# Patient Record
Sex: FEMALE | Race: WHITE | Employment: OTHER | URBAN - METROPOLITAN AREA
[De-identification: names, ages, dates, MRNs, and addresses within clinical notes are randomized per-mention and may not be internally consistent; named-entity substitution may affect disease eponyms.]

---

## 2017-01-06 ENCOUNTER — OFFICE VISIT (OUTPATIENT)
Dept: INTERNAL MEDICINE CLINIC | Age: 62
End: 2017-01-06

## 2017-01-06 VITALS
SYSTOLIC BLOOD PRESSURE: 135 MMHG | BODY MASS INDEX: 30.32 KG/M2 | HEIGHT: 65 IN | DIASTOLIC BLOOD PRESSURE: 80 MMHG | RESPIRATION RATE: 18 BRPM | WEIGHT: 182 LBS | HEART RATE: 99 BPM

## 2017-01-06 DIAGNOSIS — F41.9 ANXIETY AND DEPRESSION: ICD-10-CM

## 2017-01-06 DIAGNOSIS — J43.9 PULMONARY EMPHYSEMA, UNSPECIFIED EMPHYSEMA TYPE (HCC): ICD-10-CM

## 2017-01-06 DIAGNOSIS — R73.09 ELEVATED HEMOGLOBIN A1C: ICD-10-CM

## 2017-01-06 DIAGNOSIS — Z12.31 SCREENING MAMMOGRAM, ENCOUNTER FOR: ICD-10-CM

## 2017-01-06 DIAGNOSIS — I10 ESSENTIAL HYPERTENSION: Primary | ICD-10-CM

## 2017-01-06 DIAGNOSIS — I10 ESSENTIAL HYPERTENSION: ICD-10-CM

## 2017-01-06 DIAGNOSIS — F32.A ANXIETY AND DEPRESSION: ICD-10-CM

## 2017-01-06 DIAGNOSIS — I35.1 NONRHEUMATIC AORTIC VALVE INSUFFICIENCY: ICD-10-CM

## 2017-01-06 PROCEDURE — 99214 OFFICE O/P EST MOD 30 MIN: CPT | Performed by: INTERNAL MEDICINE

## 2017-01-06 RX ORDER — METOPROLOL SUCCINATE 25 MG/1
25 TABLET, EXTENDED RELEASE ORAL DAILY
Qty: 30 TABLET | Refills: 5 | Status: SHIPPED | OUTPATIENT
Start: 2017-01-06 | End: 2017-07-18 | Stop reason: SDUPTHER

## 2017-01-06 RX ORDER — ALBUTEROL SULFATE 90 UG/1
2 AEROSOL, METERED RESPIRATORY (INHALATION) EVERY 6 HOURS PRN
Qty: 1 INHALER | Refills: 2 | Status: SHIPPED | OUTPATIENT
Start: 2017-01-06 | End: 2017-09-06 | Stop reason: SDUPTHER

## 2017-01-06 RX ORDER — VARENICLINE TARTRATE 25 MG
KIT ORAL
Qty: 1 EACH | Refills: 0 | Status: SHIPPED | OUTPATIENT
Start: 2017-01-06 | End: 2017-07-18 | Stop reason: SDUPTHER

## 2017-01-06 RX ORDER — TIZANIDINE 2 MG/1
2 TABLET ORAL 2 TIMES DAILY PRN
Qty: 60 TABLET | Refills: 5 | Status: SHIPPED | OUTPATIENT
Start: 2017-01-06 | End: 2017-07-18 | Stop reason: SDUPTHER

## 2017-01-06 RX ORDER — DILTIAZEM HYDROCHLORIDE 120 MG/1
120 CAPSULE, COATED, EXTENDED RELEASE ORAL DAILY
Qty: 30 CAPSULE | Refills: 5 | Status: SHIPPED | OUTPATIENT
Start: 2017-01-06 | End: 2017-07-18 | Stop reason: SDUPTHER

## 2017-01-06 RX ORDER — LISINOPRIL AND HYDROCHLOROTHIAZIDE 20; 12.5 MG/1; MG/1
1 TABLET ORAL DAILY
Qty: 30 TABLET | Refills: 5 | Status: SHIPPED | OUTPATIENT
Start: 2017-01-06 | End: 2017-07-18 | Stop reason: SDUPTHER

## 2017-01-06 RX ORDER — DULOXETIN HYDROCHLORIDE 30 MG/1
30 CAPSULE, DELAYED RELEASE ORAL DAILY
Qty: 30 CAPSULE | Refills: 5 | Status: SHIPPED | OUTPATIENT
Start: 2017-01-06 | End: 2017-07-18 | Stop reason: SDUPTHER

## 2017-01-07 LAB
A/G RATIO: 1.7 (ref 1.1–2.2)
ALBUMIN SERPL-MCNC: 4 G/DL (ref 3.4–5)
ALP BLD-CCNC: 71 U/L (ref 40–129)
ALT SERPL-CCNC: 14 U/L (ref 10–40)
ANION GAP SERPL CALCULATED.3IONS-SCNC: 19 MMOL/L (ref 3–16)
AST SERPL-CCNC: 16 U/L (ref 15–37)
BASOPHILS ABSOLUTE: 0.2 K/UL (ref 0–0.2)
BASOPHILS RELATIVE PERCENT: 1 %
BILIRUB SERPL-MCNC: 0.3 MG/DL (ref 0–1)
BUN BLDV-MCNC: 14 MG/DL (ref 7–20)
CALCIUM SERPL-MCNC: 9.9 MG/DL (ref 8.3–10.6)
CHLORIDE BLD-SCNC: 96 MMOL/L (ref 99–110)
CO2: 23 MMOL/L (ref 21–32)
CREAT SERPL-MCNC: 1 MG/DL (ref 0.6–1.1)
EOSINOPHILS ABSOLUTE: 0 K/UL (ref 0–0.6)
EOSINOPHILS RELATIVE PERCENT: 0.2 %
ESTIMATED AVERAGE GLUCOSE: 128.4 MG/DL
GFR AFRICAN AMERICAN: >60
GFR NON-AFRICAN AMERICAN: 57
GLOBULIN: 2.4 G/DL
GLUCOSE BLD-MCNC: 113 MG/DL (ref 70–99)
HBA1C MFR BLD: 6.1 %
HCT VFR BLD CALC: 43.9 % (ref 36–48)
HEMOGLOBIN: 14.2 G/DL (ref 12–16)
LYMPHOCYTES ABSOLUTE: 0.8 K/UL (ref 1–5.1)
LYMPHOCYTES RELATIVE PERCENT: 5.2 %
MCH RBC QN AUTO: 30.5 PG (ref 26–34)
MCHC RBC AUTO-ENTMCNC: 32.4 G/DL (ref 31–36)
MCV RBC AUTO: 94.2 FL (ref 80–100)
MONOCYTES ABSOLUTE: 1 K/UL (ref 0–1.3)
MONOCYTES RELATIVE PERCENT: 6.3 %
NEUTROPHILS ABSOLUTE: 13.8 K/UL (ref 1.7–7.7)
NEUTROPHILS RELATIVE PERCENT: 87.3 %
PDW BLD-RTO: 18.8 % (ref 12.4–15.4)
PLATELET # BLD: 320 K/UL (ref 135–450)
PMV BLD AUTO: 8.8 FL (ref 5–10.5)
POTASSIUM SERPL-SCNC: 4.3 MMOL/L (ref 3.5–5.1)
RBC # BLD: 4.66 M/UL (ref 4–5.2)
SODIUM BLD-SCNC: 138 MMOL/L (ref 136–145)
TOTAL PROTEIN: 6.4 G/DL (ref 6.4–8.2)
WBC # BLD: 15.8 K/UL (ref 4–11)

## 2017-02-06 RX ORDER — VARENICLINE TARTRATE 1 MG/1
1 TABLET, FILM COATED ORAL 2 TIMES DAILY
Qty: 60 TABLET | Refills: 0 | Status: SHIPPED | OUTPATIENT
Start: 2017-02-06 | End: 2017-07-18

## 2017-02-22 DIAGNOSIS — J98.4 RESTRICTIVE LUNG DISEASE: Primary | ICD-10-CM

## 2017-02-22 RX ORDER — UMECLIDINIUM 62.5 UG/1
AEROSOL, POWDER ORAL
Qty: 30 EACH | Refills: 5 | Status: SHIPPED | OUTPATIENT
Start: 2017-02-22 | End: 2018-01-26 | Stop reason: SDUPTHER

## 2017-05-31 ENCOUNTER — OFFICE VISIT (OUTPATIENT)
Dept: ORTHOPEDIC SURGERY | Age: 62
End: 2017-05-31

## 2017-05-31 VITALS — WEIGHT: 182.1 LBS | HEIGHT: 65 IN | BODY MASS INDEX: 30.34 KG/M2

## 2017-05-31 DIAGNOSIS — M25.552 LEFT HIP PAIN: Primary | ICD-10-CM

## 2017-05-31 PROCEDURE — 99214 OFFICE O/P EST MOD 30 MIN: CPT | Performed by: ORTHOPAEDIC SURGERY

## 2017-05-31 PROCEDURE — G8427 DOCREV CUR MEDS BY ELIG CLIN: HCPCS | Performed by: ORTHOPAEDIC SURGERY

## 2017-05-31 PROCEDURE — G8417 CALC BMI ABV UP PARAM F/U: HCPCS | Performed by: ORTHOPAEDIC SURGERY

## 2017-05-31 PROCEDURE — 73502 X-RAY EXAM HIP UNI 2-3 VIEWS: CPT | Performed by: ORTHOPAEDIC SURGERY

## 2017-05-31 PROCEDURE — 1036F TOBACCO NON-USER: CPT | Performed by: ORTHOPAEDIC SURGERY

## 2017-05-31 PROCEDURE — 3014F SCREEN MAMMO DOC REV: CPT | Performed by: ORTHOPAEDIC SURGERY

## 2017-05-31 PROCEDURE — 3017F COLORECTAL CA SCREEN DOC REV: CPT | Performed by: ORTHOPAEDIC SURGERY

## 2017-05-31 RX ORDER — HYDROCODONE BITARTRATE AND ACETAMINOPHEN 10; 325 MG/1; MG/1
TABLET ORAL
COMMUNITY
Start: 2017-05-24 | End: 2018-01-26 | Stop reason: ALTCHOICE

## 2017-07-18 ENCOUNTER — OFFICE VISIT (OUTPATIENT)
Dept: INTERNAL MEDICINE CLINIC | Age: 62
End: 2017-07-18

## 2017-07-18 VITALS
WEIGHT: 184 LBS | DIASTOLIC BLOOD PRESSURE: 90 MMHG | HEIGHT: 65 IN | HEART RATE: 107 BPM | SYSTOLIC BLOOD PRESSURE: 140 MMHG | BODY MASS INDEX: 30.66 KG/M2 | RESPIRATION RATE: 18 BRPM

## 2017-07-18 DIAGNOSIS — R73.09 ELEVATED HEMOGLOBIN A1C: ICD-10-CM

## 2017-07-18 DIAGNOSIS — F41.9 ANXIETY AND DEPRESSION: ICD-10-CM

## 2017-07-18 DIAGNOSIS — I10 ESSENTIAL HYPERTENSION: Primary | ICD-10-CM

## 2017-07-18 DIAGNOSIS — J43.9 PULMONARY EMPHYSEMA, UNSPECIFIED EMPHYSEMA TYPE (HCC): ICD-10-CM

## 2017-07-18 DIAGNOSIS — F32.A ANXIETY AND DEPRESSION: ICD-10-CM

## 2017-07-18 PROCEDURE — G8417 CALC BMI ABV UP PARAM F/U: HCPCS | Performed by: INTERNAL MEDICINE

## 2017-07-18 PROCEDURE — 3017F COLORECTAL CA SCREEN DOC REV: CPT | Performed by: INTERNAL MEDICINE

## 2017-07-18 PROCEDURE — 3014F SCREEN MAMMO DOC REV: CPT | Performed by: INTERNAL MEDICINE

## 2017-07-18 PROCEDURE — 1036F TOBACCO NON-USER: CPT | Performed by: INTERNAL MEDICINE

## 2017-07-18 PROCEDURE — G8427 DOCREV CUR MEDS BY ELIG CLIN: HCPCS | Performed by: INTERNAL MEDICINE

## 2017-07-18 PROCEDURE — 99214 OFFICE O/P EST MOD 30 MIN: CPT | Performed by: INTERNAL MEDICINE

## 2017-07-18 PROCEDURE — 3023F SPIROM DOC REV: CPT | Performed by: INTERNAL MEDICINE

## 2017-07-18 PROCEDURE — G8926 SPIRO NO PERF OR DOC: HCPCS | Performed by: INTERNAL MEDICINE

## 2017-07-18 RX ORDER — METOPROLOL SUCCINATE 50 MG/1
50 TABLET, EXTENDED RELEASE ORAL DAILY
Qty: 30 TABLET | Refills: 5 | Status: SHIPPED | OUTPATIENT
Start: 2017-07-18 | End: 2018-01-03 | Stop reason: SDUPTHER

## 2017-07-18 RX ORDER — DILTIAZEM HYDROCHLORIDE 120 MG/1
120 CAPSULE, COATED, EXTENDED RELEASE ORAL DAILY
Qty: 30 CAPSULE | Refills: 5 | Status: SHIPPED | OUTPATIENT
Start: 2017-07-18 | End: 2018-01-03 | Stop reason: SDUPTHER

## 2017-07-18 RX ORDER — DULOXETIN HYDROCHLORIDE 30 MG/1
30 CAPSULE, DELAYED RELEASE ORAL DAILY
Qty: 30 CAPSULE | Refills: 5 | Status: SHIPPED | OUTPATIENT
Start: 2017-07-18 | End: 2018-01-03 | Stop reason: SDUPTHER

## 2017-07-18 RX ORDER — VARENICLINE TARTRATE 25 MG
KIT ORAL
Qty: 1 EACH | Refills: 0 | Status: SHIPPED | OUTPATIENT
Start: 2017-07-18 | End: 2017-08-15 | Stop reason: SDUPTHER

## 2017-07-18 RX ORDER — TIZANIDINE 2 MG/1
2 TABLET ORAL 2 TIMES DAILY PRN
Qty: 60 TABLET | Refills: 5 | Status: SHIPPED | OUTPATIENT
Start: 2017-07-18 | End: 2018-01-03 | Stop reason: SDUPTHER

## 2017-07-18 RX ORDER — LISINOPRIL AND HYDROCHLOROTHIAZIDE 20; 12.5 MG/1; MG/1
1 TABLET ORAL DAILY
Qty: 30 TABLET | Refills: 5 | Status: SHIPPED | OUTPATIENT
Start: 2017-07-18 | End: 2018-01-26 | Stop reason: SDUPTHER

## 2017-07-18 ASSESSMENT — PATIENT HEALTH QUESTIONNAIRE - PHQ9
SUM OF ALL RESPONSES TO PHQ QUESTIONS 1-9: 1
SUM OF ALL RESPONSES TO PHQ9 QUESTIONS 1 & 2: 1
2. FEELING DOWN, DEPRESSED OR HOPELESS: 1
1. LITTLE INTEREST OR PLEASURE IN DOING THINGS: 0

## 2017-08-11 ENCOUNTER — TELEPHONE (OUTPATIENT)
Dept: PULMONOLOGY | Age: 62
End: 2017-08-11

## 2017-08-21 RX ORDER — VARENICLINE TARTRATE
KIT
Qty: 53 EACH | Refills: 0 | Status: SHIPPED | OUTPATIENT
Start: 2017-08-21 | End: 2018-01-26 | Stop reason: SDUPTHER

## 2017-10-23 DIAGNOSIS — J98.4 RESTRICTIVE LUNG DISEASE: ICD-10-CM

## 2017-10-23 RX ORDER — UMECLIDINIUM 62.5 UG/1
AEROSOL, POWDER ORAL
Qty: 30 EACH | Refills: 5 | OUTPATIENT
Start: 2017-10-23

## 2017-11-06 DIAGNOSIS — J98.4 RESTRICTIVE LUNG DISEASE: ICD-10-CM

## 2017-11-06 RX ORDER — UMECLIDINIUM 62.5 UG/1
AEROSOL, POWDER ORAL
Qty: 30 EACH | Refills: 0 | OUTPATIENT
Start: 2017-11-06

## 2018-01-03 DIAGNOSIS — M47.817 LUMBOSACRAL SPONDYLOSIS WITHOUT MYELOPATHY: Primary | Chronic | ICD-10-CM

## 2018-01-03 RX ORDER — METOPROLOL SUCCINATE 50 MG/1
50 TABLET, EXTENDED RELEASE ORAL DAILY
Qty: 30 TABLET | Refills: 0 | Status: SHIPPED | OUTPATIENT
Start: 2018-01-03 | End: 2018-01-26 | Stop reason: SDUPTHER

## 2018-01-03 RX ORDER — DILTIAZEM HYDROCHLORIDE 120 MG/1
120 CAPSULE, COATED, EXTENDED RELEASE ORAL DAILY
Qty: 30 CAPSULE | Refills: 0 | Status: SHIPPED | OUTPATIENT
Start: 2018-01-03 | End: 2018-01-26 | Stop reason: SDUPTHER

## 2018-01-03 RX ORDER — DULOXETIN HYDROCHLORIDE 30 MG/1
30 CAPSULE, DELAYED RELEASE ORAL DAILY
Qty: 30 CAPSULE | Refills: 0 | Status: SHIPPED | OUTPATIENT
Start: 2018-01-03 | End: 2018-01-26 | Stop reason: SDUPTHER

## 2018-01-03 RX ORDER — TIZANIDINE 2 MG/1
TABLET ORAL
Qty: 60 TABLET | Refills: 0 | Status: SHIPPED | OUTPATIENT
Start: 2018-01-03 | End: 2018-01-26 | Stop reason: SDUPTHER

## 2018-01-26 ENCOUNTER — OFFICE VISIT (OUTPATIENT)
Dept: INTERNAL MEDICINE CLINIC | Age: 63
End: 2018-01-26

## 2018-01-26 VITALS
SYSTOLIC BLOOD PRESSURE: 150 MMHG | DIASTOLIC BLOOD PRESSURE: 75 MMHG | HEART RATE: 70 BPM | WEIGHT: 188 LBS | RESPIRATION RATE: 18 BRPM | HEIGHT: 65 IN | BODY MASS INDEX: 31.32 KG/M2

## 2018-01-26 DIAGNOSIS — J43.1 PANLOBULAR EMPHYSEMA (HCC): ICD-10-CM

## 2018-01-26 DIAGNOSIS — Z72.0 TOBACCO ABUSE: ICD-10-CM

## 2018-01-26 DIAGNOSIS — I10 ESSENTIAL HYPERTENSION: Primary | ICD-10-CM

## 2018-01-26 DIAGNOSIS — Z13.220 LIPID SCREENING: ICD-10-CM

## 2018-01-26 DIAGNOSIS — M47.817 LUMBOSACRAL SPONDYLOSIS WITHOUT MYELOPATHY: Chronic | ICD-10-CM

## 2018-01-26 DIAGNOSIS — M51.37 DEGENERATION OF LUMBAR OR LUMBOSACRAL INTERVERTEBRAL DISC: Chronic | ICD-10-CM

## 2018-01-26 DIAGNOSIS — J98.4 RESTRICTIVE LUNG DISEASE: ICD-10-CM

## 2018-01-26 DIAGNOSIS — Z12.31 ENCOUNTER FOR SCREENING MAMMOGRAM FOR BREAST CANCER: ICD-10-CM

## 2018-01-26 DIAGNOSIS — R73.09 ELEVATED HEMOGLOBIN A1C: ICD-10-CM

## 2018-01-26 PROCEDURE — 99214 OFFICE O/P EST MOD 30 MIN: CPT | Performed by: INTERNAL MEDICINE

## 2018-01-26 PROCEDURE — 1036F TOBACCO NON-USER: CPT | Performed by: INTERNAL MEDICINE

## 2018-01-26 PROCEDURE — 3023F SPIROM DOC REV: CPT | Performed by: INTERNAL MEDICINE

## 2018-01-26 PROCEDURE — 3014F SCREEN MAMMO DOC REV: CPT | Performed by: INTERNAL MEDICINE

## 2018-01-26 PROCEDURE — G8926 SPIRO NO PERF OR DOC: HCPCS | Performed by: INTERNAL MEDICINE

## 2018-01-26 PROCEDURE — G8427 DOCREV CUR MEDS BY ELIG CLIN: HCPCS | Performed by: INTERNAL MEDICINE

## 2018-01-26 PROCEDURE — 3017F COLORECTAL CA SCREEN DOC REV: CPT | Performed by: INTERNAL MEDICINE

## 2018-01-26 PROCEDURE — G8417 CALC BMI ABV UP PARAM F/U: HCPCS | Performed by: INTERNAL MEDICINE

## 2018-01-26 PROCEDURE — G8484 FLU IMMUNIZE NO ADMIN: HCPCS | Performed by: INTERNAL MEDICINE

## 2018-01-26 RX ORDER — VARENICLINE TARTRATE 25 MG
KIT ORAL
Qty: 53 EACH | Refills: 0 | Status: SHIPPED | OUTPATIENT
Start: 2018-01-26 | End: 2018-03-22 | Stop reason: SDUPTHER

## 2018-01-26 RX ORDER — DILTIAZEM HYDROCHLORIDE 120 MG/1
120 CAPSULE, COATED, EXTENDED RELEASE ORAL DAILY
Qty: 30 CAPSULE | Refills: 0 | Status: SHIPPED | OUTPATIENT
Start: 2018-01-26 | End: 2018-02-27 | Stop reason: SDUPTHER

## 2018-01-26 RX ORDER — LISINOPRIL AND HYDROCHLOROTHIAZIDE 20; 12.5 MG/1; MG/1
1 TABLET ORAL DAILY
Qty: 30 TABLET | Refills: 0 | Status: SHIPPED | OUTPATIENT
Start: 2018-01-26 | End: 2018-02-27 | Stop reason: SDUPTHER

## 2018-01-26 RX ORDER — VARENICLINE TARTRATE 1 MG/1
1 TABLET, FILM COATED ORAL 2 TIMES DAILY
Qty: 60 TABLET | Refills: 3 | Status: SHIPPED | OUTPATIENT
Start: 2018-01-26 | End: 2018-03-22 | Stop reason: SDUPTHER

## 2018-01-26 RX ORDER — TIZANIDINE 2 MG/1
TABLET ORAL
Qty: 60 TABLET | Refills: 0 | Status: SHIPPED | OUTPATIENT
Start: 2018-01-26 | End: 2018-02-27 | Stop reason: SDUPTHER

## 2018-01-26 RX ORDER — METOPROLOL SUCCINATE 50 MG/1
50 TABLET, EXTENDED RELEASE ORAL DAILY
Qty: 30 TABLET | Refills: 0 | Status: SHIPPED | OUTPATIENT
Start: 2018-01-26 | End: 2018-02-27 | Stop reason: SDUPTHER

## 2018-01-26 RX ORDER — DULOXETIN HYDROCHLORIDE 30 MG/1
30 CAPSULE, DELAYED RELEASE ORAL DAILY
Qty: 30 CAPSULE | Refills: 0 | Status: SHIPPED | OUTPATIENT
Start: 2018-01-26 | End: 2018-02-27 | Stop reason: SDUPTHER

## 2018-01-26 NOTE — PROGRESS NOTES
Subjective:      Patient ID: Praful Mckinnon is a 63 y.o. female. HPI     63 y.o. female with h.o   chronic back pain , copd, HTN , tobacco use here for regular f/w    Chronic back pain - s.p mulitiple surgeries. S.p VJ by F/w Dr Triston Raphael  on vicodin. , zanaflex Had epidurals with some relief- now lost them    Reports having more pain issues since last time   Using cane for ambulation . No recent falls    Able to do some activity     Depression better , compliant with meds     Copd with chronic Dyspnea stable. Uses inhalers as needed    Still smoking a pack a day    Current Outpatient Prescriptions   Medication Sig Dispense Refill    metoprolol succinate (TOPROL XL) 50 MG extended release tablet TAKE 1 TABLET BY MOUTH DAILY 30 tablet 0    diltiazem (CARDIZEM CD) 120 MG extended release capsule TAKE 1 CAPSULE BY MOUTH DAILY 30 capsule 0    DULoxetine (CYMBALTA) 30 MG extended release capsule TAKE 1 CAPSULE BY MOUTH DAILY 30 capsule 0    tiZANidine (ZANAFLEX) 2 MG tablet TAKE 1 TABLET BY MOUTH 2 TIMES DAILY AS NEEDED FOR MUSCLE PAIN 60 tablet 0    LYRICA 100 MG capsule Take 1 capsule by mouth 2 times daily for 30 days. 60 capsule 0    VENTOLIN  (90 Base) MCG/ACT inhaler Inhale 2 puffs into the lungs every 6 hours as needed for Wheezing 18 g 1    CHANTIX STARTING MONTH RICHARD 0.5 MG X 11 & 1 MG X 42 tablet TAKE AS DIRECTED PER PACKAGE INSTRUCTIONS 53 each 0    lisinopril-hydrochlorothiazide (PRINZIDE;ZESTORETIC) 20-12.5 MG per tablet Take 1 tablet by mouth daily 30 tablet 5    HYDROcodone-acetaminophen (NORCO)  MG per tablet .  INCRUSE ELLIPTA 62.5 MCG/INH AEPB USE ONE PUFF DAILY 30 each 5     No current facility-administered medications for this visit. Review of Systems  As above    Objective:   Physical Exam  There were no vitals filed for this visit. General: older appearing female,  Awake, alert and oriented.   Mucous Membranes:  Pink , anicteric  Neck: No JVD, no carotid bruit, no thyromegaly  Chest:  Clear to auscultation bilaterally, no added sounds  Cardiovascular:  RRR S1S2 heard, no murmurs or gallops  Abdomen:  Soft, undistended, non tender, no organomegaly, BS present  Extremities: trace edema Distal pulses well felt  Neurological : grossly normal mood ok today      Assessment:      1. Essential hypertension     2. Degeneration of lumbar or lumbosacral intervertebral disc     3. Tobacco abuse     4. Panlobular emphysema (HCC)             Plan:      Anxiety and severe  depression - has used prozac and klonopin in the past   Weaned off klonopin 2015    - now on cymbalta 30 mg daily -better controlled  - no suicidal thoughts  - would benefit from psych consult in neat future  - doing better since last year       HTn - high on meds, should be compliant on cardizem, lisinopril , metoprolol well controlled     Aortic regurgitation - no acute symptoms. F/w Dr. Selina Zuluaga    Tobacco abuse  - Need smoking cessation- need ct chest screening    COPD with pulmonary nodules- f/w Jm Bowman . Off advair and now on tudorza .  Albuterol prn    Elevated Hemoglobin A1c  At 6.1, low carb diet, -need to recheck    Chronic back pain - on vicodin by Dr. Chuy Cao- now off pain management  As she could not maintain pill count    Refer to new pain mx      Need colonoscopy /mammogram

## 2018-02-06 DIAGNOSIS — M47.817 LUMBOSACRAL SPONDYLOSIS WITHOUT MYELOPATHY: Chronic | ICD-10-CM

## 2018-02-24 DIAGNOSIS — J98.4 RESTRICTIVE LUNG DISEASE: ICD-10-CM

## 2018-02-26 ENCOUNTER — TELEPHONE (OUTPATIENT)
Dept: INTERNAL MEDICINE CLINIC | Age: 63
End: 2018-02-26

## 2018-02-26 DIAGNOSIS — M51.26 DISPLACEMENT OF LUMBAR INTERVERTEBRAL DISC WITHOUT MYELOPATHY: Chronic | ICD-10-CM

## 2018-02-26 DIAGNOSIS — M47.817 LUMBOSACRAL SPONDYLOSIS WITHOUT MYELOPATHY: Primary | Chronic | ICD-10-CM

## 2018-02-26 DIAGNOSIS — M48.061 SPINAL STENOSIS, LUMBAR REGION, WITHOUT NEUROGENIC CLAUDICATION: Chronic | ICD-10-CM

## 2018-02-26 DIAGNOSIS — M51.37 DEGENERATION OF LUMBAR OR LUMBOSACRAL INTERVERTEBRAL DISC: Chronic | ICD-10-CM

## 2018-02-26 RX ORDER — UMECLIDINIUM 62.5 UG/1
1 AEROSOL, POWDER ORAL DAILY
Qty: 30 EACH | Refills: 1 | Status: SHIPPED | OUTPATIENT
Start: 2018-02-26 | End: 2018-03-22 | Stop reason: ALTCHOICE

## 2018-02-26 NOTE — TELEPHONE ENCOUNTER
----- Message from Sonya Petit sent at 2/26/2018  2:05 PM EST -----  Contact: [phone]  She needs a referral sent to [facility] pain management-said they wouldn't schedule her with a drNoel Until they had this ,so shes not sure of which dr.she will be seeing-said she is going there for her back pain- # [phone] not have fax # last appt- 1-26-18-pt has medicare ins.--lr

## 2018-02-27 DIAGNOSIS — J98.4 RESTRICTIVE LUNG DISEASE: ICD-10-CM

## 2018-02-28 RX ORDER — LISINOPRIL AND HYDROCHLOROTHIAZIDE 20; 12.5 MG/1; MG/1
1 TABLET ORAL DAILY
Qty: 30 TABLET | Refills: 0 | Status: SHIPPED | OUTPATIENT
Start: 2018-02-28 | End: 2018-03-22 | Stop reason: SDUPTHER

## 2018-02-28 RX ORDER — TIZANIDINE 2 MG/1
TABLET ORAL
Qty: 60 TABLET | Refills: 0 | Status: SHIPPED | OUTPATIENT
Start: 2018-02-28 | End: 2018-03-22 | Stop reason: SDUPTHER

## 2018-02-28 RX ORDER — DULOXETIN HYDROCHLORIDE 30 MG/1
30 CAPSULE, DELAYED RELEASE ORAL DAILY
Qty: 30 CAPSULE | Refills: 0 | Status: SHIPPED | OUTPATIENT
Start: 2018-02-28 | End: 2018-03-22 | Stop reason: SDUPTHER

## 2018-02-28 RX ORDER — DILTIAZEM HYDROCHLORIDE 120 MG/1
120 CAPSULE, COATED, EXTENDED RELEASE ORAL DAILY
Qty: 30 CAPSULE | Refills: 0 | Status: SHIPPED | OUTPATIENT
Start: 2018-02-28 | End: 2018-03-22 | Stop reason: SDUPTHER

## 2018-02-28 RX ORDER — UMECLIDINIUM 62.5 UG/1
1 AEROSOL, POWDER ORAL DAILY
Qty: 30 EACH | Refills: 0 | Status: SHIPPED | OUTPATIENT
Start: 2018-02-28 | End: 2018-03-22 | Stop reason: SDUPTHER

## 2018-02-28 RX ORDER — METOPROLOL SUCCINATE 50 MG/1
50 TABLET, EXTENDED RELEASE ORAL DAILY
Qty: 30 TABLET | Refills: 0 | Status: SHIPPED | OUTPATIENT
Start: 2018-02-28 | End: 2018-03-22 | Stop reason: SDUPTHER

## 2018-03-22 ENCOUNTER — OFFICE VISIT (OUTPATIENT)
Dept: INTERNAL MEDICINE CLINIC | Age: 63
End: 2018-03-22

## 2018-03-22 VITALS
HEART RATE: 70 BPM | BODY MASS INDEX: 31.62 KG/M2 | WEIGHT: 190 LBS | SYSTOLIC BLOOD PRESSURE: 155 MMHG | DIASTOLIC BLOOD PRESSURE: 75 MMHG | RESPIRATION RATE: 18 BRPM

## 2018-03-22 DIAGNOSIS — J43.1 PANLOBULAR EMPHYSEMA (HCC): ICD-10-CM

## 2018-03-22 DIAGNOSIS — R73.09 ELEVATED HEMOGLOBIN A1C: ICD-10-CM

## 2018-03-22 DIAGNOSIS — I10 BENIGN ESSENTIAL HTN: Primary | ICD-10-CM

## 2018-03-22 DIAGNOSIS — I10 BENIGN ESSENTIAL HTN: ICD-10-CM

## 2018-03-22 DIAGNOSIS — M48.061 SPINAL STENOSIS, LUMBAR REGION, WITHOUT NEUROGENIC CLAUDICATION: Chronic | ICD-10-CM

## 2018-03-22 DIAGNOSIS — M47.817 LUMBOSACRAL SPONDYLOSIS WITHOUT MYELOPATHY: Chronic | ICD-10-CM

## 2018-03-22 PROCEDURE — G8417 CALC BMI ABV UP PARAM F/U: HCPCS | Performed by: INTERNAL MEDICINE

## 2018-03-22 PROCEDURE — 99213 OFFICE O/P EST LOW 20 MIN: CPT | Performed by: INTERNAL MEDICINE

## 2018-03-22 PROCEDURE — 3023F SPIROM DOC REV: CPT | Performed by: INTERNAL MEDICINE

## 2018-03-22 PROCEDURE — 3014F SCREEN MAMMO DOC REV: CPT | Performed by: INTERNAL MEDICINE

## 2018-03-22 PROCEDURE — G8427 DOCREV CUR MEDS BY ELIG CLIN: HCPCS | Performed by: INTERNAL MEDICINE

## 2018-03-22 PROCEDURE — G8484 FLU IMMUNIZE NO ADMIN: HCPCS | Performed by: INTERNAL MEDICINE

## 2018-03-22 PROCEDURE — 3017F COLORECTAL CA SCREEN DOC REV: CPT | Performed by: INTERNAL MEDICINE

## 2018-03-22 PROCEDURE — 4004F PT TOBACCO SCREEN RCVD TLK: CPT | Performed by: INTERNAL MEDICINE

## 2018-03-22 PROCEDURE — G8926 SPIRO NO PERF OR DOC: HCPCS | Performed by: INTERNAL MEDICINE

## 2018-03-22 RX ORDER — DILTIAZEM HYDROCHLORIDE 120 MG/1
120 CAPSULE, COATED, EXTENDED RELEASE ORAL DAILY
Qty: 30 CAPSULE | Refills: 5 | Status: SHIPPED | OUTPATIENT
Start: 2018-03-22 | End: 2018-07-23 | Stop reason: SDUPTHER

## 2018-03-22 RX ORDER — DULOXETIN HYDROCHLORIDE 30 MG/1
30 CAPSULE, DELAYED RELEASE ORAL DAILY
Qty: 30 CAPSULE | Refills: 0 | Status: SHIPPED | OUTPATIENT
Start: 2018-03-22 | End: 2018-04-18 | Stop reason: SDUPTHER

## 2018-03-22 RX ORDER — TIZANIDINE 2 MG/1
2 TABLET ORAL 2 TIMES DAILY PRN
Qty: 60 TABLET | Refills: 5 | Status: SHIPPED | OUTPATIENT
Start: 2018-03-22 | End: 2018-07-23 | Stop reason: SDUPTHER

## 2018-03-22 RX ORDER — LISINOPRIL AND HYDROCHLOROTHIAZIDE 20; 12.5 MG/1; MG/1
1 TABLET ORAL DAILY
Qty: 30 TABLET | Refills: 5 | Status: SHIPPED | OUTPATIENT
Start: 2018-03-22 | End: 2018-07-23

## 2018-03-22 RX ORDER — METOPROLOL SUCCINATE 50 MG/1
50 TABLET, EXTENDED RELEASE ORAL DAILY
Qty: 30 TABLET | Refills: 5 | Status: SHIPPED | OUTPATIENT
Start: 2018-03-22 | End: 2018-07-23 | Stop reason: SDUPTHER

## 2018-03-22 RX ORDER — VARENICLINE TARTRATE 1 MG/1
1 TABLET, FILM COATED ORAL 2 TIMES DAILY
Qty: 60 TABLET | Refills: 3 | Status: SHIPPED | OUTPATIENT
Start: 2018-03-22 | End: 2019-01-14 | Stop reason: SDUPTHER

## 2018-03-23 LAB
A/G RATIO: 1.9 (ref 1.1–2.2)
ALBUMIN SERPL-MCNC: 4.2 G/DL (ref 3.4–5)
ALP BLD-CCNC: 65 U/L (ref 40–129)
ALT SERPL-CCNC: 13 U/L (ref 10–40)
ANION GAP SERPL CALCULATED.3IONS-SCNC: 22 MMOL/L (ref 3–16)
AST SERPL-CCNC: 13 U/L (ref 15–37)
BASOPHILS ABSOLUTE: 0.1 K/UL (ref 0–0.2)
BASOPHILS RELATIVE PERCENT: 0.7 %
BILIRUB SERPL-MCNC: <0.2 MG/DL (ref 0–1)
BUN BLDV-MCNC: 15 MG/DL (ref 7–20)
CALCIUM SERPL-MCNC: 9.2 MG/DL (ref 8.3–10.6)
CHLORIDE BLD-SCNC: 97 MMOL/L (ref 99–110)
CO2: 23 MMOL/L (ref 21–32)
CREAT SERPL-MCNC: 0.8 MG/DL (ref 0.6–1.2)
EOSINOPHILS ABSOLUTE: 0.1 K/UL (ref 0–0.6)
EOSINOPHILS RELATIVE PERCENT: 1.2 %
ESTIMATED AVERAGE GLUCOSE: 139.9 MG/DL
GFR AFRICAN AMERICAN: >60
GFR NON-AFRICAN AMERICAN: >60
GLOBULIN: 2.2 G/DL
GLUCOSE BLD-MCNC: 123 MG/DL (ref 70–99)
HBA1C MFR BLD: 6.5 %
HCT VFR BLD CALC: 42.3 % (ref 36–48)
HEMOGLOBIN: 14.1 G/DL (ref 12–16)
LYMPHOCYTES ABSOLUTE: 1.2 K/UL (ref 1–5.1)
LYMPHOCYTES RELATIVE PERCENT: 12.5 %
MCH RBC QN AUTO: 32.7 PG (ref 26–34)
MCHC RBC AUTO-ENTMCNC: 33.4 G/DL (ref 31–36)
MCV RBC AUTO: 98 FL (ref 80–100)
MONOCYTES ABSOLUTE: 0.8 K/UL (ref 0–1.3)
MONOCYTES RELATIVE PERCENT: 8.5 %
NEUTROPHILS ABSOLUTE: 7.5 K/UL (ref 1.7–7.7)
NEUTROPHILS RELATIVE PERCENT: 77.1 %
PDW BLD-RTO: 17.7 % (ref 12.4–15.4)
PLATELET # BLD: 328 K/UL (ref 135–450)
PMV BLD AUTO: 8.8 FL (ref 5–10.5)
POTASSIUM SERPL-SCNC: 4.7 MMOL/L (ref 3.5–5.1)
RBC # BLD: 4.31 M/UL (ref 4–5.2)
SODIUM BLD-SCNC: 142 MMOL/L (ref 136–145)
TOTAL PROTEIN: 6.4 G/DL (ref 6.4–8.2)
WBC # BLD: 9.7 K/UL (ref 4–11)

## 2018-07-19 RX ORDER — DULOXETIN HYDROCHLORIDE 30 MG/1
30 CAPSULE, DELAYED RELEASE ORAL DAILY
Qty: 30 CAPSULE | Refills: 2 | Status: SHIPPED | OUTPATIENT
Start: 2018-07-19 | End: 2018-11-19 | Stop reason: SDUPTHER

## 2018-07-23 ENCOUNTER — OFFICE VISIT (OUTPATIENT)
Dept: INTERNAL MEDICINE CLINIC | Age: 63
End: 2018-07-23

## 2018-07-23 VITALS
DIASTOLIC BLOOD PRESSURE: 78 MMHG | SYSTOLIC BLOOD PRESSURE: 150 MMHG | HEIGHT: 65 IN | RESPIRATION RATE: 18 BRPM | HEART RATE: 70 BPM | BODY MASS INDEX: 33.66 KG/M2 | WEIGHT: 202 LBS

## 2018-07-23 DIAGNOSIS — Z23 NEED FOR PNEUMOCOCCAL VACCINATION: ICD-10-CM

## 2018-07-23 DIAGNOSIS — M48.061 SPINAL STENOSIS, LUMBAR REGION, WITHOUT NEUROGENIC CLAUDICATION: Chronic | ICD-10-CM

## 2018-07-23 DIAGNOSIS — F32.A ANXIETY AND DEPRESSION: ICD-10-CM

## 2018-07-23 DIAGNOSIS — I10 ESSENTIAL HYPERTENSION: ICD-10-CM

## 2018-07-23 DIAGNOSIS — J43.1 PANLOBULAR EMPHYSEMA (HCC): Primary | ICD-10-CM

## 2018-07-23 DIAGNOSIS — F41.9 ANXIETY AND DEPRESSION: ICD-10-CM

## 2018-07-23 DIAGNOSIS — R73.09 ELEVATED HEMOGLOBIN A1C: ICD-10-CM

## 2018-07-23 LAB
BASOPHILS ABSOLUTE: 0.1 K/UL (ref 0–0.2)
BASOPHILS RELATIVE PERCENT: 0.7 %
EOSINOPHILS ABSOLUTE: 0 K/UL (ref 0–0.6)
EOSINOPHILS RELATIVE PERCENT: 0.3 %
HCT VFR BLD CALC: 41.4 % (ref 36–48)
HEMOGLOBIN: 13.9 G/DL (ref 12–16)
LYMPHOCYTES ABSOLUTE: 0.8 K/UL (ref 1–5.1)
LYMPHOCYTES RELATIVE PERCENT: 8.9 %
MCH RBC QN AUTO: 33 PG (ref 26–34)
MCHC RBC AUTO-ENTMCNC: 33.5 G/DL (ref 31–36)
MCV RBC AUTO: 98.4 FL (ref 80–100)
MONOCYTES ABSOLUTE: 0.7 K/UL (ref 0–1.3)
MONOCYTES RELATIVE PERCENT: 7.1 %
NEUTROPHILS ABSOLUTE: 7.8 K/UL (ref 1.7–7.7)
NEUTROPHILS RELATIVE PERCENT: 83 %
PDW BLD-RTO: 16.1 % (ref 12.4–15.4)
PLATELET # BLD: 286 K/UL (ref 135–450)
PMV BLD AUTO: 8.5 FL (ref 5–10.5)
RBC # BLD: 4.21 M/UL (ref 4–5.2)
WBC # BLD: 9.5 K/UL (ref 4–11)

## 2018-07-23 PROCEDURE — 90732 PPSV23 VACC 2 YRS+ SUBQ/IM: CPT | Performed by: INTERNAL MEDICINE

## 2018-07-23 PROCEDURE — 3023F SPIROM DOC REV: CPT | Performed by: INTERNAL MEDICINE

## 2018-07-23 PROCEDURE — 3017F COLORECTAL CA SCREEN DOC REV: CPT | Performed by: INTERNAL MEDICINE

## 2018-07-23 PROCEDURE — G8427 DOCREV CUR MEDS BY ELIG CLIN: HCPCS | Performed by: INTERNAL MEDICINE

## 2018-07-23 PROCEDURE — G8417 CALC BMI ABV UP PARAM F/U: HCPCS | Performed by: INTERNAL MEDICINE

## 2018-07-23 PROCEDURE — 4004F PT TOBACCO SCREEN RCVD TLK: CPT | Performed by: INTERNAL MEDICINE

## 2018-07-23 PROCEDURE — G8926 SPIRO NO PERF OR DOC: HCPCS | Performed by: INTERNAL MEDICINE

## 2018-07-23 PROCEDURE — G0009 ADMIN PNEUMOCOCCAL VACCINE: HCPCS | Performed by: INTERNAL MEDICINE

## 2018-07-23 PROCEDURE — 99213 OFFICE O/P EST LOW 20 MIN: CPT | Performed by: INTERNAL MEDICINE

## 2018-07-23 RX ORDER — TIZANIDINE 2 MG/1
2 TABLET ORAL 2 TIMES DAILY PRN
Qty: 60 TABLET | Refills: 5 | Status: SHIPPED | OUTPATIENT
Start: 2018-07-23 | End: 2018-11-19 | Stop reason: SDUPTHER

## 2018-07-23 RX ORDER — VARENICLINE TARTRATE 1 MG/1
1 TABLET, FILM COATED ORAL 2 TIMES DAILY
Qty: 60 TABLET | Refills: 3 | Status: CANCELLED | OUTPATIENT
Start: 2018-07-23

## 2018-07-23 RX ORDER — METOPROLOL SUCCINATE 50 MG/1
50 TABLET, EXTENDED RELEASE ORAL DAILY
Qty: 30 TABLET | Refills: 5 | Status: SHIPPED | OUTPATIENT
Start: 2018-07-23 | End: 2018-11-19 | Stop reason: SDUPTHER

## 2018-07-23 RX ORDER — LISINOPRIL AND HYDROCHLOROTHIAZIDE 25; 20 MG/1; MG/1
1 TABLET ORAL DAILY
Qty: 30 TABLET | Refills: 5 | Status: SHIPPED | OUTPATIENT
Start: 2018-07-23 | End: 2018-11-19 | Stop reason: SDUPTHER

## 2018-07-23 RX ORDER — LISINOPRIL AND HYDROCHLOROTHIAZIDE 20; 12.5 MG/1; MG/1
1 TABLET ORAL DAILY
Qty: 30 TABLET | Refills: 5 | Status: CANCELLED | OUTPATIENT
Start: 2018-07-23

## 2018-07-23 RX ORDER — DILTIAZEM HYDROCHLORIDE 120 MG/1
120 CAPSULE, COATED, EXTENDED RELEASE ORAL DAILY
Qty: 30 CAPSULE | Refills: 5 | Status: SHIPPED | OUTPATIENT
Start: 2018-07-23 | End: 2018-11-19 | Stop reason: SDUPTHER

## 2018-07-23 NOTE — PROGRESS NOTES
Subjective:      Patient ID: Corinne Adame is a 66 y.o. female. HPI     66 y.o. female with h.o   chronic back pain , copd, HTN , tobacco use here for regular f/w    Chronic back pain - s.p mulitiple surgeries  S.p VJ by  Dr Maye Gutierrez- now changed pain mx to [place]    Was fired from Dr. Maye Gutierrez office  Off  vicodin. , zanaflex Had recent RFA to back with minimal relief-       Reports ongoing  pain issues due to being off meds   Using cane/walker  for ambulation . No recent falls       in [facility] ,expected to return this year    Depression better , compliant with meds - cymbalta    Copd with chronic Dyspnea stable. Uses inhalers as needed  Quit smoking 2 weeks ago with chantix    Current Outpatient Prescriptions   Medication Sig Dispense Refill    DULoxetine (CYMBALTA) 30 MG extended release capsule TAKE 1 CAPSULE BY MOUTH DAILY 30 capsule 2    LYRICA 100 MG capsule Take 1 capsule by mouth 2 times daily for 180 days. 60 capsule 5    tiZANidine (ZANAFLEX) 2 MG tablet Take 1 tablet by mouth 2 times daily as needed (muscle spasm) 60 tablet 5    metoprolol succinate (TOPROL XL) 50 MG extended release tablet Take 1 tablet by mouth daily 30 tablet 5    diltiazem (CARDIZEM CD) 120 MG extended release capsule Take 1 capsule by mouth daily 30 capsule 5    lisinopril-hydrochlorothiazide (PRINZIDE;ZESTORETIC) 20-12.5 MG per tablet Take 1 tablet by mouth daily 30 tablet 5    Umeclidinium Bromide (INCRUSE ELLIPTA) 62.5 MCG/INH AEPB Inhale 1 puff into the lungs daily 30 each 5    varenicline (CHANTIX CONTINUING MONTH RICHARD) 1 MG tablet Take 1 tablet by mouth 2 times daily 60 tablet 3    VENTOLIN  (90 Base) MCG/ACT inhaler Inhale 2 puffs into the lungs every 6 hours as needed for Wheezing 18 g 1     No current facility-administered medications for this visit.         Review of Systems  As above    Objective:   Physical Exam  Vitals:    07/23/18 1403   BP: (!) 150/78   Pulse: 70   Resp: 18         General: older

## 2018-07-24 LAB
A/G RATIO: 1.7 (ref 1.1–2.2)
ALBUMIN SERPL-MCNC: 4 G/DL (ref 3.4–5)
ALP BLD-CCNC: 66 U/L (ref 40–129)
ALT SERPL-CCNC: 16 U/L (ref 10–40)
ANION GAP SERPL CALCULATED.3IONS-SCNC: 16 MMOL/L (ref 3–16)
AST SERPL-CCNC: 15 U/L (ref 15–37)
BILIRUB SERPL-MCNC: <0.2 MG/DL (ref 0–1)
BUN BLDV-MCNC: 14 MG/DL (ref 7–20)
CALCIUM SERPL-MCNC: 9.4 MG/DL (ref 8.3–10.6)
CHLORIDE BLD-SCNC: 100 MMOL/L (ref 99–110)
CO2: 24 MMOL/L (ref 21–32)
CREAT SERPL-MCNC: 0.8 MG/DL (ref 0.6–1.2)
ESTIMATED AVERAGE GLUCOSE: 142.7 MG/DL
GFR AFRICAN AMERICAN: >60
GFR NON-AFRICAN AMERICAN: >60
GLOBULIN: 2.3 G/DL
GLUCOSE BLD-MCNC: 143 MG/DL (ref 70–99)
HBA1C MFR BLD: 6.6 %
POTASSIUM SERPL-SCNC: 4 MMOL/L (ref 3.5–5.1)
SODIUM BLD-SCNC: 140 MMOL/L (ref 136–145)
TOTAL PROTEIN: 6.3 G/DL (ref 6.4–8.2)

## 2018-10-10 DIAGNOSIS — M47.817 LUMBOSACRAL SPONDYLOSIS WITHOUT MYELOPATHY: Chronic | ICD-10-CM

## 2018-10-26 ENCOUNTER — OFFICE VISIT (OUTPATIENT)
Dept: INTERNAL MEDICINE CLINIC | Age: 63
End: 2018-10-26

## 2018-10-26 VITALS
HEART RATE: 80 BPM | BODY MASS INDEX: 32.99 KG/M2 | DIASTOLIC BLOOD PRESSURE: 79 MMHG | SYSTOLIC BLOOD PRESSURE: 135 MMHG | WEIGHT: 198 LBS | HEIGHT: 65 IN | RESPIRATION RATE: 18 BRPM

## 2018-10-26 DIAGNOSIS — I10 ESSENTIAL HYPERTENSION: ICD-10-CM

## 2018-10-26 DIAGNOSIS — M51.26 DISPLACEMENT OF LUMBAR INTERVERTEBRAL DISC WITHOUT MYELOPATHY: Chronic | ICD-10-CM

## 2018-10-26 DIAGNOSIS — J43.1 PANLOBULAR EMPHYSEMA (HCC): ICD-10-CM

## 2018-10-26 DIAGNOSIS — E11.9 WELL CONTROLLED TYPE 2 DIABETES MELLITUS (HCC): Primary | ICD-10-CM

## 2018-10-26 PROCEDURE — 4004F PT TOBACCO SCREEN RCVD TLK: CPT | Performed by: INTERNAL MEDICINE

## 2018-10-26 PROCEDURE — G8417 CALC BMI ABV UP PARAM F/U: HCPCS | Performed by: INTERNAL MEDICINE

## 2018-10-26 PROCEDURE — 99213 OFFICE O/P EST LOW 20 MIN: CPT | Performed by: INTERNAL MEDICINE

## 2018-10-26 PROCEDURE — 3044F HG A1C LEVEL LT 7.0%: CPT | Performed by: INTERNAL MEDICINE

## 2018-10-26 PROCEDURE — 3023F SPIROM DOC REV: CPT | Performed by: INTERNAL MEDICINE

## 2018-10-26 PROCEDURE — G8428 CUR MEDS NOT DOCUMENT: HCPCS | Performed by: INTERNAL MEDICINE

## 2018-10-26 PROCEDURE — G8484 FLU IMMUNIZE NO ADMIN: HCPCS | Performed by: INTERNAL MEDICINE

## 2018-10-26 PROCEDURE — 3017F COLORECTAL CA SCREEN DOC REV: CPT | Performed by: INTERNAL MEDICINE

## 2018-10-26 PROCEDURE — 2022F DILAT RTA XM EVC RTNOPTHY: CPT | Performed by: INTERNAL MEDICINE

## 2018-10-26 PROCEDURE — G8926 SPIRO NO PERF OR DOC: HCPCS | Performed by: INTERNAL MEDICINE

## 2018-10-26 RX ORDER — LANCETS 30 GAUGE
EACH MISCELLANEOUS
Qty: 100 EACH | Refills: 3 | Status: SHIPPED | OUTPATIENT
Start: 2018-10-26

## 2018-10-26 RX ORDER — ALBUTEROL SULFATE 90 UG/1
2 AEROSOL, METERED RESPIRATORY (INHALATION) EVERY 6 HOURS PRN
Qty: 3 INHALER | Refills: 1 | Status: SHIPPED | OUTPATIENT
Start: 2018-10-26 | End: 2019-02-26 | Stop reason: SDUPTHER

## 2018-10-26 RX ORDER — METFORMIN HYDROCHLORIDE 500 MG/1
500 TABLET, EXTENDED RELEASE ORAL
Qty: 30 TABLET | Refills: 3 | Status: SHIPPED | OUTPATIENT
Start: 2018-10-26 | End: 2018-11-19 | Stop reason: SDUPTHER

## 2018-10-26 RX ORDER — VARENICLINE TARTRATE 25 MG
KIT ORAL
Qty: 1 EACH | Refills: 0 | Status: SHIPPED | OUTPATIENT
Start: 2018-10-26 | End: 2019-12-23

## 2018-11-19 DIAGNOSIS — M47.817 LUMBOSACRAL SPONDYLOSIS WITHOUT MYELOPATHY: Chronic | ICD-10-CM

## 2018-11-19 RX ORDER — METFORMIN HYDROCHLORIDE 500 MG/1
500 TABLET, EXTENDED RELEASE ORAL
Qty: 30 TABLET | Refills: 2 | Status: SHIPPED | OUTPATIENT
Start: 2018-11-19 | End: 2019-02-26 | Stop reason: SDUPTHER

## 2018-11-19 RX ORDER — METOPROLOL SUCCINATE 50 MG/1
50 TABLET, EXTENDED RELEASE ORAL DAILY
Qty: 30 TABLET | Refills: 2 | Status: SHIPPED | OUTPATIENT
Start: 2018-11-19 | End: 2019-01-14 | Stop reason: SDUPTHER

## 2018-11-19 RX ORDER — LISINOPRIL AND HYDROCHLOROTHIAZIDE 25; 20 MG/1; MG/1
1 TABLET ORAL DAILY
Qty: 30 TABLET | Refills: 2 | Status: SHIPPED | OUTPATIENT
Start: 2018-11-19 | End: 2019-02-26 | Stop reason: SDUPTHER

## 2018-11-19 RX ORDER — DULOXETIN HYDROCHLORIDE 30 MG/1
30 CAPSULE, DELAYED RELEASE ORAL DAILY
Qty: 30 CAPSULE | Refills: 2 | Status: SHIPPED | OUTPATIENT
Start: 2018-11-19 | End: 2019-01-14 | Stop reason: SDUPTHER

## 2018-11-19 RX ORDER — TIZANIDINE 2 MG/1
2 TABLET ORAL 2 TIMES DAILY PRN
Qty: 60 TABLET | Refills: 2 | Status: SHIPPED | OUTPATIENT
Start: 2018-11-19 | End: 2019-01-14 | Stop reason: SDUPTHER

## 2018-11-19 RX ORDER — DILTIAZEM HYDROCHLORIDE 120 MG/1
120 CAPSULE, COATED, EXTENDED RELEASE ORAL DAILY
Qty: 30 CAPSULE | Refills: 2 | Status: SHIPPED | OUTPATIENT
Start: 2018-11-19 | End: 2019-01-14 | Stop reason: SDUPTHER

## 2018-12-28 DIAGNOSIS — M47.817 LUMBOSACRAL SPONDYLOSIS WITHOUT MYELOPATHY: Chronic | ICD-10-CM

## 2019-01-14 RX ORDER — VARENICLINE TARTRATE 1 MG/1
TABLET, FILM COATED ORAL
Qty: 56 TABLET | Refills: 0 | Status: SHIPPED | OUTPATIENT
Start: 2019-01-14 | End: 2019-02-26 | Stop reason: ALTCHOICE

## 2019-01-14 RX ORDER — DULOXETIN HYDROCHLORIDE 30 MG/1
CAPSULE, DELAYED RELEASE ORAL
Qty: 30 CAPSULE | Refills: 0 | Status: SHIPPED | OUTPATIENT
Start: 2019-01-14 | End: 2019-02-26 | Stop reason: SDUPTHER

## 2019-01-14 RX ORDER — UMECLIDINIUM 62.5 UG/1
AEROSOL, POWDER ORAL
Qty: 30 EACH | Refills: 0 | Status: SHIPPED | OUTPATIENT
Start: 2019-01-14 | End: 2019-02-26 | Stop reason: SDUPTHER

## 2019-01-14 RX ORDER — METOPROLOL SUCCINATE 50 MG/1
TABLET, EXTENDED RELEASE ORAL
Qty: 30 TABLET | Refills: 0 | Status: SHIPPED | OUTPATIENT
Start: 2019-01-14 | End: 2019-02-26 | Stop reason: SDUPTHER

## 2019-01-14 RX ORDER — TIZANIDINE 2 MG/1
TABLET ORAL
Qty: 60 TABLET | Refills: 0 | Status: SHIPPED | OUTPATIENT
Start: 2019-01-14 | End: 2020-01-21

## 2019-01-14 RX ORDER — DILTIAZEM HYDROCHLORIDE 120 MG/1
CAPSULE, COATED, EXTENDED RELEASE ORAL
Qty: 30 CAPSULE | Refills: 0 | Status: SHIPPED | OUTPATIENT
Start: 2019-01-14 | End: 2019-02-26 | Stop reason: SDUPTHER

## 2019-02-26 ENCOUNTER — OFFICE VISIT (OUTPATIENT)
Dept: INTERNAL MEDICINE CLINIC | Age: 64
End: 2019-02-26

## 2019-02-26 VITALS
HEART RATE: 70 BPM | HEIGHT: 65 IN | RESPIRATION RATE: 17 BRPM | SYSTOLIC BLOOD PRESSURE: 135 MMHG | WEIGHT: 188 LBS | BODY MASS INDEX: 31.32 KG/M2 | DIASTOLIC BLOOD PRESSURE: 75 MMHG

## 2019-02-26 DIAGNOSIS — E11.9 WELL CONTROLLED TYPE 2 DIABETES MELLITUS (HCC): ICD-10-CM

## 2019-02-26 DIAGNOSIS — M48.061 SPINAL STENOSIS, LUMBAR REGION, WITHOUT NEUROGENIC CLAUDICATION: Chronic | ICD-10-CM

## 2019-02-26 DIAGNOSIS — J43.1 PANLOBULAR EMPHYSEMA (HCC): ICD-10-CM

## 2019-02-26 DIAGNOSIS — M47.817 LUMBOSACRAL SPONDYLOSIS WITHOUT MYELOPATHY: Chronic | ICD-10-CM

## 2019-02-26 DIAGNOSIS — E11.9 WELL CONTROLLED TYPE 2 DIABETES MELLITUS (HCC): Primary | ICD-10-CM

## 2019-02-26 DIAGNOSIS — Z72.0 TOBACCO ABUSE: ICD-10-CM

## 2019-02-26 LAB
A/G RATIO: 1.8 (ref 1.1–2.2)
ALBUMIN SERPL-MCNC: 4.2 G/DL (ref 3.4–5)
ALP BLD-CCNC: 69 U/L (ref 40–129)
ALT SERPL-CCNC: 18 U/L (ref 10–40)
ANION GAP SERPL CALCULATED.3IONS-SCNC: 18 MMOL/L (ref 3–16)
AST SERPL-CCNC: 17 U/L (ref 15–37)
BILIRUB SERPL-MCNC: 0.4 MG/DL (ref 0–1)
BILIRUBIN, POC: NORMAL
BLOOD URINE, POC: NORMAL
BUN BLDV-MCNC: 18 MG/DL (ref 7–20)
CALCIUM SERPL-MCNC: 9.6 MG/DL (ref 8.3–10.6)
CHLORIDE BLD-SCNC: 97 MMOL/L (ref 99–110)
CLARITY, POC: NORMAL
CO2: 25 MMOL/L (ref 21–32)
COLOR, POC: NORMAL
CREAT SERPL-MCNC: 0.7 MG/DL (ref 0.6–1.2)
CREATININE URINE: 206.2 MG/DL (ref 28–259)
GFR AFRICAN AMERICAN: >60
GFR NON-AFRICAN AMERICAN: >60
GLOBULIN: 2.4 G/DL
GLUCOSE BLD-MCNC: 144 MG/DL (ref 70–99)
GLUCOSE URINE, POC: NORMAL
KETONES, POC: NORMAL
LEUKOCYTE EST, POC: NORMAL
MICROALBUMIN UR-MCNC: <1.2 MG/DL
MICROALBUMIN/CREAT UR-RTO: NORMAL MG/G (ref 0–30)
NITRITE, POC: NORMAL
PH, POC: NORMAL
POTASSIUM SERPL-SCNC: 4.4 MMOL/L (ref 3.5–5.1)
PROTEIN, POC: NORMAL
SODIUM BLD-SCNC: 140 MMOL/L (ref 136–145)
SPECIFIC GRAVITY, POC: NORMAL
TOTAL PROTEIN: 6.6 G/DL (ref 6.4–8.2)
UROBILINOGEN, POC: NORMAL

## 2019-02-26 PROCEDURE — 3017F COLORECTAL CA SCREEN DOC REV: CPT | Performed by: INTERNAL MEDICINE

## 2019-02-26 PROCEDURE — G8926 SPIRO NO PERF OR DOC: HCPCS | Performed by: INTERNAL MEDICINE

## 2019-02-26 PROCEDURE — 3023F SPIROM DOC REV: CPT | Performed by: INTERNAL MEDICINE

## 2019-02-26 PROCEDURE — 99213 OFFICE O/P EST LOW 20 MIN: CPT | Performed by: INTERNAL MEDICINE

## 2019-02-26 PROCEDURE — 4004F PT TOBACCO SCREEN RCVD TLK: CPT | Performed by: INTERNAL MEDICINE

## 2019-02-26 PROCEDURE — G8428 CUR MEDS NOT DOCUMENT: HCPCS | Performed by: INTERNAL MEDICINE

## 2019-02-26 PROCEDURE — 3044F HG A1C LEVEL LT 7.0%: CPT | Performed by: INTERNAL MEDICINE

## 2019-02-26 PROCEDURE — 2022F DILAT RTA XM EVC RTNOPTHY: CPT | Performed by: INTERNAL MEDICINE

## 2019-02-26 PROCEDURE — G8417 CALC BMI ABV UP PARAM F/U: HCPCS | Performed by: INTERNAL MEDICINE

## 2019-02-26 PROCEDURE — 81002 URINALYSIS NONAUTO W/O SCOPE: CPT | Performed by: INTERNAL MEDICINE

## 2019-02-26 PROCEDURE — G8484 FLU IMMUNIZE NO ADMIN: HCPCS | Performed by: INTERNAL MEDICINE

## 2019-02-26 RX ORDER — LISINOPRIL AND HYDROCHLOROTHIAZIDE 25; 20 MG/1; MG/1
1 TABLET ORAL DAILY
Qty: 90 TABLET | Refills: 1 | Status: SHIPPED | OUTPATIENT
Start: 2019-02-26 | End: 2019-07-08 | Stop reason: SDUPTHER

## 2019-02-26 RX ORDER — DULOXETIN HYDROCHLORIDE 30 MG/1
30 CAPSULE, DELAYED RELEASE ORAL DAILY
Qty: 90 CAPSULE | Refills: 1 | Status: SHIPPED | OUTPATIENT
Start: 2019-02-26 | End: 2019-07-08 | Stop reason: SDUPTHER

## 2019-02-26 RX ORDER — ALBUTEROL SULFATE 90 UG/1
2 AEROSOL, METERED RESPIRATORY (INHALATION) EVERY 6 HOURS PRN
Qty: 3 INHALER | Refills: 1 | Status: SHIPPED | OUTPATIENT
Start: 2019-02-26 | End: 2019-05-08 | Stop reason: SDUPTHER

## 2019-02-26 RX ORDER — METFORMIN HYDROCHLORIDE 500 MG/1
500 TABLET, EXTENDED RELEASE ORAL
Qty: 90 TABLET | Refills: 1 | Status: SHIPPED | OUTPATIENT
Start: 2019-02-26 | End: 2019-07-08 | Stop reason: SDUPTHER

## 2019-02-26 RX ORDER — METOPROLOL SUCCINATE 50 MG/1
50 TABLET, EXTENDED RELEASE ORAL DAILY
Qty: 90 TABLET | Refills: 1 | Status: SHIPPED | OUTPATIENT
Start: 2019-02-26 | End: 2019-07-08 | Stop reason: SDUPTHER

## 2019-02-26 RX ORDER — DILTIAZEM HYDROCHLORIDE 120 MG/1
120 CAPSULE, COATED, EXTENDED RELEASE ORAL DAILY
Qty: 90 CAPSULE | Refills: 1 | Status: SHIPPED | OUTPATIENT
Start: 2019-02-26 | End: 2019-06-27 | Stop reason: SDUPTHER

## 2019-02-27 LAB
ESTIMATED AVERAGE GLUCOSE: 134.1 MG/DL
HBA1C MFR BLD: 6.3 %

## 2019-03-27 DIAGNOSIS — M47.817 LUMBOSACRAL SPONDYLOSIS WITHOUT MYELOPATHY: Chronic | ICD-10-CM

## 2019-04-09 ENCOUNTER — TELEPHONE (OUTPATIENT)
Dept: INTERNAL MEDICINE CLINIC | Age: 64
End: 2019-04-09

## 2019-04-09 DIAGNOSIS — M54.9 CHRONIC BACK PAIN, UNSPECIFIED BACK LOCATION, UNSPECIFIED BACK PAIN LATERALITY: Primary | ICD-10-CM

## 2019-04-09 DIAGNOSIS — G89.29 CHRONIC BACK PAIN, UNSPECIFIED BACK LOCATION, UNSPECIFIED BACK PAIN LATERALITY: Primary | ICD-10-CM

## 2019-04-09 NOTE — TELEPHONE ENCOUNTER
----- Message from Atiya Sandoval sent at 4/9/2019  9:28 AM EDT -----  Contact: pt, [phone]  Pt is requesting a referral for pain management doctor, Dr. Nieves Zuluaga, [phone]. Last appt. 2-26-19. Next appt. 7-8-19.

## 2019-04-26 DIAGNOSIS — M47.817 LUMBOSACRAL SPONDYLOSIS WITHOUT MYELOPATHY: Chronic | ICD-10-CM

## 2019-05-08 RX ORDER — ALBUTEROL SULFATE 90 UG/1
2 AEROSOL, METERED RESPIRATORY (INHALATION) EVERY 6 HOURS PRN
Qty: 54 G | Refills: 2 | Status: SHIPPED | OUTPATIENT
Start: 2019-05-08 | End: 2019-07-08 | Stop reason: SDUPTHER

## 2019-05-14 ENCOUNTER — HOSPITAL ENCOUNTER (OUTPATIENT)
Dept: GENERAL RADIOLOGY | Age: 64
Discharge: HOME | End: 2019-05-14
Payer: MEDICARE

## 2019-05-14 ENCOUNTER — HOSPITAL ENCOUNTER (OUTPATIENT)
Age: 64
Discharge: HOME | End: 2019-05-14
Payer: MEDICARE

## 2019-05-14 DIAGNOSIS — M48.062 SPINAL STENOSIS OF LUMBAR REGION WITH NEUROGENIC CLAUDICATION: ICD-10-CM

## 2019-05-14 PROCEDURE — 72110 X-RAY EXAM L-2 SPINE 4/>VWS: CPT

## 2019-05-22 ENCOUNTER — HOSPITAL ENCOUNTER (OUTPATIENT)
Dept: MRI IMAGING | Age: 64
Discharge: HOME | End: 2019-05-22
Payer: MEDICARE

## 2019-05-22 ENCOUNTER — HOSPITAL ENCOUNTER (OUTPATIENT)
Age: 64
Discharge: HOME | End: 2019-05-22
Payer: MEDICARE

## 2019-05-22 DIAGNOSIS — M96.1 FAILED BACK SYNDROME: ICD-10-CM

## 2019-05-22 DIAGNOSIS — M48.062 SPINAL STENOSIS OF LUMBAR REGION WITH NEUROGENIC CLAUDICATION: ICD-10-CM

## 2019-05-22 LAB
BUN BLDV-MCNC: 15 MG/DL (ref 7–20)
CREAT SERPL-MCNC: 0.7 MG/DL (ref 0.6–1.2)
GFR AFRICAN AMERICAN: >60
GFR NON-AFRICAN AMERICAN: >60

## 2019-05-22 PROCEDURE — A9579 GAD-BASE MR CONTRAST NOS,1ML: HCPCS | Performed by: ANESTHESIOLOGY

## 2019-05-22 PROCEDURE — 84520 ASSAY OF UREA NITROGEN: CPT

## 2019-05-22 PROCEDURE — 82565 ASSAY OF CREATININE: CPT

## 2019-05-22 PROCEDURE — 6360000004 HC RX CONTRAST MEDICATION: Performed by: ANESTHESIOLOGY

## 2019-05-22 PROCEDURE — 72158 MRI LUMBAR SPINE W/O & W/DYE: CPT

## 2019-05-22 PROCEDURE — 36415 COLL VENOUS BLD VENIPUNCTURE: CPT

## 2019-05-22 RX ADMIN — GADOTERIDOL 15 ML: 279.3 INJECTION, SOLUTION INTRAVENOUS at 15:06

## 2019-05-28 DIAGNOSIS — M47.817 LUMBOSACRAL SPONDYLOSIS WITHOUT MYELOPATHY: Chronic | ICD-10-CM

## 2019-06-26 DIAGNOSIS — M47.817 LUMBOSACRAL SPONDYLOSIS WITHOUT MYELOPATHY: Chronic | ICD-10-CM

## 2019-06-27 RX ORDER — DILTIAZEM HYDROCHLORIDE 120 MG/1
CAPSULE, COATED, EXTENDED RELEASE ORAL
Qty: 90 CAPSULE | Refills: 0 | Status: SHIPPED | OUTPATIENT
Start: 2019-06-27 | End: 2019-07-08 | Stop reason: SDUPTHER

## 2019-07-08 ENCOUNTER — OFFICE VISIT (OUTPATIENT)
Dept: INTERNAL MEDICINE CLINIC | Age: 64
End: 2019-07-08

## 2019-07-08 VITALS
HEIGHT: 65 IN | RESPIRATION RATE: 18 BRPM | DIASTOLIC BLOOD PRESSURE: 75 MMHG | BODY MASS INDEX: 33.99 KG/M2 | WEIGHT: 204 LBS | HEART RATE: 70 BPM | SYSTOLIC BLOOD PRESSURE: 140 MMHG

## 2019-07-08 DIAGNOSIS — Z11.59 ENCOUNTER FOR HEPATITIS C SCREENING TEST FOR LOW RISK PATIENT: ICD-10-CM

## 2019-07-08 DIAGNOSIS — I10 ESSENTIAL HYPERTENSION: ICD-10-CM

## 2019-07-08 DIAGNOSIS — J98.4 RESTRICTIVE LUNG DISEASE: ICD-10-CM

## 2019-07-08 DIAGNOSIS — J43.1 PANLOBULAR EMPHYSEMA (HCC): ICD-10-CM

## 2019-07-08 DIAGNOSIS — E11.9 WELL CONTROLLED TYPE 2 DIABETES MELLITUS (HCC): ICD-10-CM

## 2019-07-08 DIAGNOSIS — M48.061 SPINAL STENOSIS, LUMBAR REGION, WITHOUT NEUROGENIC CLAUDICATION: Chronic | ICD-10-CM

## 2019-07-08 DIAGNOSIS — M47.817 LUMBOSACRAL SPONDYLOSIS WITHOUT MYELOPATHY: Primary | Chronic | ICD-10-CM

## 2019-07-08 DIAGNOSIS — Z72.0 TOBACCO ABUSE: ICD-10-CM

## 2019-07-08 PROBLEM — M25.552 LEFT HIP PAIN: Status: RESOLVED | Noted: 2017-05-31 | Resolved: 2019-07-08

## 2019-07-08 PROCEDURE — G8427 DOCREV CUR MEDS BY ELIG CLIN: HCPCS | Performed by: INTERNAL MEDICINE

## 2019-07-08 PROCEDURE — G8926 SPIRO NO PERF OR DOC: HCPCS | Performed by: INTERNAL MEDICINE

## 2019-07-08 PROCEDURE — 2022F DILAT RTA XM EVC RTNOPTHY: CPT | Performed by: INTERNAL MEDICINE

## 2019-07-08 PROCEDURE — 3023F SPIROM DOC REV: CPT | Performed by: INTERNAL MEDICINE

## 2019-07-08 PROCEDURE — 3044F HG A1C LEVEL LT 7.0%: CPT | Performed by: INTERNAL MEDICINE

## 2019-07-08 PROCEDURE — 99214 OFFICE O/P EST MOD 30 MIN: CPT | Performed by: INTERNAL MEDICINE

## 2019-07-08 PROCEDURE — G8417 CALC BMI ABV UP PARAM F/U: HCPCS | Performed by: INTERNAL MEDICINE

## 2019-07-08 PROCEDURE — 4004F PT TOBACCO SCREEN RCVD TLK: CPT | Performed by: INTERNAL MEDICINE

## 2019-07-08 PROCEDURE — 3017F COLORECTAL CA SCREEN DOC REV: CPT | Performed by: INTERNAL MEDICINE

## 2019-07-08 RX ORDER — DULOXETIN HYDROCHLORIDE 30 MG/1
30 CAPSULE, DELAYED RELEASE ORAL DAILY
Qty: 90 CAPSULE | Refills: 1 | Status: SHIPPED | OUTPATIENT
Start: 2019-07-08 | End: 2019-11-07 | Stop reason: SDUPTHER

## 2019-07-08 RX ORDER — METOPROLOL SUCCINATE 50 MG/1
50 TABLET, EXTENDED RELEASE ORAL DAILY
Qty: 90 TABLET | Refills: 1 | Status: SHIPPED | OUTPATIENT
Start: 2019-07-08 | End: 2020-01-13

## 2019-07-08 RX ORDER — LISINOPRIL AND HYDROCHLOROTHIAZIDE 25; 20 MG/1; MG/1
1 TABLET ORAL DAILY
Qty: 90 TABLET | Refills: 1 | Status: SHIPPED | OUTPATIENT
Start: 2019-07-08 | End: 2019-12-31

## 2019-07-08 RX ORDER — METFORMIN HYDROCHLORIDE 500 MG/1
500 TABLET, EXTENDED RELEASE ORAL
Qty: 90 TABLET | Refills: 1 | Status: SHIPPED | OUTPATIENT
Start: 2019-07-08 | End: 2020-01-13

## 2019-07-08 RX ORDER — DILTIAZEM HYDROCHLORIDE 120 MG/1
CAPSULE, COATED, EXTENDED RELEASE ORAL
Qty: 90 CAPSULE | Refills: 0 | Status: SHIPPED | OUTPATIENT
Start: 2019-07-08 | End: 2019-10-24 | Stop reason: SDUPTHER

## 2019-07-08 RX ORDER — ALBUTEROL SULFATE 90 UG/1
2 AEROSOL, METERED RESPIRATORY (INHALATION) EVERY 6 HOURS PRN
Qty: 54 G | Refills: 2 | Status: SHIPPED | OUTPATIENT
Start: 2019-07-08 | End: 2020-03-23

## 2019-07-08 ASSESSMENT — PATIENT HEALTH QUESTIONNAIRE - PHQ9
SUM OF ALL RESPONSES TO PHQ QUESTIONS 1-9: 1
SUM OF ALL RESPONSES TO PHQ QUESTIONS 1-9: 1
1. LITTLE INTEREST OR PLEASURE IN DOING THINGS: 0
2. FEELING DOWN, DEPRESSED OR HOPELESS: 1
SUM OF ALL RESPONSES TO PHQ9 QUESTIONS 1 & 2: 1

## 2019-07-08 NOTE — PROGRESS NOTES
Subjective:      Patient ID: Ama Perez is a 60 y.o. female. HPI     60 y.o. female with h.o   chronic back pain , copd, DM- 2,. HTN , tobacco use here for regular f/w    DM- 2- Taking metformin but not compliant with diet  Since last time, her  is back from [facility] after a long time  Mood improved and now eating more, gained about 20 lbs  Not checking sugars  activity remains low    More arthritis and back pain with weight gain      Reports ongoing aches and pains  Chronic back pain - s.p  surgerie x 4  S.p recent VJ by  Dr Kim Libman-   Not back on narcotics yet but considering pain pump soon  Remains on lyrica which is helping some       Using cane/walker  for ambulation . No recent falls  Reports chronic fatigue      Depression better , compliant with meds - cymbalta    Copd with chronic Dyspnea stable.  Uses inhalers as needed  Quit smoking  This term but intermittent smoking      Current Outpatient Medications   Medication Sig Dispense Refill    diltiazem (CARDIZEM CD) 120 MG extended release capsule TAKE ONE CAPSULE BY MOUTH DAILY 90 capsule 0    LYRICA 100 MG capsule TAKE ONE CAPSULE BY MOUTH 2 TIMES A DAY 60 capsule 0    albuterol sulfate  (90 Base) MCG/ACT inhaler Inhale 2 puffs into the lungs every 6 hours as needed for Wheezing 54 g 2    DULoxetine (CYMBALTA) 30 MG extended release capsule Take 1 capsule by mouth daily 90 capsule 1    Umeclidinium Bromide (INCRUSE ELLIPTA) 62.5 MCG/INH AEPB Inhale 1 puff into the lungs daily 3 each 2    metoprolol succinate (TOPROL XL) 50 MG extended release tablet Take 1 tablet by mouth daily 90 tablet 1    lisinopril-hydrochlorothiazide (PRINZIDE;ZESTORETIC) 20-25 MG per tablet Take 1 tablet by mouth daily 90 tablet 1    metFORMIN (GLUCOPHAGE XR) 500 MG extended release tablet Take 1 tablet by mouth daily (with breakfast) 90 tablet 1    tiZANidine (ZANAFLEX) 2 MG tablet TAKE ONE (1) TABLET BY MOUTH TWICE DAILY AS NEEDED FOR MUSCLE SPASMS 60

## 2019-07-09 LAB
A/G RATIO: 2 (ref 1.1–2.2)
ALBUMIN SERPL-MCNC: 4.1 G/DL (ref 3.4–5)
ALP BLD-CCNC: 86 U/L (ref 40–129)
ALT SERPL-CCNC: 12 U/L (ref 10–40)
ANION GAP SERPL CALCULATED.3IONS-SCNC: 20 MMOL/L (ref 3–16)
AST SERPL-CCNC: 13 U/L (ref 15–37)
BILIRUB SERPL-MCNC: <0.2 MG/DL (ref 0–1)
BUN BLDV-MCNC: 12 MG/DL (ref 7–20)
CALCIUM SERPL-MCNC: 9.6 MG/DL (ref 8.3–10.6)
CHLORIDE BLD-SCNC: 94 MMOL/L (ref 99–110)
CHOLESTEROL, FASTING: 175 MG/DL (ref 0–199)
CO2: 20 MMOL/L (ref 21–32)
CREAT SERPL-MCNC: 0.8 MG/DL (ref 0.6–1.2)
ESTIMATED AVERAGE GLUCOSE: 137 MG/DL
GFR AFRICAN AMERICAN: >60
GFR NON-AFRICAN AMERICAN: >60
GLOBULIN: 2.1 G/DL
GLUCOSE BLD-MCNC: 139 MG/DL (ref 70–99)
HBA1C MFR BLD: 6.4 %
HDLC SERPL-MCNC: 70 MG/DL (ref 40–60)
HEPATITIS C ANTIBODY INTERPRETATION: NORMAL
LDL CHOLESTEROL CALCULATED: 70 MG/DL
POTASSIUM SERPL-SCNC: 4.6 MMOL/L (ref 3.5–5.1)
SODIUM BLD-SCNC: 134 MMOL/L (ref 136–145)
TOTAL PROTEIN: 6.2 G/DL (ref 6.4–8.2)
TRIGLYCERIDE, FASTING: 173 MG/DL (ref 0–150)
VLDLC SERPL CALC-MCNC: 35 MG/DL

## 2019-07-24 DIAGNOSIS — M47.817 LUMBOSACRAL SPONDYLOSIS WITHOUT MYELOPATHY: Chronic | ICD-10-CM

## 2019-08-26 DIAGNOSIS — M47.817 LUMBOSACRAL SPONDYLOSIS WITHOUT MYELOPATHY: Chronic | ICD-10-CM

## 2019-10-24 RX ORDER — DILTIAZEM HYDROCHLORIDE 120 MG/1
CAPSULE, COATED, EXTENDED RELEASE ORAL
Qty: 90 CAPSULE | Refills: 0 | Status: SHIPPED | OUTPATIENT
Start: 2019-10-24 | End: 2020-01-20

## 2019-11-07 ENCOUNTER — OFFICE VISIT (OUTPATIENT)
Dept: INTERNAL MEDICINE CLINIC | Age: 64
End: 2019-11-07

## 2019-11-07 VITALS — HEIGHT: 65 IN | BODY MASS INDEX: 35.65 KG/M2 | WEIGHT: 214 LBS

## 2019-11-07 DIAGNOSIS — J43.1 PANLOBULAR EMPHYSEMA (HCC): ICD-10-CM

## 2019-11-07 DIAGNOSIS — I10 ESSENTIAL HYPERTENSION: ICD-10-CM

## 2019-11-07 DIAGNOSIS — Z00.00 MEDICARE ANNUAL WELLNESS VISIT, INITIAL: Primary | ICD-10-CM

## 2019-11-07 DIAGNOSIS — M51.37 DEGENERATION OF LUMBAR OR LUMBOSACRAL INTERVERTEBRAL DISC: Chronic | ICD-10-CM

## 2019-11-07 DIAGNOSIS — Z87.891 PERSONAL HISTORY OF TOBACCO USE: ICD-10-CM

## 2019-11-07 DIAGNOSIS — Z13.6 SCREENING FOR CARDIOVASCULAR CONDITION: ICD-10-CM

## 2019-11-07 DIAGNOSIS — Z23 NEED FOR INFLUENZA VACCINATION: ICD-10-CM

## 2019-11-07 DIAGNOSIS — Z00.00 ROUTINE GENERAL MEDICAL EXAMINATION AT A HEALTH CARE FACILITY: ICD-10-CM

## 2019-11-07 DIAGNOSIS — Z72.0 TOBACCO ABUSE: ICD-10-CM

## 2019-11-07 DIAGNOSIS — E11.9 WELL CONTROLLED TYPE 2 DIABETES MELLITUS (HCC): ICD-10-CM

## 2019-11-07 DIAGNOSIS — Z12.11 SCREENING FOR COLORECTAL CANCER: ICD-10-CM

## 2019-11-07 DIAGNOSIS — Z12.12 SCREENING FOR COLORECTAL CANCER: ICD-10-CM

## 2019-11-07 DIAGNOSIS — I20.8 STABLE ANGINA PECTORIS (HCC): ICD-10-CM

## 2019-11-07 DIAGNOSIS — Z12.31 ENCOUNTER FOR SCREENING MAMMOGRAM FOR BREAST CANCER: ICD-10-CM

## 2019-11-07 LAB
A/G RATIO: 1.7 (ref 1.1–2.2)
ALBUMIN SERPL-MCNC: 4.2 G/DL (ref 3.4–5)
ALP BLD-CCNC: 76 U/L (ref 40–129)
ALT SERPL-CCNC: 15 U/L (ref 10–40)
ANION GAP SERPL CALCULATED.3IONS-SCNC: 14 MMOL/L (ref 3–16)
AST SERPL-CCNC: 14 U/L (ref 15–37)
BASOPHILS ABSOLUTE: 0.1 K/UL (ref 0–0.2)
BASOPHILS RELATIVE PERCENT: 0.6 %
BILIRUB SERPL-MCNC: <0.2 MG/DL (ref 0–1)
BUN BLDV-MCNC: 16 MG/DL (ref 7–20)
CALCIUM SERPL-MCNC: 9.3 MG/DL (ref 8.3–10.6)
CHLORIDE BLD-SCNC: 89 MMOL/L (ref 99–110)
CHOLESTEROL, FASTING: 166 MG/DL (ref 0–199)
CO2: 25 MMOL/L (ref 21–32)
CREAT SERPL-MCNC: 0.8 MG/DL (ref 0.6–1.2)
EOSINOPHILS ABSOLUTE: 0.1 K/UL (ref 0–0.6)
EOSINOPHILS RELATIVE PERCENT: 1.3 %
GFR AFRICAN AMERICAN: >60
GFR NON-AFRICAN AMERICAN: >60
GLOBULIN: 2.5 G/DL
GLUCOSE BLD-MCNC: 109 MG/DL (ref 70–99)
HCT VFR BLD CALC: 41.5 % (ref 36–48)
HDLC SERPL-MCNC: 57 MG/DL (ref 40–60)
HEMOGLOBIN: 14 G/DL (ref 12–16)
LDL CHOLESTEROL CALCULATED: 69 MG/DL
LYMPHOCYTES ABSOLUTE: 0.8 K/UL (ref 1–5.1)
LYMPHOCYTES RELATIVE PERCENT: 8.1 %
MCH RBC QN AUTO: 31.9 PG (ref 26–34)
MCHC RBC AUTO-ENTMCNC: 33.6 G/DL (ref 31–36)
MCV RBC AUTO: 94.8 FL (ref 80–100)
MONOCYTES ABSOLUTE: 1 K/UL (ref 0–1.3)
MONOCYTES RELATIVE PERCENT: 9.7 %
NEUTROPHILS ABSOLUTE: 7.9 K/UL (ref 1.7–7.7)
NEUTROPHILS RELATIVE PERCENT: 80.3 %
PDW BLD-RTO: 16.8 % (ref 12.4–15.4)
PLATELET # BLD: 373 K/UL (ref 135–450)
PMV BLD AUTO: 8.2 FL (ref 5–10.5)
POTASSIUM SERPL-SCNC: 5.2 MMOL/L (ref 3.5–5.1)
RBC # BLD: 4.37 M/UL (ref 4–5.2)
SODIUM BLD-SCNC: 128 MMOL/L (ref 136–145)
TOTAL PROTEIN: 6.7 G/DL (ref 6.4–8.2)
TRIGLYCERIDE, FASTING: 202 MG/DL (ref 0–150)
TSH REFLEX: 1.26 UIU/ML (ref 0.27–4.2)
URIC ACID, SERUM: 6.7 MG/DL (ref 2.6–6)
VLDLC SERPL CALC-MCNC: 40 MG/DL
WBC # BLD: 9.9 K/UL (ref 4–11)

## 2019-11-07 PROCEDURE — G8482 FLU IMMUNIZE ORDER/ADMIN: HCPCS | Performed by: INTERNAL MEDICINE

## 2019-11-07 PROCEDURE — G0438 PPPS, INITIAL VISIT: HCPCS | Performed by: INTERNAL MEDICINE

## 2019-11-07 PROCEDURE — G0446 INTENS BEHAVE THER CARDIO DX: HCPCS | Performed by: INTERNAL MEDICINE

## 2019-11-07 PROCEDURE — 90682 RIV4 VACC RECOMBINANT DNA IM: CPT | Performed by: INTERNAL MEDICINE

## 2019-11-07 PROCEDURE — 3017F COLORECTAL CA SCREEN DOC REV: CPT | Performed by: INTERNAL MEDICINE

## 2019-11-07 PROCEDURE — G8599 NO ASA/ANTIPLAT THER USE RNG: HCPCS | Performed by: INTERNAL MEDICINE

## 2019-11-07 PROCEDURE — 3044F HG A1C LEVEL LT 7.0%: CPT | Performed by: INTERNAL MEDICINE

## 2019-11-07 PROCEDURE — G0008 ADMIN INFLUENZA VIRUS VAC: HCPCS | Performed by: INTERNAL MEDICINE

## 2019-11-07 RX ORDER — DULOXETIN HYDROCHLORIDE 30 MG/1
30 CAPSULE, DELAYED RELEASE ORAL DAILY
Qty: 90 CAPSULE | Refills: 1 | Status: SHIPPED | OUTPATIENT
Start: 2019-11-07 | End: 2020-03-23

## 2019-11-07 RX ORDER — VARENICLINE TARTRATE 25 MG
KIT ORAL
Qty: 1 BOX | Refills: 0 | Status: SHIPPED | OUTPATIENT
Start: 2019-11-07 | End: 2019-12-03 | Stop reason: SDUPTHER

## 2019-11-07 RX ORDER — TIZANIDINE 4 MG/1
4 TABLET ORAL NIGHTLY PRN
Qty: 30 TABLET | Refills: 2 | Status: SHIPPED | OUTPATIENT
Start: 2019-11-07 | End: 2020-01-21

## 2019-11-07 ASSESSMENT — PATIENT HEALTH QUESTIONNAIRE - PHQ9
SUM OF ALL RESPONSES TO PHQ QUESTIONS 1-9: 1
SUM OF ALL RESPONSES TO PHQ9 QUESTIONS 1 & 2: 1
1. LITTLE INTEREST OR PLEASURE IN DOING THINGS: 0
2. FEELING DOWN, DEPRESSED OR HOPELESS: 1
SUM OF ALL RESPONSES TO PHQ QUESTIONS 1-9: 1
SUM OF ALL RESPONSES TO PHQ QUESTIONS 1-9: 2
SUM OF ALL RESPONSES TO PHQ QUESTIONS 1-9: 2
SUM OF ALL RESPONSES TO PHQ QUESTIONS 1-9: 1
SUM OF ALL RESPONSES TO PHQ QUESTIONS 1-9: 1

## 2019-11-07 ASSESSMENT — LIFESTYLE VARIABLES
HOW MANY STANDARD DRINKS CONTAINING ALCOHOL DO YOU HAVE ON A TYPICAL DAY: 1
HOW OFTEN DO YOU HAVE SIX OR MORE DRINKS ON ONE OCCASION: 0
AUDIT-C TOTAL SCORE: 4
HOW OFTEN DO YOU HAVE A DRINK CONTAINING ALCOHOL: 3

## 2019-11-08 LAB
ESTIMATED AVERAGE GLUCOSE: 139.9 MG/DL
HBA1C MFR BLD: 6.5 %

## 2019-11-12 RX ORDER — UMECLIDINIUM 62.5 UG/1
AEROSOL, POWDER ORAL
Qty: 1 EACH | Refills: 0 | Status: SHIPPED | OUTPATIENT
Start: 2019-11-12 | End: 2019-12-10 | Stop reason: SDUPTHER

## 2019-11-21 DIAGNOSIS — M47.817 LUMBOSACRAL SPONDYLOSIS WITHOUT MYELOPATHY: Chronic | ICD-10-CM

## 2019-12-03 RX ORDER — VARENICLINE TARTRATE 25 MG
KIT ORAL
Qty: 53 TABLET | Refills: 0 | Status: SHIPPED | OUTPATIENT
Start: 2019-12-03 | End: 2019-12-23

## 2019-12-10 RX ORDER — UMECLIDINIUM 62.5 UG/1
AEROSOL, POWDER ORAL
Qty: 1 EACH | Refills: 0 | Status: SHIPPED | OUTPATIENT
Start: 2019-12-10 | End: 2020-01-07

## 2019-12-23 ENCOUNTER — OFFICE VISIT (OUTPATIENT)
Dept: INTERNAL MEDICINE CLINIC | Age: 64
End: 2019-12-23

## 2019-12-23 VITALS
HEIGHT: 65 IN | RESPIRATION RATE: 20 BRPM | HEART RATE: 86 BPM | SYSTOLIC BLOOD PRESSURE: 142 MMHG | OXYGEN SATURATION: 96 % | DIASTOLIC BLOOD PRESSURE: 76 MMHG | TEMPERATURE: 97.7 F | WEIGHT: 214 LBS | BODY MASS INDEX: 35.65 KG/M2

## 2019-12-23 DIAGNOSIS — Z01.818 PRE-OP EXAM: Primary | ICD-10-CM

## 2019-12-23 DIAGNOSIS — E11.9 WELL CONTROLLED TYPE 2 DIABETES MELLITUS (HCC): ICD-10-CM

## 2019-12-23 DIAGNOSIS — M51.37 DEGENERATION OF LUMBAR OR LUMBOSACRAL INTERVERTEBRAL DISC: Chronic | ICD-10-CM

## 2019-12-23 DIAGNOSIS — I10 ESSENTIAL HYPERTENSION: ICD-10-CM

## 2019-12-23 DIAGNOSIS — Z72.0 TOBACCO ABUSE: ICD-10-CM

## 2019-12-23 DIAGNOSIS — J43.1 PANLOBULAR EMPHYSEMA (HCC): ICD-10-CM

## 2019-12-23 PROCEDURE — 3044F HG A1C LEVEL LT 7.0%: CPT | Performed by: PHYSICIAN ASSISTANT

## 2019-12-23 PROCEDURE — 3017F COLORECTAL CA SCREEN DOC REV: CPT | Performed by: PHYSICIAN ASSISTANT

## 2019-12-23 PROCEDURE — G8926 SPIRO NO PERF OR DOC: HCPCS | Performed by: PHYSICIAN ASSISTANT

## 2019-12-23 PROCEDURE — 3023F SPIROM DOC REV: CPT | Performed by: PHYSICIAN ASSISTANT

## 2019-12-23 PROCEDURE — G8599 NO ASA/ANTIPLAT THER USE RNG: HCPCS | Performed by: PHYSICIAN ASSISTANT

## 2019-12-23 PROCEDURE — G8417 CALC BMI ABV UP PARAM F/U: HCPCS | Performed by: PHYSICIAN ASSISTANT

## 2019-12-23 PROCEDURE — G8428 CUR MEDS NOT DOCUMENT: HCPCS | Performed by: PHYSICIAN ASSISTANT

## 2019-12-23 PROCEDURE — G8482 FLU IMMUNIZE ORDER/ADMIN: HCPCS | Performed by: PHYSICIAN ASSISTANT

## 2019-12-23 PROCEDURE — 2022F DILAT RTA XM EVC RTNOPTHY: CPT | Performed by: PHYSICIAN ASSISTANT

## 2019-12-23 PROCEDURE — 99213 OFFICE O/P EST LOW 20 MIN: CPT | Performed by: PHYSICIAN ASSISTANT

## 2019-12-23 PROCEDURE — 4004F PT TOBACCO SCREEN RCVD TLK: CPT | Performed by: PHYSICIAN ASSISTANT

## 2019-12-23 RX ORDER — VARENICLINE TARTRATE 1 MG/1
1 TABLET, FILM COATED ORAL 2 TIMES DAILY
Qty: 56 TABLET | Refills: 0 | Status: SHIPPED | OUTPATIENT
Start: 2019-12-23 | End: 2020-09-09 | Stop reason: SDUPTHER

## 2019-12-23 RX ORDER — VARENICLINE TARTRATE 1 MG/1
TABLET, FILM COATED ORAL
Qty: 56 TABLET | Refills: 0 | Status: SHIPPED | OUTPATIENT
Start: 2019-12-23 | End: 2019-12-23 | Stop reason: SDUPTHER

## 2019-12-31 RX ORDER — LISINOPRIL AND HYDROCHLOROTHIAZIDE 25; 20 MG/1; MG/1
TABLET ORAL
Qty: 90 TABLET | Refills: 0 | Status: SHIPPED | OUTPATIENT
Start: 2019-12-31 | End: 2020-03-23

## 2019-12-31 RX ORDER — LISINOPRIL AND HYDROCHLOROTHIAZIDE 25; 20 MG/1; MG/1
TABLET ORAL
Qty: 90 TABLET | Refills: 0 | Status: SHIPPED | OUTPATIENT
Start: 2019-12-31 | End: 2019-12-31

## 2020-01-07 RX ORDER — UMECLIDINIUM 62.5 UG/1
AEROSOL, POWDER ORAL
Qty: 1 EACH | Refills: 2 | Status: SHIPPED | OUTPATIENT
Start: 2020-01-07 | End: 2020-03-23

## 2020-01-13 RX ORDER — METFORMIN HYDROCHLORIDE 500 MG/1
500 TABLET, EXTENDED RELEASE ORAL
Qty: 90 TABLET | Refills: 0 | Status: SHIPPED | OUTPATIENT
Start: 2020-01-13 | End: 2020-03-02

## 2020-01-13 RX ORDER — METOPROLOL SUCCINATE 50 MG/1
50 TABLET, EXTENDED RELEASE ORAL DAILY
Qty: 90 TABLET | Refills: 0 | Status: SHIPPED | OUTPATIENT
Start: 2020-01-13 | End: 2020-03-02

## 2020-01-20 RX ORDER — DILTIAZEM HYDROCHLORIDE 120 MG/1
CAPSULE, COATED, EXTENDED RELEASE ORAL
Qty: 90 CAPSULE | Refills: 0 | Status: SHIPPED | OUTPATIENT
Start: 2020-01-20 | End: 2020-03-02

## 2020-01-21 RX ORDER — TIZANIDINE 4 MG/1
4 TABLET ORAL NIGHTLY PRN
Qty: 30 TABLET | Refills: 2 | Status: SHIPPED | OUTPATIENT
Start: 2020-01-21 | End: 2020-03-23

## 2020-03-02 RX ORDER — DILTIAZEM HYDROCHLORIDE 120 MG/1
CAPSULE, COATED, EXTENDED RELEASE ORAL
Qty: 30 CAPSULE | Refills: 0 | Status: SHIPPED | OUTPATIENT
Start: 2020-03-02 | End: 2020-03-23

## 2020-03-02 RX ORDER — METOPROLOL SUCCINATE 50 MG/1
50 TABLET, EXTENDED RELEASE ORAL DAILY
Qty: 30 TABLET | Refills: 0 | Status: SHIPPED | OUTPATIENT
Start: 2020-03-02 | End: 2020-03-16

## 2020-03-02 RX ORDER — METFORMIN HYDROCHLORIDE 500 MG/1
TABLET, EXTENDED RELEASE ORAL
Qty: 30 TABLET | Refills: 0 | Status: SHIPPED | OUTPATIENT
Start: 2020-03-02 | End: 2020-03-16

## 2020-03-16 RX ORDER — METFORMIN HYDROCHLORIDE 500 MG/1
TABLET, EXTENDED RELEASE ORAL
Qty: 30 TABLET | Refills: 0 | Status: SHIPPED | OUTPATIENT
Start: 2020-03-16 | End: 2020-07-24

## 2020-03-16 RX ORDER — METOPROLOL SUCCINATE 50 MG/1
50 TABLET, EXTENDED RELEASE ORAL DAILY
Qty: 30 TABLET | Refills: 0 | Status: SHIPPED | OUTPATIENT
Start: 2020-03-16 | End: 2020-07-17

## 2020-03-23 RX ORDER — TIZANIDINE 4 MG/1
4 TABLET ORAL NIGHTLY PRN
Qty: 30 TABLET | Refills: 0 | Status: SHIPPED | OUTPATIENT
Start: 2020-03-23 | End: 2020-05-11

## 2020-03-23 RX ORDER — UMECLIDINIUM 62.5 UG/1
AEROSOL, POWDER ORAL
Qty: 3 EACH | Refills: 0 | Status: SHIPPED | OUTPATIENT
Start: 2020-03-23 | End: 2020-06-18

## 2020-03-23 RX ORDER — LISINOPRIL AND HYDROCHLOROTHIAZIDE 25; 20 MG/1; MG/1
TABLET ORAL
Qty: 90 TABLET | Refills: 0 | Status: CANCELLED | OUTPATIENT
Start: 2020-03-23

## 2020-03-23 RX ORDER — DULOXETIN HYDROCHLORIDE 30 MG/1
30 CAPSULE, DELAYED RELEASE ORAL DAILY
Qty: 90 CAPSULE | Refills: 0 | Status: SHIPPED | OUTPATIENT
Start: 2020-03-23 | End: 2020-08-12

## 2020-03-23 RX ORDER — LISINOPRIL AND HYDROCHLOROTHIAZIDE 25; 20 MG/1; MG/1
TABLET ORAL
Qty: 90 TABLET | Refills: 0 | Status: SHIPPED | OUTPATIENT
Start: 2020-03-23 | End: 2020-08-18

## 2020-03-23 RX ORDER — ALBUTEROL SULFATE 90 UG/1
2 AEROSOL, METERED RESPIRATORY (INHALATION) EVERY 6 HOURS PRN
Qty: 54 G | Refills: 0 | Status: SHIPPED | OUTPATIENT
Start: 2020-03-23 | End: 2020-09-21

## 2020-03-23 RX ORDER — DILTIAZEM HYDROCHLORIDE 120 MG/1
CAPSULE, COATED, EXTENDED RELEASE ORAL
Qty: 90 CAPSULE | Refills: 0 | Status: SHIPPED | OUTPATIENT
Start: 2020-03-23 | End: 2020-08-18

## 2020-05-11 RX ORDER — TIZANIDINE 4 MG/1
4 TABLET ORAL NIGHTLY PRN
Qty: 30 TABLET | Refills: 0 | Status: SHIPPED | OUTPATIENT
Start: 2020-05-11 | End: 2020-06-11

## 2020-06-12 RX ORDER — TIZANIDINE 4 MG/1
TABLET ORAL
Qty: 30 TABLET | Refills: 1 | Status: SHIPPED | OUTPATIENT
Start: 2020-06-12 | End: 2020-08-06 | Stop reason: SDUPTHER

## 2020-06-19 RX ORDER — UMECLIDINIUM 62.5 UG/1
AEROSOL, POWDER ORAL
Qty: 3 EACH | Refills: 0 | Status: SHIPPED | OUTPATIENT
Start: 2020-06-19 | End: 2020-07-15

## 2020-07-15 RX ORDER — UMECLIDINIUM 62.5 UG/1
AEROSOL, POWDER ORAL
Qty: 3 EACH | Refills: 0 | Status: SHIPPED | OUTPATIENT
Start: 2020-07-15 | End: 2020-08-11

## 2020-07-16 ENCOUNTER — HOSPITAL ENCOUNTER (OUTPATIENT)
Dept: MRI IMAGING | Age: 65
Discharge: HOME | End: 2020-07-16
Payer: MEDICARE

## 2020-07-16 ENCOUNTER — HOSPITAL ENCOUNTER (OUTPATIENT)
Age: 65
Discharge: HOME | End: 2020-07-16
Payer: MEDICARE

## 2020-07-16 LAB
BUN BLDV-MCNC: 7 MG/DL (ref 7–20)
CREAT SERPL-MCNC: 0.8 MG/DL (ref 0.6–1.2)
GFR AFRICAN AMERICAN: >60
GFR NON-AFRICAN AMERICAN: >60

## 2020-07-16 PROCEDURE — 84520 ASSAY OF UREA NITROGEN: CPT

## 2020-07-16 PROCEDURE — A9579 GAD-BASE MR CONTRAST NOS,1ML: HCPCS | Performed by: ANESTHESIOLOGY

## 2020-07-16 PROCEDURE — 72158 MRI LUMBAR SPINE W/O & W/DYE: CPT

## 2020-07-16 PROCEDURE — 6360000004 HC RX CONTRAST MEDICATION: Performed by: ANESTHESIOLOGY

## 2020-07-16 PROCEDURE — 36415 COLL VENOUS BLD VENIPUNCTURE: CPT

## 2020-07-16 PROCEDURE — 82565 ASSAY OF CREATININE: CPT

## 2020-07-16 RX ADMIN — GADOTERIDOL 18 ML: 279.3 INJECTION, SOLUTION INTRAVENOUS at 14:41

## 2020-07-17 RX ORDER — METOPROLOL SUCCINATE 50 MG/1
50 TABLET, EXTENDED RELEASE ORAL DAILY
Qty: 30 TABLET | Refills: 0 | Status: SHIPPED | OUTPATIENT
Start: 2020-07-17 | End: 2020-08-12

## 2020-07-24 RX ORDER — METFORMIN HYDROCHLORIDE 500 MG/1
TABLET, EXTENDED RELEASE ORAL
Qty: 30 TABLET | Refills: 0 | Status: SHIPPED | OUTPATIENT
Start: 2020-07-24 | End: 2020-08-18

## 2020-08-06 ENCOUNTER — OFFICE VISIT (OUTPATIENT)
Dept: INTERNAL MEDICINE CLINIC | Age: 65
End: 2020-08-06

## 2020-08-06 VITALS
RESPIRATION RATE: 24 BRPM | DIASTOLIC BLOOD PRESSURE: 80 MMHG | HEIGHT: 65 IN | WEIGHT: 219 LBS | BODY MASS INDEX: 36.49 KG/M2 | OXYGEN SATURATION: 95 % | HEART RATE: 105 BPM | SYSTOLIC BLOOD PRESSURE: 164 MMHG

## 2020-08-06 DIAGNOSIS — E11.9 WELL CONTROLLED TYPE 2 DIABETES MELLITUS (HCC): ICD-10-CM

## 2020-08-06 PROBLEM — E66.01 MORBIDLY OBESE (HCC): Status: ACTIVE | Noted: 2020-08-06

## 2020-08-06 PROCEDURE — G8926 SPIRO NO PERF OR DOC: HCPCS | Performed by: INTERNAL MEDICINE

## 2020-08-06 PROCEDURE — 2022F DILAT RTA XM EVC RTNOPTHY: CPT | Performed by: INTERNAL MEDICINE

## 2020-08-06 PROCEDURE — 4004F PT TOBACCO SCREEN RCVD TLK: CPT | Performed by: INTERNAL MEDICINE

## 2020-08-06 PROCEDURE — G8427 DOCREV CUR MEDS BY ELIG CLIN: HCPCS | Performed by: INTERNAL MEDICINE

## 2020-08-06 PROCEDURE — G8417 CALC BMI ABV UP PARAM F/U: HCPCS | Performed by: INTERNAL MEDICINE

## 2020-08-06 PROCEDURE — 3017F COLORECTAL CA SCREEN DOC REV: CPT | Performed by: INTERNAL MEDICINE

## 2020-08-06 PROCEDURE — 99214 OFFICE O/P EST MOD 30 MIN: CPT | Performed by: INTERNAL MEDICINE

## 2020-08-06 PROCEDURE — 3023F SPIROM DOC REV: CPT | Performed by: INTERNAL MEDICINE

## 2020-08-06 PROCEDURE — 3044F HG A1C LEVEL LT 7.0%: CPT | Performed by: INTERNAL MEDICINE

## 2020-08-06 RX ORDER — MELOXICAM 7.5 MG/1
7.5 TABLET ORAL 2 TIMES DAILY
COMMUNITY
Start: 2020-07-30 | End: 2020-12-11 | Stop reason: ALTCHOICE

## 2020-08-06 RX ORDER — DOXYCYCLINE 100 MG/1
100 TABLET ORAL 2 TIMES DAILY
Qty: 10 TABLET | Refills: 0 | Status: SHIPPED | OUTPATIENT
Start: 2020-08-06 | End: 2020-08-11

## 2020-08-06 RX ORDER — TIZANIDINE 2 MG/1
2 TABLET ORAL DAILY
COMMUNITY
Start: 2020-07-30 | End: 2020-10-22 | Stop reason: SDUPTHER

## 2020-08-06 RX ORDER — PREDNISONE 10 MG/1
TABLET ORAL
Qty: 30 TABLET | Refills: 0 | Status: SHIPPED | OUTPATIENT
Start: 2020-08-06 | End: 2020-12-11 | Stop reason: ALTCHOICE

## 2020-08-06 RX ORDER — FUROSEMIDE 20 MG/1
20 TABLET ORAL DAILY
Qty: 30 TABLET | Refills: 0 | Status: SHIPPED | OUTPATIENT
Start: 2020-08-06 | End: 2020-08-28

## 2020-08-06 NOTE — PROGRESS NOTES
MOUTH 2 TIMES A  capsule 0    albuterol sulfate  (90 Base) MCG/ACT inhaler Inhale 2 puffs into the lungs every 6 hours as needed for Wheezing 54 g 0    lisinopril-hydroCHLOROthiazide (PRINZIDE;ZESTORETIC) 20-25 MG per tablet TAKE ONE TABLET BY MOUTH DAILY 90 tablet 0    dilTIAZem (CARDIZEM CD) 120 MG extended release capsule TAKE ONE CAPSULE BY MOUTH DAILY 90 capsule 0    DULoxetine (CYMBALTA) 30 MG extended release capsule Take 1 capsule by mouth daily 90 capsule 0    varenicline (CHANTIX CONTINUING MONTH RICHARD) 1 MG tablet Take 1 tablet by mouth 2 times daily 56 tablet 0    blood glucose test strips (CONTOUR NEXT TEST) strip Test Daily. DX: E11.9 100 each 3    Lancets MISC Test once daily. DX: E11.9 100 each 3     No current facility-administered medications for this visit. Review of Systems  As above    Objective:   Physical Exam  Vitals:    08/06/20 1523   BP: (!) 164/80   Pulse: 105   Resp: 24   SpO2: 95%         General: older appearing female,  Awake, alert and oriented. Mucous Membranes:  Pink , anicteric  Neck: No JVD, no carotid bruit, no thyromegaly  Chest:  Clear to auscultation bilaterally, no added sounds  Cardiovascular:  RRR S1S2 heard, no murmurs or gallops  Abdomen:  Soft,obese  undistended, non tender, no organomegaly, BS present  Extremities: 1+  edema to both LE  Distal pulses well felt  Neurological : grossly normal mood       Wt Readings from Last 3 Encounters:   08/06/20 219 lb (99.3 kg)   12/23/19 214 lb (97.1 kg)   11/07/19 214 lb (97.1 kg)       Mri spine    No substantial interval change since 05/22/2019 allowing for differences    in slice selection and technique. 2. Prior L3 through L5 laminectomies and anterior osseous fusion from L2    through S1.    3. Severe L1-2 spinal canal stenosis. 4. Severe bilateral L1 neural foraminal narrowing. 5. Mild bilateral L3, right L4, and left L5 neural foraminal narrowing.             Assessment:       Diagnosis Orders   1. Well controlled type 2 diabetes mellitus ([address]     2. Panlobular emphysema ([address]     3. Morbidly obese ([address]     4. Spinal stenosis, lumbar region, without neurogenic claudication     5. Essential hypertension     6. COPD exacerbation (HCC)             Plan:         DM- 2, A1c  At 6.3- started on metformin 500mg daily, stopped with stomach issues  low carb diet, -need to recheck A1c and resume activity   Already on ACEI  Need lipids  Need eye exam    Anxiety and severe  depression - has used prozac and klonopin in the past   Weaned off klonopin 2015    - now on cymbalta 30 mg daily -better controlled  - no suicidal thoughts  - would benefit from psych consult   - doing better since last year though , since [facility]    [facility]    HTn - remains high today likely with 20 lb weight gain and dietary non compliance   compliant on cardizem, increased lisinoprilHCTz - continue  metoprolol     Aortic regurgitation - no acute symptoms. No CHF . F/w Dr. Stubbs Sera abuse  - quit smoking, 7/18  But resumed again intermittently    COPD with pulmonary nodules- f/w Jm Bowman . Off advair and now on Incruse Ellipta. Albuterol prn  Should stop smoking       Chronic back pain - fw Dr. Aidan Abad for pain pump soon  also on zanaflex and lyrica.     Recommend  shingles vaccine    Need colonoscopy /mammogram

## 2020-08-07 LAB
A/G RATIO: 1.2 (ref 1.1–2.2)
ALBUMIN SERPL-MCNC: 3.7 G/DL (ref 3.4–5)
ALP BLD-CCNC: 76 U/L (ref 40–129)
ALT SERPL-CCNC: 12 U/L (ref 10–40)
ANION GAP SERPL CALCULATED.3IONS-SCNC: 14 MMOL/L (ref 3–16)
AST SERPL-CCNC: 14 U/L (ref 15–37)
BILIRUB SERPL-MCNC: <0.2 MG/DL (ref 0–1)
BUN BLDV-MCNC: 9 MG/DL (ref 7–20)
CALCIUM SERPL-MCNC: 9.5 MG/DL (ref 8.3–10.6)
CHLORIDE BLD-SCNC: 92 MMOL/L (ref 99–110)
CO2: 25 MMOL/L (ref 21–32)
CREAT SERPL-MCNC: 0.7 MG/DL (ref 0.6–1.2)
ESTIMATED AVERAGE GLUCOSE: 148.5 MG/DL
GFR AFRICAN AMERICAN: >60
GFR NON-AFRICAN AMERICAN: >60
GLOBULIN: 3 G/DL
GLUCOSE BLD-MCNC: 127 MG/DL (ref 70–99)
HBA1C MFR BLD: 6.8 %
POTASSIUM SERPL-SCNC: 5 MMOL/L (ref 3.5–5.1)
SODIUM BLD-SCNC: 131 MMOL/L (ref 136–145)
TOTAL PROTEIN: 6.7 G/DL (ref 6.4–8.2)

## 2020-08-10 RX ORDER — VARENICLINE TARTRATE
KIT
Qty: 1 BOX | Refills: 0 | Status: SHIPPED | OUTPATIENT
Start: 2020-08-10 | End: 2020-10-06

## 2020-08-10 NOTE — TELEPHONE ENCOUNTER
----- Message from Dianne Quintero MD sent at 8/10/2020  3:16 PM EDT -----  Continue water pills daily  ----- Message -----  From: Ivette Russo  Sent: 8/10/2020   1:26 PM EDT  To: Dianne Quintero MD    Patient states that the leg swelling has gone down a little but not much. Please advise.

## 2020-08-11 RX ORDER — UMECLIDINIUM 62.5 UG/1
AEROSOL, POWDER ORAL
Qty: 1 EACH | Refills: 5 | Status: ON HOLD | OUTPATIENT
Start: 2020-08-11 | End: 2021-04-22

## 2020-08-12 RX ORDER — METOPROLOL SUCCINATE 50 MG/1
50 TABLET, EXTENDED RELEASE ORAL DAILY
Qty: 90 TABLET | Refills: 0 | Status: SHIPPED | OUTPATIENT
Start: 2020-08-12 | End: 2020-08-19

## 2020-08-12 RX ORDER — DULOXETIN HYDROCHLORIDE 30 MG/1
30 CAPSULE, DELAYED RELEASE ORAL DAILY
Qty: 90 CAPSULE | Refills: 0 | Status: SHIPPED | OUTPATIENT
Start: 2020-08-12 | End: 2020-11-05

## 2020-08-18 RX ORDER — LISINOPRIL AND HYDROCHLOROTHIAZIDE 25; 20 MG/1; MG/1
TABLET ORAL
Qty: 90 TABLET | Refills: 0 | Status: SHIPPED | OUTPATIENT
Start: 2020-08-18 | End: 2020-09-21

## 2020-08-18 RX ORDER — METFORMIN HYDROCHLORIDE 500 MG/1
TABLET, EXTENDED RELEASE ORAL
Qty: 90 TABLET | Refills: 0 | Status: SHIPPED | OUTPATIENT
Start: 2020-08-18 | End: 2020-11-11

## 2020-08-18 RX ORDER — DILTIAZEM HYDROCHLORIDE 120 MG/1
CAPSULE, COATED, EXTENDED RELEASE ORAL
Qty: 90 CAPSULE | Refills: 0 | Status: SHIPPED | OUTPATIENT
Start: 2020-08-18 | End: 2020-11-11

## 2020-08-20 RX ORDER — METOPROLOL SUCCINATE 50 MG/1
50 TABLET, EXTENDED RELEASE ORAL DAILY
Qty: 90 TABLET | Refills: 0 | Status: SHIPPED | OUTPATIENT
Start: 2020-08-20 | End: 2020-12-11 | Stop reason: SDUPTHER

## 2020-08-28 RX ORDER — FUROSEMIDE 20 MG/1
20 TABLET ORAL DAILY
Qty: 90 TABLET | Refills: 0 | Status: SHIPPED | OUTPATIENT
Start: 2020-08-28 | End: 2020-11-24

## 2020-09-09 RX ORDER — VARENICLINE TARTRATE 1 MG/1
1 TABLET, FILM COATED ORAL 2 TIMES DAILY
Qty: 56 TABLET | Refills: 0 | Status: SHIPPED | OUTPATIENT
Start: 2020-09-09 | End: 2020-10-06

## 2020-09-21 RX ORDER — LISINOPRIL AND HYDROCHLOROTHIAZIDE 25; 20 MG/1; MG/1
TABLET ORAL
Qty: 90 TABLET | Refills: 0 | Status: SHIPPED | OUTPATIENT
Start: 2020-09-21 | End: 2020-12-11 | Stop reason: SDUPTHER

## 2020-10-06 RX ORDER — VARENICLINE TARTRATE 1 MG/1
1 TABLET, FILM COATED ORAL 2 TIMES DAILY
Qty: 56 TABLET | Refills: 0 | Status: SHIPPED | OUTPATIENT
Start: 2020-10-06 | End: 2020-10-29

## 2020-10-22 RX ORDER — TIZANIDINE 2 MG/1
2 TABLET ORAL DAILY
Qty: 90 TABLET | Refills: 0 | Status: SHIPPED | OUTPATIENT
Start: 2020-10-22 | End: 2020-12-11 | Stop reason: SDUPTHER

## 2020-10-29 RX ORDER — VARENICLINE TARTRATE 1 MG/1
1 TABLET, FILM COATED ORAL 2 TIMES DAILY
Qty: 56 TABLET | Refills: 0 | Status: SHIPPED | OUTPATIENT
Start: 2020-10-29 | End: 2020-11-24

## 2020-11-05 RX ORDER — DULOXETIN HYDROCHLORIDE 30 MG/1
30 CAPSULE, DELAYED RELEASE ORAL DAILY
Qty: 90 CAPSULE | Refills: 0 | Status: SHIPPED | OUTPATIENT
Start: 2020-11-05 | End: 2020-12-11 | Stop reason: SDUPTHER

## 2020-11-11 ENCOUNTER — TELEPHONE (OUTPATIENT)
Dept: INTERNAL MEDICINE CLINIC | Age: 65
End: 2020-11-11

## 2020-11-11 RX ORDER — METFORMIN HYDROCHLORIDE 500 MG/1
TABLET, EXTENDED RELEASE ORAL
Qty: 90 TABLET | Refills: 0 | Status: SHIPPED | OUTPATIENT
Start: 2020-11-11 | End: 2020-11-11 | Stop reason: ALTCHOICE

## 2020-11-11 RX ORDER — DILTIAZEM HYDROCHLORIDE 120 MG/1
CAPSULE, COATED, EXTENDED RELEASE ORAL
Qty: 90 CAPSULE | Refills: 0 | Status: SHIPPED | OUTPATIENT
Start: 2020-11-11 | End: 2020-12-11 | Stop reason: SDUPTHER

## 2020-11-24 RX ORDER — VARENICLINE TARTRATE 1 MG/1
TABLET, FILM COATED ORAL
Qty: 180 TABLET | Refills: 0 | Status: SHIPPED | OUTPATIENT
Start: 2020-11-24 | End: 2021-07-13 | Stop reason: ALTCHOICE

## 2020-11-24 RX ORDER — FUROSEMIDE 20 MG/1
20 TABLET ORAL DAILY
Qty: 90 TABLET | Refills: 0 | Status: SHIPPED | OUTPATIENT
Start: 2020-11-24 | End: 2021-02-18

## 2020-12-11 ENCOUNTER — TELEPHONE (OUTPATIENT)
Dept: INTERNAL MEDICINE CLINIC | Age: 65
End: 2020-12-11

## 2020-12-11 ENCOUNTER — OFFICE VISIT (OUTPATIENT)
Dept: INTERNAL MEDICINE CLINIC | Age: 65
End: 2020-12-11

## 2020-12-11 VITALS
RESPIRATION RATE: 18 BRPM | SYSTOLIC BLOOD PRESSURE: 150 MMHG | DIASTOLIC BLOOD PRESSURE: 75 MMHG | BODY MASS INDEX: 35.65 KG/M2 | WEIGHT: 214 LBS | TEMPERATURE: 97.6 F | HEART RATE: 70 BPM | HEIGHT: 65 IN

## 2020-12-11 DIAGNOSIS — E66.01 MORBIDLY OBESE (HCC): ICD-10-CM

## 2020-12-11 DIAGNOSIS — E11.9 WELL CONTROLLED TYPE 2 DIABETES MELLITUS (HCC): ICD-10-CM

## 2020-12-11 DIAGNOSIS — I10 ESSENTIAL HYPERTENSION: ICD-10-CM

## 2020-12-11 DIAGNOSIS — F32.4 MAJOR DEPRESSIVE DISORDER WITH SINGLE EPISODE, IN PARTIAL REMISSION (HCC): ICD-10-CM

## 2020-12-11 LAB
BILIRUBIN, POC: NORMAL
BLOOD URINE, POC: NORMAL
CLARITY, POC: NORMAL
COLOR, POC: NORMAL
GLUCOSE URINE, POC: NORMAL
KETONES, POC: NORMAL
LEUKOCYTE EST, POC: NORMAL
NITRITE, POC: NORMAL
PH, POC: NORMAL
PROTEIN, POC: NORMAL
SPECIFIC GRAVITY, POC: NORMAL
UROBILINOGEN, POC: NORMAL

## 2020-12-11 PROCEDURE — G0008 ADMIN INFLUENZA VIRUS VAC: HCPCS | Performed by: INTERNAL MEDICINE

## 2020-12-11 PROCEDURE — 3017F COLORECTAL CA SCREEN DOC REV: CPT | Performed by: INTERNAL MEDICINE

## 2020-12-11 PROCEDURE — G8482 FLU IMMUNIZE ORDER/ADMIN: HCPCS | Performed by: INTERNAL MEDICINE

## 2020-12-11 PROCEDURE — 81002 URINALYSIS NONAUTO W/O SCOPE: CPT | Performed by: INTERNAL MEDICINE

## 2020-12-11 PROCEDURE — 90686 IIV4 VACC NO PRSV 0.5 ML IM: CPT | Performed by: INTERNAL MEDICINE

## 2020-12-11 PROCEDURE — G0439 PPPS, SUBSEQ VISIT: HCPCS | Performed by: INTERNAL MEDICINE

## 2020-12-11 PROCEDURE — 3044F HG A1C LEVEL LT 7.0%: CPT | Performed by: INTERNAL MEDICINE

## 2020-12-11 RX ORDER — DOXYCYCLINE 100 MG/1
100 TABLET ORAL 2 TIMES DAILY
Qty: 14 TABLET | Refills: 0 | Status: SHIPPED | OUTPATIENT
Start: 2020-12-11 | End: 2020-12-18

## 2020-12-11 RX ORDER — METOPROLOL SUCCINATE 50 MG/1
50 TABLET, EXTENDED RELEASE ORAL DAILY
Qty: 90 TABLET | Refills: 1 | Status: ON HOLD | OUTPATIENT
Start: 2020-12-11 | End: 2021-04-22 | Stop reason: HOSPADM

## 2020-12-11 RX ORDER — TORSEMIDE 20 MG/1
20 TABLET ORAL DAILY
Qty: 90 TABLET | Refills: 0 | Status: SHIPPED | OUTPATIENT
Start: 2020-12-11 | End: 2021-02-23 | Stop reason: SDUPTHER

## 2020-12-11 RX ORDER — IPRATROPIUM BROMIDE AND ALBUTEROL SULFATE 2.5; .5 MG/3ML; MG/3ML
1 SOLUTION RESPIRATORY (INHALATION) EVERY 4 HOURS
Qty: 360 ML | Refills: 0 | Status: SHIPPED | OUTPATIENT
Start: 2020-12-11

## 2020-12-11 RX ORDER — METHYLPREDNISOLONE 4 MG/1
TABLET ORAL
Qty: 1 KIT | Refills: 0 | Status: SHIPPED | OUTPATIENT
Start: 2020-12-11 | End: 2020-12-17

## 2020-12-11 RX ORDER — DULOXETIN HYDROCHLORIDE 30 MG/1
30 CAPSULE, DELAYED RELEASE ORAL DAILY
Qty: 90 CAPSULE | Refills: 0 | Status: SHIPPED | OUTPATIENT
Start: 2020-12-11 | End: 2021-04-14

## 2020-12-11 RX ORDER — TIZANIDINE 2 MG/1
2 TABLET ORAL DAILY
Qty: 90 TABLET | Refills: 1 | Status: SHIPPED | OUTPATIENT
Start: 2020-12-11 | End: 2021-03-18

## 2020-12-11 RX ORDER — LISINOPRIL AND HYDROCHLOROTHIAZIDE 25; 20 MG/1; MG/1
1 TABLET ORAL DAILY
Qty: 90 TABLET | Refills: 1 | Status: ON HOLD | OUTPATIENT
Start: 2020-12-11 | End: 2021-04-22 | Stop reason: HOSPADM

## 2020-12-11 RX ORDER — DILTIAZEM HYDROCHLORIDE 120 MG/1
120 CAPSULE, COATED, EXTENDED RELEASE ORAL DAILY
Qty: 90 CAPSULE | Refills: 1 | Status: ON HOLD | OUTPATIENT
Start: 2020-12-11 | End: 2021-04-22 | Stop reason: HOSPADM

## 2020-12-11 RX ORDER — BUPROPION HYDROCHLORIDE 100 MG/1
100 TABLET, EXTENDED RELEASE ORAL DAILY
Qty: 90 TABLET | Refills: 1 | Status: SHIPPED | OUTPATIENT
Start: 2020-12-11 | End: 2021-06-03

## 2020-12-11 ASSESSMENT — PATIENT HEALTH QUESTIONNAIRE - PHQ9
2. FEELING DOWN, DEPRESSED OR HOPELESS: 1
SUM OF ALL RESPONSES TO PHQ9 QUESTIONS 1 & 2: 0
1. LITTLE INTEREST OR PLEASURE IN DOING THINGS: 1
SUM OF ALL RESPONSES TO PHQ9 QUESTIONS 1 & 2: 2
SUM OF ALL RESPONSES TO PHQ QUESTIONS 1-9: 2
SUM OF ALL RESPONSES TO PHQ QUESTIONS 1-9: 0
SUM OF ALL RESPONSES TO PHQ QUESTIONS 1-9: 0
SUM OF ALL RESPONSES TO PHQ QUESTIONS 1-9: 2
2. FEELING DOWN, DEPRESSED OR HOPELESS: 0
SUM OF ALL RESPONSES TO PHQ QUESTIONS 1-9: 2
1. LITTLE INTEREST OR PLEASURE IN DOING THINGS: 0
SUM OF ALL RESPONSES TO PHQ QUESTIONS 1-9: 0

## 2020-12-11 ASSESSMENT — LIFESTYLE VARIABLES
HOW OFTEN DO YOU HAVE SIX OR MORE DRINKS ON ONE OCCASION: 0
HOW OFTEN DO YOU HAVE A DRINK CONTAINING ALCOHOL: 4
HOW MANY STANDARD DRINKS CONTAINING ALCOHOL DO YOU HAVE ON A TYPICAL DAY: 0
AUDIT-C TOTAL SCORE: 4

## 2020-12-11 NOTE — PATIENT INSTRUCTIONS
Learning About Medical Power of   What is a medical power of ? A medical power of , also called a durable power of  for health care, is one type of the legal forms called advance directives. It lets you name the person you want to make treatment decisions for you if you can't speak or decide for yourself. The person you choose is called your health care agent. This person is also called a health care proxy or health care surrogate. A medical power of  may be called something else in your state. How do you choose a health care agent? Choose your health care agent carefully. This person may or may not be a family member. Talk to the person before you make your final decision. Make sure he or she is comfortable with this responsibility. It's a good idea to choose someone who:  · Is at least 25years old. · Knows you well and understands what makes life meaningful for you. · Understands your Shinto and moral values. · Will do what you want, not what he or she wants. · Will be able to make difficult choices at a stressful time. · Will be able to refuse or stop treatment, if that is what you would want, even if you could die. · Will be firm and confident with health professionals if needed. · Will ask questions to get needed information. · Lives near you or agrees to travel to you if needed. Your family may help you make medical decisions while you can still be part of that process. But it's important to choose one person to be your health care agent in case you aren't able to make decisions for yourself. If you don't fill out the legal form and name a health care agent, the decisions your family can make may be limited. A health care agent may be called something else in your state. Who will make decisions for you if you don't have a health care agent? If you don't have a health care agent or a living will, you may not get the care you want. Decisions may be made by family members who disagree about your medical care. Or decisions may be made by a medical professional who doesn't know you well. In some cases, a  makes the decisions. When you name a health care agent, it is very clear who has the power to make health decisions for you. How do you name a health care agent? You name your health care agent on a legal form. This form is usually called a medical power of . Ask your hospital, state bar association, or office on aging where to find these forms. You must sign the form to make it legal. Some states require you to get the form notarized. This means that a person called a  watches you sign the form and then he or she signs the form. Some states also require that two or more witnesses sign the form. Be sure to tell your family members and doctors who your health care agent is. Where can you learn more? Go to https://[employer]."[employer]". org and sign in to your [employer] account. Enter [id number] in the [employer] box to learn more about \"Learning About [address] \"     If you do not have an account, please click on the \"Sign Up Now\" link. Current as of: December 9, 2019               Content Version: 12.6  © 4479-7043 [employer]. Care instructions adapted under license by [facility] ([facility]). If you have questions about a medical condition or this instruction, always ask your healthcare professional. [place] [address] any warranty or liability for your use of this information. Learning About Healthy Weight  What is a healthy weight? A healthy weight is the weight at which you feel good about yourself and have energy for work and play. It's also one that lowers your risk for health problems. What can you do to stay at a healthy weight?   It can be hard to stay at a healthy weight, especially when fast food, vending-machine snacks, and processed foods are so easy to find. And with your busy lifestyle, activity may be low on your list of things to do. But staying at a healthy weight may be easier than you think. Here are some dos and don'ts for staying at a healthy weight:  Do eat healthy foods  The kinds of foods you eat have a big impact on both your weight and your health. Reaching and staying at a healthy weight is not about going on a diet. It's about making healthier food choices every day and changing your diet for good. Healthy eating means eating a variety of foods so that you get all the nutrients you need. Your body needs protein, carbohydrate, and fats for energy. They keep your heart beating, your brain active, and your muscles working. On most days, try to eat from each food group. This means eating a variety of:  · Whole grains, such as whole wheat breads and pastas. · Fruits and vegetables. · Dairy products, such as low-fat milk, yogurt, and cheese. · Lean proteins, such as all types of fish, chicken without the skin, and beans. Don't have too much or too little of one thing. All foods, if eaten in moderation, can be part of healthy eating. Even sweets can be okay. If your favorite foods are high in fat, salt, sugar, or calories, limit how often you eat them. Eat smaller servings, or look for healthy substitutes. Do watch what you eat  Many people eat more than their bodies need. Part of staying at a healthy weight means learning how much food you really need from day to day and not eating more than that. Even with healthy foods, eating too much can make you gain weight. Having a well-balanced diet means that you eat enough, but not too much, and that your food gives you the nutrients you need to stay healthy. So listen to your body. Eat when you're hungry. Stop when you feel satisfied. It's a good idea to have healthy snacks ready for when you get hungry. Keep healthy snacks with you at work, in your car, and at home.  If you have a healthy snack easily available, you'll be less likely to pick a candy bar or bag of chips from a vending machine instead. Some healthy snacks you might want to keep on hand are fruit, low-fat yogurt, string cheese, low-fat microwave popcorn, raisins and other dried fruit, nuts, whole wheat crackers, pretzels, carrots, celery sticks, and broccoli. Do some physical activity   A big part of reaching and staying at a healthy weight is being active. When you're active, you burn calories. This makes it easier to reach and stay at a healthy weight. When you're active on a regular basis, your body burns more calories, even when you're at rest. Being active helps you lose fat and build lean muscle. Try to be active for at least 1 hour every day. This may sound like a lot, but it's okay to be active in smaller blocks of time that add up to 1 hour a day. Any activity that makes your heart beat faster and keeps it there for a while counts. A brisk walk, run, or swim will get your heart beating faster. So will climbing stairs, shooting baskets, or cycling. Even some household chores like vacuuming and mowing the lawn will get your heart rate up. Pick activities that you enjoy--ones that make your heart beat faster, your muscles stronger, and your muscles and joints more flexible. If you find more than one thing you like doing, do them all. You don't have to do the same thing every day. Don't diet  Diets don't work. Diets are temporary. Because you give up so much when you diet, you may be hungry and think about food all the time. And after you stop dieting, you also may overeat to make up for what you missed. Most people who diet end up gaining back the pounds they lost--and more. Remember that healthy bodies come in lots of shapes and sizes. Everyone can get healthier by eating better and being more active. Where can you learn more? Go to https://janett.health-partners. org and sign in to your [employer] account. meet your calcium requirement with diet alone, but a vitamin D supplement is usually necessary to meet this goal.  · When exposed to the sun, use a sunscreen that protects against both UVA and UVB radiation with an SPF of 30 or greater. Reapply every 2 to 3 hours or after sweating, drying off with a towel, or swimming. · Always wear a seat belt when traveling in a car. Always wear a helmet when riding a bicycle or motorcycle.

## 2020-12-11 NOTE — PROGRESS NOTES
Medicare Annual Wellness Visit  Name: Yosvany Strauss Date: 2020   MRN: [id number] Sex: Female   Age: 63 y.o. Ethnicity: Non-/Non    : 1955 Race: Misael Cox is here for Medicare AWV    Screenings for behavioral, psychosocial and functional/safety risks, and cognitive dysfunction are all negative except as indicated below. These results, as well as other patient data from the [address] form, are documented in Flowsheets linked to this Encounter. 63 y.o. female with h.o   chronic back pain , copd, DM- 2,. HTN , tobacco use here for annual exam        Since last time, reports progressive detioration of health with inactivity and continued smoking. Ongoing dyspnea with exertion, pedal edema with inactivity  continued  wheezing      DM- 2- Taking metformin but not compliant with diet  Since last time, her  is helping her out wi  Mood improved and now eating more, gained wt  Not checking sugars  activity remains low    More arthritis and back pain with weight gain      Reports ongoing aches and pains  Chronic back pain - s.p  surgeries x 4  S.p recent VJ by  Dr Hunter Ramos-   Not back on narcotics yet but considering pain pump soon  Remains on lyrica which is helping some       Using cane/walker  for ambulation . No recent falls  Reports chronic fatigue      Depression better , compliant with meds - cymbalta    Copd with chronic Dyspnea stable. Uses inhalers as needed  Quit smoking  This term but intermittent smoking        Allergies   Allergen Reactions    Ciprofloxacin Nausea And Vomiting       Prior to Visit Medications    Medication Sig Taking?  Authorizing Provider   furosemide (LASIX) 20 MG tablet Take 1 tablet by mouth daily  Radha Thomas MD   CHANTIX CONTINUING MONTH RICHARD 1 MG tablet TAKE ONE TABLET BY MOUTH 2 TIMES A DAY  Radha Thomas MD   LYRICA 100 MG capsule TAKE ONE CAPSULE BY MOUTH 2 TIMES A DAY  Radha Thomas MD dilTIAZem (CARDIZEM CD) 120 MG extended release capsule TAKE ONE CAPSULE BY MOUTH DAILY  Catalina Paris MD   metFORMIN (GLUCOPHAGE) 500 MG tablet Take 1 tablet by mouth daily (with breakfast)  Catalina Paris MD   DULoxetine (CYMBALTA) 30 MG extended release capsule Take 1 capsule by mouth daily  Catalina Paris MD   tiZANidine (ZANAFLEX) 2 MG tablet Take 1 tablet by mouth daily Take 2 mg by mouth daily  Catalina Paris MD   lisinopril-hydroCHLOROthiazide (PRINZIDE;ZESTORETIC) 20-25 MG per tablet TAKE ONE TABLET BY MOUTH DAILY  Catalina Paris MD   VENTOLIN  (90 Base) MCG/ACT inhaler Inhale 2 puffs into the lungs every 6 hours as needed for Wheezing  Catalina Paris MD   metoprolol succinate (TOPROL XL) 50 MG extended release tablet TAKE 1 TABLET BY MOUTH DAILY  Catalina Paris MD   INCRUSE ELLIPTA 62.5 MCG/INH AEPB INHALE 1 PUFF INTO THE LUNGS DAILY  Catalina Paris MD   meloxicam (MOBIC) 7.5 MG tablet Take 7.5 mg by mouth 2 times daily  Historical Provider, MD   predniSONE (DELTASONE) 10 MG tablet 40 mg x 3 days 30 mg x 3 days, 20 mg x 3 days, 10 mg x 3 days then stop  Catalina Paris MD   blood glucose test strips (CONTOUR NEXT TEST) strip Test Daily. DX: E11.9  Catalina Paris MD   Lancets MISC Test once daily.  DX: E11.9  Catalina Paris MD       Past Medical History:   Diagnosis Date    Abnormal CT scan, chest     COPD (chronic obstructive pulmonary disease) (HCC)     Decreased diffusion capacity of lung     Depression     Dyspnea     Hypertension     Lumbosacral spondylosis without myelopathy 5/9/2016    Pneumonia     Restrictive lung disease     Tobacco abuse     Well controlled type 2 diabetes mellitus ([facility] [address] 10/26/2018       Past Surgical History:   Procedure Laterality Date    BACK SURGERY      CARPAL TUNNEL RELEASE Left 12/12/14    HAND SURGERY Right 9/26/14    SKIN GRAFT Right        Family History   Problem Relation Age of Onset    Cancer Sister     Asthma Neg Hx     Emphysema Neg Hx     Diabetes Neg Hx     Hypertension Neg Hx     Heart Failure Neg Hx        CareTeam (Including outside providers/suppliers regularly involved in providing care):   Patient Care Team:  Alyx López MD as PCP - General (Internal Medicine)  Alyx López MD as PCP - [facility] Empaneled Provider  Gali Linda MD as Consulting Physician (Pulmonology)  Lucrecia Cardona MD as Consulting Physician (Pulmonology)  Serjio Young MD as Consulting Physician (Cardiology)  Joana Porras MD (Orthopedic Surgery)    Wt Readings from Last 3 Encounters:   12/11/20 214 lb (97.1 kg)   08/06/20 219 lb (99.3 kg)   12/23/19 214 lb (97.1 kg)     Vitals:    12/11/20 1445   Temp: 97.6 °F (36.4 °C)   Weight: 214 lb (97.1 kg)   Height: 5' 5\" (1.651 m)     Body mass index is 35.61 kg/m². Based upon direct observation of the patient, evaluation of cognition reveals recent and remote memory intact. General: older appearing female,  Awake, alert and oriented. Mucous Membranes:  Pink , anicteric  Neck: No JVD, no carotid bruit, no thyromegaly  Chest:  Clear to auscultation bilaterally, no added sounds  Cardiovascular:  RRR S1S2 heard, no murmurs or gallops  Abdomen:  Soft,obese  undistended, non tender, no organomegaly, BS present  Extremities: 1+  edema to both LE  Distal pulses well felt  Neurological : grossly normal mood       Wt Readings from Last 3 Encounters:   12/11/20 214 lb (97.1 kg)   08/06/20 219 lb (99.3 kg)   12/23/19 214 lb (97.1 kg)       Mri spinew    No substantial interval change since 05/22/2019 allowing for differences    in slice selection and technique. 2. Prior L3 through L5 laminectomies and anterior osseous fusion from L2    through S1.    3. Severe L1-2 spinal canal stenosis. 4. Severe bilateral L1 neural foraminal narrowing. 5. Mild bilateral L3, right L4, and left L5 neural foraminal narrowing. Need eye and dental exams as well once pandemic issues resolve         Patient's complete Health Risk Assessment and screening values have been reviewed and are found in Flowsheets. The following problems were reviewed today and where indicated follow up appointments were made and/or referrals ordered. Positive Risk Factor Screenings with Interventions:     Substance History:  Social History     Tobacco History     Smoking Status  Current Every Day Smoker Last attempt to quit  8/22/2014 Smoking Frequency  1 pack/day for 30 years (30 pk yrs) Smoking Tobacco Type  Cigarettes    Smokeless Tobacco Use  Current User Last attempt to quit  6/21/2016          Alcohol History     Alcohol Use Status  No          Drug Use     Drug Use Status  No          Sexual Activity     Sexually Active  Yes Partners  Male               Alcohol Screening: Audit-C Score: 4    A score of 8 or more is associated with harmful or hazardous drinking. A score of 13 or more in women, and 15 or more in men, is likely to indicate alcohol dependence. Substance Abuse Interventions:  · Tobacco abuse:  tobacco cessation tips and resources provided    General Health and ACP:  General  In general, how would you say your health is?: (!) Poor  In the past 7 days, have you experienced any of the following?  New or Increased Pain, New or Increased Fatigue, Loneliness, Social Isolation, Stress or Anger?: None of These  Do you get the social and emotional support that you need?: (!) No  Do you have a Living Will?: (!) No  Advance Directives     Power of  Living Will ACP-Advance Directive ACP-Power of     Not on File Not on File Filed [address] [facility] Risk Interventions:  · Pain issues: home exercises provided  · Stress: regular exercise recommended- 3-5 times per week, 30-45 minutes per session    Health Habits/Nutrition:  Health Habits/Nutrition  Do you exercise for at least 20 minutes 2-3 times per week?: (!) No  Have you lost any weight without trying in the past 3 months?: No  Do you eat fewer than 2 meals per day?: (!) Yes  Have you seen a dentist within the past year?: Yes  Body mass index: (!) 35.61  Health Habits/Nutrition Interventions:  · Inadequate physical activity:  patient is not ready to increase his/her physical activity level at this time    Hearing/Vision:  No exam data present  Hearing/Vision  Do you or your family notice any trouble with your hearing?: No  Do you have difficulty driving, watching TV, or doing any of your daily activities because of your eyesight?: No  Have you had an eye exam within the past year?: (!) No  Hearing/Vision Interventions:  · Vision concerns:  patient encouraged to make appointment with his/her eye specialist    Safety:  Safety  Do you have working smoke detectors?: Yes  Have all throw rugs been removed or fastened?: (!) No  Do you have non-slip mats or surfaces in all bathtubs/showers?: Yes  Do all of your stairways have a railing or banister?: (n/a)  Are your doorways, halls and stairs free of clutter?: Yes  Do you always fasten your seatbelt when you are in a car?: (!) No  Safety Interventions:  · Home safety tips provided    ADL:  ADLs  In the past 7 days, did you need help from others to perform any of the following everyday activities? Eating, dressing, grooming, bathing, toileting, or walking/balance?: (!) Walking/Balance, Grooming  In the past 7 days, did you need help from others to take care of any of the following?  Laundry, housekeeping, banking/finances, shopping, telephone use, food preparation, transportation, or taking medications?: (!) Laundry, Banking/Finances, Housekeeping, Shopping, Food Preparation  ADL Interventions:  · Patient declines any further evaluation/treatment for this issue    Personalized Preventive Plan   Current Health Maintenance Status  Immunization History   Administered Date(s) Administered    Influenza, Quadv, IM, PF (6 mo and older Fluzone, Flulaval, Fluarix, and 3 yrs and older Afluria) 12/11/2020    Influenza, [place], Recombinant, IM PF (Flublok 18 yrs and older) 11/07/2019    Pneumococcal Polysaccharide ([id number]) 01/01/2014, 07/23/2018    Td, unspecified formulation 08/15/2012        Health Maintenance   Topic Date Due    Diabetic retinal exam  02/14/1967    HIV screen  02/14/1972    DTaP/Tdap/Td vaccine (1 - Tdap) 02/14/1976    Cervical cancer screen  02/14/1978    Shingles Vaccine (1 of 2) 02/14/2007    Colon cancer screen colonoscopy  02/14/2007    Breast cancer screen  05/30/2015    Low dose CT lung screening  07/18/2015    Annual Wellness Visit (AWV)  05/29/2019    Diabetic microalbuminuria test  02/26/2020    Diabetic foot exam  07/08/2020    Lipid screen  11/07/2020    A1C test (Diabetic or Prediabetic)  08/06/2021    Potassium monitoring  08/06/2021    Creatinine monitoring  08/06/2021    Flu vaccine  Completed    Pneumococcal 0-64 years Vaccine  Completed    Hepatitis C screen  Completed    Hepatitis A vaccine  Aged Out    Hib vaccine  Aged Out    Meningococcal (ACWY) vaccine  Aged Out     Recommendations for [employer] Due: see orders and patient instructions/AVS.  . Recommended screening schedule for the next 5-10 years is provided to the patient in written form: see Patient Instructions/AVS.    Jackeline Piedra was seen today for medicare awv.     Diagnoses and all orders for this visit:    Need for influenza vaccination  -     INFLUENZA, QUADV, 0.5ML, 6 MO AND OLDER, IM, PF, PREFILL SYR OR SDV (FLUZONE QUADV, PF)

## 2020-12-12 LAB
A/G RATIO: 1.7 (ref 1.1–2.2)
ALBUMIN SERPL-MCNC: 3.9 G/DL (ref 3.4–5)
ALP BLD-CCNC: 91 U/L (ref 40–129)
ALT SERPL-CCNC: 12 U/L (ref 10–40)
ANION GAP SERPL CALCULATED.3IONS-SCNC: 12 MMOL/L (ref 3–16)
AST SERPL-CCNC: 13 U/L (ref 15–37)
BASOPHILS ABSOLUTE: 0.1 K/UL (ref 0–0.2)
BASOPHILS RELATIVE PERCENT: 0.7 %
BILIRUB SERPL-MCNC: <0.2 MG/DL (ref 0–1)
BUN BLDV-MCNC: 11 MG/DL (ref 7–20)
CALCIUM SERPL-MCNC: 9.2 MG/DL (ref 8.3–10.6)
CHLORIDE BLD-SCNC: 96 MMOL/L (ref 99–110)
CHOLESTEROL, FASTING: 181 MG/DL (ref 0–199)
CO2: 28 MMOL/L (ref 21–32)
CREAT SERPL-MCNC: 0.7 MG/DL (ref 0.6–1.2)
CREATININE URINE: 44.3 MG/DL (ref 28–259)
EOSINOPHILS ABSOLUTE: 0.1 K/UL (ref 0–0.6)
EOSINOPHILS RELATIVE PERCENT: 0.5 %
GFR AFRICAN AMERICAN: >60
GFR NON-AFRICAN AMERICAN: >60
GLOBULIN: 2.3 G/DL
GLUCOSE BLD-MCNC: 124 MG/DL (ref 70–99)
HCT VFR BLD CALC: 41.4 % (ref 36–48)
HDLC SERPL-MCNC: 56 MG/DL (ref 40–60)
HEMOGLOBIN: 13.8 G/DL (ref 12–16)
LDL CHOLESTEROL CALCULATED: 91 MG/DL
LYMPHOCYTES ABSOLUTE: 0.8 K/UL (ref 1–5.1)
LYMPHOCYTES RELATIVE PERCENT: 7.1 %
MCH RBC QN AUTO: 31.4 PG (ref 26–34)
MCHC RBC AUTO-ENTMCNC: 33.3 G/DL (ref 31–36)
MCV RBC AUTO: 94.4 FL (ref 80–100)
MICROALBUMIN UR-MCNC: 2.5 MG/DL
MICROALBUMIN/CREAT UR-RTO: 56.4 MG/G (ref 0–30)
MONOCYTES ABSOLUTE: 0.6 K/UL (ref 0–1.3)
MONOCYTES RELATIVE PERCENT: 5.9 %
NEUTROPHILS ABSOLUTE: 9.1 K/UL (ref 1.7–7.7)
NEUTROPHILS RELATIVE PERCENT: 85.8 %
PDW BLD-RTO: 16.5 % (ref 12.4–15.4)
PLATELET # BLD: 372 K/UL (ref 135–450)
PMV BLD AUTO: 8.6 FL (ref 5–10.5)
POTASSIUM SERPL-SCNC: 4 MMOL/L (ref 3.5–5.1)
RBC # BLD: 4.39 M/UL (ref 4–5.2)
SODIUM BLD-SCNC: 136 MMOL/L (ref 136–145)
TOTAL PROTEIN: 6.2 G/DL (ref 6.4–8.2)
TRIGLYCERIDE, FASTING: 168 MG/DL (ref 0–150)
TSH REFLEX: 0.88 UIU/ML (ref 0.27–4.2)
URIC ACID, SERUM: 7.4 MG/DL (ref 2.6–6)
VLDLC SERPL CALC-MCNC: 34 MG/DL
WBC # BLD: 10.6 K/UL (ref 4–11)

## 2020-12-14 ENCOUNTER — TELEPHONE (OUTPATIENT)
Dept: INTERNAL MEDICINE CLINIC | Age: 65
End: 2020-12-14

## 2020-12-14 DIAGNOSIS — E11.9 WELL CONTROLLED TYPE 2 DIABETES MELLITUS (HCC): ICD-10-CM

## 2020-12-14 LAB
ESTIMATED AVERAGE GLUCOSE: 137 MG/DL
HBA1C MFR BLD: 6.4 %

## 2020-12-14 NOTE — TELEPHONE ENCOUNTER
----- Message from David Briseno MD sent at 12/13/2020  7:31 PM EST -----  Lipids, renal liver, cbc all good    Need A1c    Thyroid good    Urine with slight protein spill, nothing to do  Uric acid high, risk for gout , cut down red meat

## 2020-12-23 ENCOUNTER — OFFICE VISIT (OUTPATIENT)
Dept: PRIMARY CARE CLINIC | Age: 65
End: 2020-12-23
Payer: MEDICARE

## 2020-12-23 ENCOUNTER — HOSPITAL ENCOUNTER (OUTPATIENT)
Dept: WOMENS IMAGING | Age: 65
Discharge: HOME | End: 2020-12-23
Payer: MEDICARE

## 2020-12-23 ENCOUNTER — HOSPITAL ENCOUNTER (OUTPATIENT)
Dept: CT IMAGING | Age: 65
Discharge: HOME | End: 2020-12-23
Payer: MEDICARE

## 2020-12-23 PROCEDURE — 77067 SCR MAMMO BI INCL CAD: CPT

## 2020-12-23 PROCEDURE — 77080 DXA BONE DENSITY AXIAL: CPT

## 2020-12-23 PROCEDURE — G0297 LDCT FOR LUNG CA SCREEN: HCPCS

## 2020-12-23 PROCEDURE — G8428 CUR MEDS NOT DOCUMENT: HCPCS | Performed by: NURSE PRACTITIONER

## 2020-12-23 PROCEDURE — 99211 OFF/OP EST MAY X REQ PHY/QHP: CPT | Performed by: NURSE PRACTITIONER

## 2020-12-23 PROCEDURE — G8417 CALC BMI ABV UP PARAM F/U: HCPCS | Performed by: NURSE PRACTITIONER

## 2020-12-23 NOTE — PATIENT INSTRUCTIONS
People: As much as possible, you should stay in a specific room and away from other people in your home. Also, you should use a separate bathroom, if available. Animals: You should restrict contact with pets and other animals while you are sick with COVID-19, just like you would around other people. Although there have not been reports of pets or other animals becoming sick with COVID-19, it is still recommended that people sick with COVID-19 limit contact with animals until more information is known about the virus. When possible, have another member of your household care for your animals while you are sick. If you are sick with COVID-19, avoid contact with your pet, including petting, snuggling, being kissed or licked, and sharing food. If you must care for your pet or be around animals while you are sick, wash your hands before and after you interact with pets and wear a facemask. Call ahead before visiting your doctor  If you have a medical appointment, call the healthcare provider and tell them that you have or may have COVID-19. This will help the healthcare providers office take steps to keep other people from getting infected or exposed. Wear a facemask  You should wear a facemask when you are around other people (e.g., sharing a room or vehicle) or pets and before you enter a healthcare providers office. If you are not able to wear a facemask (for example, because it causes trouble breathing), then people who live with you should not stay in the same room with you, or they should wear a facemask if they enter your room. Cover your coughs and sneezes  Cover your mouth and nose with a tissue when you cough or sneeze. Throw used tissues in a lined trash can. Immediately wash your hands with soap and water for at least 20 seconds or, if soap and water are not available, clean your hands with an alcohol-based hand  that contains at least 60% alcohol.   Clean your hands often Seek prompt medical attention if your illness is worsening (e.g., difficulty breathing). Before seeking care, call your healthcare provider and tell them that you have, or are being evaluated for, COVID-19. Put on a facemask before you enter the facility. These steps will help the healthcare providers office to keep other people in the office or waiting room from getting infected or exposed. Ask your healthcare provider to call the local or state health department. Persons who are placed under active monitoring or facilitated self-monitoring should follow instructions provided by their local health department or occupational health professionals, as appropriate. When working with your local health department check their available hours. If you have a medical emergency and need to call 911, notify the dispatch personnel that you have, or are being evaluated for COVID-19. If possible, put on a facemask before emergency medical services arrive. Discontinuing home isolation  Patients with confirmed COVID-19 should remain under home isolation precautions until the risk of secondary transmission to others is thought to be low. The decision to discontinue home isolation precautions should be made on a case-by-case basis, in consultation with healthcare providers and state and local health departments.

## 2020-12-24 LAB — SARS-COV-2: NOT DETECTED

## 2020-12-28 ENCOUNTER — HOSPITAL ENCOUNTER (OUTPATIENT)
Dept: PULMONOLOGY | Age: 65
Discharge: HOME | End: 2020-12-28
Payer: MEDICARE

## 2020-12-28 ENCOUNTER — TELEPHONE (OUTPATIENT)
Dept: INTERNAL MEDICINE CLINIC | Age: 65
End: 2020-12-28

## 2020-12-28 VITALS — OXYGEN SATURATION: 94 %

## 2020-12-28 PROCEDURE — 94060 EVALUATION OF WHEEZING: CPT

## 2020-12-28 PROCEDURE — 6370000000 HC RX 637 (ALT 250 FOR IP): Performed by: INTERNAL MEDICINE

## 2020-12-28 PROCEDURE — 94729 DIFFUSING CAPACITY: CPT

## 2020-12-28 PROCEDURE — 94640 AIRWAY INHALATION TREATMENT: CPT

## 2020-12-28 PROCEDURE — 94726 PLETHYSMOGRAPHY LUNG VOLUMES: CPT

## 2020-12-28 PROCEDURE — 94760 N-INVAS EAR/PLS OXIMETRY 1: CPT

## 2020-12-28 RX ORDER — ALBUTEROL SULFATE 90 UG/1
2 AEROSOL, METERED RESPIRATORY (INHALATION) ONCE
Status: COMPLETED | OUTPATIENT
Start: 2020-12-28 | End: 2020-12-28

## 2020-12-28 RX ORDER — DENOSUMAB 60 MG/ML
60 INJECTION SUBCUTANEOUS ONCE
Qty: 1 ML | Refills: 1 | Status: SHIPPED | OUTPATIENT
Start: 2020-12-28

## 2020-12-28 RX ADMIN — Medication 2 PUFF: at 14:00

## 2020-12-28 NOTE — TELEPHONE ENCOUNTER
----- Message from Enma York MD sent at 12/24/2020  8:52 PM EST -----  Osteopenia with decreased bone mineral density noted  Need prolia   Continue oscal 250 mg bid - need script  Arrange for prolia

## 2020-12-28 NOTE — TELEPHONE ENCOUNTER
----- Message from Karen Harvey MD sent at 12/27/2020  9:55 PM EST -----  Contact: [phone]  done  ----- Message -----  From: Homar Rao  Sent: 12/21/2020   9:25 AM EST  To: Karen Harvey MD    Please addend note.  ----- Message -----  From: Karen Harvey MD  Sent: 12/18/2020   2:22 PM EST  To: Homar Rao    Yes  ----- Message -----  From: Homar Rao  Sent: 12/18/2020   9:43 AM EST  To: Karen Harvey MD    Monica needing note to mention need for nebulizer. Are you able to addend last note?  ----- Message -----  From: Buffy Ny: 12/18/2020   9:33 AM EST  To: [facility]  called stating that Monica in [facility] still haven't received paperwork/letter/verbal ok stating the reason for her nebulizer. Pt cannot get it until this happens. Please advise.     [phone]    [facility] [phone]

## 2020-12-31 PROBLEM — M85.80 OSTEOPENIA: Status: ACTIVE | Noted: 2020-12-31

## 2021-01-01 NOTE — PROCEDURES
[address]                 [address] [address]                               PULMONARY FUNCTION    PATIENT NAME: Nena Brown                   :        1955  MED REC NO:   [id number]                          ROOM:  ACCOUNT NO:   [de-identified]                           ADMIT DATE: 2020  PROVIDER:     Geni Brady MD    DATE OF PROCEDURE:  2020    ORDERING PROVIDER:  Jey Barry MD    GIVEN DIAGNOSIS:  COPD. FINDINGS:  1. Spirometry: The FEV1 is 1.48 liters or 58% of predicted. The FVC  is 2.10 liters or 63% of predicted. The FEV1/FVC ratio is 71%. There  is no demonstrated response to inhaled bronchodilators. 2.  Plethysmography:  The total lung capacity is 4.00 liters or 75% of  predicted. 3.  Diffusion Capacity:  The diffusion capacity of carbon monoxide is  8.04 mL/min/mmHg which is 34% of predicted. 4.  Flow Volume Loops: The tracings show a normal pattern. IMPRESSION:  1. There is a mild obstructive lung defect without demonstrated  response to inhaled bronchodilators. 2.  There is a mild restrictive ventilatory defect. 3.  There is a severe reduction in diffusion capacity. 4.  Overall, the pulmonary function tests showing a combined restrictive  and obstructive defect pattern with impaired diffusion capacity, could  be seen in COPD with a concomitant restrictive defect or interstitial  lung disease. Clinical correlation is recommended.         Dave Kennedy MD    D: 2020 14:23:31       T: 2020 21:23:15     [id number]  Job#: [id number]     Doc#: [id number]    CC:

## 2021-01-05 LAB
CATARACTS: POSITIVE
DIABETIC RETINOPATHY: NEGATIVE
GLAUCOMA: NEGATIVE
INTRAOCULAR PRESSURE EYE: NORMAL
VISUAL ACUITY DISTANCE LEFT EYE: NORMAL
VISUAL ACUITY DISTANCE RIGHT EYE: NORMAL

## 2021-01-06 LAB
DLCO %PRED: 34 %
DLCO PRED: NORMAL
DLCO/VA %PRED: NORMAL
DLCO/VA PRED: NORMAL
DLCO/VA: NORMAL
DLCO: NORMAL
EXPIRATORY TIME-POST: NORMAL
EXPIRATORY TIME: NORMAL
FEF 25-75% %CHNG: NORMAL
FEF 25-75% %PRED-POST: NORMAL
FEF 25-75% %PRED-PRE: NORMAL
FEF 25-75% PRED: NORMAL
FEF 25-75%-POST: NORMAL
FEF 25-75%-PRE: NORMAL
FEV1 %PRED-POST: 58 %
FEV1 %PRED-PRE: 58 %
FEV1 PRED: NORMAL
FEV1-POST: NORMAL
FEV1-PRE: NORMAL
FEV1/FVC %PRED-POST: NORMAL
FEV1/FVC %PRED-PRE: NORMAL
FEV1/FVC PRED: NORMAL
FEV1/FVC-POST: 71 %
FEV1/FVC-PRE: 71 %
FVC %PRED-POST: NORMAL
FVC %PRED-PRE: NORMAL
FVC PRED: NORMAL
FVC-POST: NORMAL
FVC-PRE: NORMAL
GAW %PRED: NORMAL
GAW PRED: NORMAL
GAW: NORMAL
IC %PRED: NORMAL
IC PRED: NORMAL
IC: NORMAL
MEP: NORMAL
MIP: NORMAL
MVV %PRED-PRE: NORMAL
MVV PRED: NORMAL
MVV-PRE: NORMAL
PEF %PRED-POST: NORMAL
PEF %PRED-PRE: NORMAL
PEF PRED: NORMAL
PEF%CHNG: NORMAL
PEF-POST: NORMAL
PEF-PRE: NORMAL
RAW %PRED: NORMAL
RAW PRED: NORMAL
RAW: NORMAL
RV %PRED: NORMAL
RV PRED: NORMAL
RV: NORMAL
SVC %PRED: NORMAL
SVC PRED: NORMAL
SVC: NORMAL
TLC %PRED: 75 %
TLC PRED: NORMAL
TLC: NORMAL
VA %PRED: NORMAL
VA PRED: NORMAL
VA: NORMAL
VTG %PRED: NORMAL
VTG PRED: NORMAL
VTG: NORMAL

## 2021-01-06 ASSESSMENT — PULMONARY FUNCTION TESTS
FEV1_PERCENT_PREDICTED_PRE: 58
FEV1_PERCENT_PREDICTED_POST: 58
FEV1/FVC_PRE: 71
FEV1/FVC_POST: 71

## 2021-01-19 ENCOUNTER — HOSPITAL ENCOUNTER (OUTPATIENT)
Dept: NURSING | Age: 66
Setting detail: INFUSION SERIES
Discharge: HOME | End: 2021-01-19
Payer: MEDICARE

## 2021-01-19 VITALS
BODY MASS INDEX: 35.65 KG/M2 | HEART RATE: 74 BPM | RESPIRATION RATE: 20 BRPM | DIASTOLIC BLOOD PRESSURE: 70 MMHG | TEMPERATURE: 97.7 F | SYSTOLIC BLOOD PRESSURE: 151 MMHG | WEIGHT: 214 LBS | HEIGHT: 65 IN

## 2021-01-19 DIAGNOSIS — M85.80 OSTEOPENIA, UNSPECIFIED LOCATION: Primary | ICD-10-CM

## 2021-01-19 PROCEDURE — 96372 THER/PROPH/DIAG INJ SC/IM: CPT

## 2021-01-19 PROCEDURE — 6360000002 HC RX W HCPCS: Performed by: INTERNAL MEDICINE

## 2021-01-19 PROCEDURE — 99203 OFFICE O/P NEW LOW 30 MIN: CPT

## 2021-01-19 RX ADMIN — DENOSUMAB 60 MG: 60 INJECTION SUBCUTANEOUS at 11:49

## 2021-01-19 ASSESSMENT — PAIN DESCRIPTION - LOCATION: LOCATION: BACK

## 2021-01-19 ASSESSMENT — PAIN DESCRIPTION - PAIN TYPE: TYPE: CHRONIC PAIN

## 2021-01-19 ASSESSMENT — PAIN DESCRIPTION - ONSET: ONSET: ON-GOING

## 2021-01-19 ASSESSMENT — PAIN DESCRIPTION - DESCRIPTORS: DESCRIPTORS: ACHING;SHARP;SHOOTING

## 2021-01-19 ASSESSMENT — PAIN DESCRIPTION - FREQUENCY: FREQUENCY: CONTINUOUS

## 2021-01-19 ASSESSMENT — PAIN SCALES - GENERAL: PAINLEVEL_OUTOF10: 10

## 2021-01-19 ASSESSMENT — PAIN DESCRIPTION - ORIENTATION: ORIENTATION: OTHER (COMMENT)

## 2021-01-19 NOTE — PROGRESS NOTES
Pt without c/o's at present time and reports that she is feeling fine  resp easy and even  Lings CTA  Discharge instructions reviewed with pt and copy was given  Understanding verbalized then pt was  Discharged with  in stable condition

## 2021-01-19 NOTE — PROGRESS NOTES
Pt here for first dose of prolia   Prolia pamphlet given to pt and benefits as well as possible side effects reviewed with pt and her   Pt receptive to all information and denies any additional questions or concerns today  Prolia 60 mg given SC L arm  Injection site unremarkable  bandaid applied  Pt jose angel well   Pt agreeable to be monitored for 20 to 30 mins for any immediate resp issues   remains at side  Will monitor

## 2021-02-11 DIAGNOSIS — M47.817 LUMBOSACRAL SPONDYLOSIS WITHOUT MYELOPATHY: Chronic | ICD-10-CM

## 2021-02-11 RX ORDER — PREGABALIN 100 MG/1
CAPSULE ORAL
Qty: 180 CAPSULE | Refills: 0 | Status: SHIPPED | OUTPATIENT
Start: 2021-02-15 | End: 2021-05-10

## 2021-02-18 RX ORDER — FUROSEMIDE 20 MG/1
20 TABLET ORAL DAILY
Qty: 90 TABLET | Refills: 0 | Status: SHIPPED | OUTPATIENT
Start: 2021-02-18 | End: 2021-02-23 | Stop reason: ALTCHOICE

## 2021-02-23 ENCOUNTER — TELEPHONE (OUTPATIENT)
Dept: INTERNAL MEDICINE CLINIC | Age: 66
End: 2021-02-23

## 2021-02-23 RX ORDER — TORSEMIDE 20 MG/1
20 TABLET ORAL DAILY
Qty: 90 TABLET | Refills: 0 | Status: ON HOLD | OUTPATIENT
Start: 2021-02-23 | End: 2021-04-22 | Stop reason: HOSPADM

## 2021-02-23 NOTE — TELEPHONE ENCOUNTER
----- Message from John Bolanos MD sent at 2/23/2021  5:12 PM EST -----  Yes torsemide 20 mg   No more furosemide  ----- Message -----  From: Kenny Strong  Sent: 2/23/2021   3:54 PM EST  To: John Bolanos MD    Pharmacy called stating pt told them that the furosemide 20 mg is supposed to be torsemide 20 mg. Please advise.   [address]

## 2021-03-18 RX ORDER — TIZANIDINE 2 MG/1
TABLET ORAL
Qty: 30 TABLET | Refills: 0 | Status: SHIPPED | OUTPATIENT
Start: 2021-03-18 | End: 2021-04-14

## 2021-03-23 ENCOUNTER — OFFICE VISIT (OUTPATIENT)
Dept: ORTHOPEDIC SURGERY | Age: 66
End: 2021-03-23
Payer: MEDICARE

## 2021-03-23 DIAGNOSIS — M25.511 ACUTE PAIN OF RIGHT SHOULDER: ICD-10-CM

## 2021-03-23 DIAGNOSIS — S42.121A DISPLACED FRACTURE OF ACROMIAL PROCESS, RIGHT SHOULDER, INITIAL ENCOUNTER FOR CLOSED FRACTURE: Primary | ICD-10-CM

## 2021-03-23 DIAGNOSIS — M12.811 RIGHT ROTATOR CUFF TEAR ARTHROPATHY: ICD-10-CM

## 2021-03-23 DIAGNOSIS — M75.101 RIGHT ROTATOR CUFF TEAR ARTHROPATHY: ICD-10-CM

## 2021-03-23 PROCEDURE — G8482 FLU IMMUNIZE ORDER/ADMIN: HCPCS | Performed by: ORTHOPAEDIC SURGERY

## 2021-03-23 PROCEDURE — 99203 OFFICE O/P NEW LOW 30 MIN: CPT | Performed by: ORTHOPAEDIC SURGERY

## 2021-03-23 PROCEDURE — 3017F COLORECTAL CA SCREEN DOC REV: CPT | Performed by: ORTHOPAEDIC SURGERY

## 2021-03-23 PROCEDURE — G8428 CUR MEDS NOT DOCUMENT: HCPCS | Performed by: ORTHOPAEDIC SURGERY

## 2021-03-23 PROCEDURE — L3660 SO 8 AB RSTR CAN/WEB PRE OTS: HCPCS | Performed by: ORTHOPAEDIC SURGERY

## 2021-03-23 PROCEDURE — G8417 CALC BMI ABV UP PARAM F/U: HCPCS | Performed by: ORTHOPAEDIC SURGERY

## 2021-03-23 PROCEDURE — 4004F PT TOBACCO SCREEN RCVD TLK: CPT | Performed by: ORTHOPAEDIC SURGERY

## 2021-03-23 RX ORDER — HYDROCODONE BITARTRATE AND ACETAMINOPHEN 5; 325 MG/1; MG/1
1 TABLET ORAL EVERY 6 HOURS PRN
Qty: 28 TABLET | Refills: 0 | Status: SHIPPED | OUTPATIENT
Start: 2021-03-23 | End: 2021-03-30

## 2021-03-29 ENCOUNTER — HOSPITAL ENCOUNTER (OUTPATIENT)
Dept: CT IMAGING | Age: 66
Discharge: HOME | End: 2021-03-29
Payer: MEDICARE

## 2021-03-29 DIAGNOSIS — M25.511 ACUTE PAIN OF RIGHT SHOULDER: ICD-10-CM

## 2021-03-29 PROCEDURE — 73200 CT UPPER EXTREMITY W/O DYE: CPT

## 2021-04-05 ENCOUNTER — OFFICE VISIT (OUTPATIENT)
Dept: ORTHOPEDIC SURGERY | Age: 66
End: 2021-04-05
Payer: MEDICARE

## 2021-04-05 VITALS — BODY MASS INDEX: 35.65 KG/M2 | WEIGHT: 214 LBS | HEIGHT: 65 IN

## 2021-04-05 DIAGNOSIS — M12.811 ROTATOR CUFF ARTHROPATHY OF RIGHT SHOULDER: ICD-10-CM

## 2021-04-05 DIAGNOSIS — M75.101 RIGHT ROTATOR CUFF TEAR ARTHROPATHY: ICD-10-CM

## 2021-04-05 DIAGNOSIS — M12.811 RIGHT ROTATOR CUFF TEAR ARTHROPATHY: ICD-10-CM

## 2021-04-05 DIAGNOSIS — S42.121A DISPLACED FRACTURE OF ACROMIAL PROCESS, RIGHT SHOULDER, INITIAL ENCOUNTER FOR CLOSED FRACTURE: ICD-10-CM

## 2021-04-05 DIAGNOSIS — M25.511 ACUTE PAIN OF RIGHT SHOULDER: Primary | ICD-10-CM

## 2021-04-05 DIAGNOSIS — M19.011 PRIMARY OSTEOARTHRITIS OF RIGHT SHOULDER: ICD-10-CM

## 2021-04-05 PROCEDURE — 4004F PT TOBACCO SCREEN RCVD TLK: CPT | Performed by: ORTHOPAEDIC SURGERY

## 2021-04-05 PROCEDURE — 3017F COLORECTAL CA SCREEN DOC REV: CPT | Performed by: ORTHOPAEDIC SURGERY

## 2021-04-05 PROCEDURE — 99213 OFFICE O/P EST LOW 20 MIN: CPT | Performed by: ORTHOPAEDIC SURGERY

## 2021-04-05 PROCEDURE — G8428 CUR MEDS NOT DOCUMENT: HCPCS | Performed by: ORTHOPAEDIC SURGERY

## 2021-04-05 PROCEDURE — G8417 CALC BMI ABV UP PARAM F/U: HCPCS | Performed by: ORTHOPAEDIC SURGERY

## 2021-04-05 NOTE — PROGRESS NOTES
SHOULDER VISIT      HISTORY OF PRESENT ILLNESS    Kourtney Ballard is a 61 y.o. female who presents for evaluation of injury she sustained to her right shoulder. She uses a sling intermittently. She has had her CT scan is here for review. The nature and character of her pain basically is the same. ROS    Well-documented patient history form dated 3/23/2021  All other ROS negative except for above.     Past Surgical history    Past Surgical History:   Procedure Laterality Date    BACK SURGERY      CARPAL TUNNEL RELEASE Left 12/12/14    HAND SURGERY Right 9/26/14    SKIN GRAFT Right        PAST MEDICAL    Past Medical History:   Diagnosis Date    Abnormal CT scan, chest     COPD (chronic obstructive pulmonary disease) (HCC)     Decreased diffusion capacity of lung     Depression     Dyspnea     Hypertension     Lumbosacral spondylosis without myelopathy 5/9/2016    Osteopenia 12/31/2020    Pneumonia     Restrictive lung disease     Tobacco abuse     Well controlled type 2 diabetes mellitus ([address] 10/26/2018       Allergies    Allergies   Allergen Reactions    Ciprofloxacin Nausea And Vomiting       Meds    Current Outpatient Medications   Medication Sig Dispense Refill    tiZANidine (ZANAFLEX) 2 MG tablet TAKE ONE TABLET BY MOUTH EVERY DAY 30 tablet 0    torsemide (DEMADEX) 20 MG tablet Take 1 tablet by mouth daily 90 tablet 0    pregabalin (LYRICA) 100 MG capsule TAKE ONE CAPSULE BY MOUTH 2 TIMES A  capsule 0    calcium-vitamin D (OSCAL) 250-125 MG-UNIT per tablet Take 1 tablet by mouth 2 times daily 60 tablet 2    denosumab (PROLIA) 60 MG/ML SOSY SC injection Inject 1 mL into the skin once for 1 dose 1 mL 1    metoprolol succinate (TOPROL XL) 50 MG extended release tablet Take 1 tablet by mouth daily 90 tablet 1    lisinopril-hydroCHLOROthiazide (PRINZIDE;ZESTORETIC) 20-25 MG per tablet Take 1 tablet by mouth daily 90 tablet 1    DULoxetine (CYMBALTA) 30 MG extended release capsule Take 1 capsule by mouth daily 90 capsule 0    metFORMIN (GLUCOPHAGE) 500 MG tablet Take 1 tablet by mouth daily (with breakfast) 90 tablet 0    dilTIAZem (CARDIZEM CD) 120 MG extended release capsule Take 1 capsule by mouth daily 90 capsule 1    buPROPion (WELLBUTRIN SR) 100 MG extended release tablet Take 1 tablet by mouth daily 90 tablet 1    ipratropium-albuterol (DUONEB) 0.5-2.5 (3) MG/3ML SOLN nebulizer solution Inhale 3 mLs into the lungs every 4 hours 360 mL 0    CHANTIX CONTINUING MONTH RICHARD 1 MG tablet TAKE ONE TABLET BY MOUTH 2 TIMES A  tablet 0    VENTOLIN  (90 Base) MCG/ACT inhaler Inhale 2 puffs into the lungs every 6 hours as needed for Wheezing 54 g 0    INCRUSE ELLIPTA 62.5 MCG/INH AEPB INHALE 1 PUFF INTO THE LUNGS DAILY 1 each 5    blood glucose test strips (CONTOUR NEXT TEST) strip Test Daily. DX: E11.9 100 each 3    Lancets MISC Test once daily. DX: E11.9 100 each 3     No current facility-administered medications for this visit.         Social    Social History     Socioeconomic History    Marital status:      Spouse name: Not on file    Number of children: Not on file    Years of education: Not on file    Highest education level: Not on file   Occupational History    Not on file   Social Needs    Financial resource strain: Not on file    Food insecurity     Worry: Not on file     Inability: Not on file    Transportation needs     Medical: Not on file     Non-medical: Not on file   Tobacco Use    Smoking status: Current Every Day Smoker     Packs/day: 1.00     Years: 30.00     Pack years: 30.00     Types: Cigarettes     Last attempt to quit: 2014     Years since quittin.6    Smokeless tobacco: Current User     Last attempt to quit: 2016   Substance and Sexual Activity    Alcohol use: No    Drug use: No    Sexual activity: Yes     Partners: Male   Lifestyle    Physical activity     Days per week: Not on file     Minutes per session: Not on file    Stress: Not on file   Relationships    Social connections     Talks on phone: Not on file     Gets together: Not on file     Attends Jainism service: Not on file     Active member of club or organization: Not on file     Attends meetings of clubs or organizations: Not on file     Relationship status: Not on file    Intimate partner violence     Fear of current or ex partner: Not on file     Emotionally abused: Not on file     Physically abused: Not on file     Forced sexual activity: Not on file   Other Topics Concern    Not on file   Social History Narrative    Not on file       Family HISTORY    Family History   Problem Relation Age of Onset    Cancer Sister     Asthma Neg Hx     Emphysema Neg Hx     Diabetes Neg Hx     Hypertension Neg Hx     Heart Failure Neg Hx        PHYSICAL EXAM    Vital Signs:  Ht 5' 5\" (1.651 m)   Wt 214 lb (97.1 kg)   BMI 35.61 kg/m²   General Appearance:  Normal body habitus. Alert and oriented to person, place, and time. Affect:  Normal.   Skin:  Intact. Sensation:  Intact. Strength:  Intact. Reflexes:  Intact. Pulses:  Intact. Shoulder Exam  She has a full range of motion of the neck. She has negative Spurling's maneuver. Examination of the shoulder reveals: Very little range of motion secondary to arthritis ankylosis and pain posteriorly consistent with bone pain off the posterior acromion. Her left shoulder is almost the same as far as loss of motion. Her neurocirculatory lymphatic exam is symmetric to both upper extremities  She has no tenderness over the proximal biceps. She has a full active range of motion of the elbow, wrist and hand. Range of motion  (in degrees)   Right Left   ABDUCTION       EXT. ROTATION       INT.  ROTATION       FORWARD FLEX    STRENGTH         Provocative Test Positive Negative Not indicated   Spurling Sign [] [x] []   Cross Arm Adduction Test [] [] [x]   Apprehension Sign [] [] [x]   Neer Sign [] [] [x] Prescott Impingement Sign [] [] [x]   Yergason test [] [] [x]   OBucks test [] [] [x]     Other Provocative tests:     IMAGING STUDIES    I reviewed the CT scan of the right shoulder which reveals the acromion fracture and scapular fracture as well as osteoarthritis and rotator cuff arthropathy. .    IMPRESSION    Right rotator cuff arthropathy with acute acromion/scapula fracture    PLAN    1. Conservative care options including pain medicine were discussed. 2.  The indications for therapeutic injections were discussed. 3.  The indications for additional imaging studies were discussed. 4.  After considering the various options discussed, the patient elected to pursue a course that includes referring her to Dr. Cristofer Kohler for consultation to see if she may benefit from acute and/or staged intervention.

## 2021-04-06 ENCOUNTER — OFFICE VISIT (OUTPATIENT)
Dept: ORTHOPEDIC SURGERY | Age: 66
End: 2021-04-06
Payer: MEDICARE

## 2021-04-06 VITALS — HEIGHT: 65 IN | WEIGHT: 214 LBS | BODY MASS INDEX: 35.65 KG/M2

## 2021-04-06 DIAGNOSIS — M12.811 ROTATOR CUFF ARTHROPATHY OF RIGHT SHOULDER: ICD-10-CM

## 2021-04-06 DIAGNOSIS — S42.111A CLOSED DISPLACED FRACTURE OF BODY OF RIGHT SCAPULA, INITIAL ENCOUNTER: ICD-10-CM

## 2021-04-06 DIAGNOSIS — M19.011 PRIMARY OSTEOARTHRITIS OF RIGHT SHOULDER: Primary | ICD-10-CM

## 2021-04-06 PROCEDURE — 99214 OFFICE O/P EST MOD 30 MIN: CPT | Performed by: ORTHOPAEDIC SURGERY

## 2021-04-06 PROCEDURE — G8427 DOCREV CUR MEDS BY ELIG CLIN: HCPCS | Performed by: ORTHOPAEDIC SURGERY

## 2021-04-06 PROCEDURE — 4004F PT TOBACCO SCREEN RCVD TLK: CPT | Performed by: ORTHOPAEDIC SURGERY

## 2021-04-06 PROCEDURE — 3017F COLORECTAL CA SCREEN DOC REV: CPT | Performed by: ORTHOPAEDIC SURGERY

## 2021-04-06 PROCEDURE — G8417 CALC BMI ABV UP PARAM F/U: HCPCS | Performed by: ORTHOPAEDIC SURGERY

## 2021-04-06 RX ORDER — HYDROCODONE BITARTRATE AND ACETAMINOPHEN 10; 325 MG/1; MG/1
1 TABLET ORAL EVERY 6 HOURS PRN
Qty: 28 TABLET | Refills: 0 | Status: SHIPPED | OUTPATIENT
Start: 2021-04-06 | End: 2021-04-13

## 2021-04-06 NOTE — PROGRESS NOTES
Dr Souleymane Be      Date /Time 4/6/2021             9:19 AM EDT  Name Marco Bello             1955   Location  [address] [place] [address]  MRN [id number]                Chief Complaint   Patient presents with    Shoulder Pain     Right shoulder pain, referred by Dr. Freedom Pozo, Pain level 10        History of Present Illness    Marco Bello is a 61 y.o. female who presents with  right Shoulder pain. Sent in consultation by Jorge Cook MD,. She is Right-handed. Occupation: Retired  Occupational activities: light lifting. Athletic/exercise activity: no sports. Injury Mechanism:  none. Worker's Comp. & legal issues:   none. Previous Treatments: Ice, Heat, NSAIDs and pain medication    Patient presents to the office today for a new problem. Patient is here with a chief complaint of right shoulder pain. Patient was referred to us by Dr. Rhett Saint. Patient has had right shoulder pain for the past 3-4 weeks. She does not recall any specific injury or trauma. Pain is over the anterior and posterior aspects of the shoulder. Patient does have increased pain with movement. She was placed into a sling by Dr. Rhett Saint thought is not wearing her sling today. No past medical history contributing to the region. She is a former 40-pack-year history smoker. She quit smoking last December. Of note, she did start taking Prolia therapy as of last summer as her first infusion. She is due for second infusion this spring.     Past Medical History  Past Medical History:   Diagnosis Date    Abnormal CT scan, chest     COPD (chronic obstructive pulmonary disease) (HCC)     Decreased diffusion capacity of lung     Depression     Dyspnea     Hypertension     Lumbosacral spondylosis without myelopathy 5/9/2016    Osteopenia 12/31/2020    Pneumonia     Restrictive lung disease     Tobacco abuse     Well controlled type 2 diabetes mellitus ([facility] [address] 10/26/2018     Past Surgical History:   Procedure Laterality Date    BACK SURGERY      CARPAL TUNNEL RELEASE Left 14    HAND SURGERY Right 14    SKIN GRAFT Right      Social History     Tobacco Use    Smoking status: Former Smoker     Years: 30.00     Types: Cigarettes     Quit date: 2020     Years since quittin.2    Smokeless tobacco: Current User     Last attempt to quit: 2016   Substance Use Topics    Alcohol use: No      Current Outpatient Medications on File Prior to Visit   Medication Sig Dispense Refill    tiZANidine (ZANAFLEX) 2 MG tablet TAKE ONE TABLET BY MOUTH EVERY DAY 30 tablet 0    torsemide (DEMADEX) 20 MG tablet Take 1 tablet by mouth daily 90 tablet 0    pregabalin (LYRICA) 100 MG capsule TAKE ONE CAPSULE BY MOUTH 2 TIMES A  capsule 0    calcium-vitamin D (OSCAL) 250-125 MG-UNIT per tablet Take 1 tablet by mouth 2 times daily 60 tablet 2    denosumab (PROLIA) 60 MG/ML SOSY SC injection Inject 1 mL into the skin once for 1 dose 1 mL 1    metoprolol succinate (TOPROL XL) 50 MG extended release tablet Take 1 tablet by mouth daily 90 tablet 1    lisinopril-hydroCHLOROthiazide (PRINZIDE;ZESTORETIC) 20-25 MG per tablet Take 1 tablet by mouth daily 90 tablet 1    DULoxetine (CYMBALTA) 30 MG extended release capsule Take 1 capsule by mouth daily 90 capsule 0    metFORMIN (GLUCOPHAGE) 500 MG tablet Take 1 tablet by mouth daily (with breakfast) 90 tablet 0    dilTIAZem (CARDIZEM CD) 120 MG extended release capsule Take 1 capsule by mouth daily 90 capsule 1    buPROPion (WELLBUTRIN SR) 100 MG extended release tablet Take 1 tablet by mouth daily 90 tablet 1    ipratropium-albuterol (DUONEB) 0.5-2.5 (3) MG/3ML SOLN nebulizer solution Inhale 3 mLs into the lungs every 4 hours 360 mL 0    CHANTIX CONTINUING MONTH RICHARD 1 MG tablet TAKE ONE TABLET BY MOUTH 2 TIMES A  tablet 0    VENTOLIN  (90 Base) MCG/ACT inhaler Inhale 2 puffs into the lungs every 6 hours as needed for [x] Normal  []Decreased   [x] Normal []Decreased   Ulnar  [x] Normal  []Decreased   [x] Normal []Decreased   Scapula       Position  [x]Nml  []low  [] lateral  [x]Nml  []low  [] lateral   Dyskinesia  []+ []Abn. Shrug   []+ []Abn. Shrug                     Winging     [x]None   []Med  []Lat   []Worse w/FE  []Med  []Lat  []Worse w/FE   Scapulothoracic Compress.    []Impr Pain  []Impr Motion  []Impr Pain []Impr Motion    Range of Motion Active Passive Active Passive   Forward Elevation  deferred  170    Abduction  deferred  100    External Rotation @ side  deferred  60    External Rotation @ 90 abd  deferred  90    Internal Rotation @ 90 abd  deferred  40    Internal Rotation  deferred  T12    End range of motion  [] Pain  [] Pain  [] Pain  [] Pain   Strength RIGHT /5 LEFT /5   Abduction 3  5    External Rotation 3  5    Internal Rotation 3  5    Provocative Signs/Tests  [] All Neg   [x] +      [] -  [] All Neg   [x] +      [] -   Rotator Cuff Signs  [] All Neg  [] Not tested   [x] All Neg  [] Not tested    Neer  [x]  []Not tested   []  []Not tested    Paras Harper  [x]  []Not tested   []  []Not tested    Painful arc  [x]  []Not tested   []  []Not tested    Greater tuberosity tenderness  [x]  []Not tested   []  []Not tested    Drop arm  []  []Not tested  []  []Not tested   Superior Escape  []  []Not tested   []  []Not tested    ER Lag  []  []Not tested   []  []Not tested    Belly press  []  []Not tested   []  []Not tested    Lift-off  []  []Not tested   []  []Not tested    Bear hug  []  []Not tested   []  []Not tested    Biceps/Labral Signs  [] All Neg  [] Not tested   [] All Neg  [] Not tested    Palacios's  [x]  []Not tested   []  []Not tested    Speed's  [x]  []Not tested   []  []Not tested    Dynamic Load Shift/Shear  [x]  []Not tested   []  []Not tested    Clicking/Popping  []  []Not tested  []  []Not tested   Bicipital groove tenderness  []  []Not tested   []  []Not tested    Aaron  []  []Not tested   []  []Not tested AC Joint Signs  [x] All Neg  [] Not tested   [x] All Neg  [] Not tested    [facility] joint tenderness  []  []Not tested   []  []Not tested    Cross-arm adduction pain  []  []Not tested   []  []Not tested    Instability Signs  [] All Neg  [] Not tested  [] All Neg  [] Not tested   General laxity (thumb/elbow)  []  []Not tested   []  []Not tested    Hyperabduction  []  []Not tested   []  []Not tested    Sulcus []Side   []ER    []Side   []ER       Anterior apprehension  []  []Not tested   []  []Not tested    Relocation  []   []Not tested  []  []Not tested     Imaging  Right Shoulder: [facility]  Radiographs: X-rays that were taken previously were reviewed today. They do demonstrate a scapular fracture displaced in nature. CT result    1. Acute and comminuted fracture of the scapula involving the scapular body   and scapular spine.  Surrounding soft tissue and intramuscular edema. 2. Complete loss of the acromial humeral interval consistent with rotator   cuff insufficiency.  Severe associated atrophy of the rotator cuff   musculature. 3. Moderate glenohumeral effusion with numerous large articular bodies   throughout the joint. 4. Moderate to severe glenohumeral and moderate acromioclavicular   osteoarthritis. Assessment and Plan  Beryle Soman was seen today for shoulder pain. Diagnoses and all orders for this visit:    Primary osteoarthritis of right shoulder  -     [facility] PT - [facility]  -     HYDROcodone-acetaminophen (NORCO)  MG per tablet; Take 1 tablet by mouth every 6 hours as needed for Pain for up to 7 days. Intended supply: 30 days    Rotator cuff arthropathy of right shoulder    Closed displaced fracture of body of right scapula, initial encounter      Patient does have a complicated problem. First she has a displaced scapular fracture which is subacute. Fracture occurred approximately 3-4 weeks ago.   In addition she has advanced osteoarthritis and rotator cuff arthropathy. Only to seek secondary consultation from Dr. Carmina Mckeon in this case. She clearly has a now subacute scapular spine fracture that appears to be atraumatic in occurrence, concerning for pathologic or atypical.  She does have a history of Prolia use. Although there has never been a reported case that I can find correlating the two, there is a potential concern for this in my opinion. I will seek consultation from another experience surgeon, Dr. Carmina Mckeon. Clearly, she is not a candidate for reverse shoulder replacement anytime soon until we get good union of the scapular spine fracture. I discussed with Koffi Gale that her history, symptoms, signs and imaging are most consistent with rotator cuff arthropathy, right shoulder osteoarthritis, and right scapula fracture, concerning for pathologic or atypical.    We reviewed the natural history of these conditions and treatment options ranging from conservative measures (rest, icing, activity modification, physical therapy, pain meds, cortisone injection) to surgical options. In terms of treatment, I recommended continuing with rest, icing, avoidance of painful activities, NSAIDs or pain meds as tolerated, and physical therapy. If these are not effective, cortisone injection can be considered. We discussed surgical options as well, should conservative measures fail. Electronically signed by Mamie Flores MD on 2021 at 9:19 AM  This dictation was generated by voice recognition computer software. Although all attempts are made to edit the dictation for accuracy, there may be errors in the transcription that are not intended.

## 2021-04-06 NOTE — LETTER
Dr. Koffi Saul,    I saw Debby Frost today for a scapular spine fracture in the setting of severe glenohumeral arthritis. It appears that she had a very low traumatic injury and was just getting up from a chair. This concerns me for either an atypical fracture or pathologic fracture. She has taken Prolia single effusion last year. Although there are no case reports correlating the 2, I am concerned that there may be a correlation. I will seek the consultation of other surgeons I know and review further in the literature. I am wondering if we need to either seek consultation from an endocrinologist, for whether we need to stop her Prolia therapy until this fracture heals. The consultation can also be regarding Forteo infusions to promote bone healing.   This clearly comes with the monetary cost.    Thank you  Gerard

## 2021-04-14 ENCOUNTER — HOSPITAL ENCOUNTER (OUTPATIENT)
Dept: PHYSICAL THERAPY | Age: 66
Setting detail: THERAPIES SERIES
Discharge: HOME | End: 2021-04-14
Payer: MEDICARE

## 2021-04-14 DIAGNOSIS — M51.37 DEGENERATION OF LUMBAR OR LUMBOSACRAL INTERVERTEBRAL DISC: Chronic | ICD-10-CM

## 2021-04-14 PROCEDURE — 97110 THERAPEUTIC EXERCISES: CPT

## 2021-04-14 PROCEDURE — 97140 MANUAL THERAPY 1/> REGIONS: CPT

## 2021-04-14 PROCEDURE — 97161 PT EVAL LOW COMPLEX 20 MIN: CPT

## 2021-04-14 RX ORDER — TIZANIDINE 2 MG/1
TABLET ORAL
Qty: 30 TABLET | Refills: 0 | Status: SHIPPED | OUTPATIENT
Start: 2021-04-14 | End: 2021-05-03

## 2021-04-14 RX ORDER — DULOXETIN HYDROCHLORIDE 30 MG/1
30 CAPSULE, DELAYED RELEASE ORAL DAILY
Qty: 90 CAPSULE | Refills: 0 | Status: SHIPPED | OUTPATIENT
Start: 2021-04-14 | End: 2021-07-07

## 2021-04-14 NOTE — FLOWSHEET NOTE
[facility], [address] [place] [address] Ca[address], [address]  Phone: [phone], [phone]    Physical Therapy Treatment Note/ Progress Report:     Date:  2021    Patient Name:  Mayra Griggs    :  1955  MRN: [id number]  Restrictions/Precautions:    Medical/Treatment Diagnosis Information:  · Diagnosis: R shoulder OA / recent R scapular fx (atraumatic)  · Treatment Diagnosis: M25.511, K63.951  Insurance/Certification information:  PT Insurance Information: Medicare  Physician Information:  Referring Practitioner: Camacho Handy  Has the plan of care been signed (Y/N):        []  Yes  [x]  No     Date of Patient follow up with Physician: none scheduled    Is this a Progress Report:     []  Yes  [x]  No      If Yes:  Date Range for reporting period:  Initial Eval: 2021  Beginnin2021 --- Endin21    Progress report will be due (10 Rx or 30 days whichever is less):      Recertification will be due (POC Duration  / 90 days whichever is less): 21      Visit # Insurance Allowable Auth Required   In Person 1 Med Nec []  Yes     []  No    Tele Health 0  []  Yes     []  No    Total 1         Functional Scale: Quick Dash: 75% (Total Number Sum: 44)   Date assessed: 2021     Latex Allergy:  [x]NO      []YES  Preferred Language for Healthcare:   [x]English       []other:    Pain level:  5/10     SUBJECTIVE:  See eval    OBJECTIVE: See eval   Observation:    Test measurements:      RESTRICTIONS/PRECAUTIONS: recent scapular fx (2021), COPD, osteoporosis, HTN, diabetes    Exercises/Interventions:   Therapeutic Ex (30434)  Therapeutic Activity (89874)  NMR re-education (32597) Sets/Reps Notes/CUES   Pulley          scap retract 20x         Table slides flex 10x Cues to depress UT   Table slides abd 10x         Cane abd seated 15x    Cane ER seated 15x                                                                               Manual Intervention ([id number]) Seated PROM 10' Light, as tolerated due to h/o osteoporosis                            Medbridge access code: [email or web address]                    Patient Education         Therapeutic Exercise and NMR EXR  [x] (66075) Provided verbal/tactile cueing for activities related to strengthening, flexibility, endurance, ROM  for improvements in scapular, scapulothoracic and UE control with self care, reaching, carrying, lifting, house/yardwork, driving/computer work. [x] (43555) Provided verbal/tactile cueing for activities related to improving balance, coordination, kinesthetic sense, posture, motor skill, proprioception  to assist with  scapular, scapulothoracic and UE control with self care, reaching, carrying, lifting, house/yardwork, driving/computer work. Therapeutic Activities:    [x] (53063 or 96428) Provided verbal/tactile cueing for activities related to improving balance, coordination, kinesthetic sense, posture, motor skill, proprioception and motor activation to allow for proper function of scapular, scapulothoracic and UE control with self care, carrying, lifting, driving/computer work.      Home Exercise Program:    [x] (84220) Reviewed/Progressed HEP activities related to strengthening, flexibility, endurance, ROM of scapular, scapulothoracic and UE control with self care, reaching, carrying, lifting, house/yardwork, driving/computer work  [x] (23576) Reviewed/Progressed HEP activities related to improving balance, coordination, kinesthetic sense, posture, motor skill, proprioception of scapular, scapulothoracic and UE control with self care, reaching, carrying, lifting, house/yardwork, driving/computer work      Manual Treatments:  PROM / STM / Oscillations-Mobs:  G-I, II, III, IV (PA's, Inf., Post.)  [x] (56660) Provided manual therapy to mobilize soft tissue/joints of cervical/CT, scapular GHJ and UE for the purpose of modulating pain, promoting relaxation,  increasing ROM, reducing/eliminating soft tissue swelling/inflammation/restriction, improving soft tissue extensibility and allowing for proper ROM for normal function with self care, reaching, carrying, lifting, house/yardwork, driving/computer work    Modalities:      Charges:  Timed Code Treatment Minutes: 25   Total Treatment Minutes:  45   BWC:  TE TIME:  NMR TIME:  MANUAL TIME:  UNTIMED MINUTES:  Medicare Total:   n/a  n/a  n/a  n/a  $180      [x] EVAL (LOW) 63752 (typically 20 minutes face-to-face)  [] EVAL (MOD) 72104 (typically 30 minutes face-to-face)  [] EVAL (HIGH) 55475 (typically 45 minutes face-to-face)  [] RE-EVAL     [x] BZ(21554) x 1    [] IONTO  [] NMR (57130) x     [] VASO  [x] Manual (51256) x 1    [] Other:  [] TA x      [] Mech Traction (81132)  [] ES(attended) (31266)     [] ES (un) (65350):    ASSESSMENT:  See eval    GOALS:   Short Term Goals: To be achieved in: 2 weeks  1. Independent in HEP and progression per patient tolerance, in order to prevent re-injury. []? Progressing: []? Met: []? Not Met: []? Adjusted       2. Patient will have a decrease in pain to facilitate improvement in movement, function, and ADLs as indicated by Functional Deficits. []? Progressing: []? Met: []? Not Met: []? Adjusted          Long Term Goals: To be achieved in: 8 weeks  1. Disability index score of 20% or less for the QuickDash/Oswestry to assist with reaching prior level of function. []? Progressing: []? Met: []? Not Met: []? Adjusted      2. Patient will demonstrate increased AROM to equal the opposite side bilaterally to allow for proper joint functioning as indicated by patients Functional Deficits. []? Progressing: []? Met: []? Not Met: []? Adjusted       3. Patient will demonstrate an increase in strength to 5/5 to allow for proper functional mobility as indicated by patients Functional Deficits. []? Progressing: []? Met: []? Not Met: []? Adjusted       4.  Patient will return to all transfers, work activities, and functional activities without increased symptoms or restriction. []? Progressing: []? Met: []? Not Met: []? Adjusted       5. Patient will have 0/10 pain with ADL's.  []? Progressing: []? Met: []? Not Met: []? Adjusted       6. Patient stated goal: Patient wants to be able to dress independently, reach overhead, and put weight through walker without pain or restriction. []? Progressing: []? Met: []? Not Met: []? Adjusted                 (Cut Meryl Messing from eval)    Overall Progression Towards Functional goals/ Treatment Progress Update:  [] Patient is progressing as expected towards functional goals listed. [] Progression is slowed due to complexities/Impairments listed. [] Progression has been slowed due to co-morbidities.   [x] Plan just implemented, too soon to assess goals progression <30days   [] Goals require adjustment due to lack of progress  [] Patient is not progressing as expected and requires additional follow up with physician  [] Other    Prognosis for POC: [x] Good [] Fair  [] Poor    Patient requires continued skilled intervention: [x] Yes  [] No    Treatment/Activity Tolerance:  [x] Patient able to complete treatment  [] Patient limited by fatigue  [] Patient limited by pain    [] Patient limited by other medical complications  [] Other:     Return to Play: (if applicable)   []  Stage 1: Intro to Strength   []  Stage 2: Return to Run and Strength   []  Stage 3: Return to Jump and Strength   []  Stage 4: Dynamic Strength and Agility   []  Stage 5: Sport Specific Training     []  Ready to Return to Play, Meets All Above Stages   []  Not Ready for Return to Sports   Comments:                         PLAN: See eval  [] Continue per plan of care [] Alter current plan (see comments above)  [x] Plan of care initiated [] Hold pending MD visit [] Discharge    Electronically signed by:  Simonne Duane, PT    Note: If patient does not return for scheduled/ recommended follow up visits, this note will serve as a discharge from care along with most recent update on progress.

## 2021-04-14 NOTE — PLAN OF CARE
[address]ax [address] [facility] [address], [address], [address] [facility]  Phone: [phone], [phone]                                                    Physical Therapy Certification    Dear Referring Practitioner: Rik Jose,    We had the pleasure of evaluating the following patient for physical therapy services at [address] [place] [address]. A summary of our findings can be found in the initial assessment below. This includes our plan of care. If you have any questions or concerns regarding these findings, please do not hesitate to contact me at the office phone number checked above. Thank you for the referral.       Physician Signature:_______________________________Date:__________________  By signing above (or electronic signature), therapists plan is approved by physician      Patient: Gloria Duong   : 1955   MRN: [id number]  Referring Physician: Referring Practitioner: Rik Jose      Evaluation Date: 2021      Medical Diagnosis Information:  Diagnosis: R shoulder OA / recent R scapular fx (atraumatic)   Treatment Diagnosis: M25.511, M25.611                                         Insurance information: PT Insurance Information: Medicare    Precautions/ Contra-indications/Relevant Medical History: recent scapular fx (2021), COPD, osteoporosis, HTN, diabetes    C-SSRS Triggered by Intake questionnaire (Past 2 wk assessment):   [x] No, Questionnaire did not trigger screening.   [] Yes, Patient intake triggered further evaluation      [] C-SSRS Screening completed  [] PCP notified via Plan of Care  [] Emergency services notified     Latex Allergy:  [x]NO      []YES   Preferred Language for Healthcare:   [x]English       []other:     SUBJECTIVE: Patient stated complaint: Patient reports onset of R shoulder pain about 2 months ago. About one month ago she was standing up from a sitting position and she felt a \"crack. \" She discovered she had a fracture in her R scapula and she was given a sling that she wore for a few days. She followed up with another ortho MD, who diagnosed her with R shoulder OA, R RC arthropathy, and recent scapular fx. They discussed options and decided that she is a good candidate for therapy. They are keeping an eye on the scapular fracture since it appears to be atraumatic to determine if there is another cause.     Functional Disability Index: Quick Dash/Modified Oswestry: 75% (Total Number Sum: 44)    Pain Scale: 5/10  Easing factors: sleeping  Provocative factors: walking (uses rollator)     Type: [x]Constant   []Intermittent  []Radiating []Localized []other:     Numbness/Tingling: denies    Functional Limitations/Impairments: [x]Lifting/reaching [x]Grooming [x]Carrying    [x]ADL's []Driving [x]Sports/Recreations   []Other:    Occupation/School: retired, wants to be able to get dressed independently    Living Status/Prior Level of Function: Independent with ADLs and IADLs,     OBJECTIVE:     CERV ROM     Cervical Flexion     Cervical Extension     Cervical SB     Cervical rotation          ROM Left Right   Shoulder Flex 135 deg 90 deg (110 deg passively)   Shoulder Abd 135 deg 90 deg (100 deg passively)   Shoulder ER C7 Back of head   Shoulder IR L5 R hip   Elbow Flex     Elbow Ext     Wrist Flex     Wrist Ext     Strength  Left Right   Shoulder Flex     Shoulder Scap     Shoulder ER     Shoulder IR     Elbow Flex     Elbow Ext     Wrist Flex     Wrist Ext     Silviano        Reflexes/Sensation (myotomes/dermatomes):    [x]Normal    []Abnormal:      Joint mobility: not assessed   [x]Normal    []Hypo   []Hyper    Palpation: tenderness over bilateral scapular spines    Functional Mobility/Transfers: requires walker for all transfers (weightbearing through bilat UE)    Posture: forward head and rounded shoulders    Bandages/Dressings/Incisions: n/a    Gait (include devices/WB status): rollator walker and forward lumbar flexion    Orthopedic Special Tests: Drop arm (-)    [x] Patient history, allergies, meds reviewed. Medical chart reviewed. See intake form. Review Of Systems (ROS):  [x]Performed Review of systems (Integumentary, CardioPulmonary, Neurological) by intake and observation. Intake form has been scanned into medical record. Patient has been instructed to contact their primary care physician regarding ROS issues if not already being addressed at this time. Co-morbidities/Complexities (which will affect course of rehabilitation):   []None           Arthritic conditions   []Rheumatoid arthritis (M05.9)  [x]Osteoarthritis (M19.91)   Cardiovascular conditions   [x]Hypertension (I10)  []Hyperlipidemia (E78.5)  []Angina pectoris (I20)  []Atherosclerosis (I70)   Musculoskeletal conditions   []Disc pathology   []Congenital spine pathologies   []Prior surgical intervention  [x]Osteoporosis (M81.8)  []Osteopenia (M85.8)   Endocrine conditions   []Hypothyroid (E03.9)  []Hyperthyroid Gastrointestinal conditions   []Constipation (X03.57)   Metabolic conditions   []Morbid obesity (E66.01)  [x]Diabetes type 1(E10.65) or 2 (E11.65)   []Neuropathy (G60.9)     Pulmonary conditions   []Asthma (J45)  []Coughing   [x]COPD (J44.9)   Psychological Disorders  []Anxiety (F41.9)  []Depression (F32.9)   []Other:   []Other:          Barriers to/and or personal factors that will affect rehab potential:              []Age  []Sex              []Motivation/Lack of Motivation                        [x]Co-Morbidities              []Cognitive Function, education/learning barriers              []Environmental, home barriers              []profession/work barriers  []past PT/medical experience  []other:  Justification: unable to tolerate multiple positions    Falls Risk Assessment (30 days):   [x] Falls Risk assessed and no intervention required.   [] Falls Risk assessed and Patient requires intervention due to being higher risk   TUG consistent with Glenohumeral IR Deficit - <45 degrees   []Signs/symptoms consistent with facet dysfunction of cervical/thoracic spine    []Signs/symptoms consistent with pathology which may benefit from Dry needling     []other: Tolerance of evaluation/treatment:    []Excellent   [x]Good    []Fair   []Poor    Physical Therapy Evaluation Complexity Justification   [x] A history of present problem with:  [] no personal factors and/or comorbidities that impact the plan of care;  []1-2 personal factors and/or comorbidities that impact the plan of care  [x]3 personal factors and/or comorbidities that impact the plan of care  [x] An examination of body systems using standardized tests and measures addressing any of the following: body structures and functions (impairments), activity limitations, and/or participation restrictions;:  [] a total of 1-2 or more elements   [x] a total of 3 or more elements   [] a total of 4 or more elements   [x] A clinical presentation with:  [x] stable and/or uncomplicated characteristics   [] evolving clinical presentation with changing characteristics  [] unstable and unpredictable characteristics;   [x] Clinical decision making of [x] low, [] moderate, [] high complexity using standardized patient assessment instrument and/or measurable assessment of functional outcome. [x] EVAL (LOW) 79717 (typically 20 minutes face-to-face)  [] EVAL (MOD) 94678 (typically 30 minutes face-to-face)  [] EVAL (HIGH) 53300 (typically 45 minutes face-to-face)  [] RE-EVAL     PLAN:  Frequency/Duration:  2 days per week for 8 weeks:  INTERVENTIONS:  []  Therapeutic exercise including: strength training, ROM, for upper extremity and core   []  NMR activation and proprioception for UE and Core   []  Manual therapy as indicated for UE and spine to include: Dry Needling/IASTM, STM, PROM, Gr I-IV mobilizations, manipulation.    [] Modalities as needed that may include: thermal agents, E-stim, Biofeedback, US, iontophoresis as indicated  [] Patient education on joint protection, postural re-education, activity modification, progression of HEP. HEP instruction: Refer to [address] [place] [address] access code and exercises on the 1st visit treatment note    GOALS:    Short Term Goals: To be achieved in: 2 weeks  1. Independent in HEP and progression per patient tolerance, in order to prevent re-injury. [] Progressing: [] Met: [] Not Met: [] Adjusted   2. Patient will have a decrease in pain to facilitate improvement in movement, function, and ADLs as indicated by Functional Deficits. [] Progressing: [] Met: [] Not Met: [] Adjusted     Long Term Goals: To be achieved in: 8 weeks  1. Disability index score of 20% or less for the QuickDash/Oswestry to assist with reaching prior level of function. [] Progressing: [] Met: [] Not Met: [] Adjusted   2. Patient will demonstrate increased AROM to equal the opposite side bilaterally to allow for proper joint functioning as indicated by patients Functional Deficits. [] Progressing: [] Met: [] Not Met: [] Adjusted   3. Patient will demonstrate an increase in strength to 5/5 to allow for proper functional mobility as indicated by patients Functional Deficits. [] Progressing: [] Met: [] Not Met: [] Adjusted   4. Patient will return to all transfers, work activities, and functional activities without increased symptoms or restriction. [] Progressing: [] Met: [] Not Met: [] Adjusted   5. Patient will have 0/10 pain with ADL's.  [] Progressing: [] Met: [] Not Met: [] Adjusted   6. Patient stated goal: Patient wants to be able to dress independently, reach overhead, and put weight through walker without pain or restriction.   [] Progressing: [] Met: [] Not Met: [] Adjusted      Electronically signed by:  Vicki Arciniega, PT

## 2021-04-19 ENCOUNTER — HOSPITAL ENCOUNTER (INPATIENT)
Age: 66
LOS: 3 days | Discharge: HOME | DRG: 190 | End: 2021-04-22
Attending: INTERNAL MEDICINE | Admitting: INTERNAL MEDICINE
Payer: MEDICARE

## 2021-04-19 ENCOUNTER — OFFICE VISIT (OUTPATIENT)
Dept: INTERNAL MEDICINE CLINIC | Age: 66
End: 2021-04-19

## 2021-04-19 VITALS
WEIGHT: 208 LBS | DIASTOLIC BLOOD PRESSURE: 75 MMHG | TEMPERATURE: 97.8 F | SYSTOLIC BLOOD PRESSURE: 145 MMHG | RESPIRATION RATE: 28 BRPM | HEIGHT: 65 IN | BODY MASS INDEX: 34.66 KG/M2 | HEART RATE: 98 BPM | OXYGEN SATURATION: 89 %

## 2021-04-19 DIAGNOSIS — F32.4 MAJOR DEPRESSIVE DISORDER WITH SINGLE EPISODE, IN PARTIAL REMISSION (HCC): ICD-10-CM

## 2021-04-19 DIAGNOSIS — E11.9 WELL CONTROLLED TYPE 2 DIABETES MELLITUS (HCC): ICD-10-CM

## 2021-04-19 DIAGNOSIS — J44.1 COPD EXACERBATION (HCC): ICD-10-CM

## 2021-04-19 DIAGNOSIS — E66.01 MORBIDLY OBESE (HCC): ICD-10-CM

## 2021-04-19 LAB
BASOPHILS ABSOLUTE: 0.1 K/UL (ref 0–0.2)
BASOPHILS RELATIVE PERCENT: 0.8 %
EOSINOPHILS ABSOLUTE: 0.1 K/UL (ref 0–0.6)
EOSINOPHILS RELATIVE PERCENT: 0.5 %
GLUCOSE BLD-MCNC: 120 MG/DL (ref 70–99)
GLUCOSE BLD-MCNC: 125 MG/DL (ref 70–99)
HCT VFR BLD CALC: 39.2 % (ref 36–48)
HEMOGLOBIN: 12.9 G/DL (ref 12–16)
LYMPHOCYTES ABSOLUTE: 0.9 K/UL (ref 1–5.1)
LYMPHOCYTES RELATIVE PERCENT: 7.1 %
MCH RBC QN AUTO: 30.8 PG (ref 26–34)
MCHC RBC AUTO-ENTMCNC: 32.9 G/DL (ref 31–36)
MCV RBC AUTO: 93.6 FL (ref 80–100)
MONOCYTES ABSOLUTE: 0.7 K/UL (ref 0–1.3)
MONOCYTES RELATIVE PERCENT: 5.6 %
NEUTROPHILS ABSOLUTE: 10.3 K/UL (ref 1.7–7.7)
NEUTROPHILS RELATIVE PERCENT: 86 %
PDW BLD-RTO: 16.2 % (ref 12.4–15.4)
PERFORMED ON: ABNORMAL
PERFORMED ON: ABNORMAL
PLATELET # BLD: 375 K/UL (ref 135–450)
PMV BLD AUTO: 7.9 FL (ref 5–10.5)
RBC # BLD: 4.19 M/UL (ref 4–5.2)
SARS-COV-2, NAAT: NOT DETECTED
WBC # BLD: 12 K/UL (ref 4–11)

## 2021-04-19 PROCEDURE — 85025 COMPLETE CBC W/AUTO DIFF WBC: CPT

## 2021-04-19 PROCEDURE — 2500000003 HC RX 250 WO HCPCS: Performed by: INTERNAL MEDICINE

## 2021-04-19 PROCEDURE — 2580000003 HC RX 258: Performed by: INTERNAL MEDICINE

## 2021-04-19 PROCEDURE — 6360000002 HC RX W HCPCS: Performed by: INTERNAL MEDICINE

## 2021-04-19 PROCEDURE — 99223 1ST HOSP IP/OBS HIGH 75: CPT | Performed by: INTERNAL MEDICINE

## 2021-04-19 PROCEDURE — 94761 N-INVAS EAR/PLS OXIMETRY MLT: CPT

## 2021-04-19 PROCEDURE — 6370000000 HC RX 637 (ALT 250 FOR IP): Performed by: INTERNAL MEDICINE

## 2021-04-19 PROCEDURE — 36415 COLL VENOUS BLD VENIPUNCTURE: CPT

## 2021-04-19 PROCEDURE — 94640 AIRWAY INHALATION TREATMENT: CPT

## 2021-04-19 PROCEDURE — 83036 HEMOGLOBIN GLYCOSYLATED A1C: CPT

## 2021-04-19 PROCEDURE — 87635 SARS-COV-2 COVID-19 AMP PRB: CPT

## 2021-04-19 PROCEDURE — 1200000000 HC SEMI PRIVATE

## 2021-04-19 PROCEDURE — 2700000000 HC OXYGEN THERAPY PER DAY

## 2021-04-19 RX ORDER — ACETAMINOPHEN 650 MG/1
650 SUPPOSITORY RECTAL EVERY 6 HOURS PRN
Status: DISCONTINUED | OUTPATIENT
Start: 2021-04-19 | End: 2021-04-22 | Stop reason: HOSPADM

## 2021-04-19 RX ORDER — POLYETHYLENE GLYCOL 3350 17 G/17G
17 POWDER, FOR SOLUTION ORAL DAILY PRN
Status: DISCONTINUED | OUTPATIENT
Start: 2021-04-19 | End: 2021-04-22 | Stop reason: HOSPADM

## 2021-04-19 RX ORDER — SODIUM CHLORIDE 0.9 % (FLUSH) 0.9 %
5-40 SYRINGE (ML) INJECTION EVERY 12 HOURS SCHEDULED
Status: DISCONTINUED | OUTPATIENT
Start: 2021-04-19 | End: 2021-04-22 | Stop reason: HOSPADM

## 2021-04-19 RX ORDER — MELOXICAM 7.5 MG/1
TABLET ORAL
Status: ON HOLD | COMMUNITY
Start: 2021-04-06 | End: 2021-04-22 | Stop reason: HOSPADM

## 2021-04-19 RX ORDER — ONDANSETRON 2 MG/ML
4 INJECTION INTRAMUSCULAR; INTRAVENOUS EVERY 6 HOURS PRN
Status: DISCONTINUED | OUTPATIENT
Start: 2021-04-19 | End: 2021-04-22 | Stop reason: HOSPADM

## 2021-04-19 RX ORDER — DULOXETIN HYDROCHLORIDE 30 MG/1
30 CAPSULE, DELAYED RELEASE ORAL DAILY
Status: DISCONTINUED | OUTPATIENT
Start: 2021-04-19 | End: 2021-04-22 | Stop reason: HOSPADM

## 2021-04-19 RX ORDER — PREDNISONE 20 MG/1
40 TABLET ORAL DAILY
Status: DISCONTINUED | OUTPATIENT
Start: 2021-04-21 | End: 2021-04-22 | Stop reason: HOSPADM

## 2021-04-19 RX ORDER — NICOTINE POLACRILEX 4 MG
15 LOZENGE BUCCAL PRN
Status: DISCONTINUED | OUTPATIENT
Start: 2021-04-19 | End: 2021-04-22 | Stop reason: HOSPADM

## 2021-04-19 RX ORDER — DEXTROSE MONOHYDRATE 25 G/50ML
12.5 INJECTION, SOLUTION INTRAVENOUS PRN
Status: DISCONTINUED | OUTPATIENT
Start: 2021-04-19 | End: 2021-04-22 | Stop reason: HOSPADM

## 2021-04-19 RX ORDER — IPRATROPIUM BROMIDE AND ALBUTEROL SULFATE 2.5; .5 MG/3ML; MG/3ML
1 SOLUTION RESPIRATORY (INHALATION) EVERY 4 HOURS
Status: DISCONTINUED | OUTPATIENT
Start: 2021-04-19 | End: 2021-04-19

## 2021-04-19 RX ORDER — TIZANIDINE 4 MG/1
2 TABLET ORAL DAILY
Status: DISCONTINUED | OUTPATIENT
Start: 2021-04-19 | End: 2021-04-22 | Stop reason: HOSPADM

## 2021-04-19 RX ORDER — IPRATROPIUM BROMIDE AND ALBUTEROL SULFATE 2.5; .5 MG/3ML; MG/3ML
1 SOLUTION RESPIRATORY (INHALATION) EVERY 4 HOURS
Status: DISCONTINUED | OUTPATIENT
Start: 2021-04-19 | End: 2021-04-20

## 2021-04-19 RX ORDER — BUPROPION HYDROCHLORIDE 100 MG/1
100 TABLET, EXTENDED RELEASE ORAL DAILY
Status: DISCONTINUED | OUTPATIENT
Start: 2021-04-19 | End: 2021-04-22 | Stop reason: HOSPADM

## 2021-04-19 RX ORDER — METHYLPREDNISOLONE SODIUM SUCCINATE 40 MG/ML
40 INJECTION, POWDER, LYOPHILIZED, FOR SOLUTION INTRAMUSCULAR; INTRAVENOUS EVERY 12 HOURS
Status: COMPLETED | OUTPATIENT
Start: 2021-04-19 | End: 2021-04-21

## 2021-04-19 RX ORDER — PREGABALIN 100 MG/1
100 CAPSULE ORAL 2 TIMES DAILY
Status: DISCONTINUED | OUTPATIENT
Start: 2021-04-19 | End: 2021-04-22 | Stop reason: HOSPADM

## 2021-04-19 RX ORDER — SODIUM CHLORIDE 9 MG/ML
25 INJECTION, SOLUTION INTRAVENOUS PRN
Status: DISCONTINUED | OUTPATIENT
Start: 2021-04-19 | End: 2021-04-22 | Stop reason: HOSPADM

## 2021-04-19 RX ORDER — DILTIAZEM HYDROCHLORIDE 120 MG/1
120 CAPSULE, COATED, EXTENDED RELEASE ORAL DAILY
Status: DISCONTINUED | OUTPATIENT
Start: 2021-04-19 | End: 2021-04-20

## 2021-04-19 RX ORDER — LISINOPRIL AND HYDROCHLOROTHIAZIDE 12.5; 1 MG/1; MG/1
2 TABLET ORAL DAILY
Status: DISCONTINUED | OUTPATIENT
Start: 2021-04-19 | End: 2021-04-22

## 2021-04-19 RX ORDER — SODIUM CHLORIDE 9 MG/ML
INJECTION, SOLUTION INTRAVENOUS
Status: DISPENSED
Start: 2021-04-19 | End: 2021-04-20

## 2021-04-19 RX ORDER — DEXTROSE MONOHYDRATE 50 MG/ML
100 INJECTION, SOLUTION INTRAVENOUS PRN
Status: DISCONTINUED | OUTPATIENT
Start: 2021-04-19 | End: 2021-04-22 | Stop reason: HOSPADM

## 2021-04-19 RX ORDER — METOPROLOL SUCCINATE 50 MG/1
50 TABLET, EXTENDED RELEASE ORAL DAILY
Status: DISCONTINUED | OUTPATIENT
Start: 2021-04-19 | End: 2021-04-20

## 2021-04-19 RX ORDER — SODIUM CHLORIDE 0.9 % (FLUSH) 0.9 %
5-40 SYRINGE (ML) INJECTION PRN
Status: DISCONTINUED | OUTPATIENT
Start: 2021-04-19 | End: 2021-04-22 | Stop reason: HOSPADM

## 2021-04-19 RX ORDER — PROMETHAZINE HYDROCHLORIDE 25 MG/1
12.5 TABLET ORAL EVERY 6 HOURS PRN
Status: DISCONTINUED | OUTPATIENT
Start: 2021-04-19 | End: 2021-04-22 | Stop reason: HOSPADM

## 2021-04-19 RX ORDER — CALCIUM CARBONATE/VITAMIN D3 250-3.125
1 TABLET ORAL 2 TIMES DAILY
Status: DISCONTINUED | OUTPATIENT
Start: 2021-04-19 | End: 2021-04-22 | Stop reason: HOSPADM

## 2021-04-19 RX ORDER — ACETAMINOPHEN 325 MG/1
650 TABLET ORAL EVERY 6 HOURS PRN
Status: DISCONTINUED | OUTPATIENT
Start: 2021-04-19 | End: 2021-04-22 | Stop reason: HOSPADM

## 2021-04-19 RX ORDER — TORSEMIDE 20 MG/1
20 TABLET ORAL DAILY
Status: DISCONTINUED | OUTPATIENT
Start: 2021-04-19 | End: 2021-04-20

## 2021-04-19 RX ADMIN — IPRATROPIUM BROMIDE AND ALBUTEROL SULFATE 3 ML: .5; 3 SOLUTION RESPIRATORY (INHALATION) at 23:01

## 2021-04-19 RX ADMIN — IPRATROPIUM BROMIDE AND ALBUTEROL SULFATE 3 ML: .5; 3 SOLUTION RESPIRATORY (INHALATION) at 18:44

## 2021-04-19 RX ADMIN — METHYLPREDNISOLONE SODIUM SUCCINATE 40 MG: 40 INJECTION, POWDER, FOR SOLUTION INTRAMUSCULAR; INTRAVENOUS at 17:51

## 2021-04-19 RX ADMIN — TIZANIDINE 2 MG: 4 TABLET ORAL at 21:14

## 2021-04-19 RX ADMIN — IPRATROPIUM BROMIDE AND ALBUTEROL SULFATE 3 ML: .5; 3 SOLUTION RESPIRATORY (INHALATION) at 16:23

## 2021-04-19 RX ADMIN — LISINOPRIL AND HYDROCHLOROTHIAZIDE 2 TABLET: 12.5; 1 TABLET ORAL at 21:14

## 2021-04-19 RX ADMIN — ENOXAPARIN SODIUM 40 MG: 40 INJECTION SUBCUTANEOUS at 17:52

## 2021-04-19 RX ADMIN — Medication 10 ML: at 21:24

## 2021-04-19 RX ADMIN — METOPROLOL SUCCINATE 50 MG: 50 TABLET, EXTENDED RELEASE ORAL at 21:14

## 2021-04-19 RX ADMIN — SODIUM CHLORIDE 25 ML: 9 INJECTION, SOLUTION INTRAVENOUS at 17:53

## 2021-04-19 RX ADMIN — CALCIUM CARBONATE-CHOLECALCIFEROL TAB 250 MG-125 UNIT 250 MG: 250-125 TAB at 21:23

## 2021-04-19 RX ADMIN — DILTIAZEM HYDROCHLORIDE 120 MG: 120 CAPSULE, COATED, EXTENDED RELEASE ORAL at 21:14

## 2021-04-19 RX ADMIN — PREGABALIN 100 MG: 100 CAPSULE ORAL at 21:14

## 2021-04-19 RX ADMIN — DOXYCYCLINE 100 MG: 100 INJECTION, POWDER, LYOPHILIZED, FOR SOLUTION INTRAVENOUS at 17:51

## 2021-04-19 NOTE — ACP (ADVANCE CARE PLANNING)
Advance Care Planning   Healthcare Decision Maker:    Primary Decision Maker: Cipriano [facility] - [phone]    Click here to complete Healthcare Decision Makers including selection of the Healthcare Decision Maker Relationship (ie \"Primary\").

## 2021-04-19 NOTE — PROGRESS NOTES
RESPIRATORY THERAPY ASSESSMENT    Name:  Richar [facility] Record Number:  [id number]  Age: 61 y.o. Gender: female  : 1955  Today's Date:  2021  Room:  [address] [place] [address]    Assessment     Is the patient being admitted for a COPD or Asthma exacerbation? Yes   (If yes the patient will be seen every 4 hours for the first 24 hours and then reassessed)    Patient Admission Diagnosis      Allergies  Allergies   Allergen Reactions    Ciprofloxacin Nausea And Vomiting       Minimum Predicted Vital Capacity:     ALYCIA          Actual Vital Capacity:      ALYCIA              Pulmonary History:COPD and CHF/Pulmonary Edema  Home Oxygen Therapy:  room air  Home Respiratory Therapy:ENCRUZ AND ALBUTEROL   Current Respiratory Therapy:  DUONEB Q4H  Treatment Type: HHN  Medications: Albuterol/Ipratropium    Respiratory Severity Index(RSI)   Patients with orders for inhalation medications, oxygen, or any therapeutic treatment modality will be placed on Respiratory Protocol. They will be assessed with the first treatment and at least every 72 hours thereafter. The following severity scale will be used to determine frequency of treatment intervention.     Smoking History: Mild Exacerbation - 3     Social History  Social History     Tobacco Use    Smoking status: Former Smoker     Years: 30.00     Types: Cigarettes     Quit date: 2020     Years since quittin.3    Smokeless tobacco: Current User     Last attempt to quit: 2016   Substance Use Topics    Alcohol use: No    Drug use: No       Recent Surgical History: None = 0  Past Surgical History  Past Surgical History:   Procedure Laterality Date    BACK SURGERY      CARPAL TUNNEL RELEASE Left 14    HAND SURGERY Right 14    SKIN GRAFT Right        Level of Consciousness: Alert, Oriented, and Cooperative = 0    Level of Activity: Walking with assistance = 1    Respiratory Pattern: Dyspnea with exertion;Irregular pattern;or RR less than 6 = second inspiratory hold  4. Bronchodilators will be administered via Hand Held Nebulizer [facility]) to patients when ANY of the following criteria are met  a. Incognizant or uncooperative          b. Patients treated with HHN at Home        c. Unable to demonstrate proper use of MDI with spacer     d. RR > 30 bpm   5. Bronchodilators will be delivered via Metered Dose Inhaler (MDI), HHN, Aerogen to intubated patients on mechanical ventilation. 6. Inhalation medication orders will be delivered and/or substituted as outlined below. Aerosolized Medications Ordering and Administration Guidelines:    1. All Medications will be ordered by a physician, and their frequency and/or modality will be adjusted as defined by the patients Respiratory Severity Index (RSI) score. 2. If the patient does not have documented COPD, consider discontinuing anticholinergics when RSI is less than 9.  3. If the bronchospasm worsens (increased RSI), then the bronchodilator frequency can be increased to a maximum of every 4 hours. If greater than every 4 hours is required, the physician will be contacted. 4. If the bronchospasm improves, the frequency of the bronchodilator can be decreased, based on the patient's RSI, but not less than home treatment regimen frequency. 5. Bronchodilator(s) will be discontinued if patient has a RSI less than 9 and has received no scheduled or as needed treatment for 72  Hrs. Patients Ordered on a Mucolytic Agent:    1. Must always be administered with a bronchodilator. 2. Discontinue if patient experiences worsened bronchospasm, or secretions have lessened to the point that the patient is able to clear them with a cough. Anti-inflammatory and Combination Medications:    1.  If the patient lacks prior history of lung disease, is not using inhaled anti-inflammatory medication at home, and lacks wheezing by examination or by history for at least 24 hours, contact physician for possible discontinuation.

## 2021-04-19 NOTE — PROGRESS NOTES
Subjective:      Patient ID: Tori Pedroza is a 61 y.o. female. HPI     61 y.o. female with h.o   chronic back pain , copd, DM- 2,. HTN , tobacco use here for regular f/w    Since last time, reports progressive detioration of her breathing despite stopping smoking 3 months ago - inhalers help only a little   . Any activity makes her worse and gets dyspneic and  is very worried about her  No fevers, no chills. No cough ,audible stridor + and wheezing ++    Recent PFT with mild obstructive defect , severe reduction in diffusion capacity     notes she had her first prolia shot in December , however she had a fracture of her right scapula 3 weeks ago when trying to get up from sitting position. Seen Dr. Elsa Buenrostro and conservative mx advised. Planning for shoulder surgery once this issue resolves       DM- 2- Taking metformin but not compliant with diet  Last A1c at 6.4  Activity remains low     More arthritis and back pain with weight gain      Reports ongoing aches and pains  Chronic back pain - s.p  surgeries x 4  S.p  VJ by  Dr Tereza Cai-   Not back on narcotics yet but considering pain pump soon  Remains on lyrica which is helping some       Using cane/walker  for ambulation .  No recent falls  Reports chronic fatigue      Depression better , compliant with meds - cymbalta        Current Outpatient Medications   Medication Sig Dispense Refill    tiZANidine (ZANAFLEX) 2 MG tablet TAKE ONE TABLET BY MOUTH EVERY DAY 30 tablet 0    metFORMIN (GLUCOPHAGE) 500 MG tablet TAKE ONE TABLET BY MOUTH EVERY DAY 90 tablet 0    DULoxetine (CYMBALTA) 30 MG extended release capsule Take 1 capsule by mouth daily 90 capsule 0    torsemide (DEMADEX) 20 MG tablet Take 1 tablet by mouth daily 90 tablet 0    pregabalin (LYRICA) 100 MG capsule TAKE ONE CAPSULE BY MOUTH 2 TIMES A  capsule 0    calcium-vitamin D (OSCAL) 250-125 MG-UNIT per tablet Take 1 tablet by mouth 2 times daily 60 tablet 2    denosumab (PROLIA) 60 MG/ML SOSY SC injection Inject 1 mL into the skin once for 1 dose 1 mL 1    metoprolol succinate (TOPROL XL) 50 MG extended release tablet Take 1 tablet by mouth daily 90 tablet 1    lisinopril-hydroCHLOROthiazide (PRINZIDE;ZESTORETIC) 20-25 MG per tablet Take 1 tablet by mouth daily 90 tablet 1    dilTIAZem (CARDIZEM CD) 120 MG extended release capsule Take 1 capsule by mouth daily 90 capsule 1    buPROPion (WELLBUTRIN SR) 100 MG extended release tablet Take 1 tablet by mouth daily 90 tablet 1    ipratropium-albuterol (DUONEB) 0.5-2.5 (3) MG/3ML SOLN nebulizer solution Inhale 3 mLs into the lungs every 4 hours 360 mL 0    CHANTIX CONTINUING MONTH RICHARD 1 MG tablet TAKE ONE TABLET BY MOUTH 2 TIMES A  tablet 0    VENTOLIN  (90 Base) MCG/ACT inhaler Inhale 2 puffs into the lungs every 6 hours as needed for Wheezing 54 g 0    INCRUSE ELLIPTA 62.5 MCG/INH AEPB INHALE 1 PUFF INTO THE LUNGS DAILY 1 each 5    blood glucose test strips (CONTOUR NEXT TEST) strip Test Daily. DX: E11.9 100 each 3    Lancets MISC Test once daily. DX: E11.9 100 each 3     No current facility-administered medications for this visit. Review of Systems  As above    Objective:   Physical Exam  Vitals:    04/19/21 1416   Temp: 97.8 °F (36.6 °C)         General: older appearing female,  Awake, alert and oriented. Severe dyspnea with accessory muscle use and audible wheeze ++  Mucous Membranes:  Pink , anicteric  Neck: No JVD, no carotid bruit, no thyromegaly  Chest:  Upper airway stridor and diminished shayy air entry with scattered wheeze  Cardiovascular:  RRR S1S2 heard, no murmurs or gallops  Abdomen:  Soft,obese  undistended, non tender, no organomegaly, BS present  Extremities: 3+  edema to both LE  Distal pulses well felt  Neurological : grossly normal mood       Wt Readings from Last 3 Encounters:   04/19/21 208 lb (94.3 kg)   04/06/21 214 lb (97.1 kg)   04/05/21 214 lb (97.1 kg)       PFT    1. Spirometry: The FEV1 is 1.48 liters or 58% of predicted. The FVC  is 2.10 liters or 63% of predicted. The FEV1/FVC ratio is 71%. There  is no demonstrated response to inhaled bronchodilators. 2.  Plethysmography:  The total lung capacity is 4.00 liters or 75% of  predicted. 3.  Diffusion Capacity:  The diffusion capacity of carbon monoxide is  8.04 mL/min/mmHg which is 34% of predicted. 4.  Flow Volume Loops: The tracings show a normal pattern.     IMPRESSION:  1. There is a mild obstructive lung defect without demonstrated  response to inhaled bronchodilators. 2.  There is a mild restrictive ventilatory defect. 3.  There is a severe reduction in diffusion capacity. 4.  Overall, the pulmonary function tests showing a combined restrictive  and obstructive defect pattern with impaired diffusion capacity, could  be seen in COPD with a concomitant restrictive defect or interstitial  lung disease. Clinical correlation is recommended. Ct shoulder    Acute and comminuted fracture of the scapula involving the scapular body   and scapular spine.  Surrounding soft tissue and intramuscular edema. 2. Complete loss of the acromial humeral interval consistent with rotator   cuff insufficiency.  Severe associated atrophy of the rotator cuff   musculature. 3. Moderate glenohumeral effusion with numerous large articular bodies   throughout the joint. 4. Moderate to severe glenohumeral and moderate acromioclavicular   osteoarthritis. Assessment:       Diagnosis Orders   1. COPD exacerbation ([address]     2. Major depressive disorder with single episode, in partial remission ([address]     3. Morbidly obese ([address]     4. Well controlled type 2 diabetes mellitus ([address]             Plan:           COPD exacerbation    f/w Dr> Major . Off advair and now on Incruse Ellipta. Albuterol prn  Remains off smoking for few months    Remains symptomatic today with severe wheezing and dyspnea .  No hypoxia noted  Admit to hospital

## 2021-04-19 NOTE — PROGRESS NOTES
Patient admitted to room _212___ from Dr. Shahana García admit. Patient oriented to room, call light, bed rails, phone, lights and bathroom. Bed alarm deferred patient low fall risk and refuses alarm. Telemetry box in place, patient aware of placement and reason. Bed locked, in lowest position, side rails up 2/4, call light within reach. Recliner Assessment:     Patient is not able to demonstrate the ability to move from a reclining position to an upright position within the recliner due to no recliner in room. .         4 Eyes Skin Assessment:     The patient is being assess for   Admission    I agree that 2 RN's have performed a thorough Head to Toe Skin Assessment on the patient. ALL assessment sites listed below have been assessed. Areas assessed by both nurses:   [x]   Head, Face, and Ears   [x]   Shoulders, Back, and Chest, Abdomen  [x]   Arms, Elbows, and Hands   [x]   Coccyx, Sacrum, and Ischium  [x]   Legs, Feet, and Heels            **SHARE this note so that the co-signing nurse is able to place an eSignature**    Co-signer eSignature: Electronically signed by Curt Prasad RN on 4/19/21 at 5:32 PM EDT    Does the Patient have Skin Breakdown?   No           Peter Prevention initiated:  Yes   Wound Care Orders initiated:  NA      [facility] nurse consulted for Pressure Injury (Stage 3,4, Unstageable, DTI, NWPT, Complex wounds)and New or Established Ostomies:  NA      Primary Nurse eSignature: Electronically signed by Racheal Francis RN on 4/19/21 at 3:21 PM EDT

## 2021-04-19 NOTE — H&P
Admission [facility];  MRN: [id number] ;   Admit Date: 4/19/2021  2:53 PM   Current Date and Time: 4/19/2021 3:52 PM    PCP  --  Jack Prajapati MD         CC - direct admit from PCP for copd exacerbation       HPI:  Patient is a 61 y.o.  female  With hx of   chronic back pain , copd, DM- 2,. HTN , tobacco use comes to PCP office for regular f.w and notes to have severe dyspnea and hypoxia      Pt reports progressive detioration of her breathing despite stopping smoking 3 months ago - inhalers help only a little   . Any activity makes her worse and gets dyspneic and  is very worried about her  No fevers, no chills. No cough ,audible stridor + and wheezing ++     Recent PFT with mild obstructive defect , severe reduction in diffusion capacity      notes she had her first prolia shot in December , however she had a fracture of her right scapula 3 weeks ago when trying to get up from sitting position. Seen Dr. Susanna Alarcon and conservative mx advised.  Planning for shoulder surgery once this issue resolves        Allergies   Allergen Reactions    Ciprofloxacin Nausea And Vomiting       Past Medical History:   Diagnosis Date    Abnormal CT scan, chest     COPD (chronic obstructive pulmonary disease) (HCC)     Decreased diffusion capacity of lung     Depression     Dyspnea     Hypertension     Lumbosacral spondylosis without myelopathy 5/9/2016    Osteopenia 12/31/2020    Pneumonia     Restrictive lung disease     Tobacco abuse     Well controlled type 2 diabetes mellitus ([facility] [address] 10/26/2018      Past Surgical History:   Procedure Laterality Date    BACK SURGERY      CARPAL TUNNEL RELEASE Left 12/12/14    HAND SURGERY Right 9/26/14    SKIN GRAFT Right       Medications Prior to Admission: tiZANidine (ZANAFLEX) 2 MG tablet, TAKE ONE TABLET BY MOUTH EVERY DAY  metFORMIN (GLUCOPHAGE) 500 MG tablet, TAKE ONE TABLET BY MOUTH EVERY DAY  DULoxetine (CYMBALTA) 30 MG extended dyspnea, cough   Cardiovascular: Negative for chest pain   Gastrointestinal:neg for abd pain, nausea or vomiting or diarrhea  No melena or BRPR  Genitourinary: Negative for hematuria   Musculoskeletal: +vefor arthralgias   Skin: Negative for rash   Neurological: Negative for syncope   Hematological: Negative for adenopathy   Psychiatric/Behavorial: Negative for anxiety      Objective:     VS: Temp: 97.5 °F (36.4 °C)  Pulse: 86  BP: (!) 187/86  SpO2: 90 %        Physical Exam:    General: older appearing female,  Awake, alert and oriented. Severe dyspnea with accessory muscle use and audible wheeze ++  Mucous Membranes:  Pink , anicteric  Neck: No JVD, no carotid bruit, no thyromegaly  Chest:  Upper airway stridor and diminished shayy air entry with scattered wheeze  Cardiovascular:  RRR S1S2 heard, no murmurs or gallops  Abdomen:  Soft,obese  undistended, non tender, no organomegaly, BS present  Extremities: 3+  edema to both LE  Distal pulses well felt  Neurological : grossly normal mood             PFT     1.  Spirometry:  The FEV1 is 1.48 liters or 58% of predicted.  The FVC  is 2.10 liters or 63% of predicted.  The FEV1/FVC ratio is 71%.  There  is no demonstrated response to inhaled bronchodilators. 2.  Plethysmography:  The total lung capacity is 4.00 liters or 75% of  predicted. 3.  Diffusion Capacity:  The diffusion capacity of carbon monoxide is  8.04 mL/min/mmHg which is 34% of predicted. 4.  Flow Volume Loops:  The tracings show a normal pattern.     IMPRESSION:  1.  There is a mild obstructive lung defect without demonstrated  response to inhaled bronchodilators. 2. Lenetta Cave is a mild restrictive ventilatory defect. 3. Lenetta Cave is a severe reduction in diffusion capacity. 4.  Overall, the pulmonary function tests showing a combined restrictive  and obstructive defect pattern with impaired diffusion capacity, could  be seen in COPD with a concomitant restrictive defect or interstitial  lung disease.  Clinical correlation is recommended.     Ct shoulder     Acute and comminuted fracture of the scapula involving the scapular body   and scapular spine.  Surrounding soft tissue and intramuscular edema. 2. Complete loss of the acromial humeral interval consistent with rotator   cuff insufficiency.  Severe associated atrophy of the rotator cuff   musculature. 3. Moderate glenohumeral effusion with numerous large articular bodies   throughout the joint. 4. Moderate to severe glenohumeral and moderate acromioclavicular   osteoarthritis.         Assessment    1 COPD exacerbation (HCC)      2. Major depressive disorder with single episode, in partial remission ([facility]      3. Morbidly obese ([facility]      4. Well controlled type 2 diabetes mellitus ([facility]                       Plan:             COPD exacerbation   - admit to med - surg  - check cxr  - start on steroids, HHN and oxygen support  - mild hypoxia in office improved with rest - check ambulatory sats prior to dc and consider oxygen   f/w Jm Bowman . Off advair and now on Incruse Ellipta. Albuterol prn  Remains off smoking for few months  Recent PFT as above     DM- 2, A1c  At 6.4- on metformin 500mg daily,  Monitor sugars and start ssi while on steroids      Anxiety and severe  depression -  - now on cymbalta 30 mg daily -better controlled  - doing better since last year though , since her    returned from [facility]     HTN -  improved   compliant on cardizem, increased lisinoprilHCTz - continue  metoprolol   Consider changing cardizem  Due to severe pedal edema     Aortic regurgitation - no acute symptoms. No CHF but has dependant edema which is worsening with no activity   Edema remains despite recent change from lasix to  demadex 20 mg daily    check BNP and consider diuresis    F/w Dr. Kae Zamarripa        pulmonary nodules- stable on recent Ct         Chronic back pain - [reviously seen Dr. Patsy Krishnan  Not seeing him any more    on zanaflex and lyrica.       Diet: diabetic   GI/DVT PX: /lovenox  CODE STATUS: full    Gen Ruiz MD 4/19/2021 3:52 PM

## 2021-04-20 ENCOUNTER — APPOINTMENT (OUTPATIENT)
Dept: GENERAL RADIOLOGY | Age: 66
DRG: 190 | End: 2021-04-20
Attending: INTERNAL MEDICINE
Payer: MEDICARE

## 2021-04-20 LAB
A/G RATIO: 1.1 (ref 1.1–2.2)
ALBUMIN SERPL-MCNC: 3.4 G/DL (ref 3.4–5)
ALP BLD-CCNC: 88 U/L (ref 40–129)
ALT SERPL-CCNC: 12 U/L (ref 10–40)
ANION GAP SERPL CALCULATED.3IONS-SCNC: 12 MMOL/L (ref 3–16)
AST SERPL-CCNC: 11 U/L (ref 15–37)
BASOPHILS ABSOLUTE: 0 K/UL (ref 0–0.2)
BASOPHILS RELATIVE PERCENT: 0.3 %
BILIRUB SERPL-MCNC: <0.2 MG/DL (ref 0–1)
BUN BLDV-MCNC: 12 MG/DL (ref 7–20)
CALCIUM SERPL-MCNC: 9.4 MG/DL (ref 8.3–10.6)
CHLORIDE BLD-SCNC: 92 MMOL/L (ref 99–110)
CO2: 25 MMOL/L (ref 21–32)
CREAT SERPL-MCNC: 0.8 MG/DL (ref 0.6–1.2)
EOSINOPHILS ABSOLUTE: 0 K/UL (ref 0–0.6)
EOSINOPHILS RELATIVE PERCENT: 0 %
ESTIMATED AVERAGE GLUCOSE: 142.7 MG/DL
GFR AFRICAN AMERICAN: >60
GFR NON-AFRICAN AMERICAN: >60
GLOBULIN: 3.2 G/DL
GLUCOSE BLD-MCNC: 126 MG/DL (ref 70–99)
GLUCOSE BLD-MCNC: 138 MG/DL (ref 70–99)
GLUCOSE BLD-MCNC: 170 MG/DL (ref 70–99)
GLUCOSE BLD-MCNC: 178 MG/DL (ref 70–99)
GLUCOSE BLD-MCNC: 185 MG/DL (ref 70–99)
HBA1C MFR BLD: 6.6 %
HCT VFR BLD CALC: 37.9 % (ref 36–48)
HEMOGLOBIN: 12.6 G/DL (ref 12–16)
LYMPHOCYTES ABSOLUTE: 0.5 K/UL (ref 1–5.1)
LYMPHOCYTES RELATIVE PERCENT: 4.2 %
MCH RBC QN AUTO: 31.2 PG (ref 26–34)
MCHC RBC AUTO-ENTMCNC: 33.3 G/DL (ref 31–36)
MCV RBC AUTO: 93.7 FL (ref 80–100)
MONOCYTES ABSOLUTE: 0.2 K/UL (ref 0–1.3)
MONOCYTES RELATIVE PERCENT: 1.9 %
NEUTROPHILS ABSOLUTE: 10 K/UL (ref 1.7–7.7)
NEUTROPHILS RELATIVE PERCENT: 93.6 %
PDW BLD-RTO: 16.1 % (ref 12.4–15.4)
PERFORMED ON: ABNORMAL
PLATELET # BLD: 352 K/UL (ref 135–450)
PMV BLD AUTO: 8.1 FL (ref 5–10.5)
POTASSIUM SERPL-SCNC: 4 MMOL/L (ref 3.5–5.1)
PRO-BNP: 1082 PG/ML (ref 0–124)
RBC # BLD: 4.05 M/UL (ref 4–5.2)
SODIUM BLD-SCNC: 129 MMOL/L (ref 136–145)
TOTAL PROTEIN: 6.6 G/DL (ref 6.4–8.2)
TROPONIN: <0.01 NG/ML
WBC # BLD: 10.7 K/UL (ref 4–11)

## 2021-04-20 PROCEDURE — 1200000000 HC SEMI PRIVATE

## 2021-04-20 PROCEDURE — 6360000002 HC RX W HCPCS: Performed by: INTERNAL MEDICINE

## 2021-04-20 PROCEDURE — 94761 N-INVAS EAR/PLS OXIMETRY MLT: CPT

## 2021-04-20 PROCEDURE — 80053 COMPREHEN METABOLIC PANEL: CPT

## 2021-04-20 PROCEDURE — 84484 ASSAY OF TROPONIN QUANT: CPT

## 2021-04-20 PROCEDURE — 6370000000 HC RX 637 (ALT 250 FOR IP): Performed by: INTERNAL MEDICINE

## 2021-04-20 PROCEDURE — 36415 COLL VENOUS BLD VENIPUNCTURE: CPT

## 2021-04-20 PROCEDURE — 2580000003 HC RX 258: Performed by: INTERNAL MEDICINE

## 2021-04-20 PROCEDURE — 94640 AIRWAY INHALATION TREATMENT: CPT

## 2021-04-20 PROCEDURE — 85025 COMPLETE CBC W/AUTO DIFF WBC: CPT

## 2021-04-20 PROCEDURE — 2500000003 HC RX 250 WO HCPCS: Performed by: INTERNAL MEDICINE

## 2021-04-20 PROCEDURE — 2700000000 HC OXYGEN THERAPY PER DAY

## 2021-04-20 PROCEDURE — 99232 SBSQ HOSP IP/OBS MODERATE 35: CPT | Performed by: INTERNAL MEDICINE

## 2021-04-20 PROCEDURE — 83880 ASSAY OF NATRIURETIC PEPTIDE: CPT

## 2021-04-20 PROCEDURE — 99223 1ST HOSP IP/OBS HIGH 75: CPT | Performed by: INTERNAL MEDICINE

## 2021-04-20 PROCEDURE — 71046 X-RAY EXAM CHEST 2 VIEWS: CPT

## 2021-04-20 RX ORDER — IPRATROPIUM BROMIDE AND ALBUTEROL SULFATE 2.5; .5 MG/3ML; MG/3ML
1 SOLUTION RESPIRATORY (INHALATION)
Status: DISCONTINUED | OUTPATIENT
Start: 2021-04-20 | End: 2021-04-22 | Stop reason: HOSPADM

## 2021-04-20 RX ORDER — FUROSEMIDE 10 MG/ML
20 INJECTION INTRAMUSCULAR; INTRAVENOUS 2 TIMES DAILY
Status: DISCONTINUED | OUTPATIENT
Start: 2021-04-20 | End: 2021-04-21

## 2021-04-20 RX ORDER — METOPROLOL TARTRATE 50 MG/1
50 TABLET, FILM COATED ORAL 2 TIMES DAILY
Status: DISCONTINUED | OUTPATIENT
Start: 2021-04-20 | End: 2021-04-20

## 2021-04-20 RX ORDER — METOPROLOL TARTRATE 50 MG/1
50 TABLET, FILM COATED ORAL 2 TIMES DAILY
Status: DISCONTINUED | OUTPATIENT
Start: 2021-04-20 | End: 2021-04-22 | Stop reason: HOSPADM

## 2021-04-20 RX ORDER — ALBUTEROL SULFATE 2.5 MG/3ML
2.5 SOLUTION RESPIRATORY (INHALATION) EVERY 4 HOURS PRN
Status: DISCONTINUED | OUTPATIENT
Start: 2021-04-20 | End: 2021-04-22 | Stop reason: HOSPADM

## 2021-04-20 RX ADMIN — DULOXETINE HYDROCHLORIDE 30 MG: 30 CAPSULE, DELAYED RELEASE ORAL at 08:27

## 2021-04-20 RX ADMIN — FUROSEMIDE 20 MG: 10 INJECTION, SOLUTION INTRAMUSCULAR; INTRAVENOUS at 08:28

## 2021-04-20 RX ADMIN — METOPROLOL TARTRATE 50 MG: 50 TABLET, FILM COATED ORAL at 09:50

## 2021-04-20 RX ADMIN — PREGABALIN 100 MG: 100 CAPSULE ORAL at 21:11

## 2021-04-20 RX ADMIN — METHYLPREDNISOLONE SODIUM SUCCINATE 40 MG: 40 INJECTION, POWDER, FOR SOLUTION INTRAMUSCULAR; INTRAVENOUS at 17:11

## 2021-04-20 RX ADMIN — IPRATROPIUM BROMIDE AND ALBUTEROL SULFATE 3 ML: .5; 3 SOLUTION RESPIRATORY (INHALATION) at 18:59

## 2021-04-20 RX ADMIN — IPRATROPIUM BROMIDE AND ALBUTEROL SULFATE 3 ML: .5; 3 SOLUTION RESPIRATORY (INHALATION) at 22:50

## 2021-04-20 RX ADMIN — IPRATROPIUM BROMIDE AND ALBUTEROL SULFATE 3 ML: .5; 3 SOLUTION RESPIRATORY (INHALATION) at 03:18

## 2021-04-20 RX ADMIN — METFORMIN HYDROCHLORIDE 500 MG: 500 TABLET ORAL at 08:27

## 2021-04-20 RX ADMIN — IPRATROPIUM BROMIDE AND ALBUTEROL SULFATE 3 ML: .5; 3 SOLUTION RESPIRATORY (INHALATION) at 07:22

## 2021-04-20 RX ADMIN — DOXYCYCLINE 100 MG: 100 INJECTION, POWDER, LYOPHILIZED, FOR SOLUTION INTRAVENOUS at 17:11

## 2021-04-20 RX ADMIN — DOXYCYCLINE 100 MG: 100 INJECTION, POWDER, LYOPHILIZED, FOR SOLUTION INTRAVENOUS at 04:17

## 2021-04-20 RX ADMIN — TIZANIDINE 2 MG: 4 TABLET ORAL at 21:12

## 2021-04-20 RX ADMIN — CALCIUM CARBONATE-CHOLECALCIFEROL TAB 250 MG-125 UNIT 250 MG: 250-125 TAB at 21:12

## 2021-04-20 RX ADMIN — PREGABALIN 100 MG: 100 CAPSULE ORAL at 08:28

## 2021-04-20 RX ADMIN — CALCIUM CARBONATE-CHOLECALCIFEROL TAB 250 MG-125 UNIT 250 MG: 250-125 TAB at 08:28

## 2021-04-20 RX ADMIN — LISINOPRIL AND HYDROCHLOROTHIAZIDE 2 TABLET: 12.5; 1 TABLET ORAL at 21:12

## 2021-04-20 RX ADMIN — ENOXAPARIN SODIUM 40 MG: 40 INJECTION SUBCUTANEOUS at 08:26

## 2021-04-20 RX ADMIN — IPRATROPIUM BROMIDE AND ALBUTEROL SULFATE 3 ML: .5; 3 SOLUTION RESPIRATORY (INHALATION) at 11:23

## 2021-04-20 RX ADMIN — METHYLPREDNISOLONE SODIUM SUCCINATE 40 MG: 40 INJECTION, POWDER, FOR SOLUTION INTRAMUSCULAR; INTRAVENOUS at 04:17

## 2021-04-20 RX ADMIN — Medication 10 ML: at 08:26

## 2021-04-20 RX ADMIN — Medication 10 ML: at 21:15

## 2021-04-20 RX ADMIN — FUROSEMIDE 20 MG: 10 INJECTION, SOLUTION INTRAMUSCULAR; INTRAVENOUS at 17:10

## 2021-04-20 RX ADMIN — IPRATROPIUM BROMIDE AND ALBUTEROL SULFATE 3 ML: .5; 3 SOLUTION RESPIRATORY (INHALATION) at 15:37

## 2021-04-20 RX ADMIN — METOPROLOL TARTRATE 50 MG: 50 TABLET, FILM COATED ORAL at 21:12

## 2021-04-20 RX ADMIN — BUPROPION HYDROCHLORIDE 100 MG: 100 TABLET, FILM COATED, EXTENDED RELEASE ORAL at 08:27

## 2021-04-20 NOTE — PROGRESS NOTES
Xray called at this time regarding the 2 view chest xray that was not completed yesterday.  Transport to be called at this time

## 2021-04-20 NOTE — PROGRESS NOTES
Saw pt today for breathing treatment. Pt has an upper airway wheeze and lungs are clear, similar to tracheomalacia wheeze. Pt has occasional symptoms of coughing when eating, becomes short of breath during meals, and has tracheal wheeze.

## 2021-04-20 NOTE — FLOWSHEET NOTE
04/19/21 2230   Oxygen Therapy   SpO2 (!) 82 %   O2 Device None (Room air)   Pt walked to restroom and back to bed. 2 liters placed.  Pt recovered quickly

## 2021-04-20 NOTE — CONSULTS
methylPREDNISolone  40 mg Intravenous Q12H    Followed by   Kitty Going ON 4/21/2021] predniSONE  40 mg Oral Daily    insulin lispro  0-6 Units Subcutaneous TID     insulin lispro  0-3 Units Subcutaneous Nightly    doxycycline (VIBRAMYCIN) IV  100 mg Intravenous Q12H    buPROPion  100 mg Oral Daily    oyster shell calcium/vitamin D  1 tablet Oral BID    dilTIAZem  120 mg Oral Daily    DULoxetine  30 mg Oral Daily    lisinopril-hydroCHLOROthiazide  2 tablet Oral Daily    metFORMIN  500 mg Oral Daily    metoprolol succinate  50 mg Oral Daily    tiZANidine  2 mg Oral Daily    pregabalin  100 mg Oral BID     Continuous Infusions:   dextrose      sodium chloride 25 mL (04/19/21 8527)     PRN Meds:  albuterol, perflutren lipid microspheres, glucose, dextrose, glucagon (rDNA), dextrose, sodium chloride flush, sodium chloride, promethazine **OR** ondansetron, polyethylene glycol, acetaminophen **OR** acetaminophen    ALLERGIES:  Patient is allergic to ciprofloxacin. REVIEW OF SYSTEMS:  Constitutional: Negative for fever  HENT: Negative for sore throat  Eyes: Negative for redness   Respiratory: + for dyspnea, cough  Cardiovascular: Negative for chest pain  Gastrointestinal: Negative for vomiting, diarrhea   Genitourinary: Negative for hematuria   Musculoskeletal: Negative for arthralgias   Skin: Negative for rash  Neurological: Negative for syncope  Hematological: Negative for adenopathy  Psychiatric/Behavorial: Negative for anxiety    PHYSICAL EXAM:  Blood pressure (!) 177/81, pulse 78, temperature 97 °F (36.1 °C), temperature source Oral, resp. rate 18, height 5' 5\" (1.651 m), weight 212 lb 3.2 oz (96.3 kg), SpO2 97 %, not currently breastfeeding.' on 2l NC  Gen: No distress. Normocephalic, atraumatic. Eyes: PERRL. No sclera icterus. No conjunctival injection. ENT: No discharge. Pharynx clear. Neck: Trachea midline. No obvious mass. Resp: No accessory muscle use. No crackles.   Scattered bilateral wheezes. No rhonchi. No dullness on percussion. CV: Regular rate. Regular rhythm. No murmur or rub. No edema. GI: Non-tender. Non-distended. No hernia. Skin: Warm and dry. No nodule on exposed extremities. Lymph: No cervical LAD. No supraclavicular LAD. M/S: No cyanosis. No joint deformity. No clubbing. Neuro: Awake. Alert. Moves all four extremities. LABS:  CBC:   Recent Labs     04/19/21  1729 04/20/21  0600   WBC 12.0* 10.7   HGB 12.9 12.6   HCT 39.2 37.9   MCV 93.6 93.7    352     BMP:   Recent Labs     04/20/21  0600   *   K 4.0   CL 92*   CO2 25   BUN 12   CREATININE 0.8     LIVER PROFILE:   Recent Labs     04/20/21  0600   AST 11*   ALT 12   BILITOT <0.2   ALKPHOS 88     PT/INR: No results for input(s): PROTIME, INR in the last 72 hours. APTT: No results for input(s): APTT in the last 72 hours. UA:No results for input(s): NITRITE, COLORU, PHUR, LABCAST, WBCUA, RBCUA, MUCUS, TRICHOMONAS, YEAST, BACTERIA, CLARITYU, SPECGRAV, LEUKOCYTESUR, UROBILINOGEN, BILIRUBINUR, BLOODU, GLUCOSEU, AMORPHOUS in the last 72 hours. Invalid input(s): KETONESU  No results for input(s): PHART, GFR4KCI, PO2ART in the last 72 hours. Chest imaging was reviewed by me and showed:  CXR 4/20: hyperinflated, no airspace consolidative opacities, normal cardiomediastinal silhouette     ASSESSMENT:  · COPD with AE  · Acute respiratory failure with hypoxemia  · Chronic back pain  · Small pulm nodules <4 mm, f/b Dr. Min Leggett with yearly LDCT    PLAN:   IV steroids today, with plan to switch to oral prednisone 40 mg daily tomorrow, then taper   Inhaled bronchodilators   Antibiotics (doxycycline)  Supplemental oxygen to maintain SaO2 >92%; wean as tolerated  · Congratulated on quiting smoking. Thank you for the consult.

## 2021-04-20 NOTE — PLAN OF CARE
Problem: Falls - Risk of:  Goal: Will remain free from falls  Description: Will remain free from falls  Outcome: Ongoing     Problem: Discharge Planning:  Goal: Discharged to appropriate level of care  Description: Discharged to appropriate level of care  Outcome: Ongoing     Problem:  Activity Intolerance:  Goal: Ability to tolerate increased activity will improve  Description: Ability to tolerate increased activity will improve  Outcome: Ongoing     Problem: Airway Clearance - Ineffective:  Goal: Ability to maintain a clear airway will improve  Description: Ability to maintain a clear airway will improve  Outcome: Ongoing     Problem: Breathing Pattern - Ineffective:  Goal: Ability to achieve and maintain a regular respiratory rate will improve  Description: Ability to achieve and maintain a regular respiratory rate will improve  Outcome: Ongoing

## 2021-04-20 NOTE — PROGRESS NOTES
Admit: 2021    Name:  Sai Oseguera  Room:  [address] [place] [address]  MRN:    [id number]     Daily Progress Note for 2021     Admitted with COPD exacerbation    Interval History:     Feels about the same     Scheduled Meds:   ipratropium-albuterol  1 vial Inhalation Q4H WA    furosemide  20 mg Intravenous BID    sodium chloride flush  5-40 mL Intravenous 2 times per day    enoxaparin  40 mg Subcutaneous Daily    methylPREDNISolone  40 mg Intravenous Q12H    Followed by   Clive Hanson ON 2021] predniSONE  40 mg Oral Daily    insulin lispro  0-6 Units Subcutaneous TID WC    insulin lispro  0-3 Units Subcutaneous Nightly    doxycycline (VIBRAMYCIN) IV  100 mg Intravenous Q12H    buPROPion  100 mg Oral Daily    oyster shell calcium/vitamin D  1 tablet Oral BID    dilTIAZem  120 mg Oral Daily    DULoxetine  30 mg Oral Daily    lisinopril-hydroCHLOROthiazide  2 tablet Oral Daily    metFORMIN  500 mg Oral Daily    metoprolol succinate  50 mg Oral Daily    tiZANidine  2 mg Oral Daily    pregabalin  100 mg Oral BID       Continuous Infusions:   dextrose      sodium chloride 25 mL (21 1753)       PRN Meds:  albuterol, perflutren lipid microspheres, glucose, dextrose, glucagon (rDNA), dextrose, sodium chloride flush, sodium chloride, promethazine **OR** ondansetron, polyethylene glycol, acetaminophen **OR** acetaminophen                  Objective:     Temp  Av.3 °F (36.3 °C)  Min: 97 °F (36.1 °C)  Max: 97.8 °F (36.6 °C)  Pulse  Av  Min: 78  Max: 98  BP  Min: 145/75  Max: 187/86  SpO2  Av.8 %  Min: 82 %  Max: 98 %  Patient Vitals for the past 4 hrs:   BP Temp Temp src Pulse Resp SpO2   21 0743 -- -- -- -- -- 97 %   21 0712 (!) 177/81 97 °F (36.1 °C) Oral 78 18 93 %       No intake or output data in the 24 hours ending 21 0854    Physical Exam:  General:  Awake, alert and oriented.    Mucous Membranes:  Pink , anicteric  Neck: No JVD, no carotid bruit, no thyromegaly  Chest:  Bilateral. wheeze, no crackles, accessory muscle use +  Cardiovascular:  RRR S1S2 heard, no murmurs or gallops  Abdomen:  Soft, undistended, non tender, no organomegaly, BS present  Extremities: 1 + pedal edema Distal pulses well felt  Neurological : no focal deficits    Lab Data:  CBC:   Recent Labs     04/19/21  1729 04/20/21  0600   WBC 12.0* 10.7   RBC 4.19 4.05   HGB 12.9 12.6   HCT 39.2 37.9   MCV 93.6 93.7   RDW 16.2* 16.1*    352     BMP:   Recent Labs     04/20/21  0600   *   K 4.0   CL 92*   CO2 25   BUN 12   CREATININE 0.8     BNP: No results for input(s): BNP in the last 72 hours. PT/INR: No results for input(s): PROTIME, INR in the last 72 hours. APTT:No results for input(s): APTT in the last 72 hours. CARDIAC ENZYMES:   Recent Labs     04/20/21  0600   TROPONINI <0.01     FASTING LIPID PANEL:  Lab Results   Component Value Date    CHOL 153 04/10/2014    HDL 56 12/11/2020    TRIG 74 04/10/2014     LIVER PROFILE:   Recent Labs     04/20/21  0600   AST 11*   ALT 12   BILITOT <0.2   ALKPHOS 88         CXR 4/20   No pneumonia or edema        PFT     1.  Spirometry:  The FEV1 is 1.48 liters or 58% of predicted.  The FVC  is 2.10 liters or 63% of predicted.  The FEV1/FVC ratio is 71%.  There  is no demonstrated response to inhaled bronchodilators. 2.  Plethysmography:  The total lung capacity is 4.00 liters or 75% of  predicted. 3.  Diffusion Capacity:  The diffusion capacity of carbon monoxide is  8.04 mL/min/mmHg which is 34% of predicted. 4.  Flow Volume Loops:  The tracings show a normal pattern.     IMPRESSION:  1.  There is a mild obstructive lung defect without demonstrated  response to inhaled bronchodilators. 2. Leah Pilon is a mild restrictive ventilatory defect. 3. Leah Pilon is a severe reduction in diffusion capacity.   4.  Overall, the pulmonary function tests showing a combined restrictive  and obstructive defect pattern with impaired diffusion capacity, could  be seen in COPD with a concomitant restrictive defect or interstitial  lung disease.  Clinical correlation is recommended. Assessment & Plan:     Patient Active Problem List    Diagnosis Date Noted    Pulmonary nodules 12/31/2013     Priority: Medium    Tobacco abuse 12/31/2013     Priority: Low    Major depressive disorder with single episode, in partial remission ([facility] 04/19/2021    Primary osteoarthritis of right shoulder 04/05/2021    Displaced fracture of acromial process, right shoulder, initial encounter for closed fracture 03/23/2021    Acute pain of right shoulder 03/23/2021    Rotator cuff arthropathy of right shoulder 03/23/2021    Osteopenia 12/31/2020    Morbidly obese ([address] 08/06/2020    Well controlled type 2 diabetes mellitus ([facility] 10/26/2018    Lumbosacral spondylosis without myelopathy 05/09/2016    Displacement of lumbar intervertebral disc without myelopathy 05/09/2016    Degeneration of lumbar or lumbosacral intervertebral disc 05/09/2016    Spinal stenosis, lumbar region, without neurogenic claudication 05/09/2016    COPD (chronic obstructive pulmonary disease) ([facility] 07/21/2014    Aortic regurgitation 04/08/2014    HTN (hypertension) 04/08/2014    Abnormal chest CT 02/17/2014    Decreased diffusion capacity 02/17/2014    Restrictive lung disease 02/17/2014       COPD exacerbation   - admit to med - surg  CXR negative  - started on steroids, doxy, HHN and oxygen support  - mild hypoxia in office improved with rest - check ambulatory sats prior to dc and consider oxygen   f/w Dr> Major . Off advair and now on Incruse Ellipta.  Albuterol prn  Remains off smoking for few months  Recent PFT as above     DM- 2, A1c  At 6.4- on metformin 500mg daily,  Monitor sugars and start ssi while on steroids      Anxiety and severe  depression -  - now on cymbalta 30 mg daily -better controlled  - doing better since last year though , since her    [facility]     HTN -  improved   compliant on cardizem, increased lisinoprilHCTz - increase   metoprolol to 50 bid   D/c cardizem due topedal edema     Aortic regurgitation - no acute symptoms.  No CHF but has dependant edema which is worsening with no activity   Edema remains despite recent change from lasix to  demadex 20 mg daily   Elevated BNP  Start lasix   ECHO   F/w Dr. Sameer Chaney nodules- stable on recent Ct         Chronic back pain - [reviously seen Dr. Patsy Krishnan  Not seeing him any more    on zanaflex and lyrica.        Diet: diabetic   GI/DVT PX: /lovenox  CODE STATUS: full    [place]

## 2021-04-20 NOTE — FLOWSHEET NOTE
04/19/21 1942   Vital Signs   Temp 97 °F (36.1 °C)   Temp Source Oral   Pulse 97   Heart Rate Source Monitor   Resp 20   BP (!) 155/88   BP Location Right lower arm   Patient Position High fowlers   Oxygen Therapy   SpO2 92 %   O2 Device None (Room air)   Pt A/O x 4 assessment completed. Pt states she has a broken right scapula pt is going to physical therapy for this. Meds given per MAR. Pt denies any needs.  Call light within reach and bed alarm on

## 2021-04-20 NOTE — PROGRESS NOTES
Physician Progress Note      Fritz Vaughan  CSN #:                  [id number]  :                       1955  ADMIT DATE:       2021 2:53 PM  [address] [place] DATE:  RESPONDING  PROVIDER #:        Monique Carrizales MD          QUERY TEXT:    Patient admitted with COPD AE. Noted documentation of severe depression in   the h/p. Please document in progress notes and discharge summary if you are   treating/evaluating the following: The medical record reflects the following:  Risk Factors: advanced age, copd  Clinical Indicators: Anxiety and severe depression  Treatment: cymbalta 30 mg daily    Thank you for your assistance,  Birgit Kim RN,BSN,CCDS,CRCR  Options provided:  -- Major depression, recurrent:  severe without psychotic features  -- Major depression, single episode: severe without psychotic features  -- Other - I will add my own diagnosis  -- Disagree - Not applicable / Not valid  -- Disagree - Clinically unable to determine / Unknown  -- Refer to Clinical Documentation Reviewer    PROVIDER RESPONSE TEXT:    This patient has severe depression without psychotic features, single episode.     Query created by: Mohamud Leary on 2021 10:49 AM      Electronically signed by:  Monique Carrizales MD 2021 10:51 AM

## 2021-04-21 ENCOUNTER — HOSPITAL ENCOUNTER (OUTPATIENT)
Dept: PHYSICAL THERAPY | Age: 66
Setting detail: THERAPIES SERIES
End: 2021-04-21
Payer: MEDICARE

## 2021-04-21 ENCOUNTER — TELEPHONE (OUTPATIENT)
Dept: PULMONOLOGY | Age: 66
End: 2021-04-21

## 2021-04-21 LAB
ANION GAP SERPL CALCULATED.3IONS-SCNC: 10 MMOL/L (ref 3–16)
BASOPHILS ABSOLUTE: 0.1 K/UL (ref 0–0.2)
BASOPHILS RELATIVE PERCENT: 0.4 %
BUN BLDV-MCNC: 21 MG/DL (ref 7–20)
CALCIUM SERPL-MCNC: 9.5 MG/DL (ref 8.3–10.6)
CHLORIDE BLD-SCNC: 91 MMOL/L (ref 99–110)
CO2: 27 MMOL/L (ref 21–32)
CREAT SERPL-MCNC: 0.8 MG/DL (ref 0.6–1.2)
EOSINOPHILS ABSOLUTE: 0 K/UL (ref 0–0.6)
EOSINOPHILS RELATIVE PERCENT: 0 %
GFR AFRICAN AMERICAN: >60
GFR NON-AFRICAN AMERICAN: >60
GLUCOSE BLD-MCNC: 114 MG/DL (ref 70–99)
GLUCOSE BLD-MCNC: 139 MG/DL (ref 70–99)
GLUCOSE BLD-MCNC: 146 MG/DL (ref 70–99)
GLUCOSE BLD-MCNC: 154 MG/DL (ref 70–99)
GLUCOSE BLD-MCNC: 160 MG/DL (ref 70–99)
HCT VFR BLD CALC: 39.4 % (ref 36–48)
HEMOGLOBIN: 12.8 G/DL (ref 12–16)
LV EF: 58 %
LVEF MODALITY: NORMAL
LYMPHOCYTES ABSOLUTE: 0.5 K/UL (ref 1–5.1)
LYMPHOCYTES RELATIVE PERCENT: 3.5 %
MCH RBC QN AUTO: 30.5 PG (ref 26–34)
MCHC RBC AUTO-ENTMCNC: 32.5 G/DL (ref 31–36)
MCV RBC AUTO: 93.7 FL (ref 80–100)
MONOCYTES ABSOLUTE: 0.5 K/UL (ref 0–1.3)
MONOCYTES RELATIVE PERCENT: 4 %
NEUTROPHILS ABSOLUTE: 11.9 K/UL (ref 1.7–7.7)
NEUTROPHILS RELATIVE PERCENT: 92.1 %
PDW BLD-RTO: 16.2 % (ref 12.4–15.4)
PERFORMED ON: ABNORMAL
PLATELET # BLD: 397 K/UL (ref 135–450)
PMV BLD AUTO: 8.2 FL (ref 5–10.5)
POTASSIUM SERPL-SCNC: 4.3 MMOL/L (ref 3.5–5.1)
RBC # BLD: 4.21 M/UL (ref 4–5.2)
SODIUM BLD-SCNC: 128 MMOL/L (ref 136–145)
WBC # BLD: 12.9 K/UL (ref 4–11)

## 2021-04-21 PROCEDURE — 85025 COMPLETE CBC W/AUTO DIFF WBC: CPT

## 2021-04-21 PROCEDURE — 94761 N-INVAS EAR/PLS OXIMETRY MLT: CPT

## 2021-04-21 PROCEDURE — 2500000003 HC RX 250 WO HCPCS: Performed by: INTERNAL MEDICINE

## 2021-04-21 PROCEDURE — 36415 COLL VENOUS BLD VENIPUNCTURE: CPT

## 2021-04-21 PROCEDURE — 6370000000 HC RX 637 (ALT 250 FOR IP): Performed by: INTERNAL MEDICINE

## 2021-04-21 PROCEDURE — 2580000003 HC RX 258: Performed by: INTERNAL MEDICINE

## 2021-04-21 PROCEDURE — 1200000000 HC SEMI PRIVATE

## 2021-04-21 PROCEDURE — 93306 TTE W/DOPPLER COMPLETE: CPT

## 2021-04-21 PROCEDURE — 80048 BASIC METABOLIC PNL TOTAL CA: CPT

## 2021-04-21 PROCEDURE — 6360000002 HC RX W HCPCS: Performed by: INTERNAL MEDICINE

## 2021-04-21 PROCEDURE — 94640 AIRWAY INHALATION TREATMENT: CPT

## 2021-04-21 PROCEDURE — 99232 SBSQ HOSP IP/OBS MODERATE 35: CPT | Performed by: INTERNAL MEDICINE

## 2021-04-21 PROCEDURE — 2700000000 HC OXYGEN THERAPY PER DAY

## 2021-04-21 PROCEDURE — 2580000003 HC RX 258

## 2021-04-21 RX ORDER — SODIUM CHLORIDE 9 MG/ML
INJECTION, SOLUTION INTRAVENOUS
Status: COMPLETED
Start: 2021-04-21 | End: 2021-04-21

## 2021-04-21 RX ADMIN — ACETAMINOPHEN 650 MG: 325 TABLET ORAL at 03:41

## 2021-04-21 RX ADMIN — Medication 10 ML: at 22:27

## 2021-04-21 RX ADMIN — DOXYCYCLINE 100 MG: 100 INJECTION, POWDER, LYOPHILIZED, FOR SOLUTION INTRAVENOUS at 16:03

## 2021-04-21 RX ADMIN — DULOXETINE HYDROCHLORIDE 30 MG: 30 CAPSULE, DELAYED RELEASE ORAL at 08:59

## 2021-04-21 RX ADMIN — DOXYCYCLINE 100 MG: 100 INJECTION, POWDER, LYOPHILIZED, FOR SOLUTION INTRAVENOUS at 03:40

## 2021-04-21 RX ADMIN — IPRATROPIUM BROMIDE AND ALBUTEROL SULFATE 3 ML: .5; 3 SOLUTION RESPIRATORY (INHALATION) at 11:29

## 2021-04-21 RX ADMIN — BUPROPION HYDROCHLORIDE 100 MG: 100 TABLET, FILM COATED, EXTENDED RELEASE ORAL at 08:59

## 2021-04-21 RX ADMIN — IPRATROPIUM BROMIDE AND ALBUTEROL SULFATE 3 ML: .5; 3 SOLUTION RESPIRATORY (INHALATION) at 18:51

## 2021-04-21 RX ADMIN — LISINOPRIL AND HYDROCHLOROTHIAZIDE 2 TABLET: 12.5; 1 TABLET ORAL at 22:27

## 2021-04-21 RX ADMIN — PREGABALIN 100 MG: 100 CAPSULE ORAL at 22:26

## 2021-04-21 RX ADMIN — ACETAMINOPHEN 650 MG: 325 TABLET ORAL at 09:14

## 2021-04-21 RX ADMIN — IPRATROPIUM BROMIDE AND ALBUTEROL SULFATE 3 ML: .5; 3 SOLUTION RESPIRATORY (INHALATION) at 23:13

## 2021-04-21 RX ADMIN — METHYLPREDNISOLONE SODIUM SUCCINATE 40 MG: 40 INJECTION, POWDER, FOR SOLUTION INTRAMUSCULAR; INTRAVENOUS at 03:40

## 2021-04-21 RX ADMIN — METFORMIN HYDROCHLORIDE 500 MG: 500 TABLET ORAL at 08:59

## 2021-04-21 RX ADMIN — METOPROLOL TARTRATE 50 MG: 50 TABLET, FILM COATED ORAL at 08:59

## 2021-04-21 RX ADMIN — PREGABALIN 100 MG: 100 CAPSULE ORAL at 08:59

## 2021-04-21 RX ADMIN — CALCIUM CARBONATE-CHOLECALCIFEROL TAB 250 MG-125 UNIT 250 MG: 250-125 TAB at 09:00

## 2021-04-21 RX ADMIN — METOPROLOL TARTRATE 50 MG: 50 TABLET, FILM COATED ORAL at 22:26

## 2021-04-21 RX ADMIN — SODIUM CHLORIDE 250 ML: 9 INJECTION, SOLUTION INTRAVENOUS at 03:45

## 2021-04-21 RX ADMIN — CALCIUM CARBONATE-CHOLECALCIFEROL TAB 250 MG-125 UNIT 250 MG: 250-125 TAB at 22:27

## 2021-04-21 RX ADMIN — IPRATROPIUM BROMIDE AND ALBUTEROL SULFATE 3 ML: .5; 3 SOLUTION RESPIRATORY (INHALATION) at 07:20

## 2021-04-21 RX ADMIN — PREDNISONE 40 MG: 20 TABLET ORAL at 16:04

## 2021-04-21 RX ADMIN — IPRATROPIUM BROMIDE AND ALBUTEROL SULFATE 3 ML: .5; 3 SOLUTION RESPIRATORY (INHALATION) at 15:02

## 2021-04-21 RX ADMIN — Medication 10 ML: at 09:00

## 2021-04-21 RX ADMIN — TIZANIDINE 2 MG: 4 TABLET ORAL at 22:26

## 2021-04-21 RX ADMIN — ENOXAPARIN SODIUM 40 MG: 40 INJECTION SUBCUTANEOUS at 08:59

## 2021-04-21 ASSESSMENT — PAIN DESCRIPTION - PAIN TYPE
TYPE: ACUTE PAIN
TYPE: ACUTE PAIN

## 2021-04-21 ASSESSMENT — PAIN DESCRIPTION - LOCATION: LOCATION: HEAD

## 2021-04-21 ASSESSMENT — PAIN SCALES - GENERAL
PAINLEVEL_OUTOF10: 4
PAINLEVEL_OUTOF10: 7
PAINLEVEL_OUTOF10: 3

## 2021-04-21 ASSESSMENT — PAIN DESCRIPTION - FREQUENCY: FREQUENCY: CONTINUOUS

## 2021-04-21 NOTE — PLAN OF CARE
Problem: Falls - Risk of:  Goal: Will remain free from falls  Description: Will remain free from falls  4/21/2021 0029 by Caleb Tillman RN  Outcome: Ongoing  4/20/2021 1420 by Manley Siemens, RN  Outcome: Ongoing     Problem: OXYGENATION/RESPIRATORY FUNCTION  Goal: Patient will maintain patent airway  Outcome: Ongoing     Problem:  Activity Intolerance:  Goal: Ability to tolerate increased activity will improve  Description: Ability to tolerate increased activity will improve  Outcome: Ongoing     Problem: Breathing Pattern - Ineffective:  Goal: Ability to achieve and maintain a regular respiratory rate will improve  Description: Ability to achieve and maintain a regular respiratory rate will improve  Outcome: Ongoing

## 2021-04-21 NOTE — CARE COORDINATION
INTERDISCIPLINARY PLAN OF CARE CONFERENCE    Date/Time: 4/21/2021 2:02 PM  Completed by: Daysi Driscoll Case Management      Patient Name:  Marco Bello  YOB: 1955  Admitting Diagnosis: COPD (chronic obstructive pulmonary disease) ([facility] [J44.9]     Admit Date/Time:  4/19/2021  2:53 PM    Chart reviewed. Interdisciplinary team contacted or reviewed plan related to patient progress and discharge plans. Disciplines included Case Management, Nursing, and Dietitian. Current Status: IP 04/19/2021  PT/OT recommendation for discharge plan of care:     Expected D/C Disposition:  Home  Confirmed plan: chart reviewed  Discharge Plan Comments: Lives w/spouse. Plans to return home. CM following for home O2 needs. No other DC needs identified.      Home O2 in place on admit: No  Pt informed of need to bring portable home O2 tank on day of discharge for nursing to connect prior to leaving:  Not Indicated  Verbalized agreement/Understanding:  Not Indicated

## 2021-04-21 NOTE — PROGRESS NOTES
Admit: 2021    Name:  Cristiane Jackson  Room:  [facility]-  MRN:    [id number]     Daily Progress Note for 2021     Admitted with COPD exacerbation    Interval History:     Feels much better today    Scheduled Meds:   ipratropium-albuterol  1 vial Inhalation Q4H WA    furosemide  20 mg Intravenous BID    metoprolol tartrate  50 mg Oral BID    sodium chloride flush  5-40 mL Intravenous 2 times per day    enoxaparin  40 mg Subcutaneous Daily    predniSONE  40 mg Oral Daily    insulin lispro  0-6 Units Subcutaneous TID WC    insulin lispro  0-3 Units Subcutaneous Nightly    doxycycline (VIBRAMYCIN) IV  100 mg Intravenous Q12H    buPROPion  100 mg Oral Daily    oyster shell calcium/vitamin D  1 tablet Oral BID    DULoxetine  30 mg Oral Daily    lisinopril-hydroCHLOROthiazide  2 tablet Oral Daily    metFORMIN  500 mg Oral Daily    tiZANidine  2 mg Oral Daily    pregabalin  100 mg Oral BID       Continuous Infusions:   dextrose      sodium chloride 25 mL (21 1753)       PRN Meds:  albuterol, perflutren lipid microspheres, glucose, dextrose, glucagon (rDNA), dextrose, sodium chloride flush, sodium chloride, promethazine **OR** ondansetron, polyethylene glycol, acetaminophen **OR** acetaminophen                  Objective:     Temp  Av.5 °F (36.4 °C)  Min: 97 °F (36.1 °C)  Max: 98.8 °F (37.1 °C)  Pulse  Av.5  Min: 81  Max: 95  BP  Min: 138/80  Max: 167/79  SpO2  Av.9 %  Min: 91 %  Max: 97 %  Patient Vitals for the past 4 hrs:   BP Temp Temp src Pulse Resp SpO2   21 0734 (!) 167/79 97.1 °F (36.2 °C) Oral 82 18 91 %   21 0722 -- -- -- -- 18 91 %         Intake/Output Summary (Last 24 hours) at 2021 0948  Last data filed at 2021 0859  Gross per 24 hour   Intake 490 ml   Output 2 ml   Net 488 ml       Physical Exam:  General:  Awake, alert and oriented.    Mucous Membranes:  Pink , anicteric  Neck: No JVD, no carotid bruit, no thyromegaly  Chest:  no wheeze, no crackles, no accessory muscle use   Cardiovascular:  RRR S1S2 heard, no murmurs or gallops  Abdomen:  Soft, undistended, non tender, no organomegaly, BS present  Extremities: trace pedal edema Distal pulses well felt  Neurological : no focal deficits    Lab Data:  CBC:   Recent Labs     04/19/21  1729 04/20/21  0600 04/21/21  0537   WBC 12.0* 10.7 12.9*   RBC 4.19 4.05 4.21   HGB 12.9 12.6 12.8   HCT 39.2 37.9 39.4   MCV 93.6 93.7 93.7   RDW 16.2* 16.1* 16.2*    352 397     BMP:   Recent Labs     04/20/21  0600 04/21/21  0537   * 128*   K 4.0 4.3   CL 92* 91*   CO2 25 27   BUN 12 21*   CREATININE 0.8 0.8     BNP: No results for input(s): BNP in the last 72 hours. PT/INR: No results for input(s): PROTIME, INR in the last 72 hours. APTT:No results for input(s): APTT in the last 72 hours. CARDIAC ENZYMES:   Recent Labs     04/20/21  0600   TROPONINI <0.01     FASTING LIPID PANEL:  Lab Results   Component Value Date    CHOL 153 04/10/2014    HDL 56 12/11/2020    TRIG 74 04/10/2014     LIVER PROFILE:   Recent Labs     04/20/21  0600   AST 11*   ALT 12   BILITOT <0.2   ALKPHOS 88         CXR 4/20   No pneumonia or edema        PFT     1.  Spirometry:  The FEV1 is 1.48 liters or 58% of predicted.  The FVC  is 2.10 liters or 63% of predicted.  The FEV1/FVC ratio is 71%.  There  is no demonstrated response to inhaled bronchodilators. 2.  Plethysmography:  The total lung capacity is 4.00 liters or 75% of  predicted. 3.  Diffusion Capacity:  The diffusion capacity of carbon monoxide is  8.04 mL/min/mmHg which is 34% of predicted. 4.  Flow Volume Loops:  The tracings show a normal pattern.     IMPRESSION:  1.  There is a mild obstructive lung defect without demonstrated  response to inhaled bronchodilators. 2. Sahil Avoyelles is a mild restrictive ventilatory defect. 3. Sahil Avoyelles is a severe reduction in diffusion capacity.   4.  Overall, the pulmonary function tests showing a combined restrictive  and obstructive defect pattern with impaired diffusion capacity, could  be seen in COPD with a concomitant restrictive defect or interstitial  lung disease.  Clinical correlation is recommended. Assessment & Plan:     Patient Active Problem List    Diagnosis Date Noted    Pulmonary nodules 12/31/2013     Priority: Medium    Tobacco abuse 12/31/2013     Priority: Low    Major depressive disorder with single episode, in partial remission ([facility] 04/19/2021    Primary osteoarthritis of right shoulder 04/05/2021    Displaced fracture of acromial process, right shoulder, initial encounter for closed fracture 03/23/2021    Acute pain of right shoulder 03/23/2021    Rotator cuff arthropathy of right shoulder 03/23/2021    Osteopenia 12/31/2020    Morbidly obese ([address] 08/06/2020    Type 2 diabetes mellitus without complication, without long-term current use of insulin ([facility] 10/26/2018    Lumbosacral spondylosis without myelopathy 05/09/2016    Displacement of lumbar intervertebral disc without myelopathy 05/09/2016    Degeneration of lumbar or lumbosacral intervertebral disc 05/09/2016    Spinal stenosis, lumbar region, without neurogenic claudication 05/09/2016    COPD with acute exacerbation ([address] 07/21/2014    Aortic regurgitation 04/08/2014    HTN (hypertension) 04/08/2014    Abnormal chest CT 02/17/2014    Decreased diffusion capacity 02/17/2014    Restrictive lung disease 02/17/2014       COPD exacerbation   - admit to med - surg  CXR negative  - started on steroids, doxy, HHN and oxygen support  - mild hypoxia in office improved with rest - check ambulatory sats prior to dc and consider oxygen   f/w Dr> Major . Off advair and now on Incruse Ellipta.  Albuterol prn  Remains off smoking for few months  Recent PFT as above     DM- 2, A1c  At 6.4- on metformin 500mg daily,  Monitor sugars and start ssi while on steroids      Anxiety and severe  depression -  - now on cymbalta 30 mg daily -better controlled  - doing better since last year though , since her    returned from alf     HTN -  improved   compliant on cardizem, increased lisinoprilHCTz - increase   metoprolol to 50 bid   D/c cardizem due topedal edema    Hyponatremia  ? SIADH  Also recd IV lasix    has been on cymbalta for several years   1500 ml fluid restriction     Aortic regurgitation - no acute symptoms.  No CHF but has dependant edema which is worsening with no activity   Edema remains despite recent change from lasix to  demadex 20 mg daily   Elevated BNP  Start lasix    ECHO-not done yet   Hold lasix 4/21   F/w Dr. Kali Salazar nodules- stable on recent Ct       Chronic back pain - [reviously seen Dr. Delores Morris  Not seeing him any more    on zanaflex and lyrica.        Diet: diabetic   GI/DVT PX: /lovenox  CODE STATUS: full    Sultana Sheffield

## 2021-04-21 NOTE — PROGRESS NOTES
Bedside report given to Ashwini Sanchez RN  pt in stable condition no needs at this time.  Call light within reach

## 2021-04-21 NOTE — FLOWSHEET NOTE
04/20/21 2100   Vital Signs   Temp 98.8 °F (37.1 °C)   Temp Source Oral   Pulse 95   Heart Rate Source Monitor   Resp 18   /72   BP Location Right upper arm   Patient Position Semi fowlers   Level of Consciousness Alert (0)   MEWS Score 1   Oxygen Therapy   SpO2 93 %   O2 Device Nasal cannula   O2 Flow Rate (L/min) 2 L/min   Pt A/O x 4 assessment. Meds given per MAR. Pt remains on 2 liters only complaining of SOB with activity. Pt denies any needs at this time.  Call light within reach

## 2021-04-21 NOTE — PROGRESS NOTES
Pulmonary Progress Note    CC: shortness of breath     Subjective:   Patient feels better. Intake/Output Summary (Last 24 hours) at 4/21/2021 1116  Last data filed at 4/21/2021 0859  Gross per 24 hour   Intake 490 ml   Output 2 ml   Net 488 ml       Exam:   BP (!) 167/79   Pulse 82   Temp 97.1 °F (36.2 °C) (Oral)   Resp 18   Ht 5' 5\" (1.651 m)   Wt 212 lb 3.2 oz (96.3 kg)   SpO2 91%   BMI 35.31 kg/m²  on RA  Gen: No distress. Normocephalic, atraumatic. Comfortable. Eyes: PERRL. No sclera icterus. No conjunctival injection. ENT: No discharge. Pharynx clear. Neck: Trachea midline. No obvious mass. Resp: No accessory muscle use. No crackles. No wheezes. No rhonchi. No dullness on percussion. CV: Regular rate. Regular rhythm. No murmur or rub. No edema. GI: Non-tender. Non-distended. No hernia. Skin: Warm and dry. No nodule on exposed extremities. Lymph: No cervical LAD. No supraclavicular LAD. M/S: No cyanosis. No joint deformity. No clubbing. Neuro: Awake. Alert. Moves all four extremities.      Scheduled Meds:   ipratropium-albuterol  1 vial Inhalation Q4H WA    metoprolol tartrate  50 mg Oral BID    sodium chloride flush  5-40 mL Intravenous 2 times per day    enoxaparin  40 mg Subcutaneous Daily    predniSONE  40 mg Oral Daily    insulin lispro  0-6 Units Subcutaneous TID     insulin lispro  0-3 Units Subcutaneous Nightly    doxycycline (VIBRAMYCIN) IV  100 mg Intravenous Q12H    buPROPion  100 mg Oral Daily    oyster shell calcium/vitamin D  1 tablet Oral BID    DULoxetine  30 mg Oral Daily    lisinopril-hydroCHLOROthiazide  2 tablet Oral Daily    metFORMIN  500 mg Oral Daily    tiZANidine  2 mg Oral Daily    pregabalin  100 mg Oral BID     Continuous Infusions:   dextrose      sodium chloride 25 mL (04/19/21 9653)     PRN Meds:  albuterol, perflutren lipid microspheres, glucose, dextrose, glucagon (rDNA), dextrose, sodium chloride flush, sodium chloride, promethazine **OR** ondansetron, polyethylene glycol, acetaminophen **OR** acetaminophen    Labs:  CBC:   Recent Labs     04/19/21  1729 04/20/21  0600 04/21/21  0537   WBC 12.0* 10.7 12.9*   HGB 12.9 12.6 12.8   HCT 39.2 37.9 39.4   MCV 93.6 93.7 93.7    352 397     BMP:   Recent Labs     04/20/21  0600 04/21/21  0537   * 128*   K 4.0 4.3   CL 92* 91*   CO2 25 27   BUN 12 21*   CREATININE 0.8 0.8     LIVER PROFILE:   Recent Labs     04/20/21  0600   AST 11*   ALT 12   BILITOT <0.2   ALKPHOS 88     PT/INR: No results for input(s): PROTIME, INR in the last 72 hours. APTT: No results for input(s): APTT in the last 72 hours. UA:No results for input(s): NITRITE, COLORU, PHUR, LABCAST, WBCUA, RBCUA, MUCUS, TRICHOMONAS, YEAST, BACTERIA, CLARITYU, SPECGRAV, LEUKOCYTESUR, UROBILINOGEN, BILIRUBINUR, BLOODU, GLUCOSEU, AMORPHOUS in the last 72 hours. Invalid input(s): KETONESU  No results for input(s): PHART, SMS0GLW, PO2ART in the last 72 hours. Cultures:   4/19 Rapid sars Cov2- neg     Films:  CXR 4/20: hyperinflated, no airspace consolidative opacities, normal cardiomediastinal silhouette      ASSESSMENT:  · COPD with AE  · Acute respiratory failure with hypoxemia  · Chronic back pain  · Small pulm nodules <4 mm, f/b Dr. Emely Mullins with yearly LDCT  · hyponatremia     PLAN:  ·  oral prednisone 40 mg daily, then taper   · Inhaled bronchodilators   · Antibiotics  (D2 doxycycline)  · Supplemental oxygen to maintain SaO2 >92%; wean as tolerated  · Congratulated on quiting smoking. No

## 2021-04-21 NOTE — TELEPHONE ENCOUNTER
Patient  calling on pt behalf wanting to schedule a hospital fua. Patient to be d/c 4/22/21. Will watch for d/c.  Will need new referral from pcp     LOV: 7/19/16    Assessment:       · Dyspnea- Likely early emphysema and deconditioning  · Decreased diffusion capacity- No e/o ILD, or pulmonary vascular disease; suspect has a component of emphysema  · Tobacco abuse  · Moderate Aortic regurgitation     Plan:       · Encouraged continued smoking cessation  · Continue Incruse and albuterol prn  · Influenza vaccine recommended- pt declines  · Up-to-date on pneumovax  · F/u in 1 year with 6 MWT and PFT wo BD

## 2021-04-21 NOTE — PROGRESS NOTES
Per V/O Dr. Dupont Channel pt telemetry off for pt shower, will reapply after personal care completed. Call light in reach.

## 2021-04-22 ENCOUNTER — TELEPHONE (OUTPATIENT)
Dept: INTERNAL MEDICINE CLINIC | Age: 66
End: 2021-04-22

## 2021-04-22 VITALS
DIASTOLIC BLOOD PRESSURE: 66 MMHG | OXYGEN SATURATION: 91 % | SYSTOLIC BLOOD PRESSURE: 148 MMHG | WEIGHT: 212.2 LBS | RESPIRATION RATE: 16 BRPM | HEART RATE: 88 BPM | TEMPERATURE: 97.2 F | HEIGHT: 65 IN | BODY MASS INDEX: 35.35 KG/M2

## 2021-04-22 LAB
ANION GAP SERPL CALCULATED.3IONS-SCNC: 8 MMOL/L (ref 3–16)
BASOPHILS ABSOLUTE: 0.1 K/UL (ref 0–0.2)
BASOPHILS RELATIVE PERCENT: 0.5 %
BUN BLDV-MCNC: 18 MG/DL (ref 7–20)
CALCIUM SERPL-MCNC: 9.8 MG/DL (ref 8.3–10.6)
CHLORIDE BLD-SCNC: 93 MMOL/L (ref 99–110)
CO2: 29 MMOL/L (ref 21–32)
CREAT SERPL-MCNC: 0.8 MG/DL (ref 0.6–1.2)
EOSINOPHILS ABSOLUTE: 0 K/UL (ref 0–0.6)
EOSINOPHILS RELATIVE PERCENT: 0 %
GFR AFRICAN AMERICAN: >60
GFR NON-AFRICAN AMERICAN: >60
GLUCOSE BLD-MCNC: 100 MG/DL (ref 70–99)
GLUCOSE BLD-MCNC: 122 MG/DL (ref 70–99)
HCT VFR BLD CALC: 38.3 % (ref 36–48)
HEMOGLOBIN: 12.5 G/DL (ref 12–16)
LYMPHOCYTES ABSOLUTE: 1 K/UL (ref 1–5.1)
LYMPHOCYTES RELATIVE PERCENT: 9.1 %
MCH RBC QN AUTO: 30.4 PG (ref 26–34)
MCHC RBC AUTO-ENTMCNC: 32.6 G/DL (ref 31–36)
MCV RBC AUTO: 93.4 FL (ref 80–100)
MONOCYTES ABSOLUTE: 1.3 K/UL (ref 0–1.3)
MONOCYTES RELATIVE PERCENT: 11.3 %
NEUTROPHILS ABSOLUTE: 8.9 K/UL (ref 1.7–7.7)
NEUTROPHILS RELATIVE PERCENT: 79.1 %
PDW BLD-RTO: 15.8 % (ref 12.4–15.4)
PERFORMED ON: ABNORMAL
PLATELET # BLD: 364 K/UL (ref 135–450)
PMV BLD AUTO: 8.1 FL (ref 5–10.5)
POTASSIUM SERPL-SCNC: 4.3 MMOL/L (ref 3.5–5.1)
RBC # BLD: 4.1 M/UL (ref 4–5.2)
SODIUM BLD-SCNC: 130 MMOL/L (ref 136–145)
WBC # BLD: 11.2 K/UL (ref 4–11)

## 2021-04-22 PROCEDURE — 6370000000 HC RX 637 (ALT 250 FOR IP): Performed by: INTERNAL MEDICINE

## 2021-04-22 PROCEDURE — 2500000003 HC RX 250 WO HCPCS: Performed by: INTERNAL MEDICINE

## 2021-04-22 PROCEDURE — 94761 N-INVAS EAR/PLS OXIMETRY MLT: CPT

## 2021-04-22 PROCEDURE — 99239 HOSP IP/OBS DSCHRG MGMT >30: CPT | Performed by: INTERNAL MEDICINE

## 2021-04-22 PROCEDURE — 2580000003 HC RX 258: Performed by: INTERNAL MEDICINE

## 2021-04-22 PROCEDURE — 6360000002 HC RX W HCPCS: Performed by: INTERNAL MEDICINE

## 2021-04-22 PROCEDURE — 94640 AIRWAY INHALATION TREATMENT: CPT

## 2021-04-22 PROCEDURE — 85025 COMPLETE CBC W/AUTO DIFF WBC: CPT

## 2021-04-22 PROCEDURE — 36415 COLL VENOUS BLD VENIPUNCTURE: CPT

## 2021-04-22 PROCEDURE — 80048 BASIC METABOLIC PNL TOTAL CA: CPT

## 2021-04-22 RX ORDER — METOPROLOL TARTRATE 50 MG/1
50 TABLET, FILM COATED ORAL 2 TIMES DAILY
Qty: 60 TABLET | Refills: 1 | Status: SHIPPED | OUTPATIENT
Start: 2021-04-22 | End: 2021-06-09

## 2021-04-22 RX ORDER — BUDESONIDE AND FORMOTEROL FUMARATE DIHYDRATE 160; 4.5 UG/1; UG/1
2 AEROSOL RESPIRATORY (INHALATION) 2 TIMES DAILY
Qty: 1 INHALER | Refills: 1 | Status: SHIPPED | OUTPATIENT
Start: 2021-04-22 | End: 2021-06-09

## 2021-04-22 RX ORDER — DOXYCYCLINE HYCLATE 100 MG
100 TABLET ORAL 2 TIMES DAILY
Qty: 8 TABLET | Refills: 0 | Status: SHIPPED | OUTPATIENT
Start: 2021-04-22 | End: 2021-04-26

## 2021-04-22 RX ORDER — UMECLIDINIUM 62.5 UG/1
AEROSOL, POWDER ORAL
Qty: 1 EACH | Refills: 5 | Status: SHIPPED | OUTPATIENT
Start: 2021-04-22 | End: 2021-09-21

## 2021-04-22 RX ORDER — LISINOPRIL 40 MG/1
40 TABLET ORAL DAILY
Qty: 30 TABLET | Refills: 1 | Status: SHIPPED | OUTPATIENT
Start: 2021-04-22 | End: 2021-05-19

## 2021-04-22 RX ORDER — PREDNISONE 20 MG/1
TABLET ORAL
Qty: 15 TABLET | Refills: 0 | Status: SHIPPED | OUTPATIENT
Start: 2021-04-22 | End: 2021-05-05 | Stop reason: ALTCHOICE

## 2021-04-22 RX ORDER — LISINOPRIL 20 MG/1
40 TABLET ORAL DAILY
Status: DISCONTINUED | OUTPATIENT
Start: 2021-04-22 | End: 2021-04-22 | Stop reason: HOSPADM

## 2021-04-22 RX ADMIN — IPRATROPIUM BROMIDE AND ALBUTEROL SULFATE 3 ML: .5; 3 SOLUTION RESPIRATORY (INHALATION) at 07:28

## 2021-04-22 RX ADMIN — ENOXAPARIN SODIUM 40 MG: 40 INJECTION SUBCUTANEOUS at 08:43

## 2021-04-22 RX ADMIN — METOPROLOL TARTRATE 50 MG: 50 TABLET, FILM COATED ORAL at 08:43

## 2021-04-22 RX ADMIN — DOXYCYCLINE 100 MG: 100 INJECTION, POWDER, LYOPHILIZED, FOR SOLUTION INTRAVENOUS at 04:10

## 2021-04-22 RX ADMIN — PREDNISONE 40 MG: 20 TABLET ORAL at 08:43

## 2021-04-22 RX ADMIN — Medication 10 ML: at 08:44

## 2021-04-22 RX ADMIN — DULOXETINE HYDROCHLORIDE 30 MG: 30 CAPSULE, DELAYED RELEASE ORAL at 08:43

## 2021-04-22 RX ADMIN — CALCIUM CARBONATE-CHOLECALCIFEROL TAB 250 MG-125 UNIT 250 MG: 250-125 TAB at 08:43

## 2021-04-22 RX ADMIN — ACETAMINOPHEN 650 MG: 325 TABLET ORAL at 04:10

## 2021-04-22 RX ADMIN — METFORMIN HYDROCHLORIDE 500 MG: 500 TABLET ORAL at 08:43

## 2021-04-22 RX ADMIN — PREGABALIN 100 MG: 100 CAPSULE ORAL at 08:43

## 2021-04-22 RX ADMIN — LISINOPRIL 40 MG: 20 TABLET ORAL at 08:43

## 2021-04-22 RX ADMIN — BUPROPION HYDROCHLORIDE 100 MG: 100 TABLET, FILM COATED, EXTENDED RELEASE ORAL at 08:43

## 2021-04-22 ASSESSMENT — PAIN DESCRIPTION - LOCATION: LOCATION: HEAD

## 2021-04-22 ASSESSMENT — PAIN SCALES - GENERAL: PAINLEVEL_OUTOF10: 4

## 2021-04-22 NOTE — FLOWSHEET NOTE
04/21/21 2215   Vital Signs   Temp 97.9 °F (36.6 °C)   Temp Source Oral   Pulse 82   Heart Rate Source Monitor   Resp 18   BP (!) 179/83   BP Location Right upper arm   Patient Position Semi fowlers   Level of Consciousness Alert (0)   MEWS Score 1   Oxygen Therapy   SpO2 91 %   O2 Device None (Room air)   Pt A/O x 4 assessment completed. Meds given MAR. Pt denies any needs.  Call light within reach

## 2021-04-22 NOTE — CARE COORDINATION
DISCHARGE ORDER  Date/Time 2021 9:35 AM  Completed by: Kye Davenport Case Management    Patient Name: Nir Garvey      : 1955  Admitting Diagnosis: COPD (chronic obstructive pulmonary disease) [facility] [J44.9]      Admit order Date and Status: IP 2021  (verify MD's last order for status of admission)      Noted discharge order. If applicable PT/OT recommendation at Discharge: NA  DME recommendation by PT/OT: NA  Confirmed discharge plan with patient (Alert and Oriented)  Discharge Plan: Chart reviewed. Met with pt at bedside and explained the role of the CM. Plans to return home. Denies any new HHC or DME needs. No barriers to obtaining her prescriptions identified. +CM vito          Reviewed chart. Role of discharge planner explained and patient verbalized understanding. Discharge order is noted. Has Home O2 in place on admit:  No  Informed of need to bring portable home O2 tank on day of discharge for nursing to connect prior to leaving:   No  Verbalized agreement/Understanding:   No  Pt is being d/c'd to home today. Pt's O2 sats are 92% on RA. Discharge timeout done with Torey Frye. All discharge needs and concerns addressed.

## 2021-04-22 NOTE — PROGRESS NOTES
Written  report given to Anderson Garcia RN  pt in stable condition no needs at this time.  Call light within reach

## 2021-04-22 NOTE — TELEPHONE ENCOUNTER
----- Message from Jorge Cook MD sent at 4/22/2021  1:53 PM EDT -----  Contact: Tyra Pina- [phone]  Week of may 3rd  ----- Message -----  From: Joseph Bishop  Sent: 4/22/2021   1:36 PM EDT  To: Jorge Cook MD    Pt  called stating that she needs a hospital follow up in a week. [facility] not have any openings available. Where would you like for me to schedule her? Thank you.       Tyra Pina- [phone]

## 2021-04-22 NOTE — PLAN OF CARE
Problem: Falls - Risk of:  Goal: Will remain free from falls  Description: Will remain free from falls  Outcome: Ongoing     Problem: OXYGENATION/RESPIRATORY FUNCTION  Goal: Patient will maintain patent airway  Outcome: Ongoing     Problem: Discharge Planning:  Goal: Discharged to appropriate level of care  Description: Discharged to appropriate level of care  Outcome: Ongoing     Problem:  Activity Intolerance:  Goal: Ability to tolerate increased activity will improve  Description: Ability to tolerate increased activity will improve  Outcome: Ongoing

## 2021-04-22 NOTE — DISCHARGE SUMMARY
Name:  Cristiane Jackson  Room:  0212/0212-01  MRN:    [id number]    Discharge Summary      This discharge summary is in conjunction with a complete physical exam done on the day of discharge. Discharging Physician: Dr. Parnell Davis [place]: 4/19/2021  Discharge: 4/22/2021      HPI taken from admission H&P:    Patient is a 61 y.o.  female  With hx of   chronic back pain , copd, DM- 2,. HTN , tobacco use comes to PCP office for regular f.w and notes to have severe dyspnea and hypoxia      Pt reports progressive detioration of her breathing despite stopping smoking 3 months ago - inhalers help only a little   . Any activity makes her worse and gets dyspneic and  is very worried about her  No fevers, no chills. No cough ,audible stridor + and wheezing ++     Recent PFT with mild obstructive defect , severe reduction in diffusion capacity      notes she had her first prolia shot in December , however she had a fracture of her right scapula 3 weeks ago when trying to get up from sitting position. Seen Dr. Dru Wilkinson and conservative mx advised. Planning for shoulder surgery once this issue resolves     Diagnoses this Admission and Hospital Course   COPD exacerbation   - CXR negative  - started on steroids, doxy, HHN and oxygen support  - mild hypoxia in office improved with rest - check ambulatory sats prior to dc and consider oxygen  - f/w Jm Bowman . Off advair and now on Incruse Ellipta.  Albuterol prn  - Remains off smoking for few months  - Recent PFT as above  - D/c home on pred taper,doxy     DM2  - Hgb A1c  At 6.4  - on metformin 500mg daily,  - Monitor sugars and start ssi while on steroids      Anxiety and severe  depression -  - now on cymbalta 30 mg daily -better controlled  - doing better since last year though , since her    [facility]     HTN -  improved  - compliant on cardizem, increased lisinoprilHCTz - increase   metoprolol to 50 bid   - D/c cardizem due to pedal edema  - HCTZ d/c ed 2 to hyponatremia  - We may have to restart calcium blocker if Bp continues to be high     Hyponatremia  - ? SIADH  - Also recd IV lasix   - has been on cymbalta for several years - continue that for now  - Hold HCTZ. - 1800 ml fluid restriction  - Na 130 today     Aortic regurgitation   - no acute symptoms  - No CHF but has dependant edema which is worsening with no activity   - Edema remains despite recent change from lasix to  demadex 20 mg daily   - ECHO- as above  - Hold lasix 4/21  - F/w  [facility]     pulmonary nodules  - stable on recent Ct       Chronic back pain - [reviously seen Dr. Lovely Schwab  - Not seeing him any more   - on zanaflex and lyrica. Procedures (Please Review Full Report for Details)  None     Consults    Pulmonology     Physical Exam at Discharge:    BP (!) 148/66   Pulse 88   Temp 97.2 °F (36.2 °C) (Oral)   Resp 16   Ht 5' 5\" (1.651 m)   Wt 212 lb 3.2 oz (96.3 kg)   SpO2 91%   BMI 35.31 kg/m²     General:  Awake, alert and oriented.    Mucous Membranes:  Pink , anicteric  Neck: No JVD, no carotid bruit, no thyromegaly  Chest:  no wheeze, no crackles, no accessory muscle use   Cardiovascular:  RRR S1S2 heard, no murmurs or gallops  Abdomen:  Soft, undistended, non tender, no organomegaly, BS present  Extremities: trace pedal edema Distal pulses well felt  Neurological : no focal deficits    CBC:   Recent Labs     04/20/21  0600 04/21/21  0537 04/22/21  0547   WBC 10.7 12.9* 11.2*   HGB 12.6 12.8 12.5   HCT 37.9 39.4 38.3   MCV 93.7 93.7 93.4    397 364     BMP:   Recent Labs     04/20/21  0600 04/21/21  0537 04/22/21  0547   * 128* 130*   K 4.0 4.3 4.3   CL 92* 91* 93*   CO2 25 27 29   BUN 12 21* 18   CREATININE 0.8 0.8 0.8     LIVER PROFILE:   Recent Labs     04/20/21  0600   AST 11*   ALT 12   BILITOT <0.2   ALKPHOS 88     CULTURES  COVID-19: not detected     RADIOLOGY  XR CHEST (2 VW)   Final Result   No pneumonia or edema Discharge Medications     Medication List      START taking these medications    budesonide-formoterol 160-4.5 MCG/ACT Aero  Commonly known as: Symbicort  Inhale 2 puffs into the lungs 2 times daily     doxycycline hyclate 100 MG tablet  Commonly known as: VIBRA-TABS  Take 1 tablet by mouth 2 times daily for 4 days     lisinopril 40 MG tablet  Commonly known as: PRINIVIL;ZESTRIL  Take 1 tablet by mouth daily     metoprolol tartrate 50 MG tablet  Commonly known as: LOPRESSOR  Take 1 tablet by mouth 2 times daily     predniSONE 20 MG tablet  Commonly known as: DELTASONE  2 qd- 3 days,1 1/2 qd- 3 days,1 qd- 3 days,1/2 qd- 3 days        CONTINUE taking these medications    blood glucose test strips strip  Commonly known as: Contour Next Test  Test Daily. DX: E11.9     buPROPion 100 MG extended release tablet  Commonly known as: Wellbutrin SR  Take 1 tablet by mouth daily     calcium-vitamin D 250-125 MG-UNIT per tablet  Commonly known as: OSCAL  Take 1 tablet by mouth 2 times daily     Chantix Continuing Month Jeremy 1 MG tablet  Generic drug: varenicline  TAKE ONE TABLET BY MOUTH 2 TIMES A DAY     DULoxetine 30 MG extended release capsule  Commonly known as: CYMBALTA  Take 1 capsule by mouth daily     Incruse Ellipta 62.5 MCG/INH Aepb  Generic drug: Umeclidinium Bromide  INHALE 1 PUFF INTO THE LUNGS DAILY     ipratropium-albuterol 0.5-2.5 (3) MG/3ML Soln nebulizer solution  Commonly known as: DUONEB  Inhale 3 mLs into the lungs every 4 hours     Lancets Misc  Test once daily.  DX: E11.9     metFORMIN 500 MG tablet  Commonly known as: GLUCOPHAGE  TAKE ONE TABLET BY MOUTH EVERY DAY     pregabalin 100 MG capsule  Commonly known as: LYRICA  TAKE ONE CAPSULE BY MOUTH 2 TIMES A DAY     Prolia 60 MG/ML Sosy SC injection  Generic drug: denosumab  Inject 1 mL into the skin once for 1 dose     tiZANidine 2 MG tablet  Commonly known as: ZANAFLEX  TAKE ONE TABLET BY MOUTH EVERY DAY     Ventolin  (90 Base) MCG/ACT inhaler  Generic drug: albuterol sulfate HFA  Inhale 2 puffs into the lungs every 6 hours as needed for Wheezing        STOP taking these medications    dilTIAZem 120 MG extended release capsule  Commonly known as: CARDIZEM CD     lisinopril-hydroCHLOROthiazide 20-25 MG per tablet  Commonly known as: PRINZIDE;ZESTORETIC     meloxicam 7.5 MG tablet  Commonly known as: MOBIC     metoprolol succinate 50 MG extended release tablet  Commonly known as: TOPROL XL     torsemide 20 MG tablet  Commonly known as: DEMADEX           Where to Get Your Medications      These medications were sent to [address] [place] [address] [phone] - [phone]  [address], [address] [place] [address]    Phone: [phone]   · budesonide-formoterol 160-4.5 MCG/ACT Aero  · doxycycline hyclate 100 MG tablet  · Incruse Ellipta 62.5 MCG/INH Aepb  · lisinopril 40 MG tablet  · metoprolol tartrate 50 MG tablet  · predniSONE 20 MG tablet       Discharged in stable condition to home     Follow Up: Follow up with PCP, pulmonology OP     Total time spent on discharge is 35 minutes      KHLOE Flowers.

## 2021-04-22 NOTE — PROGRESS NOTES
Admit: 2021    Name:  Catia Sharma  Room:  [address] [place] [address]  MRN:    [id number]     Daily Progress Note for 2021     Admitted with COPD exacerbation    Interval History:     Feels much better today    Scheduled Meds:   ipratropium-albuterol  1 vial Inhalation Q4H WA    metoprolol tartrate  50 mg Oral BID    sodium chloride flush  5-40 mL Intravenous 2 times per day    enoxaparin  40 mg Subcutaneous Daily    predniSONE  40 mg Oral Daily    insulin lispro  0-6 Units Subcutaneous TID WC    insulin lispro  0-3 Units Subcutaneous Nightly    doxycycline (VIBRAMYCIN) IV  100 mg Intravenous Q12H    buPROPion  100 mg Oral Daily    oyster shell calcium/vitamin D  1 tablet Oral BID    DULoxetine  30 mg Oral Daily    lisinopril-hydroCHLOROthiazide  2 tablet Oral Daily    metFORMIN  500 mg Oral Daily    tiZANidine  2 mg Oral Daily    pregabalin  100 mg Oral BID       Continuous Infusions:   dextrose      sodium chloride 25 mL (21 1753)       PRN Meds:  albuterol, perflutren lipid microspheres, glucose, dextrose, glucagon (rDNA), dextrose, sodium chloride flush, sodium chloride, promethazine **OR** ondansetron, polyethylene glycol, acetaminophen **OR** acetaminophen                  Objective:     Temp  Av.3 °F (36.3 °C)  Min: 97 °F (36.1 °C)  Max: 97.9 °F (36.6 °C)  Pulse  Av.3  Min: 82  Max: 85  BP  Min: 135/78  Max: 179/83  SpO2  Av.5 %  Min: 90 %  Max: 95 %  Patient Vitals for the past 4 hrs:   Resp SpO2   21 0728 18 92 %         Intake/Output Summary (Last 24 hours) at 2021 0825  Last data filed at 2021 1809  Gross per 24 hour   Intake 390 ml   Output 3 ml   Net 387 ml       Physical Exam:  General:  Awake, alert and oriented.    Mucous Membranes:  Pink , anicteric  Neck: No JVD, no carotid bruit, no thyromegaly  Chest:  no wheeze, no crackles, no accessory muscle use   Cardiovascular:  RRR S1S2 heard, no murmurs or gallops  Abdomen:  Soft, undistended, non tender, no organomegaly, BS present  Extremities: trace pedal edema Distal pulses well felt  Neurological : no focal deficits    Lab Data:  CBC:   Recent Labs     04/20/21  0600 04/21/21  0537 04/22/21  0547   WBC 10.7 12.9* 11.2*   RBC 4.05 4.21 4.10   HGB 12.6 12.8 12.5   HCT 37.9 39.4 38.3   MCV 93.7 93.7 93.4   RDW 16.1* 16.2* 15.8*    397 364     BMP:   Recent Labs     04/20/21  0600 04/21/21  0537 04/22/21  0547   * 128* 130*   K 4.0 4.3 4.3   CL 92* 91* 93*   CO2 25 27 29   BUN 12 21* 18   CREATININE 0.8 0.8 0.8     BNP: No results for input(s): BNP in the last 72 hours. PT/INR: No results for input(s): PROTIME, INR in the last 72 hours. APTT:No results for input(s): APTT in the last 72 hours. CARDIAC ENZYMES:   Recent Labs     04/20/21  0600   TROPONINI <0.01     FASTING LIPID PANEL:  Lab Results   Component Value Date    CHOL 153 04/10/2014    HDL 56 12/11/2020    TRIG 74 04/10/2014     LIVER PROFILE:   Recent Labs     04/20/21  0600   AST 11*   ALT 12   BILITOT <0.2   ALKPHOS 88         CXR 4/20   No pneumonia or edema        PFT     1.  Spirometry:  The FEV1 is 1.48 liters or 58% of predicted.  The FVC  is 2.10 liters or 63% of predicted.  The FEV1/FVC ratio is 71%.  There  is no demonstrated response to inhaled bronchodilators. 2.  Plethysmography:  The total lung capacity is 4.00 liters or 75% of  predicted. 3.  Diffusion Capacity:  The diffusion capacity of carbon monoxide is  8.04 mL/min/mmHg which is 34% of predicted. 4.  Flow Volume Loops:  The tracings show a normal pattern.     IMPRESSION:  1.  There is a mild obstructive lung defect without demonstrated  response to inhaled bronchodilators. 2. Elena Mettle is a mild restrictive ventilatory defect. 3. Elena Mettle is a severe reduction in diffusion capacity.   4.  Overall, the pulmonary function tests showing a combined restrictive  and obstructive defect pattern with impaired diffusion capacity, could  be seen in COPD with a concomitant restrictive defect or interstitial  lung disease.  Clinical correlation is recommended. ECHO  Technically difficult examination. Left ventricular systolic function is normal with ejection fraction   estimated at 55-60 %. There is moderate concentric left ventricular hypertrophy. Grade I diastolic dysfunction with normal filling pressure. Mild mitral regurgitation. Mild-to-moderate aortic regurgitation is present. Mild tricuspid regurgitation. Systolic pulmonary artery pressure (SPAP) estimated at 40   mmHg (RA pressure 3 mmHg), consistent with mild pulmonary hypertension. Trivial pulmonic regurgitation present.   Assessment & Plan:     Patient Active Problem List    Diagnosis Date Noted    Pulmonary nodules 12/31/2013     Priority: Medium    Tobacco abuse 12/31/2013     Priority: Low    Major depressive disorder with single episode, in partial remission ([address] 04/19/2021    Primary osteoarthritis of right shoulder 04/05/2021    Displaced fracture of acromial process, right shoulder, initial encounter for closed fracture 03/23/2021    Acute pain of right shoulder 03/23/2021    Rotator cuff arthropathy of right shoulder 03/23/2021    Osteopenia 12/31/2020    Morbidly obese ([address] 08/06/2020    Type 2 diabetes mellitus without complication, without long-term current use of insulin ([address] 10/26/2018    Lumbosacral spondylosis without myelopathy 05/09/2016    Displacement of lumbar intervertebral disc without myelopathy 05/09/2016    Degeneration of lumbar or lumbosacral intervertebral disc 05/09/2016    Spinal stenosis, lumbar region, without neurogenic claudication 05/09/2016    COPD with acute exacerbation ([address] 07/21/2014    Aortic regurgitation 04/08/2014    HTN (hypertension) 04/08/2014    Abnormal chest CT 02/17/2014    Decreased diffusion capacity 02/17/2014    Restrictive lung disease 02/17/2014       COPD exacerbation   - admit to med - surg  CXR negative  - started on steroids, doxy, HHN and oxygen support  - mild hypoxia in office improved with rest - check ambulatory sats prior to dc and consider oxygen   f/w Jm Bowman . Off advair and now on Incruse Ellipta. Albuterol prn  Remains off smoking for few months  Recent PFT as above  D/c home on pred taper,doxy     DM- 2, A1c  At 6.4- on metformin 500mg daily,  Monitor sugars and start ssi while on steroids      Anxiety and severe  depression -  - now on cymbalta 30 mg daily -better controlled  - doing better since last year though , since her    returned from residential     HTN -  improved   compliant on cardizem, increased lisinoprilHCTz - increase   metoprolol to 50 bid   D/c cardizem due to pedal edema  HCTZ d/c ed 2 to hyponatremia  We may have to restart calcium blocker if Bp continues to be high    Hyponatremia  ? SIADH  Also recd IV lasix    has been on cymbalta for several years - continue that for now  Hold HCTZ. 1500 ml fluid restriction  Na 130 today   1800 ml FR at home    Aortic regurgitation - no acute symptoms.  No CHF but has dependant edema which is worsening with no activity   Edema remains despite recent change from lasix to  demadex 20 mg daily   Elevated BNP  Start lasix    ECHO- as above  Hold lasix 4/21   F/w Dr. Cherelle Fisher nodules- stable on recent Ct       Chronic back pain - [reviously seen Dr. Grace Samples  Not seeing him any more    on zanaflex and lyrica.        Diet: diabetic   GI/DVT PX: /lovenox  CODE STATUS: full    D/c home     F/u pcp 1 week   BMP in 1-2 weeks     Jovanny Fuel

## 2021-04-23 ENCOUNTER — CARE COORDINATION (OUTPATIENT)
Dept: CASE MANAGEMENT | Age: 66
End: 2021-04-23

## 2021-04-23 NOTE — CARE COORDINATION
[facility] Transitions Initial Follow Up Call    Call within 2 business days of discharge: Yes    Patient: Haven Krabbe Patient : 1955   MRN: [id number]  Reason for Admission: COPD   Discharge Date: 21 RARS: Readmission Risk Score: 10      Last Discharge [facility]       Complaint Diagnosis Description Type Department Provider    21   Admission (Discharged) 6356 Lilo Peoples MD           Attempted to reach patient via phone for initial post hospital transition call. VM left stating purpose of call along with my contact information requesting a return call on both home and mobile numbers.            Follow Up  Future Appointments   Date Time Provider Cyril Carney   2021  3:15 PM Linette Montesinos, PT [facility] PT [facility]   2021 11:00 AM MD Mc [facility] Int None   2021 12:00 PM [address] OP NURSING ROOM 1 [facility] OP RN Sarina Berman   8/10/2021  2:15 PM MD TERRI Armstrong RN

## 2021-04-26 ENCOUNTER — CARE COORDINATION (OUTPATIENT)
Dept: CASE MANAGEMENT | Age: 66
End: 2021-04-26

## 2021-04-28 ENCOUNTER — HOSPITAL ENCOUNTER (OUTPATIENT)
Dept: PHYSICAL THERAPY | Age: 66
Setting detail: THERAPIES SERIES
Discharge: HOME | End: 2021-04-28
Payer: MEDICARE

## 2021-04-28 PROCEDURE — 97140 MANUAL THERAPY 1/> REGIONS: CPT

## 2021-04-28 PROCEDURE — 97112 NEUROMUSCULAR REEDUCATION: CPT

## 2021-04-28 PROCEDURE — 97110 THERAPEUTIC EXERCISES: CPT

## 2021-04-28 NOTE — FLOWSHEET NOTE
[facility], [address] [place] [address] [address], [address]  Phone: [phone], [phone]    Physical Therapy Treatment Note/ Progress Report:     Date:  2021    Patient Name:  Judy Brito    :  1955  MRN: [id number]  Restrictions/Precautions:    Medical/Treatment Diagnosis Information:  · Diagnosis: R shoulder OA / recent R scapular fx (atraumatic)  · Treatment Diagnosis: M25.511, Z53.265  Insurance/Certification information:  PT Insurance Information: Medicare  Physician Information:  Referring Practitioner: Rola Coughlin  Has the plan of care been signed (Y/N):        []  Yes  [x]  No     Date of Patient follow up with Physician: none scheduled    Is this a Progress Report:     []  Yes  [x]  No      If Yes:  Date Range for reporting period:  Initial Eval: 2021  Beginnin2021 --- Endin21    Progress report will be due (10 Rx or 30 days whichever is less):      Recertification will be due (POC Duration  / 90 days whichever is less): 21      Visit # Insurance Allowable Auth Required   In Person 1 Med Nec []  Yes     []  No    Tele Health 0  []  Yes     []  No    Total 1         Functional Scale: Quick Dash: 75% (Total Number Sum: 44)   Date assessed: 2021     Latex Allergy:  [x]NO      []YES  Preferred Language for Healthcare:   [x]English       []other:    Pain level:  5/10     SUBJECTIVE:  Patient reports she hasn't really been doing her exercises, but she was in the hospital for a couple of days early last week. She felt better after the first visit though.     OBJECTIVE: See eval   Observation:    Test measurements:      RESTRICTIONS/PRECAUTIONS: recent scapular fx (2021), COPD, osteoporosis, HTN, diabetes    Exercises/Interventions:   Therapeutic Ex (46653)  Therapeutic Activity (46865)  NMR re-education (90764) Sets/Reps Notes/CUES   Pulley 5'         scap retract 20x         Table slides flex 10x10\" Cues to depress UT   Table slides abd 10x10\"    Table ER stretch 10x10\"         Cane abd seated 15x    Cane ER seated 15x         Bicep curls 3-way 1# 30x ea                                                                     Manual Intervention (65330)     Seated PROM 10' Light, as tolerated due to h/o osteoporosis                            [facility] [email or web address]                    Patient Education         Therapeutic Exercise and NMR EXR  [x] (90016) Provided verbal/tactile cueing for activities related to strengthening, flexibility, endurance, ROM  for improvements in scapular, scapulothoracic and UE control with self care, reaching, carrying, lifting, house/yardwork, driving/computer work. [x] (77159) Provided verbal/tactile cueing for activities related to improving balance, coordination, kinesthetic sense, posture, motor skill, proprioception  to assist with  scapular, scapulothoracic and UE control with self care, reaching, carrying, lifting, house/yardwork, driving/computer work. Therapeutic Activities:    [x] (43293 or 03941) Provided verbal/tactile cueing for activities related to improving balance, coordination, kinesthetic sense, posture, motor skill, proprioception and motor activation to allow for proper function of scapular, scapulothoracic and UE control with self care, carrying, lifting, driving/computer work.      Home Exercise Program:    [x] (97438) Reviewed/Progressed HEP activities related to strengthening, flexibility, endurance, ROM of scapular, scapulothoracic and UE control with self care, reaching, carrying, lifting, house/yardwork, driving/computer work  [x] (06292) Reviewed/Progressed HEP activities related to improving balance, coordination, kinesthetic sense, posture, motor skill, proprioception of scapular, scapulothoracic and UE control with self care, reaching, carrying, lifting, house/yardwork, driving/computer work      Manual Treatments:  PROM / STM / Oscillations-Mobs:  G-I, II, III, IV (PA's, Inf., Post.)  [x] (48542) Provided manual therapy to mobilize soft tissue/joints of cervical/CT, scapular GHJ and UE for the purpose of modulating pain, promoting relaxation,  increasing ROM, reducing/eliminating soft tissue swelling/inflammation/restriction, improving soft tissue extensibility and allowing for proper ROM for normal function with self care, reaching, carrying, lifting, house/yardwork, driving/computer work    Modalities:      Charges:  Timed Code Treatment Minutes: 45   Total Treatment Minutes:  45   BWC:  TE TIME:  NMR TIME:  MANUAL TIME:  UNTIMED MINUTES:  Medicare Total:   n/a  n/a  n/a  n/a  $280      [x] EVAL (LOW) 19938 (typically 20 minutes face-to-face)  [] EVAL (MOD) 91970 (typically 30 minutes face-to-face)  [] EVAL (HIGH) 95451 (typically 45 minutes face-to-face)  [] RE-EVAL     [x] MT(89883) x 1    [] IONTO  [x] NMR (26992) x 1    [] VASO  [x] Manual (31447) x 1    [] Other:  [] TA x      [] Mech Traction (00543)  [] ES(attended) (79776)     [] ES (un) (18124):    ASSESSMENT: Patient had decreased pain again post-manual therapy. Educated patient about importance of exercises. GOALS:   Short Term Goals: To be achieved in: 2 weeks  1. Independent in HEP and progression per patient tolerance, in order to prevent re-injury. []? Progressing: []? Met: []? Not Met: []? Adjusted       2. Patient will have a decrease in pain to facilitate improvement in movement, function, and ADLs as indicated by Functional Deficits. []? Progressing: []? Met: []? Not Met: []? Adjusted          Long Term Goals: To be achieved in: 8 weeks  1. Disability index score of 20% or less for the QuickDash/Oswestry to assist with reaching prior level of function. []? Progressing: []? Met: []? Not Met: []? Adjusted      2. Patient will demonstrate increased AROM to equal the opposite side bilaterally to allow for proper joint functioning as indicated by patients Functional Deficits. []? Progressing: []?  Met: []? Not Met: []? Adjusted       3. Patient will demonstrate an increase in strength to 5/5 to allow for proper functional mobility as indicated by patients Functional Deficits. []? Progressing: []? Met: []? Not Met: []? Adjusted       4. Patient will return to all transfers, work activities, and functional activities without increased symptoms or restriction. []? Progressing: []? Met: []? Not Met: []? Adjusted       5. Patient will have 0/10 pain with ADL's.  []? Progressing: []? Met: []? Not Met: []? Adjusted       6. Patient stated goal: Patient wants to be able to dress independently, reach overhead, and put weight through walker without pain or restriction. []? Progressing: []? Met: []? Not Met: []? Adjusted                 (Patrick Farias from eval)    Overall Progression Towards Functional goals/ Treatment Progress Update:  [] Patient is progressing as expected towards functional goals listed. [] Progression is slowed due to complexities/Impairments listed. [] Progression has been slowed due to co-morbidities.   [x] Plan just implemented, too soon to assess goals progression <30days   [] Goals require adjustment due to lack of progress  [] Patient is not progressing as expected and requires additional follow up with physician  [] Other    Prognosis for POC: [x] Good [] Fair  [] Poor    Patient requires continued skilled intervention: [x] Yes  [] No    Treatment/Activity Tolerance:  [x] Patient able to complete treatment  [] Patient limited by fatigue  [] Patient limited by pain    [] Patient limited by other medical complications  [] Other:     Return to Play: (if applicable)   []  Stage 1: Intro to Strength   []  Stage 2: Return to Run and Strength   []  Stage 3: Return to Jump and Strength   []  Stage 4: Dynamic Strength and Agility   []  Stage 5: Sport Specific Training     []  Ready to Return to Play, Meets All Above Stages   []  Not Ready for Return to Sports   Comments:                         PLAN: See eval  [x] Continue per plan of care [] Alter current plan (see comments above)  [] Plan of care initiated [] Hold pending MD visit [] Discharge    Electronically signed by:  Aisha Glaser PT    Note: If patient does not return for scheduled/ recommended follow up visits, this note will serve as a discharge from care along with most recent update on progress.

## 2021-05-03 RX ORDER — TIZANIDINE 2 MG/1
TABLET ORAL
Qty: 30 TABLET | Refills: 0 | Status: SHIPPED | OUTPATIENT
Start: 2021-05-03 | End: 2021-05-12

## 2021-05-05 ENCOUNTER — OFFICE VISIT (OUTPATIENT)
Dept: INTERNAL MEDICINE CLINIC | Age: 66
End: 2021-05-05

## 2021-05-05 ENCOUNTER — HOSPITAL ENCOUNTER (OUTPATIENT)
Dept: PHYSICAL THERAPY | Age: 66
Setting detail: THERAPIES SERIES
Discharge: HOME | End: 2021-05-05
Payer: MEDICARE

## 2021-05-05 VITALS
HEART RATE: 70 BPM | RESPIRATION RATE: 18 BRPM | WEIGHT: 216 LBS | DIASTOLIC BLOOD PRESSURE: 75 MMHG | BODY MASS INDEX: 35.99 KG/M2 | SYSTOLIC BLOOD PRESSURE: 142 MMHG | HEIGHT: 65 IN | TEMPERATURE: 97.8 F

## 2021-05-05 DIAGNOSIS — Z09 HOSPITAL DISCHARGE FOLLOW-UP: Primary | ICD-10-CM

## 2021-05-05 DIAGNOSIS — R60.0 PEDAL EDEMA: ICD-10-CM

## 2021-05-05 DIAGNOSIS — F32.4 MAJOR DEPRESSIVE DISORDER WITH SINGLE EPISODE, IN PARTIAL REMISSION (HCC): ICD-10-CM

## 2021-05-05 DIAGNOSIS — J44.1 COPD EXACERBATION (HCC): ICD-10-CM

## 2021-05-05 DIAGNOSIS — E11.9 WELL CONTROLLED TYPE 2 DIABETES MELLITUS (HCC): ICD-10-CM

## 2021-05-05 DIAGNOSIS — I10 ESSENTIAL HYPERTENSION: ICD-10-CM

## 2021-05-05 PROCEDURE — 99495 TRANSJ CARE MGMT MOD F2F 14D: CPT | Performed by: INTERNAL MEDICINE

## 2021-05-05 PROCEDURE — 97112 NEUROMUSCULAR REEDUCATION: CPT

## 2021-05-05 PROCEDURE — 1111F DSCHRG MED/CURRENT MED MERGE: CPT | Performed by: INTERNAL MEDICINE

## 2021-05-05 PROCEDURE — 97140 MANUAL THERAPY 1/> REGIONS: CPT

## 2021-05-05 PROCEDURE — 97110 THERAPEUTIC EXERCISES: CPT

## 2021-05-05 RX ORDER — TORSEMIDE 20 MG/1
20 TABLET ORAL DAILY
Qty: 90 TABLET | Refills: 0 | Status: SHIPPED | OUTPATIENT
Start: 2021-05-05 | End: 2021-08-02

## 2021-05-05 RX ORDER — MELOXICAM 7.5 MG/1
7.5 TABLET ORAL DAILY
Qty: 90 TABLET | Refills: 0 | Status: SHIPPED | OUTPATIENT
Start: 2021-05-05 | End: 2021-07-12

## 2021-05-05 NOTE — FLOWSHEET NOTE
[facility],  [place] [address] [place], [address]  Phone: [phone], [phone]    Physical Therapy Treatment Note/ Progress Report:     Date:  2021    Patient Name:  Gloria Duong    :  1955  MRN: [id number]  Restrictions/Precautions:    Medical/Treatment Diagnosis Information:  · Diagnosis: R shoulder OA / recent R scapular fx (atraumatic)  · Treatment Diagnosis: M25.511, F92.213  Insurance/Certification information:  PT Insurance Information: Medicare  Physician Information:  Referring Practitioner: Rik Jose  Has the plan of care been signed (Y/N):        []  Yes  [x]  No     Date of Patient follow up with Physician: none scheduled    Is this a Progress Report:     []  Yes  [x]  No      If Yes:  Date Range for reporting period:  Initial Eval: 2021  Beginnin2021 --- Endin21    Progress report will be due (10 Rx or 30 days whichever is less): 67     Recertification will be due (POC Duration  / 90 days whichever is less): 21      Visit # Insurance Allowable Auth Required   In Person 3 Med Nec []  Yes     []  No    Tele Health 0  []  Yes     []  No    Total 3         Functional Scale: Quick Dash: 75% (Total Number Sum: 44)   Date assessed: 2021     Latex Allergy:  [x]NO      []YES  Preferred Language for Healthcare:   [x]English       []other:    Pain level:  6-7/10     SUBJECTIVE:  Patient reports she has been working on her exercises, but she has still been having pain and popping in bilateral shoulders.  She has a little more today because she had to go to the doctor and walked more than normal.    OBJECTIVE: See eval   Observation:    Test measurements:      RESTRICTIONS/PRECAUTIONS: recent scapular fx (2021), COPD, osteoporosis, HTN, diabetes    Exercises/Interventions:   Therapeutic Ex (99438)  Therapeutic Activity (27564)  NMR re-education (52857) Sets/Reps Notes/CUES   Pulley 5'         scap retract / rows  20x / 20x Table slides flex 10x10\" Cues to depress UT   Table slides abd 10x10\"    Table ER stretch 10x10\"         Cane abd seated 15x    Cane ER seated 15x         Bicep curls 3-way 1# 30x ea         Cross body punches (thoracic rotation) 20x ea    Shoulder flex to 90 deg 20x ea Cues to depress UT                                                     Manual Intervention (04778)     Seated PROM 10' Light, as tolerated due to h/o osteoporosis   Light UT rolling with  5'                        [employer] access code: [email or web address]                    Patient Education         Therapeutic Exercise and NMR EXR  [x] (50731) Provided verbal/tactile cueing for activities related to strengthening, flexibility, endurance, ROM  for improvements in scapular, scapulothoracic and UE control with self care, reaching, carrying, lifting, house/yardwork, driving/computer work. [x] (53617) Provided verbal/tactile cueing for activities related to improving balance, coordination, kinesthetic sense, posture, motor skill, proprioception  to assist with  scapular, scapulothoracic and UE control with self care, reaching, carrying, lifting, house/yardwork, driving/computer work. Therapeutic Activities:    [x] (19876 or 93665) Provided verbal/tactile cueing for activities related to improving balance, coordination, kinesthetic sense, posture, motor skill, proprioception and motor activation to allow for proper function of scapular, scapulothoracic and UE control with self care, carrying, lifting, driving/computer work.      Home Exercise Program:    [x] (77043) Reviewed/Progressed HEP activities related to strengthening, flexibility, endurance, ROM of scapular, scapulothoracic and UE control with self care, reaching, carrying, lifting, house/yardwork, driving/computer work  [x] (20988) Reviewed/Progressed HEP activities related to improving balance, coordination, kinesthetic sense, posture, motor skill, proprioception of scapular, scapulothoracic and UE control with self care, reaching, carrying, lifting, house/yardwork, driving/computer work      Manual Treatments:  PROM / STM / Oscillations-Mobs:  G-I, II, III, IV (Godfrey, Inf., Post.)  [x] (55012) Provided manual therapy to mobilize soft tissue/joints of cervical/CT, scapular GHJ and UE for the purpose of modulating pain, promoting relaxation,  increasing ROM, reducing/eliminating soft tissue swelling/inflammation/restriction, improving soft tissue extensibility and allowing for proper ROM for normal function with self care, reaching, carrying, lifting, house/yardwork, driving/computer work    Modalities:      Charges:  Timed Code Treatment Minutes: 43   Total Treatment Minutes:  43   BWC:  TE TIME:  NMR TIME:  MANUAL TIME:  UNTIMED MINUTES:  Medicare Total:   n/a  n/a  n/a  n/a  $380      [] EVAL (LOW) 70402 (typically 20 minutes face-to-face)  [] EVAL (MOD) 04658 (typically 30 minutes face-to-face)  [] EVAL (HIGH) 28852 (typically 45 minutes face-to-face)  [] RE-EVAL     [x] QB(71474) x 1    [] IONTO  [x] NMR (83831) x 1    [] VASO  [x] Manual (03575) x 1    [] Other:  [] TA x      [] Mech Traction (01331)  [] ES(attended) (93127)     [] ES (un) (13693):    ASSESSMENT: Patient reported some increase in pain post PROM, but decreased pain post-soft tissue mobilization. Patient hasn't made much progress at this time, but just started working on her exercises consistently. GOALS:   Short Term Goals: To be achieved in: 2 weeks  1. Independent in HEP and progression per patient tolerance, in order to prevent re-injury. []? Progressing: []? Met: []? Not Met: []? Adjusted       2. Patient will have a decrease in pain to facilitate improvement in movement, function, and ADLs as indicated by Functional Deficits. []? Progressing: []? Met: []? Not Met: []? Adjusted          Long Term Goals: To be achieved in: 8 weeks  1.  Disability index score of 20% or less for the QuickDash/Oswestry to assist with reaching prior level of function. []? Progressing: []? Met: []? Not Met: []? Adjusted      2. Patient will demonstrate increased AROM to equal the opposite side bilaterally to allow for proper joint functioning as indicated by patients Functional Deficits. []? Progressing: []? Met: []? Not Met: []? Adjusted       3. Patient will demonstrate an increase in strength to 5/5 to allow for proper functional mobility as indicated by patients Functional Deficits. []? Progressing: []? Met: []? Not Met: []? Adjusted       4. Patient will return to all transfers, work activities, and functional activities without increased symptoms or restriction. []? Progressing: []? Met: []? Not Met: []? Adjusted       5. Patient will have 0/10 pain with ADL's.  []? Progressing: []? Met: []? Not Met: []? Adjusted       6. Patient stated goal: Patient wants to be able to dress independently, reach overhead, and put weight through walker without pain or restriction. []? Progressing: []? Met: []? Not Met: []? Adjusted                 (Cut Gale Axon from eval)    Overall Progression Towards Functional goals/ Treatment Progress Update:  [] Patient is progressing as expected towards functional goals listed. [] Progression is slowed due to complexities/Impairments listed. [] Progression has been slowed due to co-morbidities.   [x] Plan just implemented, too soon to assess goals progression <30days   [] Goals require adjustment due to lack of progress  [] Patient is not progressing as expected and requires additional follow up with physician  [] Other    Prognosis for POC: [x] Good [] Fair  [] Poor    Patient requires continued skilled intervention: [x] Yes  [] No    Treatment/Activity Tolerance:  [x] Patient able to complete treatment  [] Patient limited by fatigue  [] Patient limited by pain    [] Patient limited by other medical complications  [] Other:     Return to Play: (if applicable)   []  Stage 1: Intro to Strength   []  Stage 2: Return to Run and Strength   []  Stage 3: Return to Jump and Strength   []  Stage 4: Dynamic Strength and Agility   []  Stage 5: Sport Specific Training     []  Ready to Return to Play, Meets All Above Stages   []  Not Ready for Return to Sports   Comments:                         PLAN: See eval  [x] Continue per plan of care [] Alter current plan (see comments above)  [] Plan of care initiated [] Hold pending MD visit [] Discharge    Electronically signed by:  Caity Rodas PT    Note: If patient does not return for scheduled/ recommended follow up visits, this note will serve as a discharge from care along with most recent update on progress.

## 2021-05-05 NOTE — PROGRESS NOTES
Post-Discharge Transitional Care Management Services or Hospital Follow Up      Sim Call   YOB: 1955    Date of Office Visit:  5/5/2021  Date of Hospital Admission: 4/19/21  Date of Hospital Discharge: 4/22/21  Readmission Risk Score(high >=14%. Medium >=10%):Readmission Risk Score: 10      Care management risk score Rising risk (score 2-5) and Complex Care (Scores >=6): 3     Non face to face  following discharge, date last encounter closed (first attempt may have been earlier): 4/26/2021  3:14 PM 4/26/2021  3:14 PM    Call initiated 2 business days of discharge: Yes         Patient Active Problem List   Diagnosis    Pulmonary nodules    Tobacco abuse    Abnormal chest CT    Decreased diffusion capacity    Restrictive lung disease    Aortic regurgitation    HTN (hypertension)    COPD with acute exacerbation ([address]    Lumbosacral spondylosis without myelopathy    Displacement of lumbar intervertebral disc without myelopathy    Degeneration of lumbar or lumbosacral intervertebral disc    Spinal stenosis, lumbar region, without neurogenic claudication    Type 2 diabetes mellitus without complication, without long-term current use of insulin ([address]    Morbidly obese ([address]    Osteopenia    Displaced fracture of acromial process, right shoulder, initial encounter for closed fracture    Acute pain of right shoulder    Rotator cuff arthropathy of right shoulder    Primary osteoarthritis of right shoulder    Major depressive disorder with single episode, in partial remission ([address]       Allergies   Allergen Reactions    Ciprofloxacin Nausea And Vomiting       Medications listed as ordered at the time of discharge from hospitals, [address] Medication Instructions JOHN:    Printed on:05/05/21 8284   Medication Information                      blood glucose test strips (CONTOUR NEXT TEST) strip  Test Daily.   DX: E11.9             budesonide-formoterol (SYMBICORT) 160-4.5 MCG/ACT AERO  Inhale 2 puffs into the lungs 2 times daily             buPROPion (WELLBUTRIN SR) 100 MG extended release tablet  Take 1 tablet by mouth daily             calcium-vitamin D (OSCAL) 250-125 MG-UNIT per tablet  Take 1 tablet by mouth 2 times daily             CHANTIX CONTINUING MONTH RICHARD 1 MG tablet  TAKE ONE TABLET BY MOUTH 2 TIMES A DAY             denosumab (PROLIA) 60 MG/ML SOSY SC injection  Inject 1 mL into the skin once for 1 dose             DULoxetine (CYMBALTA) 30 MG extended release capsule  Take 1 capsule by mouth daily             INCRUSE ELLIPTA 62.5 MCG/INH AEPB  INHALE 1 PUFF INTO THE LUNGS DAILY             ipratropium-albuterol (DUONEB) 0.5-2.5 (3) MG/3ML SOLN nebulizer solution  Inhale 3 mLs into the lungs every 4 hours             Lancets MISC  Test once daily.  DX: E11.9             lisinopril (PRINIVIL;ZESTRIL) 40 MG tablet  Take 1 tablet by mouth daily             meloxicam (MOBIC) 7.5 MG tablet  Take 1 tablet by mouth daily             metFORMIN (GLUCOPHAGE) 500 MG tablet  TAKE ONE TABLET BY MOUTH EVERY DAY             metoprolol tartrate (LOPRESSOR) 50 MG tablet  Take 1 tablet by mouth 2 times daily             pregabalin (LYRICA) 100 MG capsule  TAKE ONE CAPSULE BY MOUTH 2 TIMES A DAY             tiZANidine (ZANAFLEX) 2 MG tablet  TAKE ONE TABLET BY MOUTH EVERY DAY             torsemide (DEMADEX) 20 MG tablet  Take 1 tablet by mouth daily             VENTOLIN  (90 Base) MCG/ACT inhaler  Inhale 2 puffs into the lungs every 6 hours as needed for Wheezing                   Medications marked \"taking\" at this time  Outpatient Medications Marked as Taking for the 5/5/21 encounter (Office Visit) with Nancy Lubin MD   Medication Sig Dispense Refill    torsemide (DEMADEX) 20 MG tablet Take 1 tablet by mouth daily 90 tablet 0    meloxicam (MOBIC) 7.5 MG tablet Take 1 tablet by mouth daily 90 tablet 0        Medications patient taking as of now reconciled against medications ordered at time of hospital discharge: Yes    Chief Complaint   Patient presents with    Follow-Up from Hospital       HPI     61 y.o. female with hx of   chronic back pain , copd, DM- 2,. HTN , tobacco use  Was admitted to hospital last week for copd exacerbation and treated with steroids, doxy . Hyponatremia treated with holding hctz and her cardizem was removed for worsening pedal edema. Pt now reports her breathing is the same, with exertional dyspnea and takes few minutes to get normal after minimal activity. Did not get qualified for oxygen. Activity remains poor. Worsening pedal edema off demadex     Remains sober from smoking x 4 months      Inpatient course: Discharge summary reviewed- see chart. Interval history/Current status: home with     Review of Systems     As above     Vitals:    05/05/21 1051   BP: (!) 142/75   Pulse: 70   Resp: 18   Temp: 97.8 °F (36.6 °C)   Weight: 216 lb (98 kg)   Height: 5' 5\" (1.651 m)     Body mass index is 35.94 kg/m². Wt Readings from Last 3 Encounters:   05/05/21 216 lb (98 kg)   04/19/21 212 lb 3.2 oz (96.3 kg)   04/19/21 208 lb (94.3 kg)     BP Readings from Last 3 Encounters:   05/05/21 (!) 142/75   04/22/21 (!) 148/66   04/19/21 (!) 145/75       Physical Exam      General: older appearing female,  Awake, alert and oriented. Mucous Membranes:  Pink , anicteric  Neck: No JVD, no carotid bruit, no thyromegaly  Chest:  diminished shayy air entry with scattered wheeze  Cardiovascular:  RRR S1S2 heard, no murmurs or gallops  Abdomen:  Soft,obese  undistended, non tender, no organomegaly, BS present  Extremities: 3+  edema to both LE  Distal pulses well felt  Neurological : grossly normal mood             PFT    1. Spirometry: The FEV1 is 1.48 liters or 58% of predicted. The FVC  is 2.10 liters or 63% of predicted. The FEV1/FVC ratio is 71%. There  is no demonstrated response to inhaled bronchodilators.   2.  Plethysmography:  The total lung capacity is 4.00 liters or 75% of  predicted. 3.  Diffusion Capacity:  The diffusion capacity of carbon monoxide is  8.04 mL/min/mmHg which is 34% of predicted. 4.  Flow Volume Loops: The tracings show a normal pattern.     IMPRESSION:  1. There is a mild obstructive lung defect without demonstrated  response to inhaled bronchodilators. 2.  There is a mild restrictive ventilatory defect. 3.  There is a severe reduction in diffusion capacity. 4.  Overall, the pulmonary function tests showing a combined restrictive  and obstructive defect pattern with impaired diffusion capacity, could  be seen in COPD with a concomitant restrictive defect or interstitial  lung disease. Clinical correlation is recommended. Ct shoulder    Acute and comminuted fracture of the scapula involving the scapular body   and scapular spine.  Surrounding soft tissue and intramuscular edema. 2. Complete loss of the acromial humeral interval consistent with rotator   cuff insufficiency.  Severe associated atrophy of the rotator cuff   musculature. 3. Moderate glenohumeral effusion with numerous large articular bodies   throughout the joint. 4. Moderate to severe glenohumeral and moderate acromioclavicular   osteoarthritis. Assessment:       Diagnosis Orders   1. COPD exacerbation ([address]  6 Minute Walk Test    DME Order for Pulse Ox as OP   2. Major depressive disorder with single episode, in partial remission ([address]     3. Well controlled type 2 diabetes mellitus ([address]     4. Essential hypertension     5. Hospital discharge follow-up     6. Pedal edema             Plan:           COPD exacerbation   - recent admission noted and improved some    f/w Dr> Major . Off advair and now on Incruse Ellipta.  Albuterol prn  Remains off smoking for few months  Obtain 6 min walk test  Consider overnight pulse oximetry    DM- 2, A1c  At 6.4- on metformin 500mg daily,  Already on ACEI  Need eye exam    Anxiety and severe  depression - has

## 2021-05-10 DIAGNOSIS — M47.817 LUMBOSACRAL SPONDYLOSIS WITHOUT MYELOPATHY: Chronic | ICD-10-CM

## 2021-05-11 RX ORDER — PREGABALIN 100 MG/1
CAPSULE ORAL
Qty: 180 CAPSULE | Refills: 0 | Status: SHIPPED | OUTPATIENT
Start: 2021-05-12 | End: 2021-08-04

## 2021-05-12 ENCOUNTER — HOSPITAL ENCOUNTER (OUTPATIENT)
Dept: PHYSICAL THERAPY | Age: 66
Setting detail: THERAPIES SERIES
Discharge: HOME | End: 2021-05-12
Payer: MEDICARE

## 2021-05-12 PROCEDURE — 97110 THERAPEUTIC EXERCISES: CPT | Performed by: PHYSICAL THERAPY ASSISTANT

## 2021-05-12 PROCEDURE — 97140 MANUAL THERAPY 1/> REGIONS: CPT | Performed by: PHYSICAL THERAPY ASSISTANT

## 2021-05-12 PROCEDURE — 97112 NEUROMUSCULAR REEDUCATION: CPT | Performed by: PHYSICAL THERAPY ASSISTANT

## 2021-05-12 RX ORDER — TIZANIDINE 2 MG/1
TABLET ORAL
Qty: 30 TABLET | Refills: 0 | Status: SHIPPED | OUTPATIENT
Start: 2021-05-12 | End: 2021-06-16

## 2021-05-12 NOTE — FLOWSHEET NOTE
[facility], [address] [place] [address] [place], [address]  Phone: [phone], [phone]    Physical Therapy Treatment Note/ Progress Report:     Date:  2021    Patient Name:  Gonzalo Griggs    :  1955  MRN: [id number]  Restrictions/Precautions:    Medical/Treatment Diagnosis Information:  · Diagnosis: R shoulder OA / recent R scapular fx (atraumatic)  · Treatment Diagnosis: M25.511, K80.749  Insurance/Certification information:  PT Insurance Information: Medicare  Physician Information:  Referring Practitioner: Willie Deleon  Has the plan of care been signed (Y/N):        []  Yes  [x]  No     Date of Patient follow up with Physician: none scheduled    Is this a Progress Report:     []  Yes  [x]  No      If Yes:  Date Range for reporting period:  Initial Eval: 2021  Beginnin2021 --- Endin21    Progress report will be due (10 Rx or 30 days whichever is less): 19     Recertification will be due (POC Duration  / 90 days whichever is less): 21      Visit # Insurance Allowable Auth Required   In Person [facility] []  Yes     []  No    [facility] 0  []  Yes     []  No    Total 4         Functional Scale: Quick Dash: 75% (Total Number Sum: 44)   Date assessed: 2021     Latex Allergy:  [x]NO      []YES  Preferred Language for Healthcare:   [x]English       []other:    Pain level:  6-7/10     SUBJECTIVE:  Still about the same - sore in the upper trap area.       OBJECTIVE: See eval   Observation:    Test measurements:      RESTRICTIONS/PRECAUTIONS: recent scapular fx (2021), COPD, osteoporosis, HTN, diabetes    Exercises/Interventions:   Therapeutic Ex (47913)  Therapeutic Activity (06408)  NMR re-education (61203) Sets/Reps Notes/CUES   Pulley 5'         scap retract / rows  Red 20x / red 20x    T=band ER /IR Active only x20 Unable to use band         Table slides flex 10x10\" Cues to depress UT   Table slides abd 10x10\"    Table ER stretch 10x10\" Met: []? Adjusted      2. Patient will demonstrate increased AROM to equal the opposite side bilaterally to allow for proper joint functioning as indicated by patients Functional Deficits. []? Progressing: []? Met: []? Not Met: []? Adjusted       3. Patient will demonstrate an increase in strength to 5/5 to allow for proper functional mobility as indicated by patients Functional Deficits. []? Progressing: []? Met: []? Not Met: []? Adjusted       4. Patient will return to all transfers, work activities, and functional activities without increased symptoms or restriction. []? Progressing: []? Met: []? Not Met: []? Adjusted       5. Patient will have 0/10 pain with ADL's.  []? Progressing: []? Met: []? Not Met: []? Adjusted       6. Patient stated goal: Patient wants to be able to dress independently, reach overhead, and put weight through walker without pain or restriction. []? Progressing: []? Met: []? Not Met: []? Adjusted                 (Cut Mary Alice Izquierdo from [facility])    Overall Progression Towards Functional goals/ Treatment Progress Update:  [] Patient is progressing as expected towards functional goals listed. [] Progression is slowed due to complexities/Impairments listed. [] Progression has been slowed due to co-morbidities.   [x] Plan just implemented, too soon to assess goals progression <30days   [] Goals require adjustment due to lack of progress  [] Patient is not progressing as expected and requires additional follow up with physician  [] Other    Prognosis for POC: [x] Good [] Fair  [] Poor    Patient requires continued skilled intervention: [x] Yes  [] No    Treatment/Activity Tolerance:  [x] Patient able to complete treatment  [] Patient limited by fatigue  [] Patient limited by pain    [] Patient limited by other medical complications  [] Other:     Return to Play: (if applicable)   []  Stage 1: Intro to Strength   []  Stage 2: Return to Run and Strength   []  Stage 3: Return to Jump and Strength   []  Stage 4: Dynamic Strength and Agility   []  Stage 5: Sport Specific Training     []  Ready to Return to Play, Meets All Above Stages   []  Not Ready for Return to Sports   Comments:                         PLAN: See eval  [x] Continue per plan of care [] Alter current plan (see comments above)  [] Plan of care initiated [] Hold pending MD visit [] Discharge    Electronically signed by:  Hui Rothman PTA    Note: If patient does not return for scheduled/ recommended follow up visits, this note will serve as a discharge from care along with most recent update on progress.

## 2021-05-19 ENCOUNTER — HOSPITAL ENCOUNTER (OUTPATIENT)
Dept: PHYSICAL THERAPY | Age: 66
Setting detail: THERAPIES SERIES
Discharge: HOME | End: 2021-05-19
Payer: MEDICARE

## 2021-05-19 PROCEDURE — 97140 MANUAL THERAPY 1/> REGIONS: CPT | Performed by: PHYSICAL THERAPY ASSISTANT

## 2021-05-19 PROCEDURE — 97110 THERAPEUTIC EXERCISES: CPT | Performed by: PHYSICAL THERAPY ASSISTANT

## 2021-05-19 PROCEDURE — 97112 NEUROMUSCULAR REEDUCATION: CPT | Performed by: PHYSICAL THERAPY ASSISTANT

## 2021-05-19 NOTE — FLOWSHEET NOTE
[facility], later [facility], [address] [place] [address] [place], [address]  Phone: [phone], [phone]    Physical Therapy Treatment Note/ Progress Report:     Date:  2021    Patient Name:  Marco Bello    :  1955  MRN: [id number]  Restrictions/Precautions:    Medical/Treatment Diagnosis Information:  · Diagnosis: R shoulder OA / recent R scapular fx (atraumatic)  · Treatment Diagnosis: M25.511, Y34.051  Insurance/Certification information:  PT Insurance Information: Medicare  Physician Information:  Referring Practitioner: Dru Wilkinson  Has the plan of care been signed (Y/N):        []  Yes  [x]  No     Date of Patient follow up with Physician: none scheduled    Is this a Progress Report:     []  Yes  [x]  No      If Yes:  Date Range for reporting period:  Initial Eval: 2021  Beginnin2021 --- Endin21    Progress report will be due (10 Rx or 30 days whichever is less):      Recertification will be due (POC Duration  / 90 days whichever is less): 21      Visit # Insurance Allowable Auth Required   In Person [facility] []  Yes     []  No    Tele Health 0  []  Yes     []  No    Total 5         Functional Scale: Quick Dash: 75% (Total Number Sum: 44)   Date assessed: 2021     Latex Allergy:  [x]NO      []YES  Preferred Language for Healthcare:   [x]English       []other:    Pain level:  6-7/10     SUBJECTIVE:  States she was very sore after last visit. Soreness was through the anterior shoulder. Was sore for a few days. We discussed contacting Dr. George Portillo office as they discussed a possible injection at her last visit with him. She will contact their office this week for follow-up.       OBJECTIVE: See eval   Observation:    Test measurements:      RESTRICTIONS/PRECAUTIONS: recent scapular fx (2021), COPD, osteoporosis, HTN, diabetes    Exercises/Interventions:   Therapeutic Ex (57920)  Therapeutic Activity (68297)  NMR re-education (68891) Sets/Reps Notes/CUES   Pulley 5'         scap retract / rows  Red 20x / red 20x    T=band ER /IR Active only x20 Unable to use band              Table slides flex 10x10\" Cues to depress UT   Table slides abd 10x10\"    Table ER stretch 10x10\"         Cane flexion seated  x10    Cane abd seated 15x    Cane ER seated 15x         Bicep curls 3-way 1# 30x ea         Cross body punches (thoracic rotation) 20x ea    Shoulder flex to 90 deg 20x ea Cues to depress UT                                                     Manual Intervention (32276)     Seated PROM 10' Light, as tolerated due to h/o osteoporosis   Light UT rolling with  5'         STM right upper trap and scapular  5'              Medbridge access code: [email or web address]                    Patient Education         Therapeutic Exercise and NMR EXR  [x] (83513) Provided verbal/tactile cueing for activities related to strengthening, flexibility, endurance, ROM  for improvements in scapular, scapulothoracic and UE control with self care, reaching, carrying, lifting, house/yardwork, driving/computer work. [x] (38782) Provided verbal/tactile cueing for activities related to improving balance, coordination, kinesthetic sense, posture, motor skill, proprioception  to assist with  scapular, scapulothoracic and UE control with self care, reaching, carrying, lifting, house/yardwork, driving/computer work. Therapeutic Activities:    [x] (92364 or 88146) Provided verbal/tactile cueing for activities related to improving balance, coordination, kinesthetic sense, posture, motor skill, proprioception and motor activation to allow for proper function of scapular, scapulothoracic and UE control with self care, carrying, lifting, driving/computer work.      Home Exercise Program:    [x] (85552) Reviewed/Progressed HEP activities related to strengthening, flexibility, endurance, ROM of scapular, scapulothoracic and UE control with self care, reaching, carrying, lifting, Functional Deficits. []? Progressing: []? Met: []? Not Met: []? Adjusted          Long Term Goals: To be achieved in: 8 weeks  1. Disability index score of 20% or less for the QuickDash/Oswestry to assist with reaching prior level of function. []? Progressing: []? Met: []? Not Met: []? Adjusted      2. Patient will demonstrate increased AROM to equal the opposite side bilaterally to allow for proper joint functioning as indicated by patients Functional Deficits. []? Progressing: []? Met: []? Not Met: []? Adjusted       3. Patient will demonstrate an increase in strength to 5/5 to allow for proper functional mobility as indicated by patients Functional Deficits. []? Progressing: []? Met: []? Not Met: []? Adjusted       4. Patient will return to all transfers, work activities, and functional activities without increased symptoms or restriction. []? Progressing: []? Met: []? Not Met: []? Adjusted       5. Patient will have 0/10 pain with ADL's.  []? Progressing: []? Met: []? Not Met: []? Adjusted       6. Patient stated goal: Patient wants to be able to dress independently, reach overhead, and put weight through walker without pain or restriction. []? Progressing: []? Met: []? Not Met: []? Adjusted                 (Cut Bibi Tony from eval)    Overall Progression Towards Functional goals/ Treatment Progress Update:  [] Patient is progressing as expected towards functional goals listed. [] Progression is slowed due to complexities/Impairments listed. [] Progression has been slowed due to co-morbidities.   [x] Plan just implemented, too soon to assess goals progression <30days   [] Goals require adjustment due to lack of progress  [] Patient is not progressing as expected and requires additional follow up with physician  [] Other    Prognosis for POC: [x] Good [] Fair  [] Poor    Patient requires continued skilled intervention: [x] Yes  [] No    Treatment/Activity Tolerance:  [x] Patient able to complete treatment  [] Patient limited by fatigue  [] Patient limited by pain    [] Patient limited by other medical complications  [] Other:     Return to Play: (if applicable)   []  Stage 1: Intro to Strength   []  Stage 2: Return to Run and Strength   []  Stage 3: Return to Jump and Strength   []  Stage 4: Dynamic Strength and Agility   []  Stage 5: Sport Specific Training     []  Ready to Return to Play, Meets All Above Stages   []  Not Ready for Return to Sports   Comments:                         PLAN: See eval  [x] Continue per plan of care [] Alter current plan (see comments above)  [] Plan of care initiated [] Hold pending MD visit [] Discharge    Electronically signed by:  Claudene Dress, PTA    Note: If patient does not return for scheduled/ recommended follow up visits, this note will serve as a discharge from care along with most recent update on progress.

## 2021-05-20 RX ORDER — LISINOPRIL 40 MG/1
40 TABLET ORAL DAILY
Qty: 30 TABLET | Refills: 2 | Status: SHIPPED | OUTPATIENT
Start: 2021-05-20 | End: 2021-08-26

## 2021-05-21 ENCOUNTER — TELEPHONE (OUTPATIENT)
Dept: ORTHOPEDIC SURGERY | Age: 66
End: 2021-05-21

## 2021-05-24 ENCOUNTER — HOSPITAL ENCOUNTER (OUTPATIENT)
Dept: PULMONOLOGY | Age: 66
Discharge: HOME | End: 2021-05-24
Payer: MEDICARE

## 2021-05-24 ENCOUNTER — TELEPHONE (OUTPATIENT)
Dept: ORTHOPEDIC SURGERY | Age: 66
End: 2021-05-24

## 2021-05-24 DIAGNOSIS — J44.1 COPD EXACERBATION (HCC): ICD-10-CM

## 2021-05-24 PROCEDURE — 94618 PULMONARY STRESS TESTING: CPT

## 2021-05-24 NOTE — TELEPHONE ENCOUNTER
General Question     Subject: QUESTIONS REGARDING APPOINTMENT  Patient and /or Facility Request: Brooke Conley  Contact Number: [phone]     CALLED TO FOLLOW UP ON PHONE ON Friday, MAY 21, 2021    PATIENT IS TO FOLLOW UP WITH DR. COATES.  STATED HE NEEDS A DOCTOR IN THE [facility] LOCATION.     PLEASE CALL BACK PATIENT AT THE ABOVE NUMBER

## 2021-05-24 NOTE — TELEPHONE ENCOUNTER
Left them a message. There is no one in [facility] or [facility] capable of handling her issues.   She needs to see Carmina Mckeon

## 2021-05-25 ENCOUNTER — HOSPITAL ENCOUNTER (OUTPATIENT)
Dept: PHYSICAL THERAPY | Age: 66
Setting detail: THERAPIES SERIES
Discharge: HOME | End: 2021-05-25
Payer: MEDICARE

## 2021-05-25 ENCOUNTER — TELEPHONE (OUTPATIENT)
Dept: PULMONOLOGY | Age: 66
End: 2021-05-25

## 2021-05-25 PROCEDURE — 97112 NEUROMUSCULAR REEDUCATION: CPT | Performed by: PHYSICAL THERAPY ASSISTANT

## 2021-05-25 PROCEDURE — 97110 THERAPEUTIC EXERCISES: CPT | Performed by: PHYSICAL THERAPY ASSISTANT

## 2021-05-25 PROCEDURE — 97140 MANUAL THERAPY 1/> REGIONS: CPT | Performed by: PHYSICAL THERAPY ASSISTANT

## 2021-05-25 NOTE — PROCEDURES
[address] [address]                               PULMONARY FUNCTION    PATIENT NAME: Yevgeniy Lugo                   :        1955  MED REC NO:   [id number]                          ROOM:  ACCOUNT NO:   [de-identified]                           ADMIT DATE: 2021  PROVIDER:     Rekha Coulter MD    DATE OF PROCEDURE:  2021    SIX MINUTE WALK TEST INTERPRETATION    The patient is a 53-year-old female who underwent a 6-minute walk test.   The patient's baseline oxygen saturation was 84% at room air at rest  with a heart rate of 91, respiratory rate of 20, dyspnea-modified Sea  scale of 3, fatigue-modified Sea scale of 3. The patient had to be put  on supplemental oxygen, which had to be increased to 2 liters per  minute. On the study, the patient's 6-minute walk parameters were 91%  of oxygen saturation at 2 liters by minute. Heart rate of 116 and  respiratory rate of 32, dyspnea-modified Sea scale of 7,  fatigue-modified Sea scale of 6. The patient walked 660 feet. The  patient's total expected 6-minute walk distance was 1381 feet. The  patient achieved 41% of the expected distance and also after the  exercise, the patient had significant increase in systolic blood  pressure along with that the patient had increased shortness of breath  and wheezing throughout. On the basis of this 6-minute walk test, the  patient has exertional hypoxemia on suboptimal exercise along with that,  the patient has elevation of heart rate, blood pressure and after the  exercise the patient also had extreme shortness of breath and wheezing. Please correlate clinically.         Berkley Dugan MD    D: 2021 9:07:09       T: 2021 9:12:02     [email or web address]  Job#: [id number]     Doc#: [id number]    CC:

## 2021-05-25 NOTE — TELEPHONE ENCOUNTER
Please see results of 6 min walk test. Dr. Babar Concepcion wanted you to know the pt. Should be on 2 L min. O2 per NC. Thanks.

## 2021-05-25 NOTE — FLOWSHEET NOTE
[place] [facility]'         scap retract / rows  Red 20x / red 20x    ER isometric 5\" x10    T - band ER /IR Active only x20 Unable to use band              Table slides flex 10x10\" Cues to depress UT   Table slides abd 10x10\"    Table ER stretch 10x10\"         Cane flexion seated  x10    Cane abd seated 15x    Cane ER seated x20         Bicep curls 3-way 1# 30x ea         Cross body punches (thoracic rotation) 20x ea    Shoulder flex to 90 deg 20x ea Cues to depress UT                                                     Manual Intervention (51433)     Seated PROM 10' Light, as tolerated due to h/o osteoporosis   Light UT rolling with  5'         STM right upper trap and scapular  5'              Medbridge access code: [email or web address]                    Patient Education         Therapeutic Exercise and NMR EXR  [x] (62028) Provided verbal/tactile cueing for activities related to strengthening, flexibility, endurance, ROM  for improvements in scapular, scapulothoracic and UE control with self care, reaching, carrying, lifting, house/yardwork, driving/computer work. [x] (12515) Provided verbal/tactile cueing for activities related to improving balance, coordination, kinesthetic sense, posture, motor skill, proprioception  to assist with  scapular, scapulothoracic and UE control with self care, reaching, carrying, lifting, house/yardwork, driving/computer work. Therapeutic Activities:    [x] (75395 or 95334) Provided verbal/tactile cueing for activities related to improving balance, coordination, kinesthetic sense, posture, motor skill, proprioception and motor activation to allow for proper function of scapular, scapulothoracic and UE control with self care, carrying, lifting, driving/computer work.      Home Exercise Program:    [x] (72853) Reviewed/Progressed HEP activities related to strengthening, flexibility, endurance, ROM of scapular, scapulothoracic and UE control with self care, reaching, carrying, lifting, house/yardwork, driving/computer work  [x] (49798) Reviewed/Progressed HEP activities related to improving balance, coordination, kinesthetic sense, posture, motor skill, proprioception of scapular, scapulothoracic and UE control with self care, reaching, carrying, lifting, house/yardwork, driving/computer work      Manual Treatments:  PROM / STM / Oscillations-Mobs:  G-I, II, III, IV (PA's, Inf., Post.)  [x] (30734) Provided manual therapy to mobilize soft tissue/joints of cervical/CT, scapular GHJ and UE for the purpose of modulating pain, promoting relaxation,  increasing ROM, reducing/eliminating soft tissue swelling/inflammation/restriction, improving soft tissue extensibility and allowing for proper ROM for normal function with self care, reaching, carrying, lifting, house/yardwork, driving/computer work    Modalities:      Charges:  Timed Code Treatment Minutes: 45'   Total Treatment Minutes:  38'   BWC:  TE TIME:  NMR TIME:  MANUAL TIME:  UNTIMED MINUTES:  Medicare Total:   n/a  n/a  n/a  n/a  $680 (visit 6)      [] EVAL (LOW) 42362 (typically 20 minutes face-to-face)  [] EVAL (MOD) 01353 (typically 30 minutes face-to-face)  [] EVAL (HIGH) 423 8935 (typically 45 minutes face-to-face)  [] RE-EVAL     [x] QX(98011) x 1    [] IONTO  [x] NMR (48471) x 1    [] VASO  [x] Manual ([id number]) x1     [] Other:  [] TA x      [] Mech Traction (56753)  [] ES(attended) (75737)     [] ES (un) (06704):    ASSESSMENT: Patient reported some increase in pain post PROM, but decreased pain post-soft tissue mobilization. Patient hasn't made much progress at this time, but just started working on her exercises consistently. GOALS:   Short Term Goals: To be achieved in: 2 weeks  1. Independent in HEP and progression per patient tolerance, in order to prevent re-injury. []? Progressing: [x]? Met: []? Not Met: []? Adjusted       2.  Patient will have a decrease in pain to facilitate improvement in movement, function, and ADLs as indicated by Functional Deficits. []? Progressing: [x]? Met: []? Not Met: []? Adjusted          Long Term Goals: To be achieved in: 8 weeks  1. Disability index score of 20% or less for the [employer]/Osw[employer] to assist with reaching prior level of function. []? Progressing: []? Met: [x]? Not Met: []? Adjusted      2. Patient will demonstrate increased AROM to equal the opposite side bilaterally to allow for proper joint functioning as indicated by patients Functional Deficits. []? Progressing: []? Met: [x]? Not Met: []? Adjusted       3. Patient will demonstrate an increase in strength to 5/5 to allow for proper functional mobility as indicated by patients Functional Deficits. []? Progressing: []? Met: [x]? Not Met: []? Adjusted       4. Patient will return to all transfers, work activities, and functional activities without increased symptoms or restriction. []? Progressing: []? Met: [x]? Not Met: []? Adjusted       5. Patient will have 0/10 pain with ADL's.  []? Progressing: []? Met: [x]? Not Met: []? Adjusted       6. Patient stated goal: Patient wants to be able to dress independently, reach overhead, and put weight through walker without pain or restriction. []? Progressing: []? Met: [x]? Not Met: []? Adjusted                    Overall Progression Towards Functional goals/ Treatment Progress Update:  [] Patient is progressing as expected towards functional goals listed. [] Progression is slowed due to complexities/Impairments listed. [] Progression has been slowed due to co-morbidities.   [x] Plan just implemented, too soon to assess goals progression <30days   [] Goals require adjustment due to lack of progress  [] Patient is not progressing as expected and requires additional follow up with physician  [] Other    Prognosis for POC: [x] Good [] Fair  [] Poor    Patient requires continued skilled intervention: [x] Yes  [] No    Treatment/Activity Tolerance:  [x] Patient able to complete treatment  [] Patient limited by fatigue  [] Patient limited by pain    [] Patient limited by other medical complications  [] Other:     Return to Play: (if applicable)   []  Stage 1: Intro to Strength   []  Stage 2: Return to Run and Strength   []  Stage 3: Return to Jump and Strength   []  Stage 4: Dynamic Strength and Agility   []  Stage 5: Sport Specific Training     []  Ready to Return to Play, Meets All Above Stages   []  Not Ready for Return to Sports   Comments:                         PLAN:   [] Continue per plan of care [] Alter current plan (see comments above)  [] Plan of care initiated [] Hold pending MD visit [x] Discharge and refer back to MD     Electronically signed by:  Sourav Sanders PTA, Dawit Camp PT,MPT,ATC     Note: If patient does not return for scheduled/ recommended follow up visits, this note will serve as a discharge from care along with most recent update on progress.

## 2021-06-03 RX ORDER — BUPROPION HYDROCHLORIDE 100 MG/1
100 TABLET, EXTENDED RELEASE ORAL DAILY
Qty: 90 TABLET | Refills: 0 | Status: SHIPPED | OUTPATIENT
Start: 2021-06-03 | End: 2021-10-14

## 2021-06-09 ENCOUNTER — OFFICE VISIT (OUTPATIENT)
Dept: ORTHOPEDIC SURGERY | Age: 66
End: 2021-06-09
Payer: MEDICARE

## 2021-06-09 VITALS — HEIGHT: 65 IN | BODY MASS INDEX: 35.99 KG/M2 | WEIGHT: 216 LBS

## 2021-06-09 DIAGNOSIS — M12.811 ROTATOR CUFF ARTHROPATHY OF RIGHT SHOULDER: ICD-10-CM

## 2021-06-09 DIAGNOSIS — S42.111A CLOSED DISPLACED FRACTURE OF BODY OF RIGHT SCAPULA, INITIAL ENCOUNTER: ICD-10-CM

## 2021-06-09 DIAGNOSIS — M25.511 RIGHT SHOULDER PAIN, UNSPECIFIED CHRONICITY: Primary | ICD-10-CM

## 2021-06-09 PROCEDURE — 99214 OFFICE O/P EST MOD 30 MIN: CPT | Performed by: ORTHOPAEDIC SURGERY

## 2021-06-09 PROCEDURE — 3017F COLORECTAL CA SCREEN DOC REV: CPT | Performed by: ORTHOPAEDIC SURGERY

## 2021-06-09 PROCEDURE — G8427 DOCREV CUR MEDS BY ELIG CLIN: HCPCS | Performed by: ORTHOPAEDIC SURGERY

## 2021-06-09 PROCEDURE — 1036F TOBACCO NON-USER: CPT | Performed by: ORTHOPAEDIC SURGERY

## 2021-06-09 PROCEDURE — G8417 CALC BMI ABV UP PARAM F/U: HCPCS | Performed by: ORTHOPAEDIC SURGERY

## 2021-06-09 RX ORDER — METOPROLOL TARTRATE 50 MG/1
50 TABLET, FILM COATED ORAL 2 TIMES DAILY
Qty: 60 TABLET | Refills: 0 | Status: SHIPPED | OUTPATIENT
Start: 2021-06-09 | End: 2021-07-07

## 2021-06-09 RX ORDER — BUDESONIDE AND FORMOTEROL FUMARATE DIHYDRATE 160; 4.5 UG/1; UG/1
AEROSOL RESPIRATORY (INHALATION)
Qty: 10.2 G | Refills: 0 | Status: SHIPPED | OUTPATIENT
Start: 2021-06-09 | End: 2021-07-07

## 2021-06-09 ASSESSMENT — ENCOUNTER SYMPTOMS
BACK PAIN: 1
SHORTNESS OF BREATH: 1

## 2021-06-09 NOTE — PROGRESS NOTES
Review of Systems   Respiratory: Positive for shortness of breath. Difficulty breathing - COPD     Cardiovascular: Positive for leg swelling. High blood pressure     Musculoskeletal: Positive for back pain. Fracture - scapula  Joint pain - shoulder   Neurological:        Chronic pain   All other systems reviewed and are negative.

## 2021-06-09 NOTE — PROGRESS NOTES
[address] [facility]  Office Visit  Shoulder Pain  Date:  2021    Name:  Marco Bello  Address:  [phone] [id number]    :  1955      Age:   61 y.o.    SSN:  [id number]      Medical Record Number:  <[id number]>    Chief Complaint:    Right shoulder pain    HPI:   Marco Bello is a 61 y.o. female who is right-hand-dominant here today for new patient evaluation for right shoulder. She has seen our partners previously and sent to us for further evaluation. The patient reports that about 3 months ago she was trying to get up from seated to standing when she felt pain in her right shoulder blade. X-ray and CT demonstrated a displaced scapular body fracture. It did not appear to go into the glenoid. The patient has been treated nonoperatively to allow it to heal.  The patient also has concurrent right shoulder rotator cuff arthropathy for which the treatment would be reverse total shoulder arthroplasty. The patient has a history of smoking but she quit in December. She also has a history of diabetes type 2 but she does not know what her A1c is.     Pain Assessment  Location of Pain: Shoulder  Location Modifiers: Right  Severity of Pain: 7  Quality of Pain: Sharp  Duration of Pain: Persistent  Frequency of Pain: Intermittent  Aggravating Factors:  (movement)  Limiting Behavior: Yes  Relieving Factors: Rest  Work-Related Injury: No  Are there other pain locations you wish to document?: No    Past History:  Past Medical History:   Diagnosis Date    Abnormal CT scan, chest     COPD (chronic obstructive pulmonary disease) (HCC)     Decreased diffusion capacity of lung     Depression     Dyspnea     Hypertension     Lumbosacral spondylosis without myelopathy 2016    Osteopenia 2020    Pneumonia     Restrictive lung disease     Tobacco abuse     Well controlled type 2 diabetes mellitus ([facility] [address] 10/26/2018       Past Surgical History:   Procedure Laterality Date    BACK SURGERY      CARPAL TUNNEL RELEASE Left 14    HAND SURGERY Right 14    SKIN GRAFT Right        Social History     Tobacco Use    Smoking status: Former Smoker     Years: 30.00     Types: Cigarettes     Quit date: 2020     Years since quittin.4    Smokeless tobacco: Former User     Quit date: 2016   Substance Use Topics    Alcohol use: No    Drug use: No        Family History:  family history includes Cancer in her sister.          Current Outpatient Medications:     SYMBICORT 160-4.5 MCG/ACT AERO, Inhale 2 puffs into the lungs 2 times daily, Disp: 10.2 g, Rfl: 0    metoprolol tartrate (LOPRESSOR) 50 MG tablet, Take 1 tablet by mouth 2 times daily, Disp: 60 tablet, Rfl: 0    buPROPion (WELLBUTRIN SR) 100 MG extended release tablet, Take 1 tablet by mouth daily, Disp: 90 tablet, Rfl: 0    lisinopril (PRINIVIL;ZESTRIL) 40 MG tablet, Take 1 tablet by mouth daily, Disp: 30 tablet, Rfl: 2    tiZANidine (ZANAFLEX) 2 MG tablet, TAKE ONE TABLET BY MOUTH EVERY DAY, Disp: 30 tablet, Rfl: 0    pregabalin (LYRICA) 100 MG capsule, TAKE ONE CAPSULE BY MOUTH 2 TIMES A DAY, Disp: 180 capsule, Rfl: 0    torsemide (DEMADEX) 20 MG tablet, Take 1 tablet by mouth daily, Disp: 90 tablet, Rfl: 0    meloxicam (MOBIC) 7.5 MG tablet, Take 1 tablet by mouth daily, Disp: 90 tablet, Rfl: 0    INCRUSE ELLIPTA 62.5 MCG/INH AEPB, INHALE 1 PUFF INTO THE LUNGS DAILY, Disp: 1 each, Rfl: 5    metFORMIN (GLUCOPHAGE) 500 MG tablet, TAKE ONE TABLET BY MOUTH EVERY DAY, Disp: 90 tablet, Rfl: 0    DULoxetine (CYMBALTA) 30 MG extended release capsule, Take 1 capsule by mouth daily, Disp: 90 capsule, Rfl: 0    calcium-vitamin D (OSCAL) 250-125 MG-UNIT per tablet, Take 1 tablet by mouth 2 times daily, Disp: 60 tablet, Rfl: 2    ipratropium-albuterol (DUONEB) 0.5-2.5 (3) MG/3ML SOLN nebulizer solution, Inhale 3 mLs into the lungs every 4 hours, Disp: 360 mL, Rfl: 0   strength, 5 out of 5 subscapularis strength    Stability: No anterior instability. No posterior instability. Other findings: The skin is warm dry and well perfused. 2+ radial pulse. Sensation is intact to light touch over the deltoid. Left comparison shoulder exam    Inspection:  Held in a normal posture. Normal contour at the acromioclavicular joint. No swelling, ecchymosis, or erythema about the shoulder. No atrophy appreciated. No scapular winging. Palpation:  No subacromial crepitus. No tenderness of the AC joint. No greater tuberosity tenderness. No tenderness in the bicipital groove. Range of Motion: Active ROM forward flexion 135 degrees, external rotation 40 degrees, internal rotation back pocket. Normal scapulothoracic rhythm. Strength:  Normal supraspinatus, infraspinatus, and subscapularis muscle strength. Stability: No anterior instability. No posterior instability. Special Tests: Impingement findings are negative. Labral findings are negative. Speed sign and Yergason signs are both negative. Crossover sign is negative. Belly press sign is negative. Lift off sign is negative. Other findings: The skin is warm dry and well perfused. 2+ radial pulse. Sensation is intact to light touch over the deltoid. Radiographic:  No new x-rays taken today. Prior x-ray and CT scan from March demonstrate displaced scapular body fracture with rotator cuff arthropathy    Assessment:  Fidel Arciniega is a 61 y.o. female with:  1. Right shoulder rotator cuff arthropathy in the setting of subacute scapular body fracture without extension into the joint, about 3 months out    Impression:  Encounter Diagnoses   Name Primary?     Right shoulder pain, unspecified chronicity Yes    Rotator cuff arthropathy of right shoulder     Closed displaced fracture of body of right scapula, initial encounter        Office Procedures:  Orders Placed This Encounter   Procedures    CT SHOULDER RIGHT WO CONTRAST     Standing Status:   Future     Standing Expiration Date:   6/9/2022     Order Specific Question:   Reason for exam:     Answer:   CT [facility] [place]       Plan: We had a candid discussion with Ann Marie Correa regarding the next treatment steps. At this point we believe the patient would be a good candidate for a right reverse total shoulder arthroplasty with several caveats. Fortunately the patient has quit smoking which is very helpful. She needs to get her blood sugars evaluated by her primary care physician and make sure her A1c is below 8 and well controlled. We also need to get a new CT scan to evaluate her scapular body fracture healing. If that is healed then she would be a good candidate for a reverse total shoulder arthroplasty once her blood sugars are under control. Follow-up with us in 1 to 2 weeks to go over the CT scan results. Total time spent for evaluation, education, and development of treatment plan: 35 minutes. Nicanor Galvin DO  [facility] Clinical Fellow    The encounter with Ann Marie Correa was supervised by Dr. Pauline Isaacs who personally examined the patient and reviewed the plan. This dictation was performed with a verbal recognition program (DRAGON) and it was checked for errors. It is possible that there are still dictated errors within this office note. If so, please bring any errors to my attention for an addendum. All efforts were made to ensure that this office note is accurate.   ______________  I was physically present and personally supervised the Orthopaedic Sports Medicine Fellow in the evaluation and development of a treatment plan for this patient. I personally interviewed the patient and performed a physical examination. In addition, I discussed the patient's condition and treatment options with them. I have also reviewed and agree with the past medical, family and social history unless otherwise noted. All of the patient's questions were answered. Serge Mckinney MD, PhD  6/9/2021

## 2021-06-15 ENCOUNTER — TELEPHONE (OUTPATIENT)
Dept: INTERNAL MEDICINE CLINIC | Age: 66
End: 2021-06-15

## 2021-06-15 NOTE — TELEPHONE ENCOUNTER
----- Message from Blue Alarcon MD sent at 6/15/2021  2:36 PM EDT -----  Contact: Bernice Lima [phone]  Yes and we sent request for oxygen  ----- Message -----  From: Andreia Llamas  Sent: 6/15/2021   2:32 PM EDT  To: Blue Alarcon MD    Please review. ----- Message -----  From: Rojelio Nicholas MA  Sent: 6/15/2021   2:10 PM EDT  To: Gloria Officer    Patient called to see if her walk test results are in. Patient said her test was done on 5/24/21 at [facility].  Thank you

## 2021-06-23 ENCOUNTER — OFFICE VISIT (OUTPATIENT)
Dept: ORTHOPEDIC SURGERY | Age: 66
End: 2021-06-23
Payer: MEDICARE

## 2021-06-23 VITALS — BODY MASS INDEX: 35.99 KG/M2 | WEIGHT: 216 LBS | HEIGHT: 65 IN

## 2021-06-23 DIAGNOSIS — S42.111A CLOSED DISPLACED FRACTURE OF BODY OF RIGHT SCAPULA, INITIAL ENCOUNTER: ICD-10-CM

## 2021-06-23 DIAGNOSIS — M12.811 ROTATOR CUFF ARTHROPATHY OF RIGHT SHOULDER: Primary | ICD-10-CM

## 2021-06-23 PROCEDURE — 3017F COLORECTAL CA SCREEN DOC REV: CPT | Performed by: ORTHOPAEDIC SURGERY

## 2021-06-23 PROCEDURE — 99214 OFFICE O/P EST MOD 30 MIN: CPT | Performed by: ORTHOPAEDIC SURGERY

## 2021-06-23 PROCEDURE — G8417 CALC BMI ABV UP PARAM F/U: HCPCS | Performed by: ORTHOPAEDIC SURGERY

## 2021-06-23 PROCEDURE — 1036F TOBACCO NON-USER: CPT | Performed by: ORTHOPAEDIC SURGERY

## 2021-06-23 PROCEDURE — G8427 DOCREV CUR MEDS BY ELIG CLIN: HCPCS | Performed by: ORTHOPAEDIC SURGERY

## 2021-06-23 NOTE — PROGRESS NOTES
Chief Complaint    Follow-up (right shoulder, CT results)      History of Present Illness:  Gloria Duong is a pleasant, type 2 diabetic, 61 y.o., female, here today for follow up of her right shoulder and CT results. This patient sustained a subacute scapular body fracture in the presence of rotator cuff arthropathy. We previously discussed she is a candidate for a reverse total shoulder replacement. She was able to obtain a CT for pre-operative planning. She tolerated the study well and presents today to review those results. Today she complains of pain with her everyday activities as well as loss of motion. Of importance she does require a walker for ambulation. She reports no new injuries or setbacks. Pain Assessment  Location of Pain: Shoulder  Location Modifiers: Right  Severity of Pain: 6  Quality of Pain: Aching  Duration of Pain: Persistent  Frequency of Pain: Intermittent  Aggravating Factors: Other (Comment) (movement)  Relieving Factors: Rest  Result of Injury: No  Work-Related Injury: No  Are there other pain locations you wish to document?: No      Medical History:  Patient's medications, allergies, past medical, surgical, social and family histories were reviewed and updated as appropriate. Review of Systems    Done: 6/9/21  Patient has had no medical changes since last evaluated        Review of Systems  A 14 point review of systems was completed by the patient on 6/9/21 and is available in the media section of the scanned medical record and was reviewed on 6/23/2021. The review is negative with the exception of those things mentioned in the HPI and Past Medical History    Vital Signs:  Vitals:       General/Appearance: Alert and oriented and in no apparent distress. Skin:  There are no skin lesions, cellulitis, or extreme edema. The patient has warm and well-perfused Bilateral upper extremities with brisk capillary refill.       Right Shoulder Exam:  Inspection: No gross deformities, no signs of infection. Palpation: Deferred    Active Range of Motion: Forward Elevation 120, External Rotation 35, Internal Rotation back pocket    Passive Range of Motion: Deferred    Strength: Deferred    Special Tests: No Aaron muscle deformity. Neurovascular: Sensation to light touch is intact, no motor deficits, palpable radial pulses 2+      Radiology:     No new XR obtained at this time. CT Right Shoulder: 3/23/21  CONCLUSION:   1. Chronic fracture acromion process and scapular body.  No acute osseous injury identified. 2. Moderate glenohumeral joint arthropathy with spurring of the inferomedial humeral head and    joint space loss. 3. Osseous bodies within the joint space may in part represent displaced spurs. Assessment :  Caleb Rose is a 61 y.o. female with: Right shoulder rotator cuff arthropathy in the setting of subacute scapular body fracture without extension into the joint. She is a candidate for concurrent ORIF scapular spine fracture and reverse shoulder arthroplasty. However, she has to stop smoking cigarettes absolutely in order to give the scapular spine fracture a chance to heal.      Impression:  Encounter Diagnoses   Name Primary?  Rotator cuff arthropathy of right shoulder Yes    Closed displaced fracture of body of right scapula, initial encounter        Office Procedures:  No orders of the defined types were placed in this encounter. Treatment Plan: We were able to review pertinent imaging today with the patient. We discussed diagnosis and treatment options in detail. She will require a reverse total shoulder replacement. Additionally she will require an ORIF of the spine of the scapula. We will schedule both of these operations at the same time. We discussed in detail risks, benefits and expectations postoperatively. We also discussed a recovery timeline following each operation.      We will require her to undergo nicotene blood testing prior to her

## 2021-06-28 ENCOUNTER — TELEPHONE (OUTPATIENT)
Dept: ORTHOPEDIC SURGERY | Age: 66
End: 2021-06-28

## 2021-06-28 ENCOUNTER — TELEPHONE (OUTPATIENT)
Dept: INTERNAL MEDICINE CLINIC | Age: 66
End: 2021-06-28

## 2021-06-28 NOTE — TELEPHONE ENCOUNTER
----- Message from Esther Michael MD sent at 6/28/2021  2:44 PM EDT -----  Contact: Yan Gifford [phone]  Make appt this week  ----- Message -----  From: Charles Dempsey MA  Sent: 6/28/2021   1:57 PM EDT  To: Esther Michael MD    Patient called stating she will be having surgery on 7/30/21 for a  shoulder repair and broken scapula. Patient needs a h&p prior to surgery. Please advise.  Thank you

## 2021-06-28 NOTE — TELEPHONE ENCOUNTER
Surgery and/or Procedure Scheduling     Contact Name: Leslie Martinez Request: YES  Patient Contact Number: [phone]

## 2021-06-29 ENCOUNTER — OFFICE VISIT (OUTPATIENT)
Dept: INTERNAL MEDICINE CLINIC | Age: 66
End: 2021-06-29

## 2021-06-29 ENCOUNTER — TELEPHONE (OUTPATIENT)
Dept: ORTHOPEDIC SURGERY | Age: 66
End: 2021-06-29

## 2021-06-29 VITALS
HEIGHT: 65 IN | SYSTOLIC BLOOD PRESSURE: 145 MMHG | HEART RATE: 70 BPM | DIASTOLIC BLOOD PRESSURE: 75 MMHG | RESPIRATION RATE: 18 BRPM | BODY MASS INDEX: 35.94 KG/M2

## 2021-06-29 DIAGNOSIS — M47.817 LUMBOSACRAL SPONDYLOSIS WITHOUT MYELOPATHY: ICD-10-CM

## 2021-06-29 DIAGNOSIS — Z01.818 PRE-OP EXAM: Primary | ICD-10-CM

## 2021-06-29 DIAGNOSIS — E11.9 WELL CONTROLLED TYPE 2 DIABETES MELLITUS (HCC): ICD-10-CM

## 2021-06-29 DIAGNOSIS — J43.1 PANLOBULAR EMPHYSEMA (HCC): ICD-10-CM

## 2021-06-29 DIAGNOSIS — F32.4 MAJOR DEPRESSIVE DISORDER WITH SINGLE EPISODE, IN PARTIAL REMISSION (HCC): ICD-10-CM

## 2021-06-29 DIAGNOSIS — I10 ESSENTIAL HYPERTENSION: ICD-10-CM

## 2021-06-29 DIAGNOSIS — E66.01 MORBIDLY OBESE (HCC): ICD-10-CM

## 2021-06-29 PROCEDURE — G8417 CALC BMI ABV UP PARAM F/U: HCPCS | Performed by: INTERNAL MEDICINE

## 2021-06-29 PROCEDURE — 93000 ELECTROCARDIOGRAM COMPLETE: CPT | Performed by: INTERNAL MEDICINE

## 2021-06-29 PROCEDURE — 99213 OFFICE O/P EST LOW 20 MIN: CPT | Performed by: INTERNAL MEDICINE

## 2021-06-29 PROCEDURE — 1036F TOBACCO NON-USER: CPT | Performed by: INTERNAL MEDICINE

## 2021-06-29 PROCEDURE — 3023F SPIROM DOC REV: CPT | Performed by: INTERNAL MEDICINE

## 2021-06-29 PROCEDURE — 2022F DILAT RTA XM EVC RTNOPTHY: CPT | Performed by: INTERNAL MEDICINE

## 2021-06-29 PROCEDURE — G8926 SPIRO NO PERF OR DOC: HCPCS | Performed by: INTERNAL MEDICINE

## 2021-06-29 PROCEDURE — 3044F HG A1C LEVEL LT 7.0%: CPT | Performed by: INTERNAL MEDICINE

## 2021-06-29 PROCEDURE — G8427 DOCREV CUR MEDS BY ELIG CLIN: HCPCS | Performed by: INTERNAL MEDICINE

## 2021-06-29 PROCEDURE — 3017F COLORECTAL CA SCREEN DOC REV: CPT | Performed by: INTERNAL MEDICINE

## 2021-06-29 NOTE — PROGRESS NOTES
Subjective:      Laverne Medellin is a 61 y.o. female who presents to the office today for a preoperative consultation at the request of surgeon  Dr. Ashleigh Encinas  who plans on performing right total shoulder arthoplasty    . Planned [facility] is [facility].             61 y.o. female with h.o   chronic back pain , copd, DM- 2,. HTN         COPD Since last time, reports her sob has been better , using inhalers as needed and off smoking for 6 months now   Had walk test done at [facility] and required oxygen. Still did not get any O2     DM- 2- Taking metformin but not compliant with diet  Since last time, her  is helping her out with house stuff   Mood improved and now eating more, gained wt  Last A1c at 6.6  activity remains low    More arthritis and back pain with weight gain      Reports ongoing aches and pains  Chronic back pain - s.p  surgeries x 4  S.p recent VJ by  Dr Rivas Ca-   Not back on narcotics yet but considering pain pump soon  Remains on lyrica which is helping some       Using cane/walker  for ambulation .  No recent falls  Reports chronic fatigue      Depression better , compliant with meds - cymbalta          Past Medical History:   Diagnosis Date    Abnormal CT scan, chest     COPD (chronic obstructive pulmonary disease) (HCC)     Decreased diffusion capacity of lung     Depression     Dyspnea     Hypertension     Lumbosacral spondylosis without myelopathy 5/9/2016    Osteopenia 12/31/2020    Pneumonia     Restrictive lung disease     Tobacco abuse     Well controlled type 2 diabetes mellitus ([address] 10/26/2018     Patient Active Problem List    Diagnosis Date Noted    Pulmonary nodules 12/31/2013    Tobacco abuse 12/31/2013    Major depressive disorder with single episode, in partial remission ([address] 04/19/2021    Primary osteoarthritis of right shoulder 04/05/2021    Displaced fracture of acromial process, right shoulder, initial encounter for closed fracture 03/23/2021  Acute pain of right shoulder 2021    Rotator cuff arthropathy of right shoulder 2021    Osteopenia 2020    Morbidly obese ([facility] 2020    Type 2 diabetes mellitus without complication, without long-term current use of insulin ([facility] 10/26/2018    Lumbosacral spondylosis without myelopathy 2016    Displacement of lumbar intervertebral disc without myelopathy 2016    Degeneration of lumbar or lumbosacral intervertebral disc 2016    Spinal stenosis, lumbar region, without neurogenic claudication 2016    COPD with acute exacerbation ([facility] 2014    Aortic regurgitation 2014    HTN (hypertension) 2014    Abnormal chest CT 2014    Decreased diffusion capacity 2014    Restrictive lung disease 2014     Past Surgical History:   Procedure Laterality Date    BACK SURGERY      CARPAL TUNNEL RELEASE Left 14    HAND SURGERY Right 14    SKIN GRAFT Right      Family History   Problem Relation Age of Onset    Cancer Sister     Asthma Neg Hx     Emphysema Neg Hx     Diabetes Neg Hx     Hypertension Neg Hx     Heart Failure Neg Hx      Social History     Socioeconomic History    Marital status:      Spouse name: None    Number of children: None    Years of education: None    Highest education level: None   Occupational History    None   Tobacco Use    Smoking status: Former Smoker     Years: 30.00     Types: Cigarettes     Quit date: 2020     Years since quittin.5    Smokeless tobacco: Former User     Quit date: 2016   Substance and Sexual Activity    Alcohol use: No    Drug use: No    Sexual activity: Yes     Partners: Male   Other Topics Concern    None   Social History Narrative    None     Social Determinants of Health     Financial Resource Strain:     Difficulty of Paying Living Expenses:    Food Insecurity:     Worried About Running Out of Food in the Last Year:     Ran Out of Food in the Last Year:    Transportation Needs:     Lack of Transportation (Medical):      Lack of Transportation (Non-Medical):    Physical Activity:     Days of Exercise per Week:     Minutes of Exercise per Session:    Stress:     Feeling of Stress :    Social Connections:     Frequency of Communication with Friends and Family:     Frequency of Social Gatherings with Friends and Family:     Attends Caoda[employer] Services:     Active Member of Clubs or Organizations:     Attends Club or Organization Meetings:     Marital Status:    Intimate Partner Violence:     Fear of Current or Ex-Partner:     Emotionally Abused:     Physically Abused:     Sexually Abused:      Current Outpatient Medications   Medication Sig Dispense Refill    tiZANidine (ZANAFLEX) 2 MG tablet TAKE ONE TABLET BY MOUTH EVERY DAY 30 tablet 2    SYMBICORT 160-4.5 MCG/ACT AERO Inhale 2 puffs into the lungs 2 times daily 10.2 g 0    metoprolol tartrate (LOPRESSOR) 50 MG tablet Take 1 tablet by mouth 2 times daily 60 tablet 0    buPROPion (WELLBUTRIN SR) 100 MG extended release tablet Take 1 tablet by mouth daily 90 tablet 0    lisinopril (PRINIVIL;ZESTRIL) 40 MG tablet Take 1 tablet by mouth daily 30 tablet 2    pregabalin (LYRICA) 100 MG capsule TAKE ONE CAPSULE BY MOUTH 2 TIMES A  capsule 0    torsemide (DEMADEX) 20 MG tablet Take 1 tablet by mouth daily 90 tablet 0    meloxicam (MOBIC) 7.5 MG tablet Take 1 tablet by mouth daily 90 tablet 0    INCRUSE ELLIPTA 62.5 MCG/INH AEPB INHALE 1 PUFF INTO THE LUNGS DAILY 1 each 5    metFORMIN (GLUCOPHAGE) 500 MG tablet TAKE ONE TABLET BY MOUTH EVERY DAY 90 tablet 0    DULoxetine (CYMBALTA) 30 MG extended release capsule Take 1 capsule by mouth daily 90 capsule 0    calcium-vitamin D (OSCAL) 250-125 MG-UNIT per tablet Take 1 tablet by mouth 2 times daily 60 tablet 2    denosumab (PROLIA) 60 MG/ML SOSY SC injection Inject 1 mL into the skin once for 1 dose 1 mL 1    normal sinus rhythm. ECHO 4/21     Summary   Technically difficult examination. Left ventricular systolic function is normal with ejection fraction   estimated at 55-60 %. There is moderate concentric left ventricular hypertrophy. Grade I diastolic dysfunction with normal filling pressure. Mild mitral regurgitation. Mild-to-moderate aortic regurgitation is present. Mild tricuspid regurgitation. Systolic pulmonary artery pressure (SPAP) estimated at 40   mmHg (RA pressure 3 mmHg), consistent with mild pulmonary hypertension. Trivial pulmonic regurgitation present. Walk test   43-year-old female who underwent a 6-minute walk test.   The patient's baseline oxygen saturation was 84% at room air at rest  with a heart rate of 91, respiratory rate of 20, dyspnea-modified Sea  scale of 3, fatigue-modified Sea scale of 3. The patient had to be put  on supplemental oxygen, which had to be increased to 2 liters per  minute. On the study, the patient's 6-minute walk parameters were 91%  of oxygen saturation at 2 liters by minute. PFT     1.  Spirometry:  The FEV1 is 1.48 liters or 58% of predicted.  The FVC  is 2.10 liters or 63% of predicted.  The FEV1/FVC ratio is 71%.  There  is no demonstrated response to inhaled bronchodilators. 2.  Plethysmography:  The total lung capacity is 4.00 liters or 75% of  predicted. 3.  Diffusion Capacity:  The diffusion capacity of carbon monoxide is  8.04 mL/min/mmHg which is 34% of predicted. 4.  Flow Volume Loops:  The tracings show a normal pattern.     IMPRESSION:  1.  There is a mild obstructive lung defect without demonstrated  response to inhaled bronchodilators. 2. Marinell Curd is a mild restrictive ventilatory defect. 3. Marinell Curd is a severe reduction in diffusion capacity.   4.  Overall, the pulmonary function tests showing a combined restrictive  and obstructive defect pattern with impaired diffusion capacity, could  be seen in COPD with a concomitant restrictive defect or interstitial  lung disease.  Clinical correlation is recommended.     Ct shoulder     Acute and comminuted fracture of the scapula involving the scapular body   and scapular spine.  Surrounding soft tissue and intramuscular edema. 2. Complete loss of the acromial humeral interval consistent with rotator   cuff insufficiency.  Severe associated atrophy of the rotator cuff   musculature. 3. Moderate glenohumeral effusion with numerous large articular bodies   throughout the joint. 4. Moderate to severe glenohumeral and moderate acromioclavicular   osteoarthritis. Lab Results   Component Value Date    LABA1C 6.6 04/19/2021     Lab Results   Component Value Date    .7 04/19/2021         Wt Readings from Last 3 Encounters:   06/23/21 216 lb (98 kg)   06/09/21 216 lb (98 kg)   05/05/21 216 lb (98 kg)          Assessment:      Diagnosis Orders   1. Pre-op exam  EKG 12 lead   2. Lumbosacral spondylosis without myelopathy     3. Major depressive disorder with single episode, in partial remission ([address]     4. Well controlled type 2 diabetes mellitus ([address]     5. Essential hypertension     6. Morbidly obese ([address]     7. Panlobular emphysema ([address]            Plan:     Patient medically stable  for above planned procedure. COPD    - recent admission noted and improved some    f/w Dr> Major . Off advair and now on Incruse Ellipta. Albuterol prn  Remains off smoking for 6 months  - new oxygen requirements as below.  Pt to get o2 today     - might need to monitor post op overnight for hx of severe copd        DM- 2, A1c  At 6.4- on metformin 500mg daily,  Already on ACEI  Need eye exam    Anxiety and severe  depression - has used prozac and klonopin in the past   Weaned off klonopin 2015    - now on cymbalta 30 mg daily -better controlled  - no suicidal thoughts  - doing better since last year since her    returned from [facility]    HTN - remains stable today   Recently off cardizem

## 2021-06-30 LAB
ESTIMATED AVERAGE GLUCOSE: 139.9 MG/DL
HBA1C MFR BLD: 6.5 %

## 2021-07-07 ENCOUNTER — TELEPHONE (OUTPATIENT)
Dept: ORTHOPEDIC SURGERY | Age: 66
End: 2021-07-07

## 2021-07-07 DIAGNOSIS — M51.37 DEGENERATION OF LUMBAR OR LUMBOSACRAL INTERVERTEBRAL DISC: Chronic | ICD-10-CM

## 2021-07-07 RX ORDER — DILTIAZEM HYDROCHLORIDE 120 MG/1
CAPSULE, COATED, EXTENDED RELEASE ORAL
Qty: 90 CAPSULE | Refills: 0 | Status: SHIPPED | OUTPATIENT
Start: 2021-07-07 | End: 2021-09-29

## 2021-07-07 RX ORDER — DULOXETIN HYDROCHLORIDE 30 MG/1
30 CAPSULE, DELAYED RELEASE ORAL DAILY
Qty: 90 CAPSULE | Refills: 0 | Status: SHIPPED | OUTPATIENT
Start: 2021-07-07 | End: 2021-09-29

## 2021-07-07 NOTE — TELEPHONE ENCOUNTER
Auth: [employer]  Date: 7/20/2021  Reference # None  Spoke with: None  Type of SX: Outpatient  Location: [facility]  CPT [id number]   SX area: Rt shoulder  Insurance: Medicare A&B

## 2021-07-12 RX ORDER — MELOXICAM 7.5 MG/1
7.5 TABLET ORAL DAILY
Qty: 90 TABLET | Refills: 0 | Status: SHIPPED | OUTPATIENT
Start: 2021-07-12 | End: 2021-10-06

## 2021-07-13 ENCOUNTER — TELEPHONE (OUTPATIENT)
Dept: PULMONOLOGY | Age: 66
End: 2021-07-13

## 2021-07-13 ENCOUNTER — VIRTUAL VISIT (OUTPATIENT)
Dept: PULMONOLOGY | Age: 66
End: 2021-07-13
Payer: MEDICARE

## 2021-07-13 DIAGNOSIS — J44.9 COPD, MILD (HCC): Primary | ICD-10-CM

## 2021-07-13 PROCEDURE — 99442 PR PHYS/QHP TELEPHONE EVALUATION 11-20 MIN: CPT | Performed by: INTERNAL MEDICINE

## 2021-07-13 NOTE — PROGRESS NOTES
Ronnie Winkler is a 61 y.o. female evaluated via telephone on 7/13/2021. Consent:  She and/or health care decision maker is aware that that she may receive a bill for this telephone service, depending on her insurance coverage, and has provided verbal consent to proceed: Yes    Documentation:  I communicated with the patient and/or health care decision maker about COPD and upcoming surgery. Details of this discussion including any medical advice provided:   Assessment:   · Pre-op for shoulder replacement surgery  · Chronic hypoxia  · Mild COPD  · Morbid obesity    Plan:   · Continue current inhaler regimen  Patient may proceed to the operating room from pulmonary perspective without further testing. Because of underlying disease, the patient is at increased risk of ammy-operative complications, including atelectasis, pneumonia and, when general anesthesia is required, failure to liberate from mechanical ventilation. However, this should not preclude the patient from having the recommended operation. It is my recommendation that the patient's inhaled bronchodilators be continued in the perioperative period and that short acting bronchodilators (albuterol neb) be used on a prn basis as well. All of this was discussed with the patient today in the office. I affirm this is a Patient Initiated Episode with a Patient who has not had a related appointment within my department in the past 7 days or scheduled within the next 24 hours. Patient identification was verified at the start of the visit: Yes    Total Time: minutes: 11-20 minutes    The visit was conducted pursuant to the emergency declaration under the [facility], [address] [place] [address] authority and the [employer] General Act. Patient identification was verified, and a caregiver was present when appropriate.  The patient was located in a state where the provider was credentialed to provide care.       Den Conroy MD

## 2021-07-13 NOTE — TELEPHONE ENCOUNTER
limited to the following:    Your Provider(s) may not able to provide medical treatment for your particular condition and you may be required to seek alternative healthcare or emergency care services.  The electronic systems or other security protocols or safeguards used in the Service could fail, causing a breach of privacy of your medical or other information.  Given regulatory requirements in certain jurisdictions, your Provider(s) diagnosis and/or treatment options, especially pertaining to certain prescriptions, may be limited. Acceptance   1. You understand that Services will be provided via Telehealth. This process involves the use of HIPAA compliant and secure, real-time audio-visual interfacing with a qualified and appropriately trained provider located at [facility]. 2. You understand that, under no circumstances, will this session be recorded. 3. You understand that the Provider(s) at [facility] and other clinical participants will be party to the information obtained during the Telehealth session in accordance with best medical practices. 4. You understand that the information obtained during the Telehealth session will be used to help determine the most appropriate treatment options. 5. You understand that You have the right to revoke this consent at any point in time. 6. You understand that Telehealth is voluntary, and that continued treatment is not dependent upon consent. 7. You understand that, in the event of non-consent to Telehealth services and/or technical difficulties, you will obtain services as typically provided in the absence of Telehealth technology. 8. You understand that this consent will be kept in Your medical record. 9. No potential benefits from the use of Telehealth or specific results can be guaranteed. Your condition may not be cured or improved and, in some cases, may get worse.    10. There are limitations in the terms described in the Terms of Service and this Telehealth Consent. The patient was read the following statement and has consented to the visit as of 7/13/21. The patient has been scheduled for their first telehealth visit on 7/13/21 with Dr. Brittney Cortes.

## 2021-07-13 NOTE — PROGRESS NOTES
1957 [facility] patients having surgery or anesthesia are required to be Covid tested OR to have been vaccinated at least 14 days prior to your procedure. It is very important to return our call to [phone] and notify the staff of your last vaccination date otherwise you will be required to complete Covid PCR test within the 5-6 days prior to surgery & quarantine. The results will need to be faxed to PreAdmission Testing at [phone]. PRIOR TO PROCEDURE DATE:        1. PLEASE FOLLOW ANY  GUIDELINES/ INSTRUCTIONS PRIOR TO YOUR PROCEDURE AS ADVISED BY YOUR SURGEON. 2. Arrange for someone to drive you home and be with you for the first 24 hours after discharge for your safety after your procedure for which you received sedation. Ensure it is someone we can share information with regarding your discharge. 3. You must contact your surgeon for instructions IF:   You are taking any blood thinners, aspirin, anti-inflammatory or vitamin E.   There is a change in your physical condition such as a cold, fever, rash, cuts, sores or any other infection, especially near your surgical site. 4. Do not drink alcohol the day before or day of your procedure. 5. A Pre-op History and Physical for surgery MUST be completed by your Physician or Urgent Care within 30 days of your procedure date. Please bring a copy with you on the day of your procedure and along with any other testing performed. THE DAY OF YOUR PROCEDURE:  1. Follow instructions for ARRIVAL TIME as DIRECTED BY YOUR SURGEON. 2. Enter the MAIN entrance from [employer] and follow the signs to the free [employer] or [employer] parking (offered free of charge 6am-5pm). 3. Enter the Main Entrance of the hospital (do not enter from the lower level of the parking garage). Upon entrance, check in with the  at the main desk on your left. If no one is available at the desk, proceed into the [facility] Waiting Room and go through the door directly into the [facility]. There is a Check-in desk ACROSS from Room 5 (marked with a sign hanging from the ceiling). The phone number for the surgery center is [phone]. 4. Please call [phone] option #2 option #2 if you have not been preregistered yet. On the day of your procedure bring your insurance card and photo ID. You will be registered at your bedside once brought back to your room. 5. DO NOT EAT ANYTHING eight hours prior to your arrival for surgery. May have 8 ounces of water 4 hours prior to your arrival for surgery. NOTE: ALL Gastric, Bariatric and Bowel surgery patients MUST follow their surgeon's instructions. 6. MEDICATIONS    Take the following medications with a SMALL sip of water: metoprolol   Bariatric patient's call surgeon if on diabetic medications as some need to be stopped 1 week preop   Use your usual dose of inhalers the morning of surgery. BRING your rescue inhaler with you to hospital.    Anesthesia does NOT want you to take insulin the morning of surgery. They will control your blood sugar while you are at the hospital. Please contact your ordering physician for instructions regarding your insulin the night before your procedure. If you have an insulin pump, please keep it set on basal rate. 7. Do not swallow water when brushing teeth. No gum, candy, mints or ice chips. Refrain from smoking or at least decrease the amount. 8. Dress in loose, comfortable clothing appropriate for redressing after your procedure. Do not wear jewelry (including body piercings), make-up (especially NO eye make-up), fingernail polish (NO toenail polish if foot/leg surgery), lotion, powders or metal hairclips. 9. Dentures, glasses, or contacts will need to be removed before your procedure.  Bring cases for your glasses, contacts, dentures, or hearing aids to protect them while you are in surgery. 10. If you use a CPAP, please bring it with you on the day of your procedure. 11. We recommend that valuable personal  belongings such as cash, cell phones, e-tablets or jewelry, be left at home during your stay. The hospital will not be responsible for valuables that are not secured in the hospital safe. However, if your insurance requires a co-pay, you may want to bring a method of payment, i.e. Check or credit card, if you wish to pay your co-pay the day of surgery. 12. If you are to stay overnight, you may bring a bag with personal items. Please have any large items you may need brought in by your family after your arrival to your hospital room. 15. If you have a Living Will or Durable Power of , please bring a copy on the day of your procedure. 15. With your permission, one family member may accompany you while you are being prepared for surgery. Once you are ready, additional family members may join you. HOW WE KEEP YOU SAFE and WORK TO PREVENT SURGICAL SITE INFECTIONS:  1. Health care workers should always check your ID bracelet to verify your name and birth date. You will be asked many times to state your name, date of birth, and allergies. 2. Health care workers should always clean their hands with soap or alcohol gel before providing care to you. It is okay to ask anyone if they cleaned their hands before they touch you. 3. You will be actively involved in verifying the type of procedure you are having and ensuring the correct surgical site. This will be confirmed multiple times prior to your procedure. Do NOT allison your surgery site UNLESS instructed to by your surgeon. 4. Do not shave or wax for 72 hours prior to procedure near your operative site. Shaving with a razor can irritate your skin and make it easier to develop an infection.  On the day of your procedure, any hair that needs to be removed near the surgical site will be clipped by a healthcare worker using a special clippers designed to avoid skin irritation. 5. When you are in the operating room, your surgical site will be cleansed with a special soap, and in most cases, you will be given an antibiotic before the surgery begins. What to expect AFTER YOUR PROCEDURE:  1. Immediately following your procedure, your will be taken to the PACU for the first phase of your recovery. Your nurse will help you recover from any potential side effects of anesthesia, such as extreme drowsiness, changes in your vital signs or breathing patterns. Nausea, headache, muscle aches, or sore throat may also occur after anesthesia. Your nurse will help you manage these potential side effects. 2. For comfort and safety, arrange to have someone at home with you for the first 24 hours after discharge. 3. You and your family will be given written instructions about your diet, activity, dressing care, medications, and return visits. 4. Once at home, should issues with nausea, pain, or bleeding occur, or should you notice any signs of infection, you should call your surgeon. 5. Always clean your hands before and after caring for your wound. Do not let your family touch your surgery site without cleaning their hands. 6. Narcotic pain medications can cause significant constipation. You may want to add a stool softener to your postoperative medication schedule or speak to your surgeon on how best to manage this SIDE EFFECT. SPECIAL INSTRUCTIONS   Thank you for allowing us to care for you. We strive to exceed your expectations in the delivery of care and service provided to you and your family. If you need to contact the [place] [address] staff for any reason, please call us at [phone]    Instructions reviewed with patient during preadmission testing phone interview.   Ericka Trujillo RN.7/13/2021 .8:37 AM      ADDITIONAL EDUCATIONAL INFORMATION REVIEWED PER PHONE WITH YOU AND/OR YOUR FAMILY:  Yes Hibiclens® Bathing Instructions

## 2021-07-14 ENCOUNTER — OFFICE VISIT (OUTPATIENT)
Dept: ORTHOPEDIC SURGERY | Age: 66
End: 2021-07-14
Payer: MEDICARE

## 2021-07-14 ENCOUNTER — HOSPITAL ENCOUNTER (OUTPATIENT)
Dept: PREADMISSION TESTING | Age: 66
Discharge: HOME | End: 2021-07-18
Payer: MEDICARE

## 2021-07-14 VITALS — BODY MASS INDEX: 35.65 KG/M2 | WEIGHT: 214 LBS | HEIGHT: 65 IN

## 2021-07-14 DIAGNOSIS — S42.111A CLOSED DISPLACED FRACTURE OF BODY OF RIGHT SCAPULA, INITIAL ENCOUNTER: ICD-10-CM

## 2021-07-14 DIAGNOSIS — M12.811 ROTATOR CUFF ARTHROPATHY OF RIGHT SHOULDER: Primary | ICD-10-CM

## 2021-07-14 LAB
ABO/RH: NORMAL
ANION GAP SERPL CALCULATED.3IONS-SCNC: 10 MMOL/L (ref 3–16)
ANTIBODY SCREEN: NORMAL
APTT: 26.9 SEC (ref 26.2–38.6)
BASOPHILS ABSOLUTE: 0.1 K/UL (ref 0–0.2)
BASOPHILS RELATIVE PERCENT: 0.9 %
BILIRUBIN URINE: NEGATIVE
BLOOD, URINE: NEGATIVE
BUN BLDV-MCNC: 12 MG/DL (ref 7–20)
CALCIUM SERPL-MCNC: 9 MG/DL (ref 8.3–10.6)
CHLORIDE BLD-SCNC: 97 MMOL/L (ref 99–110)
CLARITY: CLEAR
CO2: 30 MMOL/L (ref 21–32)
COLOR: YELLOW
CREAT SERPL-MCNC: 0.9 MG/DL (ref 0.6–1.2)
EOSINOPHILS ABSOLUTE: 0.1 K/UL (ref 0–0.6)
EOSINOPHILS RELATIVE PERCENT: 0.8 %
GFR AFRICAN AMERICAN: >60
GFR NON-AFRICAN AMERICAN: >60
GLUCOSE BLD-MCNC: 120 MG/DL (ref 70–99)
GLUCOSE URINE: NEGATIVE MG/DL
HCT VFR BLD CALC: 37.1 % (ref 36–48)
HEMOGLOBIN: 12.4 G/DL (ref 12–16)
INR BLD: 0.87 (ref 0.88–1.12)
KETONES, URINE: NEGATIVE MG/DL
LEUKOCYTE ESTERASE, URINE: NEGATIVE
LYMPHOCYTES ABSOLUTE: 0.7 K/UL (ref 1–5.1)
LYMPHOCYTES RELATIVE PERCENT: 6.8 %
MCH RBC QN AUTO: 31 PG (ref 26–34)
MCHC RBC AUTO-ENTMCNC: 33.4 G/DL (ref 31–36)
MCV RBC AUTO: 92.9 FL (ref 80–100)
MICROSCOPIC EXAMINATION: NORMAL
MONOCYTES ABSOLUTE: 0.8 K/UL (ref 0–1.3)
MONOCYTES RELATIVE PERCENT: 7.4 %
NEUTROPHILS ABSOLUTE: 8.9 K/UL (ref 1.7–7.7)
NEUTROPHILS RELATIVE PERCENT: 84.1 %
NITRITE, URINE: NEGATIVE
PDW BLD-RTO: 17.6 % (ref 12.4–15.4)
PH UA: 6.5 (ref 5–8)
PLATELET # BLD: 325 K/UL (ref 135–450)
PMV BLD AUTO: 8.6 FL (ref 5–10.5)
POTASSIUM SERPL-SCNC: 4.3 MMOL/L (ref 3.5–5.1)
PROTEIN UA: NEGATIVE MG/DL
PROTHROMBIN TIME: 9.8 SEC (ref 9.9–12.7)
RBC # BLD: 3.99 M/UL (ref 4–5.2)
SODIUM BLD-SCNC: 137 MMOL/L (ref 136–145)
SPECIFIC GRAVITY UA: <=1.005 (ref 1–1.03)
URINE TYPE: NORMAL
UROBILINOGEN, URINE: 0.2 E.U./DL
WBC # BLD: 10.6 K/UL (ref 4–11)

## 2021-07-14 PROCEDURE — 87641 MR-STAPH DNA AMP PROBE: CPT

## 2021-07-14 PROCEDURE — G8417 CALC BMI ABV UP PARAM F/U: HCPCS | Performed by: ORTHOPAEDIC SURGERY

## 2021-07-14 PROCEDURE — 86900 BLOOD TYPING SEROLOGIC ABO: CPT

## 2021-07-14 PROCEDURE — L3670 SO ACRO/CLAV CAN WEB PRE OTS: HCPCS | Performed by: ORTHOPAEDIC SURGERY

## 2021-07-14 PROCEDURE — 87086 URINE CULTURE/COLONY COUNT: CPT

## 2021-07-14 PROCEDURE — 1036F TOBACCO NON-USER: CPT | Performed by: ORTHOPAEDIC SURGERY

## 2021-07-14 PROCEDURE — 86901 BLOOD TYPING SEROLOGIC RH(D): CPT

## 2021-07-14 PROCEDURE — 99213 OFFICE O/P EST LOW 20 MIN: CPT | Performed by: ORTHOPAEDIC SURGERY

## 2021-07-14 PROCEDURE — 3017F COLORECTAL CA SCREEN DOC REV: CPT | Performed by: ORTHOPAEDIC SURGERY

## 2021-07-14 PROCEDURE — 85730 THROMBOPLASTIN TIME PARTIAL: CPT

## 2021-07-14 PROCEDURE — 85610 PROTHROMBIN TIME: CPT

## 2021-07-14 PROCEDURE — 86850 RBC ANTIBODY SCREEN: CPT

## 2021-07-14 PROCEDURE — 80048 BASIC METABOLIC PNL TOTAL CA: CPT

## 2021-07-14 PROCEDURE — 81003 URINALYSIS AUTO W/O SCOPE: CPT

## 2021-07-14 PROCEDURE — 85025 COMPLETE CBC W/AUTO DIFF WBC: CPT

## 2021-07-14 PROCEDURE — G8427 DOCREV CUR MEDS BY ELIG CLIN: HCPCS | Performed by: ORTHOPAEDIC SURGERY

## 2021-07-14 PROCEDURE — 83036 HEMOGLOBIN GLYCOSYLATED A1C: CPT

## 2021-07-14 NOTE — PROGRESS NOTES
pocket    Passive Range of Motion: Deferred    Strength: Deferred    Special Tests: No Aaron muscle deformity. Neurovascular: Sensation to light touch is intact, no motor deficits, palpable radial pulses 2+      Radiology:     No new XR obtained at this time. Assessment :  Ulysses samaniego 61 y. o. female with: Right shoulder rotator cuff arthropathy in the setting of subacute scapular body fracture without extension into the joint. Impression:  Encounter Diagnoses   Name Primary?  Rotator cuff arthropathy of right shoulder Yes    Closed displaced fracture of body of right scapula, initial encounter        Office Procedures:  Orders Placed This Encounter   Procedures    DJO ultrasling IV Shoulder Sling     Patient was prescribed a DJO Ultrasling IV Shoulder Brace. The right shoulder will require stabilization / immobilization from this orthosis. The orthosis will assist in protecting the affected area, provide functional support and facilitate healing. The device was ordered and fit on 7/14/21. The patient was educated and fit by a healthcare professional with expert knowledge and specialization in brace application while under the direct supervision of the treating physician. Verbal and written instructions for the use of and application of this item were provided. They were instructed to contact the office immediately should the brace result in increased pain, decreased sensation, increased swelling or worsening of the condition. Treatment Plan: We will proceed with surgery as planned on 7/20/21. Whether or not the spine of the scapula is fixed will be decided intra-operatively. We discussed in detail risks, benefits and expectations postoperatively. We also discussed a recovery timeline following this operation. We will go ahead and fit her with an ultrasling brace to be worn postoperatively. We will see Hollie Ruiz back for surgery weeks and/or as needed.  All questions were answered to patient's satisfaction and She was encouraged to call with any further questions or concerns. Redd Maharaj is in agreement with this plan. Risks, benefits and potential complications of reverse total shoulder arthroplasty surgery were discussed with the patient. Risks discussed include but are not limited to bleeding, infection, anesthetic risk, injury to nerves and blood vessels, deep vein thrombosis, residual stiffness and weakness, and the need for revision surgery. The patient also understands that anesthetic risks include cardiopulmonary issues, drug reactions and even death. The patient voices an understanding of the importance of physical therapy and home exercises after surgery. All questions were answered and written informed consent for surgery was obtained today. The patient was counseled at length about the risks of jonah Covid-19 during their perioperative period and any recovery window from their procedure. The patient was made aware that jonah Covid-19  may worsen their prognosis for recovering from their procedure  and lend to a higher morbidity and/or mortality risk. All material risks, benefits, and reasonable alternatives including postponing the procedure were discussed. The patient does wish to proceed with the procedure at this time. 7/14/2021  4:40 PM    Jacob Lee ATC  [facility]    During this examination, I, Greg Murillo, functioned as a scribe for Dr. Enrique Powell. The history taking and physical examination were performed by Dr. Maria Ines Olea. All counseling during the appointment was performed between the patient and Dr. Maria Ines Olea. 7/14/21  _____________  I, Dr. Enrique Powell, personally performed the services described in this documentation as described by Jacob Lee ATC in my presence, and it is both accurate and complete. Serge Olea MD, PhD  7/14/2021

## 2021-07-15 LAB
ESTIMATED AVERAGE GLUCOSE: 139.9 MG/DL
HBA1C MFR BLD: 6.5 %
MRSA SCREEN RT-PCR: NORMAL
URINE CULTURE, ROUTINE: NORMAL

## 2021-07-15 NOTE — PROGRESS NOTES
Pt had PAT visit 7-14, labs reviewed and abnormal results have been routed to surgeon by lab.  Eileen Cruz

## 2021-07-19 ENCOUNTER — ANESTHESIA EVENT (OUTPATIENT)
Dept: OPERATING ROOM | Age: 66
End: 2021-07-19
Payer: MEDICARE

## 2021-07-19 NOTE — PROGRESS NOTES
Spoke with Song pAaricio at pulmonary MD office Dr. Clemencia Vernon, [phone] states he cleared patient for surgery and will fax clearance letter tous. -db---

## 2021-07-19 NOTE — PROGRESS NOTES
[facility] / [facility] ([facility]) Cyril Brower, [address]    Acknowledgment of Informed Consent for Surgical or Medical Procedure and Sedation  I agree to allow doctor(s) Efra Shah and his/her associates or assistants, including residents and/or other qualified medical practitioner to perform the following medical treatment or procedure and to administer or direct the administration of sedation as necessary:  Procedure(s): RIGHT SHOULDER REVERSE TOTAL SHOULDER REPLACEMENT, OPEN REDUCTION INTERNAL FIXATION OF [address]   My doctor has explained the following regarding the proposed procedure:   the explanation of the procedure   the benefits of the procedure   the potential problems that might occur during recuperation   the risks and side effects of the procedure which could include but are not limited to severe blood loss, infection, stroke or death   the benefits, risks and side effect of alternative procedures including the consequences of declining this procedure or any alternative procedures   the likelihood of achieving satisfactory results. I acknowledge no guarantee or assurance has been made to me regarding the results. I understand that during the course of this treatment/procedure, unforeseen conditions can occur which require an additional or different procedure. I agree to allow my physician or assistants to perform such extension of the original procedure as they may find necessary. I understand that sedation will often result in temporary impairment of memory and fine motor skills and that sedation can occasionally progress to a state of deep sedation or general anesthesia. I understand the risks of anesthesia for surgery include, but are not limited to, sore throat, hoarseness, injury to face, mouth, or teeth; nausea; headache; injury to blood vessels or nerves; death, brain damage, or paralysis.     I understand that if I have a Limitation of Treatment order in effect during my hospitalization, the order may or may not be in effect during this procedure. I give my doctor permission to give me blood or blood products. I understand that there are risks with receiving blood such as hepatitis, AIDS, fever, or allergic reaction. I acknowledge that the risks, benefits, and alternatives of this treatment have been explained to me and that no express or implied warranty has been given by the hospital, any blood bank, or any person or entity as to the blood or blood components transfused. At the discretion of my doctor, I agree to allow observers, equipment/product representatives and allow photographing, and/or televising of the procedure, provided my name or identity is maintained confidentially. I agree the hospital may dispose of or use for scientific or educational purposes any tissue, fluid, or body parts which may be removed.     ________________________________Date________Time______ am/pm  ([place])  Patient or Signature of Closest Relative or Legal Guardian    ________________________________Date________Time______am/pm      Page 1 of  1  Witness

## 2021-07-20 ENCOUNTER — HOSPITAL ENCOUNTER (OUTPATIENT)
Age: 66
Setting detail: OUTPATIENT SURGERY
Discharge: HOME | End: 2021-07-20
Attending: ORTHOPAEDIC SURGERY | Admitting: ORTHOPAEDIC SURGERY
Payer: MEDICARE

## 2021-07-20 ENCOUNTER — ANESTHESIA (OUTPATIENT)
Dept: OPERATING ROOM | Age: 66
End: 2021-07-20
Payer: MEDICARE

## 2021-07-20 ENCOUNTER — APPOINTMENT (OUTPATIENT)
Dept: GENERAL RADIOLOGY | Age: 66
End: 2021-07-20
Attending: ORTHOPAEDIC SURGERY
Payer: MEDICARE

## 2021-07-20 VITALS
HEIGHT: 65 IN | RESPIRATION RATE: 20 BRPM | TEMPERATURE: 97.4 F | DIASTOLIC BLOOD PRESSURE: 69 MMHG | HEART RATE: 75 BPM | OXYGEN SATURATION: 92 % | SYSTOLIC BLOOD PRESSURE: 134 MMHG | BODY MASS INDEX: 35.65 KG/M2 | WEIGHT: 214 LBS

## 2021-07-20 VITALS — SYSTOLIC BLOOD PRESSURE: 129 MMHG | OXYGEN SATURATION: 91 % | DIASTOLIC BLOOD PRESSURE: 76 MMHG

## 2021-07-20 DIAGNOSIS — Z96.611 STATUS POST REPLACEMENT OF RIGHT SHOULDER JOINT: Primary | ICD-10-CM

## 2021-07-20 LAB
ABO/RH: NORMAL
ANTIBODY SCREEN: NORMAL
GLUCOSE BLD-MCNC: 131 MG/DL (ref 70–99)
GLUCOSE BLD-MCNC: 90 MG/DL (ref 70–99)
PERFORMED ON: ABNORMAL
PERFORMED ON: NORMAL

## 2021-07-20 PROCEDURE — 3600000004 HC SURGERY LEVEL 4 BASE: Performed by: ORTHOPAEDIC SURGERY

## 2021-07-20 PROCEDURE — 86850 RBC ANTIBODY SCREEN: CPT

## 2021-07-20 PROCEDURE — 86900 BLOOD TYPING SEROLOGIC ABO: CPT

## 2021-07-20 PROCEDURE — 2500000003 HC RX 250 WO HCPCS: Performed by: ANESTHESIOLOGY

## 2021-07-20 PROCEDURE — 64415 NJX AA&/STRD BRCH PLXS IMG: CPT | Performed by: ANESTHESIOLOGY

## 2021-07-20 PROCEDURE — 6360000002 HC RX W HCPCS: Performed by: NURSE PRACTITIONER

## 2021-07-20 PROCEDURE — 7100000001 HC PACU RECOVERY - ADDTL 15 MIN: Performed by: ORTHOPAEDIC SURGERY

## 2021-07-20 PROCEDURE — 97166 OT EVAL MOD COMPLEX 45 MIN: CPT

## 2021-07-20 PROCEDURE — 2700000000 HC OXYGEN THERAPY PER DAY

## 2021-07-20 PROCEDURE — 6360000002 HC RX W HCPCS: Performed by: NURSE ANESTHETIST, CERTIFIED REGISTERED

## 2021-07-20 PROCEDURE — 2500000003 HC RX 250 WO HCPCS: Performed by: NURSE ANESTHETIST, CERTIFIED REGISTERED

## 2021-07-20 PROCEDURE — 97530 THERAPEUTIC ACTIVITIES: CPT

## 2021-07-20 PROCEDURE — 3700000001 HC ADD 15 MINUTES (ANESTHESIA): Performed by: ORTHOPAEDIC SURGERY

## 2021-07-20 PROCEDURE — 73020 X-RAY EXAM OF SHOULDER: CPT

## 2021-07-20 PROCEDURE — 97535 SELF CARE MNGMENT TRAINING: CPT

## 2021-07-20 PROCEDURE — C1776 JOINT DEVICE (IMPLANTABLE): HCPCS | Performed by: ORTHOPAEDIC SURGERY

## 2021-07-20 PROCEDURE — 7100000010 HC PHASE II RECOVERY - FIRST 15 MIN: Performed by: ORTHOPAEDIC SURGERY

## 2021-07-20 PROCEDURE — 2709999900 HC NON-CHARGEABLE SUPPLY: Performed by: ORTHOPAEDIC SURGERY

## 2021-07-20 PROCEDURE — 6360000002 HC RX W HCPCS: Performed by: ORTHOPAEDIC SURGERY

## 2021-07-20 PROCEDURE — 7100000011 HC PHASE II RECOVERY - ADDTL 15 MIN: Performed by: ORTHOPAEDIC SURGERY

## 2021-07-20 PROCEDURE — 2580000003 HC RX 258: Performed by: ORTHOPAEDIC SURGERY

## 2021-07-20 PROCEDURE — 6370000000 HC RX 637 (ALT 250 FOR IP): Performed by: NURSE ANESTHETIST, CERTIFIED REGISTERED

## 2021-07-20 PROCEDURE — 7100000000 HC PACU RECOVERY - FIRST 15 MIN: Performed by: ORTHOPAEDIC SURGERY

## 2021-07-20 PROCEDURE — C9290 INJ, BUPIVACAINE LIPOSOME: HCPCS | Performed by: ANESTHESIOLOGY

## 2021-07-20 PROCEDURE — 6360000002 HC RX W HCPCS: Performed by: ANESTHESIOLOGY

## 2021-07-20 PROCEDURE — 97116 GAIT TRAINING THERAPY: CPT

## 2021-07-20 PROCEDURE — 97162 PT EVAL MOD COMPLEX 30 MIN: CPT

## 2021-07-20 PROCEDURE — 6370000000 HC RX 637 (ALT 250 FOR IP): Performed by: ANESTHESIOLOGY

## 2021-07-20 PROCEDURE — 3600000014 HC SURGERY LEVEL 4 ADDTL 15MIN: Performed by: ORTHOPAEDIC SURGERY

## 2021-07-20 PROCEDURE — 3700000000 HC ANESTHESIA ATTENDED CARE: Performed by: ORTHOPAEDIC SURGERY

## 2021-07-20 PROCEDURE — 86901 BLOOD TYPING SEROLOGIC RH(D): CPT

## 2021-07-20 PROCEDURE — 94761 N-INVAS EAR/PLS OXIMETRY MLT: CPT

## 2021-07-20 PROCEDURE — 94640 AIRWAY INHALATION TREATMENT: CPT

## 2021-07-20 PROCEDURE — 2580000003 HC RX 258: Performed by: ANESTHESIOLOGY

## 2021-07-20 DEVICE — SCREW, LOCKING BONE, RSP, 5X26
Type: IMPLANTABLE DEVICE | Site: SHOULDER | Status: FUNCTIONAL
Brand: DJO SURGICAL

## 2021-07-20 DEVICE — SCREW, LOCKING BONE, RSP, 5X22
Type: IMPLANTABLE DEVICE | Site: SHOULDER | Status: FUNCTIONAL
Brand: DJO SURGICAL

## 2021-07-20 DEVICE — ALTIVATE REVERSE, HUMERAL STEM, SMALL SHELL, SZ 12X48MM
Type: IMPLANTABLE DEVICE | Site: SHOULDER | Status: FUNCTIONAL
Brand: DJO SURGICAL

## 2021-07-20 DEVICE — GLENOID, HEAD W/RETAINING SCREW, RSP, 36MM, -4MM
Type: IMPLANTABLE DEVICE | Site: SHOULDER | Status: FUNCTIONAL
Brand: DJO SURGICAL

## 2021-07-20 DEVICE — SCREW, LOCKING BONE, RSP, 5X30
Type: IMPLANTABLE DEVICE | Site: SHOULDER | Status: FUNCTIONAL
Brand: DJO SURGICAL

## 2021-07-20 DEVICE — IMPL CAPPED SHOULDER S3 TOTAL ADV REVERSE DJO: Type: IMPLANTABLE DEVICE | Site: SHOULDER | Status: FUNCTIONAL

## 2021-07-20 DEVICE — RSP BASEPLATE, 30MM, W/P2 COATING
Type: IMPLANTABLE DEVICE | Site: SHOULDER | Status: FUNCTIONAL
Brand: DJO SURGICAL

## 2021-07-20 DEVICE — INSERT HUM 36MM NEUT SM SOCK E PLUS ALTIVATE REV: Type: IMPLANTABLE DEVICE | Site: SHOULDER | Status: FUNCTIONAL

## 2021-07-20 RX ORDER — 0.9 % SODIUM CHLORIDE 0.9 %
500 INTRAVENOUS SOLUTION INTRAVENOUS
Status: DISCONTINUED | OUTPATIENT
Start: 2021-07-20 | End: 2021-07-20 | Stop reason: HOSPADM

## 2021-07-20 RX ORDER — OXYCODONE HYDROCHLORIDE AND ACETAMINOPHEN 5; 325 MG/1; MG/1
1 TABLET ORAL EVERY 6 HOURS PRN
Qty: 36 TABLET | Refills: 0 | Status: SHIPPED | OUTPATIENT
Start: 2021-07-20 | End: 2021-07-29

## 2021-07-20 RX ORDER — HYDRALAZINE HYDROCHLORIDE 20 MG/ML
5 INJECTION INTRAMUSCULAR; INTRAVENOUS EVERY 10 MIN PRN
Status: DISCONTINUED | OUTPATIENT
Start: 2021-07-20 | End: 2021-07-20 | Stop reason: HOSPADM

## 2021-07-20 RX ORDER — ACETAMINOPHEN 325 MG/1
650 TABLET ORAL EVERY 6 HOURS
Status: CANCELLED | OUTPATIENT
Start: 2021-07-20

## 2021-07-20 RX ORDER — SODIUM CHLORIDE 0.9 % (FLUSH) 0.9 %
5-40 SYRINGE (ML) INJECTION PRN
Status: CANCELLED | OUTPATIENT
Start: 2021-07-20

## 2021-07-20 RX ORDER — VANCOMYCIN HYDROCHLORIDE 1 G/20ML
INJECTION, POWDER, LYOPHILIZED, FOR SOLUTION INTRAVENOUS PRN
Status: DISCONTINUED | OUTPATIENT
Start: 2021-07-20 | End: 2021-07-20 | Stop reason: ALTCHOICE

## 2021-07-20 RX ORDER — OXYCODONE HYDROCHLORIDE 5 MG/1
5 TABLET ORAL EVERY 4 HOURS PRN
Status: CANCELLED | OUTPATIENT
Start: 2021-07-20

## 2021-07-20 RX ORDER — ONDANSETRON 2 MG/ML
INJECTION INTRAMUSCULAR; INTRAVENOUS PRN
Status: DISCONTINUED | OUTPATIENT
Start: 2021-07-20 | End: 2021-07-20 | Stop reason: SDUPTHER

## 2021-07-20 RX ORDER — MIDAZOLAM HYDROCHLORIDE 1 MG/ML
INJECTION INTRAMUSCULAR; INTRAVENOUS PRN
Status: DISCONTINUED | OUTPATIENT
Start: 2021-07-20 | End: 2021-07-20 | Stop reason: SDUPTHER

## 2021-07-20 RX ORDER — LIDOCAINE HYDROCHLORIDE 10 MG/ML
1 INJECTION, SOLUTION EPIDURAL; INFILTRATION; INTRACAUDAL; PERINEURAL
Status: DISCONTINUED | OUTPATIENT
Start: 2021-07-20 | End: 2021-07-20 | Stop reason: HOSPADM

## 2021-07-20 RX ORDER — SUCCINYLCHOLINE/SOD CL,ISO/PF 200MG/10ML
SYRINGE (ML) INTRAVENOUS PRN
Status: DISCONTINUED | OUTPATIENT
Start: 2021-07-20 | End: 2021-07-20 | Stop reason: SDUPTHER

## 2021-07-20 RX ORDER — ALBUTEROL SULFATE 2.5 MG/3ML
2.5 SOLUTION RESPIRATORY (INHALATION) EVERY 6 HOURS PRN
Status: DISCONTINUED | OUTPATIENT
Start: 2021-07-20 | End: 2021-07-20 | Stop reason: HOSPADM

## 2021-07-20 RX ORDER — OXYCODONE HYDROCHLORIDE 5 MG/1
10 TABLET ORAL EVERY 4 HOURS PRN
Status: CANCELLED | OUTPATIENT
Start: 2021-07-20

## 2021-07-20 RX ORDER — MAGNESIUM HYDROXIDE 1200 MG/15ML
LIQUID ORAL CONTINUOUS PRN
Status: COMPLETED | OUTPATIENT
Start: 2021-07-20 | End: 2021-07-20

## 2021-07-20 RX ORDER — SENNA AND DOCUSATE SODIUM 50; 8.6 MG/1; MG/1
1 TABLET, FILM COATED ORAL 2 TIMES DAILY
Status: CANCELLED | OUTPATIENT
Start: 2021-07-20

## 2021-07-20 RX ORDER — SODIUM CHLORIDE 0.9 % (FLUSH) 0.9 %
5-40 SYRINGE (ML) INJECTION PRN
Status: DISCONTINUED | OUTPATIENT
Start: 2021-07-20 | End: 2021-07-20 | Stop reason: HOSPADM

## 2021-07-20 RX ORDER — ALBUTEROL SULFATE 90 UG/1
AEROSOL, METERED RESPIRATORY (INHALATION) PRN
Status: DISCONTINUED | OUTPATIENT
Start: 2021-07-20 | End: 2021-07-20 | Stop reason: SDUPTHER

## 2021-07-20 RX ORDER — ROCURONIUM BROMIDE 10 MG/ML
INJECTION, SOLUTION INTRAVENOUS PRN
Status: DISCONTINUED | OUTPATIENT
Start: 2021-07-20 | End: 2021-07-20 | Stop reason: SDUPTHER

## 2021-07-20 RX ORDER — ONDANSETRON 4 MG/1
4 TABLET, FILM COATED ORAL 3 TIMES DAILY PRN
Qty: 15 TABLET | Refills: 0 | Status: SHIPPED | OUTPATIENT
Start: 2021-07-20 | End: 2021-09-02 | Stop reason: ALTCHOICE

## 2021-07-20 RX ORDER — MORPHINE SULFATE 4 MG/ML
4 INJECTION, SOLUTION INTRAMUSCULAR; INTRAVENOUS
Status: CANCELLED | OUTPATIENT
Start: 2021-07-20

## 2021-07-20 RX ORDER — SODIUM CHLORIDE 9 MG/ML
25 INJECTION, SOLUTION INTRAVENOUS PRN
Status: DISCONTINUED | OUTPATIENT
Start: 2021-07-20 | End: 2021-07-20 | Stop reason: HOSPADM

## 2021-07-20 RX ORDER — DOCUSATE SODIUM 100 MG/1
100 CAPSULE, LIQUID FILLED ORAL 2 TIMES DAILY
Qty: 60 CAPSULE | Refills: 0 | Status: SHIPPED | OUTPATIENT
Start: 2021-07-20 | End: 2021-08-19

## 2021-07-20 RX ORDER — ASPIRIN 325 MG
325 TABLET, DELAYED RELEASE (ENTERIC COATED) ORAL DAILY
Qty: 30 TABLET | Refills: 0 | Status: SHIPPED | OUTPATIENT
Start: 2021-07-20 | End: 2022-06-07

## 2021-07-20 RX ORDER — ONDANSETRON 4 MG/1
4 TABLET, ORALLY DISINTEGRATING ORAL EVERY 8 HOURS PRN
Status: CANCELLED | OUTPATIENT
Start: 2021-07-20

## 2021-07-20 RX ORDER — SODIUM CHLORIDE 0.9 % (FLUSH) 0.9 %
5-40 SYRINGE (ML) INJECTION EVERY 12 HOURS SCHEDULED
Status: CANCELLED | OUTPATIENT
Start: 2021-07-20

## 2021-07-20 RX ORDER — EPHEDRINE SULFATE 50 MG/ML
INJECTION INTRAVENOUS PRN
Status: DISCONTINUED | OUTPATIENT
Start: 2021-07-20 | End: 2021-07-20 | Stop reason: SDUPTHER

## 2021-07-20 RX ORDER — PROPOFOL 10 MG/ML
INJECTION, EMULSION INTRAVENOUS PRN
Status: DISCONTINUED | OUTPATIENT
Start: 2021-07-20 | End: 2021-07-20 | Stop reason: SDUPTHER

## 2021-07-20 RX ORDER — BUPIVACAINE HYDROCHLORIDE 5 MG/ML
INJECTION, SOLUTION EPIDURAL; INTRACAUDAL
Status: COMPLETED | OUTPATIENT
Start: 2021-07-20 | End: 2021-07-20

## 2021-07-20 RX ORDER — SODIUM CHLORIDE 0.9 % (FLUSH) 0.9 %
5-40 SYRINGE (ML) INJECTION EVERY 12 HOURS SCHEDULED
Status: DISCONTINUED | OUTPATIENT
Start: 2021-07-20 | End: 2021-07-20 | Stop reason: HOSPADM

## 2021-07-20 RX ORDER — ONDANSETRON 2 MG/ML
4 INJECTION INTRAMUSCULAR; INTRAVENOUS
Status: DISCONTINUED | OUTPATIENT
Start: 2021-07-20 | End: 2021-07-20 | Stop reason: HOSPADM

## 2021-07-20 RX ORDER — LIDOCAINE HCL/PF 100 MG/5ML
SYRINGE (ML) INJECTION PRN
Status: DISCONTINUED | OUTPATIENT
Start: 2021-07-20 | End: 2021-07-20 | Stop reason: SDUPTHER

## 2021-07-20 RX ORDER — SODIUM CHLORIDE 9 MG/ML
25 INJECTION, SOLUTION INTRAVENOUS PRN
Status: CANCELLED | OUTPATIENT
Start: 2021-07-20

## 2021-07-20 RX ORDER — DIPHENHYDRAMINE HYDROCHLORIDE 50 MG/ML
12.5 INJECTION INTRAMUSCULAR; INTRAVENOUS
Status: DISCONTINUED | OUTPATIENT
Start: 2021-07-20 | End: 2021-07-20 | Stop reason: HOSPADM

## 2021-07-20 RX ORDER — ONDANSETRON 2 MG/ML
4 INJECTION INTRAMUSCULAR; INTRAVENOUS EVERY 6 HOURS PRN
Status: CANCELLED | OUTPATIENT
Start: 2021-07-20

## 2021-07-20 RX ORDER — SODIUM CHLORIDE, SODIUM LACTATE, POTASSIUM CHLORIDE, CALCIUM CHLORIDE 600; 310; 30; 20 MG/100ML; MG/100ML; MG/100ML; MG/100ML
INJECTION, SOLUTION INTRAVENOUS CONTINUOUS
Status: DISCONTINUED | OUTPATIENT
Start: 2021-07-20 | End: 2021-07-20 | Stop reason: HOSPADM

## 2021-07-20 RX ORDER — MIDAZOLAM HYDROCHLORIDE 1 MG/ML
INJECTION INTRAMUSCULAR; INTRAVENOUS
Status: COMPLETED
Start: 2021-07-20 | End: 2021-07-20

## 2021-07-20 RX ORDER — AMOXICILLIN AND CLAVULANATE POTASSIUM 875; 125 MG/1; MG/1
1 TABLET, FILM COATED ORAL 2 TIMES DAILY
Qty: 6 TABLET | Refills: 0 | Status: SHIPPED | OUTPATIENT
Start: 2021-07-20 | End: 2021-07-23

## 2021-07-20 RX ORDER — HYDROCODONE BITARTRATE AND ACETAMINOPHEN 5; 325 MG/1; MG/1
1 TABLET ORAL
Status: COMPLETED | OUTPATIENT
Start: 2021-07-20 | End: 2021-07-20

## 2021-07-20 RX ORDER — DEXAMETHASONE SODIUM PHOSPHATE 4 MG/ML
INJECTION, SOLUTION INTRA-ARTICULAR; INTRALESIONAL; INTRAMUSCULAR; INTRAVENOUS; SOFT TISSUE PRN
Status: DISCONTINUED | OUTPATIENT
Start: 2021-07-20 | End: 2021-07-20 | Stop reason: SDUPTHER

## 2021-07-20 RX ORDER — MORPHINE SULFATE 4 MG/ML
2 INJECTION, SOLUTION INTRAMUSCULAR; INTRAVENOUS
Status: CANCELLED | OUTPATIENT
Start: 2021-07-20

## 2021-07-20 RX ORDER — ALBUTEROL SULFATE 2.5 MG/3ML
2.5 SOLUTION RESPIRATORY (INHALATION) ONCE
Status: COMPLETED | OUTPATIENT
Start: 2021-07-20 | End: 2021-07-20

## 2021-07-20 RX ADMIN — MIDAZOLAM HYDROCHLORIDE 1 MG: 2 INJECTION, SOLUTION INTRAMUSCULAR; INTRAVENOUS at 10:20

## 2021-07-20 RX ADMIN — ONDANSETRON 4 MG: 2 INJECTION INTRAMUSCULAR; INTRAVENOUS at 13:12

## 2021-07-20 RX ADMIN — ALBUTEROL SULFATE 4 PUFF: 90 AEROSOL, METERED RESPIRATORY (INHALATION) at 11:14

## 2021-07-20 RX ADMIN — Medication 120 MG: at 10:59

## 2021-07-20 RX ADMIN — EPHEDRINE SULFATE 10 MG: 50 INJECTION INTRAVENOUS at 13:08

## 2021-07-20 RX ADMIN — SODIUM CHLORIDE, POTASSIUM CHLORIDE, SODIUM LACTATE AND CALCIUM CHLORIDE: 600; 310; 30; 20 INJECTION, SOLUTION INTRAVENOUS at 10:33

## 2021-07-20 RX ADMIN — ALBUTEROL SULFATE 2.5 MG: 2.5 SOLUTION RESPIRATORY (INHALATION) at 15:48

## 2021-07-20 RX ADMIN — BUPIVACAINE 20 ML: 13.3 INJECTION, SUSPENSION, LIPOSOMAL INFILTRATION at 10:35

## 2021-07-20 RX ADMIN — DEXAMETHASONE SODIUM PHOSPHATE 8 MG: 4 INJECTION, SOLUTION INTRAMUSCULAR; INTRAVENOUS at 11:29

## 2021-07-20 RX ADMIN — PROPOFOL 30 MG: 10 INJECTION, EMULSION INTRAVENOUS at 11:20

## 2021-07-20 RX ADMIN — HYDROCODONE BITARTRATE AND ACETAMINOPHEN 1 TABLET: 5; 325 TABLET ORAL at 16:29

## 2021-07-20 RX ADMIN — CEFTRIAXONE SODIUM 2000 MG: 2 INJECTION, POWDER, FOR SOLUTION INTRAMUSCULAR; INTRAVENOUS at 11:06

## 2021-07-20 RX ADMIN — EPHEDRINE SULFATE 10 MG: 50 INJECTION INTRAVENOUS at 12:05

## 2021-07-20 RX ADMIN — VANCOMYCIN HYDROCHLORIDE 1500 MG: 10 INJECTION, POWDER, LYOPHILIZED, FOR SOLUTION INTRAVENOUS at 10:33

## 2021-07-20 RX ADMIN — ROCURONIUM BROMIDE 20 MG: 10 INJECTION INTRAVENOUS at 12:39

## 2021-07-20 RX ADMIN — Medication 100 MG: at 10:59

## 2021-07-20 RX ADMIN — ALBUTEROL SULFATE 2.5 MG: 2.5 SOLUTION RESPIRATORY (INHALATION) at 09:27

## 2021-07-20 RX ADMIN — PROPOFOL 150 MG: 10 INJECTION, EMULSION INTRAVENOUS at 10:59

## 2021-07-20 RX ADMIN — EPHEDRINE SULFATE 10 MG: 50 INJECTION INTRAVENOUS at 12:01

## 2021-07-20 RX ADMIN — BUPIVACAINE HYDROCHLORIDE 20 ML: 5 INJECTION, SOLUTION EPIDURAL; INTRACAUDAL; PERINEURAL at 10:35

## 2021-07-20 RX ADMIN — ROCURONIUM BROMIDE 50 MG: 10 INJECTION INTRAVENOUS at 11:12

## 2021-07-20 ASSESSMENT — PULMONARY FUNCTION TESTS
PIF_VALUE: 1
PIF_VALUE: 22
PIF_VALUE: 23
PIF_VALUE: 22
PIF_VALUE: 21
PIF_VALUE: 23
PIF_VALUE: 5
PIF_VALUE: 21
PIF_VALUE: 22
PIF_VALUE: 21
PIF_VALUE: 22
PIF_VALUE: 23
PIF_VALUE: 22
PIF_VALUE: 23
PIF_VALUE: 22
PIF_VALUE: 21
PIF_VALUE: 23
PIF_VALUE: 22
PIF_VALUE: 23
PIF_VALUE: 22
PIF_VALUE: 6
PIF_VALUE: 22
PIF_VALUE: 21
PIF_VALUE: 22
PIF_VALUE: 21
PIF_VALUE: 26
PIF_VALUE: 23
PIF_VALUE: 21
PIF_VALUE: 6
PIF_VALUE: 21
PIF_VALUE: 21
PIF_VALUE: 22
PIF_VALUE: 22
PIF_VALUE: 23
PIF_VALUE: 21
PIF_VALUE: 22
PIF_VALUE: 21
PIF_VALUE: 22
PIF_VALUE: 21
PIF_VALUE: 23
PIF_VALUE: 21
PIF_VALUE: 22
PIF_VALUE: 23
PIF_VALUE: 23
PIF_VALUE: 22
PIF_VALUE: 23
PIF_VALUE: 23
PIF_VALUE: 22
PIF_VALUE: 21
PIF_VALUE: 22
PIF_VALUE: 20
PIF_VALUE: 23
PIF_VALUE: 1
PIF_VALUE: 21
PIF_VALUE: 21
PIF_VALUE: 22
PIF_VALUE: 23
PIF_VALUE: 22
PIF_VALUE: 22
PIF_VALUE: 21
PIF_VALUE: 22
PIF_VALUE: 20
PIF_VALUE: 22
PIF_VALUE: 4
PIF_VALUE: 21
PIF_VALUE: 21
PIF_VALUE: 28
PIF_VALUE: 21
PIF_VALUE: 21
PIF_VALUE: 22
PIF_VALUE: 20
PIF_VALUE: 9
PIF_VALUE: 21
PIF_VALUE: 19
PIF_VALUE: 26
PIF_VALUE: 21
PIF_VALUE: 23
PIF_VALUE: 23
PIF_VALUE: 20
PIF_VALUE: 23
PIF_VALUE: 4
PIF_VALUE: 22
PIF_VALUE: 19
PIF_VALUE: 23
PIF_VALUE: 22
PIF_VALUE: 21
PIF_VALUE: 22
PIF_VALUE: 23
PIF_VALUE: 21
PIF_VALUE: 23
PIF_VALUE: 3
PIF_VALUE: 22
PIF_VALUE: 22
PIF_VALUE: 21
PIF_VALUE: 21
PIF_VALUE: 5
PIF_VALUE: 21
PIF_VALUE: 22
PIF_VALUE: 9
PIF_VALUE: 4
PIF_VALUE: 23
PIF_VALUE: 21
PIF_VALUE: 22
PIF_VALUE: 21
PIF_VALUE: 22
PIF_VALUE: 21
PIF_VALUE: 5
PIF_VALUE: 21
PIF_VALUE: 23
PIF_VALUE: 21
PIF_VALUE: 25
PIF_VALUE: 24
PIF_VALUE: 21
PIF_VALUE: 21
PIF_VALUE: 22
PIF_VALUE: 24
PIF_VALUE: 21
PIF_VALUE: 25
PIF_VALUE: 24
PIF_VALUE: 22
PIF_VALUE: 23
PIF_VALUE: 22
PIF_VALUE: 20
PIF_VALUE: 22
PIF_VALUE: 27
PIF_VALUE: 22
PIF_VALUE: 20
PIF_VALUE: 9
PIF_VALUE: 21
PIF_VALUE: 21
PIF_VALUE: 20
PIF_VALUE: 2
PIF_VALUE: 5
PIF_VALUE: 22
PIF_VALUE: 21
PIF_VALUE: 23
PIF_VALUE: 23
PIF_VALUE: 22
PIF_VALUE: 20
PIF_VALUE: 21
PIF_VALUE: 23
PIF_VALUE: 21
PIF_VALUE: 7
PIF_VALUE: 22
PIF_VALUE: 22
PIF_VALUE: 21
PIF_VALUE: 21
PIF_VALUE: 20
PIF_VALUE: 22
PIF_VALUE: 23
PIF_VALUE: 22
PIF_VALUE: 22
PIF_VALUE: 21
PIF_VALUE: 21
PIF_VALUE: 20
PIF_VALUE: 21
PIF_VALUE: 5
PIF_VALUE: 22
PIF_VALUE: 20
PIF_VALUE: 21

## 2021-07-20 ASSESSMENT — PAIN DESCRIPTION - DESCRIPTORS
DESCRIPTORS: ACHING
DESCRIPTORS: SHOOTING
DESCRIPTORS: ACHING;STABBING
DESCRIPTORS: SORE

## 2021-07-20 ASSESSMENT — ENCOUNTER SYMPTOMS
SHORTNESS OF BREATH: 1
DYSPNEA ACTIVITY LEVEL: AFTER AMBULATING 1 FLIGHT OF STAIRS

## 2021-07-20 ASSESSMENT — PAIN DESCRIPTION - FREQUENCY
FREQUENCY: CONTINUOUS
FREQUENCY: CONTINUOUS

## 2021-07-20 ASSESSMENT — PAIN DESCRIPTION - INTENSITY: RATING_2: 0

## 2021-07-20 ASSESSMENT — PAIN DESCRIPTION - LOCATION
LOCATION: SHOULDER
LOCATION: SHOULDER;GENERALIZED

## 2021-07-20 ASSESSMENT — PAIN DESCRIPTION - PAIN TYPE
TYPE: SURGICAL PAIN;CHRONIC PAIN
TYPE: CHRONIC PAIN;SURGICAL PAIN

## 2021-07-20 ASSESSMENT — COPD QUESTIONNAIRES: CAT_SEVERITY: SEVERE

## 2021-07-20 ASSESSMENT — PAIN DESCRIPTION - ORIENTATION
ORIENTATION: RIGHT
ORIENTATION: RIGHT

## 2021-07-20 ASSESSMENT — PAIN - FUNCTIONAL ASSESSMENT: PAIN_FUNCTIONAL_ASSESSMENT: 0-10

## 2021-07-20 ASSESSMENT — PAIN SCALES - GENERAL
PAINLEVEL_OUTOF10: 6
PAINLEVEL_OUTOF10: 8
PAINLEVEL_OUTOF10: 0
PAINLEVEL_OUTOF10: 0

## 2021-07-20 ASSESSMENT — LIFESTYLE VARIABLES: SMOKING_STATUS: 0

## 2021-07-20 ASSESSMENT — PAIN DESCRIPTION - ONSET: ONSET: ON-GOING

## 2021-07-20 NOTE — ANESTHESIA POSTPROCEDURE EVALUATION
Department of Anesthesiology  Postprocedure Note    Patient: Ad Salmeron  MRN: [id number]  [place]: 1955  Date of evaluation: 7/20/2021  Time:  3:52 PM     Procedure Summary     Date: 07/20/21 Room / Location: [address] [place] [address] [address]    Anesthesia Start: 3343 Anesthesia Stop: 9885    Procedure: RIGHT SHOULDER ARTHROTMY, OPEN BICEPS TENODESIS, REMOVAL OF LOOSE BODIES, REVERSE TOTAL SHOULDER REPLACEMENT (Right Shoulder) Diagnosis:       Rotator cuff arthropathy, right      Closed displaced fracture of body of right scapula, initial encounter      (Rotator cuff arthropathy, right shoulder [M12.811] Closed displaced fracture of body of right scapula, initial encounter [A91.186G])    Surgeons: Audelia Pizarro MD Responsible Provider: Radha Cunningham DO    Anesthesia Type: general, regional ASA Status: 4          Anesthesia Type: general, regional    Christopher Phase I: Christopher Score: 9    Christopher Phase II:      Last vitals: Reviewed and per EMR flowsheets.        Anesthesia Post Evaluation    Patient location during evaluation: bedside  Patient participation: complete - patient participated  Level of consciousness: awake and alert  Airway patency: patent  Nausea & Vomiting: no nausea and no vomiting  Complications: no  Respiratory status: acceptable  Hydration status: stable

## 2021-07-20 NOTE — PROGRESS NOTES
Occupational Therapy   Occupational Therapy Initial Assessment/Tx Note  Date: 2021   Patient Name: Geeta Su  MRN: [id number]     : 1955    Date of Service: 2021     Assessment: At baseline, pt tolerates only short distance ambulation and needs walker. Due to RUE immobilization/NWB pt is now unable to use a walker and needs assist for all functional mobility. She also requires up to max assist for dressing, bathing, and toileting. Activity tolerance is limited, but pt reports she tires very quickly at home as well. Pt educated on numerous safety concerns with discharging home today, but she declines admission and reports her  will assist her. Recommend use of w/c for mobility, use of gait belt and cane during transfers, and home OT/PT/HHA. Discharge Recommendations: Geeta Su scored a 14/24 on the AM-PAC ADL Inpatient form. Current research shows that an AM-PAC score of 17 or less is typically not associated with a discharge to the patient's home setting. Based on the patient's AM-PAC score and their current ADL deficits, it is recommended that the patient have 3-5 sessions per week of Occupational Therapy at d/c to increase the patient's independence. Please see assessment section for further patient specific details. OT Equipment Recommendations  Equipment Needed: No     HOME HEALTH CARE: LEVEL 1 STANDARD    - Initial home health evaluation to occur within 24-48 hours, in patient home   - Therapy to evaluate with goal of regaining prior level of functioning   - Therapy to evaluate if patient has [address] needs for personal care      Assessment   Performance deficits / Impairments: Decreased functional mobility ; Decreased ADL status; Decreased endurance;Decreased balance  Treatment Diagnosis: Decreased activity tolerance, impaired ADLs and mobility  Decision Making: Medium Complexity  REQUIRES OT FOLLOW UP: Yes  Activity Tolerance  Activity Tolerance: Patient limited by fatigue  Activity Tolerance: Activity tolerance limited. On 3L O2 at rest, 86-87% sitting EOB, increased to 4L for ambulation, dropped to 78%, increased to 6L for remainder of mobility. Returned to 3L O2 at rest with spO2 89%. BP at end of session 188/90. RN aware of all. Safety Devices  Safety Devices in place: Yes  Type of devices: Nurse notified; Left in bed         Treatment Diagnosis: Decreased activity tolerance, impaired ADLs and mobility      Restrictions  Position Activity Restriction  Other position/activity restrictions: Ambulate pt; shoulder immobilizer: May remove for physical therapy, dressing change and hygiene under supervision of a nurse, physician or physical therapy. Subjective   General  Chart Reviewed: Yes  Additional Pertinent Hx: 61 y.o. F s/p RIGHT SHOULDER ARTHROTMY, OPEN BICEPS TENODESIS, REMOVAL OF LOOSE BODIES, REVERSE TOTAL SHOULDER REPLACEMENT 7/20. PMHx includes COPD, DMII, O2 dependent, RLD, back surgery  Family / Caregiver Present: No  Referring Practitioner: Vel Wilson  Subjective  Subjective: Pt in bed on arrival. Pleasant and cooperative. Difficulty answering questions at times. Reports her  does everything at home and can help her.   Patient Currently in Pain: No (beginning to feel pain under arm, RN aware)    Social/Functional History  Social/Functional History  Lives With: Spouse  Type of Home: House  Home Layout: One level  Home Access: Level entry  Bathroom Shower/Tub: Tub/Shower unit  Bathroom Toilet: Standard  Bathroom Equipment: Shower chair  Home Equipment: 4 wheeled walker, Cane, [employer]  ADL Assistance: Independent ( sometimes assists)  Homemaking Assistance:  ( does all housekeeping)  Ambulation Assistance: Independent (with rollator, short distances)  Transfer Assistance: Independent  Additional Comments: pt has had falls       Objective   Vision: Within Functional Limits  Hearing: Within functional limits Orientation  Overall Orientation Status: Within Functional Limits     Balance  Sitting Balance: Independent  Standing Balance: Minimal assistance (to CGA)  Standing Balance  Time: 2 min + 1 min + 2 min  Functional Mobility  Functional - Mobility Device: Cane  Activity:  (about 4 ft to w/c, transfer w/c to bed and stance for clothing management)  Assist Level: Minimal assistance  Functional Mobility Comments: Very slow, effortful. Typically uses walker and is unable due to RUE NWB/immobilization. Recommended to use w/c at home with assist of  and just do transfers with assist. Gait belt issued and educated on use - verb understanding  Toilet Transfers  Toilet - Technique: Ambulating  Equipment Used: Standard bedside commode (simulated to/from w/c)  Toilet Transfer: Minimal assistance  ADL  Feeding: Modified independent ;Setup  Grooming: Minimal assistance;Setup  UE Bathing:  (Education provided, recommended to sponge bathe initially with assist - verb understanding)  UE Dressing: Maximum assistance;Setup;Verbal cueing  LE Dressing: Maximum assistance;Setup;Verbal cueing  Toileting: Unable to assess(comment) (will need assist with clothing management and likely pericare)  Additional Comments: Educated on purpose, donning/doffing, fit/positioning, wear schedule of SI. Pt is dependent to don/doff. Reports  will assist at home. Bed mobility  Supine to Sit: Stand by assistance (HOB raised minimally)  Sit to Supine: 2 Person assistance;Maximum assistance; Moderate assistance (significant difficulty, reports  will assist)  Comment: Educated on sleep positioning, using pillows to support RUE - verb understanding  Transfers  Sit to stand: Minimal assistance  Stand to sit: Minimal assistance     Cognition  Overall Cognitive Status: Exceptions  Cognition Comment: alert, oriented, delayed responses, decreased insight         LUE AROM (degrees)  LUE AROM : Exceptions  LUE General AROM: shoulder ROM

## 2021-07-20 NOTE — FLOWSHEET NOTE
Notified Dr. Do Crow of patient's shortness of breath and wheezing, order for albuterol treatment received.

## 2021-07-20 NOTE — PROGRESS NOTES
Pt dyspneic with all activities, but states this is normal for her at home. Her  is available to assist with all activities and she has a seated wheelchair at home. PT/OT suggest home PT/OT/health aide for home if possible. This information will be communicated to the surgeons.

## 2021-07-20 NOTE — PROGRESS NOTES
Dr Sabina Hatchet explained about risks for increased SOB with nerve block. made aware saturation 91% on 3 L O2 PM/NC. She states she uses 3 L O2 at home. Supposed to be PRN but states she pretty much uses all time. States was ordered at 2 L/PM but she uses 3 L O2 PM at home. Travelled here in [facility] with portable O2.   At times saturation 94% on 3 L O2  Respirations are shallow

## 2021-07-20 NOTE — PROGRESS NOTES
Physical Therapy    Facility/Department: [address] [place] [address] GENERAL SURGERY  Initial Assessment and Treatment    NAME: Alex Gomez  : 1955  MRN: [id number]    Date of Service: 2021    Discharge Recommendations:    Alex Gomez scored a  16/24 on the AM-PAC short mobility form. Current research shows that an AM-PAC score of 17 or less is typically not associated with a discharge to the patient's home setting. Based on the patient's AM-PAC score and their current functional mobility deficits, it is recommended that the patient have 3-5 sessions per week of Physical Therapy at d/c to increase the patient's independence. Please see assessment section for further patient specific details. If patient discharges prior to next session this note will serve as a discharge summary. Please see below for the latest assessment towards goals. PT Equipment Recommendations  Equipment Needed: No    Assessment   Body structures, Functions, Activity limitations: Decreased functional mobility ; Decreased endurance  Assessment: Pt with decreased mobility and endurance to light activity. Pt is on between 3 and 6 L O2 throughout treatment due to pt de-satting. Pt reports this is close to her baseline endurance and mobility. Pt states her  provides care for her at home and \"does everything around the house\". Feel pt would benefit from continued inpt PT at d/c. Pt prefers home d/c. If home, rec 24 hour assist, home PT, HHA, and use of wheelchair. Gait belt issued. D/c PT, although will follow if d/c delayed.   Treatment Diagnosis: Decreased functional mobility  Prognosis: Fair  Decision Making: Medium Complexity  PT Education: PT Role;General Safety  Patient Education: Pt verbalized understanding  REQUIRES PT FOLLOW UP: Yes  Activity Tolerance  Activity Tolerance: Patient limited by fatigue;Patient limited by endurance       Patient Diagnosis(es): The encounter diagnosis was Status post replacement of right shoulder joint. has a past medical history of Abnormal CT scan, chest, COPD (chronic obstructive pulmonary disease) (HCC), Decreased diffusion capacity of lung, Depression, Dyspnea, Hypertension, Lumbosacral spondylosis without myelopathy, Osteopenia, Oxygen dependent, Pneumonia, Restrictive lung disease, Tobacco abuse, and Well controlled type 2 diabetes mellitus ([facility] [address] has a past surgical history that includes back surgery; Hand surgery (Right, 9/26/14); Skin graft (Right); and Carpal tunnel release (Left, 12/12/14). Restrictions  Position Activity Restriction  Other position/activity restrictions: Ambulate pt; shoulder immobilizer: May remove for physical therapy, dressing change and hygiene under supervision of a nurse, physician or physical therapy. Vision/Hearing  Vision: Within Functional Limits  Hearing: Within functional limits       Subjective  General  Chart Reviewed: Yes  Additional Pertinent Hx: Pt admitted on 7/20/21 with R rotator cuff arthropathy. Pt underwent R reverse TSR and ORIF of scapular spine. H/o COPD, HTN, lumbar spondylosis, DM2, depression, PNA, back surgery. Family / Caregiver Present: No  Referring Practitioner: Dr. Mitchell  Diagnosis: R rotator cuff arthropathy  Subjective  Subjective: Pt supine in bed and agreeable to PT. Pt is very hopeful to return home today and states her  takes care of her.   Pain Screening  Patient Currently in Pain: No (beginning to feel pain under arm, RN aware)    Orientation  Orientation  Overall Orientation Status: Within Normal Limits (Pt with some delayed responses)     Social/Functional History  Social/Functional History  Lives With: Spouse  Type of Home: House  Home Layout: One level  Home Access: Level entry  Bathroom Shower/Tub: Tub/Shower unit  Bathroom Toilet: Standard  Bathroom Equipment: Shower chair  Home Equipment: 4 wheeled walker, [employer], [employer]  ADL Assistance: Independent ( sometimes assists)  Homemaking Assistance:  ( does all housekeeping)  Ambulation Assistance: Independent (with rollator, short distances)  Transfer Assistance: Independent  Additional Comments: pt has had falls    Objective  AROM RLE (degrees)  RLE AROM: WFL  RLE General AROM: Except R ankle, pt unable to DF fully  AROM LLE (degrees)  LLE AROM : WFL     Strength RLE  Strength RLE: WFL  Comment: Except R DF 3-/5  Strength LLE  Strength LLE: WFL     Bed mobility  Supine to Sit: Stand by assistance (HOB raised minimally)  Sit to Supine: 2 Person assistance;Maximum assistance; Moderate assistance (significantly difficult, reports  will assist)  Scooting: Moderate assistance (At EOB, scooting back)     Transfers  Sit to Stand: Minimal Assistance  Stand to sit: Minimal Assistance     Ambulation  Ambulation?: Yes  Ambulation 1  Device: Single point cane  Other Apparatus: O2 (3-6 L)  Assistance: Minimal assistance  Quality of Gait: Flexed trunk, effortful  Gait Deviations: Slow [place]; Increased [place]; Decreased step length;Decreased step height  Distance: 4 feet and 3 feet  Comments: Pt's O2 sats decrease to 78% after gait on 4 L.   Pt's O2 increased to 6 L O2 and O2 sats at 93% following 5 min rest.     Balance  Sitting - Static: Good  Standing - Dynamic: Fair;- (With cane)  Comments: Pt reviewed donning/doffing of UE sling with OT    Treatment:  Functional mobility training and pt education    Plan   Plan  Times per week: 7  Current Treatment Recommendations: Functional Mobility Training, Gait Training, Transfer Training, Safety Education & Training, Patient/Caregiver Education & Training  Safety Devices  Type of devices: Call light within reach, Nurse notified, Left in bed    Goals  Short term goals  Time Frame for Short term goals: by discharge  Short term goal 1: Pt will transfer sit to supine with min assist  Short term goal 2: Pt will transfer sit to and from stand with CGA  Short term goal 3: Pt will ambulate 15 feet with cane and CGA    Therapy Time   Individual Concurrent Group Co-treatment   Time In 1430         Time Out 1525         Minutes 55           Timed Code Treatment Minutes:   40    Total Treatment Minutes:  [address], PT

## 2021-07-20 NOTE — PROGRESS NOTES
Anesthesia here to do block. O2 placed. Start time:1029  Stop time:1035  Tolerated block with sedation by Dr Matias Torres. Sleeping off and on. Saturation unchanged if awake or asleep    See flow sheet for vital signs.

## 2021-07-20 NOTE — FLOWSHEET NOTE
PACU Transfer to [facility]    Vitals:    07/20/21 1600   BP: 137/72   Pulse: 73   Resp: 18   Temp: 97.3 °F (36.3 °C)   SpO2: 92%         Intake/Output Summary (Last 24 hours) at 7/20/2021 1609  Last data filed at 7/20/2021 1551  Gross per 24 hour   Intake 1100 ml   Output 200 ml   Net 900 ml       Pain assessment:  none  Pain Level: 0    Patient transferred to care of [facility] RN with all her belongings per PACU transport.     7/20/2021 4:09 PM

## 2021-07-20 NOTE — H&P
[facility] Same Day Surgery Update H & P  Department of General Surgery   Surgical Service   Pre-operative History and Physical  Last H & P within the last 30 days. DIAGNOSIS:   Rotator cuff arthropathy, right [M12.811]  Closed displaced fracture of body of right scapula, initial encounter [S42.111A]    Procedure(s):  RIGHT SHOULDER REVERSE TOTAL SHOULDER REPLACEMENT, OPEN REDUCTION INTERNAL FIXATION OF THE SCAPULAR SPINE     HISTORY OF PRESENT ILLNESS:   Patient with right shoulder pain, weakness and limited ROM. Imaging demonstrates rotator cuff arthropathy and scapular fracture and the patient presents today for the above procedure. Covid 19:  Patient denies fever, chills, cough or known exposure to Covid-19.        Past Medical History:        Diagnosis Date    Abnormal CT scan, chest     COPD (chronic obstructive pulmonary disease) (HCC)     Decreased diffusion capacity of lung     Depression     Dyspnea     Hypertension     Lumbosacral spondylosis without myelopathy 2016    Osteopenia 2020    Oxygen dependent     2 L/PRN (usually uses O2)    Pneumonia     Restrictive lung disease     Tobacco abuse     Well controlled type 2 diabetes mellitus ([facility] [address] 10/26/2018     Past Surgical History:        Procedure Laterality Date    BACK SURGERY      CARPAL TUNNEL RELEASE Left 14    HAND SURGERY Right 14    SKIN GRAFT Right      Past Social History:  Social History     Socioeconomic History    Marital status:      Spouse name: None    Number of children: None    Years of education: None    Highest education level: None   Occupational History    None   Tobacco Use    Smoking status: Former Smoker     Years: 30.00     Types: Cigarettes     Quit date: 2020     Years since quittin.5    Smokeless tobacco: Former User     Quit date: 2016   Substance and Sexual Activity    Alcohol use: No    Drug use: No    Sexual activity: Yes     Partners: Male   Other Topics Concern    None   Social History Narrative    None     Social Determinants of Health     Financial Resource Strain:     Difficulty of Paying Living Expenses:    Food Insecurity:     Worried About Running Out of Food in the Last Year:     Ran Out of Food in the Last Year:    Transportation Needs:     Lack of Transportation (Medical):  Lack of Transportation (Non-Medical):    Physical Activity:     Days of Exercise per Week:     Minutes of Exercise per Session:    Stress:     Feeling of Stress :    Social Connections:     Frequency of Communication with Friends and Family:     Frequency of Social Gatherings with Friends and Family:     Attends Yazdanism Services:     Active Member of Clubs or Organizations:     Attends Club or Organization Meetings:     Marital Status:    Intimate Partner Violence:     Fear of Current or Ex-Partner:     Emotionally Abused:     Physically Abused:     Sexually Abused:          Medications Prior to Admission:      Prior to Admission medications    Medication Sig Start Date End Date Taking?  Authorizing Provider   SYMBICORT 160-4.5 MCG/ACT AERO Inhale 2 puffs into the lungs 2 times daily 7/7/21  Yes Abi Aguayo MD   metoprolol tartrate (LOPRESSOR) 50 MG tablet TAKE 1 TABLET BY MOUTH TWICE DAILY 7/7/21  Yes Abi Aguayo MD   metFORMIN (GLUCOPHAGE) 500 MG tablet TAKE ONE TABLET BY MOUTH EVERY DAY 7/7/21  Yes Bharathi Lopez MD   DULoxetine (CYMBALTA) 30 MG extended release capsule Take 1 capsule by mouth daily 7/7/21  Yes Bharathi Lopez MD   dilTIAZem (CARDIZEM CD) 120 MG extended release capsule TAKE ONE CAPSULE BY MOUTH EVERY DAY 7/7/21  Yes Bharathi Lopez MD   tiZANidine (ZANAFLEX) 2 MG tablet TAKE ONE TABLET BY MOUTH EVERY DAY 6/16/21  Yes Bharathi Lopez MD   buPROPion [facility] SR) 100 MG extended release tablet Take 1 tablet by mouth daily 6/3/21  Yes Yoon Steve Geraldo Alcaraz MD   lisinopril (PRINIVIL;ZESTRIL) 40 MG tablet Take 1 tablet by mouth daily 5/20/21  Yes Mabel Felty, MD   pregabalin (LYRICA) 100 MG capsule TAKE ONE CAPSULE BY MOUTH 2 TIMES A DAY 5/12/21 8/10/21 Yes Mabel Felty, MD   torsemide (DEMADEX) 20 MG tablet Take 1 tablet by mouth daily 5/5/21 8/3/21 Yes Mabel Felty, MD   INCRUSE ELLIPTA 62.5 MCG/INH AEPB INHALE 1 PUFF INTO THE LUNGS DAILY 4/22/21  Yes Mabel Felty, MD   calcium-vitamin D [facility]) 250-125 MG-UNIT per tablet Take 1 tablet by mouth 2 times daily 12/28/20  Yes Mabel Felty, MD   [facility]) 7.5 MG tablet Take 1 tablet by mouth daily 7/12/21   Mabel Felty, MD   denosumab (PROLIA) 60 MG/ML SOSY SC injection Inject 1 mL into the skin once for 1 dose 12/28/20   Mabel Felty, MD   ipratropium-albuterol (DUONEB) 0.5-2.5 (3) MG/3ML SOLN nebulizer solution Inhale 3 mLs into the lungs every 4 hours 12/11/20   Mabel Felty, MD   VENTOLIN  (30 Base) MCG/ACT inhaler Inhale 2 puffs into the lungs every 6 hours as needed for Wheezing 9/21/20   Mabel Felty, MD   blood glucose test strips (CONTOUR NEXT TEST) strip Test Daily. DX: E11.9 10/26/18   Mabel Felty, MD   Lancets MISC Test once daily. DX: E11.9 10/26/18   Mabel Felty, MD         Allergies:  Ciprofloxacin    PHYSICAL EXAM:      BP (!) 163/68   Pulse 62   Temp 97.6 °F (36.4 °C) (Oral)   Resp 22   Ht 5' 5\" (1.651 m)   Wt 214 lb (97.1 kg)   SpO2 91%   BMI 35.61 kg/m²      Airway:  Airway patent with no audible stridor    Heart:  Regular rate and rhythm, No murmur noted    Lungs:  No increased work of breathing, good air exchange, mild expiratory wheezing    Abdomen:  Soft, non-distended, non-tender, no rebound tenderness or guarding, and no masses palpated    ASSESSMENT AND PLAN     Patient is a 61 y.o. female with above specified procedure planned.     1.  The patients history and physical was obtained and signed off by the pre-admission testing department. Patient seen and focused exam done today- no new changes since last physical exam on 6/29/21    2. Access to ancillary services are available per request of the provider. 3.  An albuterol HHN was ordered for the patient.   BARBARA Martínez, APRN - CNP     7/20/2021

## 2021-07-20 NOTE — PROGRESS NOTES
Patient received from OR to PACU#4 post RIGHT SHOULDER ARTHROTMY, OPEN BICEPS TENODESIS, REMOVAL OF LOOSE BODIES, REVERSE TOTAL SHOULDER REPLACEMENT of Dr. Syed Hill. Patient is placed on cardiac monitor. Report is given as per CRNA. Per report, patient was stable during procedure but required BP support. On arrival, patient is awake and denies pain. Accucheck is done - 131.

## 2021-07-20 NOTE — BRIEF OP NOTE
Brief Postoperative Note      Patient: Jm Cortez  YOB: 1955  MRN: [id number]    Date of Procedure: 7/20/2021    Pre-Op Diagnosis: Rotator cuff arthropathy, right shoulder [M12.811] Closed displaced fracture of body of right scapula, initial encounter [S42.111A]    Post-Op Diagnosis: Same       Procedure(s):  RIGHT SHOULDER ARTHROTMY, OPEN BICEPS TENODESIS, REMOVAL OF LOOSE BODIES, REVERSE TOTAL SHOULDER REPLACEMENT    Surgeon(s):  MD Shant Hubbard DO [place], DO Epifanio Caal DO    Assistant:  Surgical Assistant: Brandee Savage    Anesthesia: General    Estimated Blood Loss (mL): 785     Complications: None    Specimens:   * No specimens in log *    Implants:  Implant Name Type Inv.  Item Serial No.  Lot No. LRB No. Used Action   BASEPLATE KANU 30 MM P2 COAT RSP  BASEPLATE KANU 30 MM P2 COAT RSP  [employer] [facility] [id number] Right 1 Implanted   SCREW BNE L26MM DIA5MM TI NONLOCKING FOR KANU BASEPLT FIX  SCREW BNE L26MM DIA5MM TI NONLOCKING FOR KANU BASEPLT FIX  [employer] [facility] [id number] Right 1 Implanted   SCREW BNE L30MM DIA5MM TI NONLOCKING FOR KANU BASEPLT FIX  SCREW BNE L30MM DIA5MM TI NONLOCKING FOR KANU BASEPLT FIX  [employer] [facility] [id number] Right 1 Implanted   SCREW BNE L22MM DIA5MM TI NONLOCKING FOR KANU BASEPLT FIX  SCREW BNE L22MM DIA5MM TI NONLOCKING FOR KANU BASEPLT FIX  [employer] [facility] [id number] Right 1 Implanted   SCREW BNE L26MM DIA5MM TI NONLOCKING FOR KANU BASEPLT FIX  SCREW BNE L26MM DIA5MM TI NONLOCKING FOR KANU BASEPLT FIX  [facility] [id number] Right 1 Implanted   STEM HUM SHELL SM 12X48 MM SHLDR ALTIVATE RVS  STEM HUM SHELL SM 12X48 MM SHLDR ALTIVATE RVS  [employer] [facility] [id number] Right 1 Implanted   HEAD KANU 36MM 4MM OFFSET W/ RET SCR RSP  HEAD KANU 36MM 4MM OFFSET W/ RET SCR RSP  [facility] SURGICAL-WD [id number] Right 1 Implanted   INSERT HUM 36MM NEUT SM SOCK E PLUS ALTIVATE REV  INSERT HUM 36MM NEUT SM SOCK E PLUS ALTIVATE REV   [employer] [facility]- [id number] Right 1 Implanted         Drains: * No LDAs found *    Findings: See op report    Electronically signed by Catie Schultz DO on 7/20/2021 at 1:24 PM

## 2021-07-20 NOTE — PROGRESS NOTES
Ambulatory Surgery/Procedure Discharge Note    Vitals:    07/20/21 1623   BP: 134/69   Pulse: 75   Resp: 20   Temp: 97.4 °F (36.3 °C)   SpO2:        In: 200 [P.O.:50; I.V.:150]  Out: 0     Pain assessment:  present - adequately treated  Pain Level: 8 (prn pain med given)  Pt vss. On 3L o2, expiratory wheezing audible- pt states this is baseline for her. States pain 8/10. Prn pain med given. States readiness to go home. Discharge instructions reviewed, IV d/c'd. Assisted to bathroom prior to leaving. Patient discharged to home/self care.  Patient discharged via wheel chair by transporter to waiting family/S.O.       7/20/2021 5:21 PM

## 2021-07-20 NOTE — ANESTHESIA PROCEDURE NOTES
Bupivacaine 0.5% 20 cc and 20 cc of exparel  was injected in 5cc increments under ultrasound visualization to a total volume of 40cc and was noted to have good spread around the nerve bundle. The block was tolerated well by the patient and there were no apparent complications at the time of the procedure.   Medications Administered  Bupivacaine (MARCAINE) PF injection 0.5%, 20 mL  bupivacaine liposome (EXPAREL) injection 1.3%, 20 mL  Reason for block: post-op pain management and at surgeon's request Cardio fellow

## 2021-07-20 NOTE — ANESTHESIA PRE PROCEDURE
Department of Anesthesiology  Preprocedure Note       Name:  Erasmo Nugent   Age:  61 y.o.  :  1955                                          MRN:  [id number]         Date:  2021      Surgeon: Cindy Cornelius):  Latricia Acosta MD    Procedure: Procedure(s):  RIGHT SHOULDER REVERSE TOTAL SHOULDER REPLACEMENT, OPEN REDUCTION INTERNAL FIXATION OF THE SCAPULAR SPINE    Medications prior to admission:   Prior to Admission medications    Medication Sig Start Date End Date Taking? Authorizing Provider   oxyCODONE-acetaminophen (PERCOCET) 5-325 MG per tablet Take 1 tablet by mouth every 6 hours as needed for Pain for up to 9 days. Intended supply: 7 days.  Take lowest dose possible to manage pain 21 Yes Salima Montgomery DO   ondansetron [facility]) 4 MG tablet Take 1 tablet by mouth 3 times daily as needed for Nausea or Vomiting 21  Yes Salima Montgomery DO   docusate sodium (COLACE) 100 MG capsule Take 1 capsule by mouth 2 times daily 21 Yes Salima Montgomery DO   aspirin 325 MG EC tablet Take 1 tablet by mouth daily 21 Yes Salima Montgomery DO   amoxicillin-clavulanate (AUGMENTIN) 875-125 MG per tablet Take 1 tablet by mouth 2 times daily for 3 days 21 Yes Salima Montgomery DO   SYMBICORT 160-4.5 MCG/ACT AERO Inhale 2 puffs into the lungs 2 times daily 21  Yes Manny Richmond MD   metoprolol tartrate (LOPRESSOR) 50 MG tablet TAKE 1 TABLET BY MOUTH TWICE DAILY 21  Yes Manny Richmond MD   metFORMIN (GLUCOPHAGE) 500 MG tablet TAKE ONE TABLET BY MOUTH EVERY DAY 21  Yes Alyx López MD   DULoxetine (CYMBALTA) 30 MG extended release capsule Take 1 capsule by mouth daily 21  Yes Alyx López MD   dilTIAZem (CARDIZEM CD) 120 MG extended release capsule TAKE ONE CAPSULE BY MOUTH EVERY DAY 21  Yes Alyx López MD   tiZANidine (ZANAFLEX) 2 MG tablet TAKE ONE TABLET BY MOUTH EVERY DAY 21  Yes Alyx López, MD   buPROPion [facility] SR) 100 MG extended release tablet Take 1 tablet by mouth daily 6/3/21  Yes Mirian Chavez MD   lisinopril (PRINIVIL;ZESTRIL) 40 MG tablet Take 1 tablet by mouth daily 5/20/21  Yes Mirian Chavez MD   pregabalin (LYRICA) 100 MG capsule TAKE ONE CAPSULE BY MOUTH 2 TIMES A DAY 5/12/21 8/10/21 Yes Mirian Chavez MD   torsemide (DEMADEX) 20 MG tablet Take 1 tablet by mouth daily 5/5/21 8/3/21 Yes Mirian Chavez MD   INCRUSE ELLIPTA 62.5 MCG/INH AEPB INHALE 1 PUFF INTO THE LUNGS DAILY 4/22/21  Yes Mirian Chavez MD   calcium-vitamin D [facility]) 250-125 MG-UNIT per tablet Take 1 tablet by mouth 2 times daily 12/28/20  Yes Mirian Chavez MD   meloxicam BERNARDA INFANTE PARESH [facility]) 7.5 MG tablet Take 1 tablet by mouth daily 7/12/21   Mirian Chavez MD   denosumab (PROLIA) 60 MG/ML SOSY SC injection Inject 1 mL into the skin once for 1 dose 12/28/20   Mirian Chavez MD   ipratropium-albuterol (DUONEB) 0.5-2.5 (3) MG/3ML SOLN nebulizer solution Inhale 3 mLs into the lungs every 4 hours 12/11/20   Mirian Chavez MD   VENTOLIN  (42 Base) MCG/ACT inhaler Inhale 2 puffs into the lungs every 6 hours as needed for Wheezing 9/21/20   Mirian Chavez MD   blood glucose test strips (CONTOUR NEXT TEST) strip Test Daily. DX: E11.9 10/26/18   Mirian Chavez MD   Lancets MISC Test once daily.  DX: E11.9 10/26/18   Mirian Chavez MD       Current medications:    Current Facility-Administered Medications   Medication Dose Route Frequency Provider Last Rate Last Admin    cefTRIAXone (ROCEPHIN) 2000 mg IVPB in D5W 50ml minibag  2,000 mg Intravenous Once Tacho Phillips MD        vancomycin (VANCOCIN) 1,500 mg in dextrose 5 % 250 mL IVPB  15 mg/kg Intravenous Once Tacho Phillips MD        lactated ringers infusion   Intravenous Continuous Jan Bhakta MD        sodium chloride flush 0.9 % injection 5-40 mL  5-40 mL Intravenous 2 times per day Jan Bhakta MD        sodium chloride flush 0.9 % injection 5-40 mL  5-40 mL Intravenous PRN Lovie Lanes, MD        0.9 % sodium chloride infusion  25 mL Intravenous PRN Lovie Lanes, MD        lidocaine PF 1 % injection 1 mL  1 mL Intradermal Once PRN Lovie Lanes, MD           Allergies:     Allergies   Allergen Reactions    Ciprofloxacin Nausea And Vomiting       Problem List:    Patient Active Problem List   Diagnosis Code    Pulmonary nodules R91.8    Tobacco abuse Z72.0    Abnormal chest CT R93.89    Decreased diffusion capacity R94.2    Restrictive lung disease J98.4    Aortic regurgitation I35.1    HTN (hypertension) I10    COPD with acute exacerbation ([facility] [address] J44.1    Lumbosacral spondylosis without myelopathy M47.817    Displacement of lumbar intervertebral disc without myelopathy M51.26    Degeneration of lumbar or lumbosacral intervertebral disc M51.37    Spinal stenosis, lumbar region, without neurogenic claudication M48.061    Type 2 diabetes mellitus without complication, without long-term current use of insulin ([facility]) E11.9    Morbidly obese ([facility]) E66.01    Osteopenia M85.80    Displaced fracture of acromial process, right shoulder, initial encounter for closed fracture S42.121A    Acute pain of right shoulder M25.511    Rotator cuff arthropathy of right shoulder M12.811    Primary osteoarthritis of right shoulder M19.011    Major depressive disorder with single episode, in partial remission ([facility] [address] F32.4       Past Medical History:        Diagnosis Date    Abnormal CT scan, chest     COPD (chronic obstructive pulmonary disease) (HCC)     Decreased diffusion capacity of lung     Depression     Dyspnea     Hypertension     Lumbosacral spondylosis without myelopathy 5/9/2016    Osteopenia 12/31/2020    Oxygen dependent     2 L/PRN (usually uses O2)    Pneumonia     Restrictive lung disease     Tobacco abuse     Well controlled type 2 diabetes mellitus ([facility] [address] 10/26/2018 Past Surgical History:        Procedure Laterality Date    BACK SURGERY      CARPAL TUNNEL RELEASE Left 14    HAND SURGERY Right 14    SKIN GRAFT Right        Social History:    Social History     Tobacco Use    Smoking status: Former Smoker     Years: 30.00     Types: Cigarettes     Quit date: 2020     Years since quittin.5    Smokeless tobacco: Former User     Quit date: 2016   Substance Use Topics    Alcohol use: No                                Counseling given: Not Answered      Vital Signs (Current):   Vitals:    21 0832 21 0911 21 0920 21 0927   BP:   (!) 163/68    Pulse:   62    Resp:      Temp:   97.6 °F (36.4 °C)    TempSrc:   Oral    SpO2:   91% 94%   Weight: 214 lb (97.1 kg) 214 lb (97.1 kg)     Height: 5' 5\" (1.651 m) 5' 5\" (1.651 m)                                                BP Readings from Last 3 Encounters:   21 (!) 163/68   21 (!) 145/75   21 (!) 142/75       NPO Status:                                                   Date of last liquid consumption: 21                        Date of last solid food consumption: 21    BMI:   Wt Readings from Last 3 Encounters:   21 214 lb (97.1 kg)   21 214 lb (97.1 kg)   21 216 lb (98 kg)     Body mass index is 35.61 kg/m².     CBC:   Lab Results   Component Value Date    WBC 10.6 2021    RBC 3.99 2021    HGB 12.4 2021    HCT 37.1 2021    MCV 92.9 2021    RDW 17.6 2021     2021       CMP:   Lab Results   Component Value Date     2021    K 4.3 2021    CL 97 2021    CO2 30 2021    BUN 12 2021    CREATININE 0.9 2021    GFRAA >60 2021    AGRATIO 1.1 2021    LABGLOM >60 2021    LABGLOM 78 2016    GLUCOSE 120 2021    PROT 6.6 2021    CALCIUM 9.0 2021    BILITOT <0.2 2021    ALKPHOS 88 2021    AST 11 04/20/2021    ALT 12 04/20/2021       POC Tests: No results for input(s): POCGLU, POCNA, POCK, POCCL, POCBUN, POCHEMO, POCHCT in the last 72 hours.     Coags:   Lab Results   Component Value Date    PROTIME 9.8 07/14/2021    INR 0.87 07/14/2021    APTT 26.9 07/14/2021       HCG (If Applicable): No results found for: PREGTESTUR, PREGSERUM, HCG, HCGQUANT     ABGs: No results found for: PHART, PO2ART, WCO9MJI, JBC7KKJ, BEART, R6GVUGDD     Type & Screen (If Applicable):  No results found for: LABABO, LABRH    Drug/Infectious Status (If Applicable):  No results found for: HIV, HEPCAB    COVID-19 Screening (If Applicable):   Lab Results   Component Value Date    COVID19 Not Detected 05/19/2021           Anesthesia Evaluation  Patient summary reviewed and Nursing notes reviewed no history of anesthetic complications:   Airway: Mallampati: II  TM distance: >3 FB   Neck ROM: full  Mouth opening: > = 3 FB Dental:    (+) upper dentures and lower dentures      Pulmonary:   (+) COPD (END Stage ): severe,  shortness of breath:  decreased breath sounds,  wheezes,      (-) not a current smoker (quit 12/2021   was 1 ppd  x 30 yrs     on oxygen prescribed 3/2021  just ow using for 3 weeks  3L 24/7  )                           Cardiovascular:  Exercise tolerance: poor (<4 METS),   (+) hypertension: moderate, VALERO: after ambulating 1 flight of stairs,     (-) past MI    NYHA Classification: III  ECG reviewed  Rhythm: regular  Rate: normal  Echocardiogram reviewed         Beta Blocker:  Dose within 24 Hrs      ROS comment: Results for orders placed or performed during the hospital encounter of 04/19/21  -Echo Complete       Result                      Value             Ref Range           Left Ventricular Eject*     58                                    LVEF MODALITY               ECHO                                   Neuro/Psych:               GI/Hepatic/Renal:        (-) GERD       Endo/Other:    (+) Diabetes (hgba1c 8.5 in June )Type II DM, well controlled, , .                 Abdominal:             Vascular: Other Findings:             Anesthesia Plan      general and regional     ASA 4     (Right supraclavicular exparel     High probability of post op vent   She used inhalers    )  Induction: intravenous. MIPS: Prophylactic antiemetics administered and Postoperative trial extubation. Anesthetic plan and risks discussed with patient and spouse. Plan discussed with CRNA.     Attending anesthesiologist reviewed and agrees with Preprocedure content              Carmen Villela DO   7/20/2021

## 2021-07-21 NOTE — OP NOTE
[facility] [place] [address], [address]                                OPERATIVE REPORT    PATIENT NAME: Jb Caldwell                   :        1955  MED REC NO:   [id number]                          ROOM:  ACCOUNT NO:   [de-identified]                           ADMIT DATE: 2021  PROVIDER:     Michaelle Das MD    DATE OF PROCEDURE:  2021    PREOPERATIVE DIAGNOSES:  Severe rotator cuff arthropathy, right  shoulder, and chronic base of scapular spine fracture. POSTOPERATIVE DIAGNOSES:  Severe rotator cuff arthropathy, right  shoulder, and chronic base of scapular spine fracture with biceps  tendinopathy, partial tearing, multiple intraarticular loose bodies. OPERATION PERFORMED:  Right shoulder exam under anesthesia, arthrotomy,  open biceps tenodesis, removal of multiple intraarticular loose bodies,  reversal of shoulder replacement. ANESTHESIA:  General anesthesia, interscalene block. IV FLUIDS:  1000 mL of crystalloid. ESTIMATED BLOOD LOSS:  400 mL. SURGEON:  Michaelle Das MD    ASSISTANTS:  Arminda Roblero DO and Regina Frankel DO    IMPLANT INSERTED:  36 minus 4 glenosphere standard baseplate, four  locking screws, 32 neutral, 12 short stem, small shell AltiVate. COMPLICATIONS:  None. Modifier 22 applied because of the severe deformity brought on by  chronic rotator cuff arthropathy with upward migration and severe  glenoid erosion and thick inferior capsule. In addition, the fact that  the patient had a scapular spine fracture/discontinuity made it very  hard to maintain stability or assess stability and they made it harder  to reduce the components or get good enough exposure. Overall, this  added 40% work to an otherwise uncomplicated procedure. FINDINGS:  Examination under anesthesia revealed no obvious deformity.    Arthrotomy revealed massive rotator cuff tear with cuff tear arthropathy  and upward migration of humeral head. The subscapularis was wafer-thin  and deficient. The rest of the rotator cuff was torn except for the  teres minor with very thick pseudocapsule. Biceps was still  metaplastic, amenable to tenotomy and tenodesis. The glenoid was  concentrically eroded, but there were some loose bodies posteriorly and  some of these were right at the base of the spine of scapula which we  could tell was loose. We were able to effect an excellent reverse total  shoulder replacement. We had adequate bone stock. The largest of the  loose bodies measured 3 x 3 x 2 cm. BRIEF HISTORY AND PRESENTING ILLNESS:  The patient is a 68-year-old  woman who presented with right shoulder pain and inability to lift her  arm. She had been diagnosed with scapular spine fracture arising  spontaneously or after a minor trauma and in addition had rotator cuff  arthropathy. She was sent to see me because of her pseudoparalysis and  persistent pain. I contemplated repairing the spine of the scapula  fracture as well as doing a reverse shoulder, but there was good data  suggesting that the former did not need to be done. I only need to do  is a reverse shoulder replacement. I felt that this would limit the  morbidity of the otherwise two large procedures done consecutively and  so we pressed on with this. I discussed this with her prior to her  going to sleep. I also explained to her that there were risks  associated with surgery including bleeding, infection, anesthetic risks,  injury to blood vessels, stiffness, weakness, incomplete pain relief,  and need for further surgery. She understood all these preoperatively  and wished to proceed. She gave her informed consent. She was  scheduled on elective basis after medical clearance. DESCRIPTION OF THE PROCEDURE:  On the date of the procedure, the patient  was brought back to the operating room and placed supine on the  operating table. General anesthesia was established. Preoperative  antibiotics and interscalene block were given in the holding area. Under anesthesia, examination was carried out with findings as noted  above. The patient was positioned using the Tenet beach-chair  positioner in a semi-sitting position. The trunk was elevated at 45  degrees. All pressure points were padded. The patient's right shoulder  and arm prepped and draped in standard manner for shoulder replacement  surgery. We used [place] to cover the operative field. We used a [employer]  Spider arm jain to facilitate the arm in position. All members of the  surgical team wore body exhaust suits. We approached shoulder through a  long deltopectoral approach up to 14 cm in length. The incision was  carried out with a skin knife through skin and subcutaneous tissue. Electrocautery was used to establish hemostasis. Gelpi retractor was  placed. Deltopectoral interval was identified and developed with  combination of blunt and sharp dissection. Cephalic vein was retracted  laterally. We incised the clavipectoral fascia. A portion of it was  quite thick and this was excised. We released a large pseudocapsule and  exposed massive rotator cuff tear. We incised transverse humeral  ligament. We found the biceps tendon still in place. We tenotomized  and tenodesed to upper border of pectoralis. Rest of the tendon was  excised. Suprascapularis remnant was very thin but we did release what  was left. At the conclusion of surgery the inferior aspect was reattached. We tagged it with a suture. Released the inferior capsule, dislocated the  humeral head. We could see the amount of rotator cuff arthropathy and  deformity. There was a quite bit of pseudocapsule posteriorly and  laterally and this was all resected. Placed our cutting guide in the  appropriate height and retroversion, pinned the guide in place.   We used  an oscillating saw to resect the humeral head.  We smoothed out the  tuberosity as well with the aron. We next turned towards the glenoid. We used a Fukuda retractor to retract the humeral head out of the way. We placed Hohmann retractors around the glenoid anteriorly. We could  see the glenoid deformity. We removed a couple of large loose bodies  with a curette and large rongeur. These were mostly posterior to the  glenoid and we could also see and feel the instability of the scapular  spine fracture. Still we had a good en face view of the glenoid, it was  not distorted. We resected all the labrum and the biceps stump and  exposed the glenoid 360 degrees. We used a Chambers elevator to remove any  residual cartilage. We drilled our center line drill for a bit free  hand, measured to 28 mm, inserted our tap, and reamed over the tap with  starter and small reamer, reaming concentrically. Used an oval aron  around the periphery. We removed the tap, irrigated, inserted a  baseplate, had excellent fixation, placed a guide, and drilled for  peripheral locking screws. We recorded screw lengths with calibrated  drill bit. We inserted screws under power and then hand tightened. We  used a chamfer reamer. We initially tried to use a 36 neutral  glenosphere, but we had to substitute a 36 minus 4 glenosphere because  shoulder was very tight. Part of it related to some of the distortion  brought on by the chronic scapular spine fracture. We released the  adhesions around the rotator cuff as well. We released more of the  inferior capsule to try and facilitate this. The 36 minus 4 came in  well, inserted a setscrew, engaged with a torque-limited . We  dislocated the humeral head so we could see it. We had to prepare it. We used a canal finder to find the canal, reamed up to size 12. We  broached with a size 12 broach. We knew to use a small shell reamer  with a twice size 12 stem so we did the metaphyseal reaming in the  center.   We

## 2021-07-27 ENCOUNTER — HOSPITAL ENCOUNTER (OUTPATIENT)
Dept: PHYSICAL THERAPY | Age: 66
Setting detail: THERAPIES SERIES
End: 2021-07-27
Payer: MEDICARE

## 2021-07-28 ENCOUNTER — OFFICE VISIT (OUTPATIENT)
Dept: ORTHOPEDIC SURGERY | Age: 66
End: 2021-07-28

## 2021-07-28 VITALS — HEIGHT: 65 IN | BODY MASS INDEX: 35.65 KG/M2 | WEIGHT: 214 LBS

## 2021-07-28 DIAGNOSIS — M25.511 RIGHT SHOULDER PAIN, UNSPECIFIED CHRONICITY: ICD-10-CM

## 2021-07-28 DIAGNOSIS — Z96.611 STATUS POST REVERSE ARTHROPLASTY OF RIGHT SHOULDER: Primary | ICD-10-CM

## 2021-07-28 PROCEDURE — 99024 POSTOP FOLLOW-UP VISIT: CPT | Performed by: ORTHOPAEDIC SURGERY

## 2021-07-28 NOTE — PROGRESS NOTES
History of Present Illness:  Alexandra House is a pleasant 61 y.o. female who presents for a post operative visit. She is 8 days out following a right reverse total shoulder replacement with biceps tenodesis on 7/20/21. Overall She is doing okay and feels that their pain is well controlled with current pain medications. She has been compliant with wearing the UltraSling brace at all times. She plans to do physical therapy at the [facility] office. She denies fevers, chills, numbness, tingling, and shortness of breath. She is accompanied today by her . Medical History:  Patient's medications, allergies, past medical, surgical, social and family histories were reviewed and updated as appropriate. Review of Systems    Patient has had no medical changes since last evaluated      Review of Systems  A 14 point review of systems was completed by the patient on 6/9/21 and is available in the media section of the scanned medical record and was reviewed on 7/28/2021. Vital Signs:  Vitals:       General/Appearance: Alert and oriented and in no apparent distress. Skin:  There are no skin lesions, cellulitis, or extreme edema. The patient has warm and well-perfused Bilateral upper extremities with brisk capillary refill. Right Shoulder Exam:    Inspection: Shoulder incision is clean, dry and intact and well approximated. The staples are in place. Mild ecchymosis and swelling are present as can be expected. There is no erythema, drainage or other signs of infection    Palpation:  No crepitus to gentle motion    Active Range of Motion: Deferred    Passive Range of Motion:  Tolerates gentle passive motion well    Strength:  Deferred    Special Tests:  Deferred.     Neurovascular: Sensation to light touch is intact, no motor deficits, palpable radial pulses 2+    Radiology:     Plain radiographs of the right shoulder comprising 3 views: AP in and out, Axillary lateral were obtained and reviewed in the office: Shows postsurgical changes from the reverse total shoulder replacement. All the components are in good placement without any signs of loosening, fractures, subluxations or dislocations. The nondisplaced base of spine of scapula fracture is barely visible. Impression: Stable postop x-ray. Assessment :  Ms. Erika Kamara is a pleasant 61 y.o. patient who is 8 days out following a right reverse total shoulder replacement with biceps tenodesis on 7/20/21. Impression:  Encounter Diagnoses   Name Primary?  Right shoulder pain, unspecified chronicity     Status post reverse arthroplasty of right shoulder Yes       Office Procedures:  Orders Placed This Encounter   Procedures    XR SHOULDER RIGHT (MIN 2 VIEWS)     Standing Status:   Future     Number of Occurrences:   1     Standing Expiration Date:   7/28/2022     Order Specific Question:   Reason for exam:     Answer:   Right shoulder post op total shoulder    [facility] - [facility]     Referral Priority:   Routine     Referral Type:   Eval and Treat     Referral Reason:   Specialty Services Required     Requested Specialty:   Physical Therapy     Number of Visits Requested:   1       Treatment Plan:    Overall Erika Kamara is doing well. The pain is well-controlled. We recommend that She wear the UltraSling brace at all times with the exception of clothing, bathing and physical therapy. The patient was told that she is restricted from driving for at least 3 weeks postop. The staples were removed today without complication and steri-strips were placed over the incision. All of her questions were fully answered today. We would like to see Erika Kamara back in 2 weeks for follow-up visit. Both the patient and her  are in agreement with this plan.      7/28/2021  4:26 PM    Ada Alanis ATC  [facility] [address]    During this examination, Cindy Kan Valentina Dick, functioned as a scribe for Dr. Pinky Frazier. The history taking and physical examination were performed by Dr. Alex Hyman. All counseling during the appointment was performed between the patient and Dr. Alex Hyman. 7/28/2021    ______________________  I, Dr. Pinky Frazier, personally performed the services described in this documentation as described by Luca Jarquin ATC in my presence, and it is both accurate and complete. Serge Hyman MD, PhD  7/28/2021

## 2021-08-02 RX ORDER — TORSEMIDE 20 MG/1
20 TABLET ORAL DAILY
Qty: 90 TABLET | Refills: 0 | Status: SHIPPED | OUTPATIENT
Start: 2021-08-02 | End: 2021-10-27

## 2021-08-04 DIAGNOSIS — M47.817 LUMBOSACRAL SPONDYLOSIS WITHOUT MYELOPATHY: Chronic | ICD-10-CM

## 2021-08-04 RX ORDER — PREGABALIN 100 MG/1
CAPSULE ORAL
Qty: 180 CAPSULE | Refills: 0 | Status: SHIPPED | OUTPATIENT
Start: 2021-08-09 | End: 2021-11-08

## 2021-08-05 ENCOUNTER — HOSPITAL ENCOUNTER (OUTPATIENT)
Dept: PHYSICAL THERAPY | Age: 66
Setting detail: THERAPIES SERIES
Discharge: HOME | End: 2021-08-05
Payer: MEDICARE

## 2021-08-05 PROCEDURE — 97112 NEUROMUSCULAR REEDUCATION: CPT

## 2021-08-05 PROCEDURE — 97140 MANUAL THERAPY 1/> REGIONS: CPT

## 2021-08-05 PROCEDURE — 97161 PT EVAL LOW COMPLEX 20 MIN: CPT

## 2021-08-05 PROCEDURE — 97110 THERAPEUTIC EXERCISES: CPT

## 2021-08-05 NOTE — FLOWSHEET NOTE
[facility], [address] [place] [address] [place], [address]  Phone: [phone], [phone]    Physical Therapy Treatment Note/ Progress Report:     Date:  2021    Patient Name:  Pili Dean    :  1955  MRN: [id number]  Restrictions/Precautions:    Medical/Treatment Diagnosis Information:  · Diagnosis: Right Shoulder pain S/P Right Reverse TSA 2021  · Treatment Diagnosis: G50.131; X12.610  Insurance/Certification information:  PT Insurance Information: Medicare  Physician Information:  Referring Practitioner: Raghu Grewal  Has the plan of care been signed (Y/N):        []  Yes  [x]  No     Date of Patient follow up with Physician: 2021    Is this a Progress Report:     []  Yes  [x]  No      If Yes:  Date Range for reporting period:  Initial Eval: 2021  Beginnin2021 --- Endin2021    Progress report will be due (10 Rx or 30 days whichever is less): 9779     Recertification will be due (POC Duration  / 90 days whichever is less): 11/3/2021      Visit # Insurance Allowable Auth Required   In Person 1 Med Nec []  Yes     []  No    Tele Health -  []  Yes     []  No    Total 1         Functional Scale: Quick Dash: 75% (Total Number Sum: 44/55)   Date assessed: 2021     Latex Allergy:  [x]NO      []YES  Preferred Language for Healthcare:   [x]English       []other:    Pain level:  210     SUBJECTIVE:  See eval    OBJECTIVE: See eval   Observation:    Test measurements:      RESTRICTIONS/PRECAUTIONS: Reverse TSA with Bicep Tenodesis        Exercises/Interventions:   Therapeutic Ex (28296)  Therapeutic Activity (27406)  NMR re-education (54764) Sets/Reps Notes/CUES   [place]          Wrist Flex/Ext 20 x     Wrist Pro/Sup 20 x     Rad/Uln Deviation 20 x          Ball Squeeze 30 x    Finger Taps 10 x ea         PROM Elbow Flex/Ext 20 x         Scap Squeeze 20 x    Shrug with Scap Squeeze 20 x         Upper Trap Stretch 3 x 30\" Manual Intervention (56873)     PROM 10' Seated per pt request                             [address] access code:  Access Code: [email or web address]  URL: [email or web address]  Date: 08/05/2021  Prepared by: Eve Kwong    Exercises  Seated Forearm Pronation and Supination AROM - 1 x daily - 7 x weekly - 3 sets - 10 reps  Seated Wrist Radial and Ulnar Deviation AROM - 1 x daily - 7 x weekly - 3 sets - 10 reps  Wrist Flexion Extension AROM - Palms Down - 1 x daily - 7 x weekly - 3 sets - 10 reps  Forearm Strengthening with Ball Squeeze - 1 x daily - 7 x weekly - 3 sets - 10 reps  Seated Scapular Retraction - 1 x daily - 7 x weekly - 3 sets - 10 reps  Standing Shoulder Shrugs - 1 x daily - 7 x weekly - 3 sets - 10 reps  Seated Neck Sidebending Stretch - 1 x daily - 7 x weekly - 1 sets - 3 reps - 30\" hold  Seated Finger MP Extension AROM with Blocking - 1 x daily - 7 x weekly - 1 sets - 10 reps                    Patient Education 10' Pt education with HEP and progression of PT along with compliance with HEP to aide with formal PT for optimal outcomes. Therapeutic Exercise and NMR EXR  [x] (94326) Provided verbal/tactile cueing for activities related to strengthening, flexibility, endurance, ROM  for improvements in scapular, scapulothoracic and UE control with self care, reaching, carrying, lifting, house/yardwork, driving/computer work. [x] (56825) Provided verbal/tactile cueing for activities related to improving balance, coordination, kinesthetic sense, posture, motor skill, proprioception  to assist with  scapular, scapulothoracic and UE control with self care, reaching, carrying, lifting, house/yardwork, driving/computer work.     Therapeutic Activities:    [x] (08473 or 62067) Provided verbal/tactile cueing for activities related to improving balance, coordination, kinesthetic sense, posture, motor skill, proprioception and motor activation to allow for proper function of scapular, scapulothoracic and UE control with self care, carrying, lifting, driving/computer work. Home Exercise Program:    [x] (37226) Reviewed/Progressed HEP activities related to strengthening, flexibility, endurance, ROM of scapular, scapulothoracic and UE control with self care, reaching, carrying, lifting, house/yardwork, driving/computer work  [x] (15968) Reviewed/Progressed HEP activities related to improving balance, coordination, kinesthetic sense, posture, motor skill, proprioception of scapular, scapulothoracic and UE control with self care, reaching, carrying, lifting, house/yardwork, driving/computer work      Manual Treatments:  PROM / STM / Oscillations-Mobs:  G-I, II, III, IV (PA's, Inf., Post.)  [x] (82585) Provided manual therapy to mobilize soft tissue/joints of cervical/CT, scapular GHJ and UE for the purpose of modulating pain, promoting relaxation,  increasing ROM, reducing/eliminating soft tissue swelling/inflammation/restriction, improving soft tissue extensibility and allowing for proper ROM for normal function with self care, reaching, carrying, lifting, house/yardwork, driving/computer work    Modalities:      Charges:  Timed Code Treatment Minutes: 40'   Total Treatment Minutes:  60'   BWC:  TE TIME:  NMR TIME:  MANUAL TIME:  UNTIMED MINUTES:  Medicare Total:   -  -  -  -  Visit 1   Total $130      [x] EVAL (LOW) 47190 (typically 20 minutes face-to-face)  [] EVAL (MOD) 95244 (typically 30 minutes face-to-face)  [] EVAL (HIGH) 40278 (typically 45 minutes face-to-face)  [] RE-EVAL     [x] (00448) x 1     [] IONTO  [x] NMR (08994) x 1    [] VASO  [x] Manual (48687) x 1     [] Other:  [] TA x      [] Mech Traction (41040)  [] ES(attended) (34175)     [] ES (un) (89072):    ASSESSMENT:  See eval    GOALS:   Short Term Goals: To be achieved in: 2 weeks  1. Independent in HEP and progression per patient tolerance, in order to prevent re-injury. []? Progressing: []? Met: []?  Not Met: []? Adjusted       2. Patient will have a decrease in pain to facilitate improvement in movement, function, and ADLs as indicated by Functional Deficits. []? Progressing: []? Met: []? Not Met: []? Adjusted          Long Term Goals: To be achieved in: 12 weeks  1. Disability index score of 20% or less for the QuickDash/Oswestry to assist with reaching prior level of function. []? Progressing: []? Met: []? Not Met: []? Adjusted      2. Patient will demonstrate increased AROM to equal the opposite side bilaterally to allow for proper joint functioning as indicated by patients Functional Deficits. []? Progressing: []? Met: []? Not Met: []? Adjusted       3. Patient will demonstrate an increase in strength to of R UE to 4/5 grossly to allow for proper functional mobility as indicated by patients Functional Deficits. []? Progressing: []? Met: []? Not Met: []? Adjusted       4. Patient will return to all transfers, work activities, and functional activities without increased symptoms or restriction. []? Progressing: []? Met: []? Not Met: []? Adjusted       5. Patient will have 0/10 pain with ADL's.  []? Progressing: []? Met: []? Not Met: []? Adjusted       6. Patient stated goal: To be able to perform self care  []? Progressing: []? Met: []? Not Met: []? Adjusted       7. Patient stated goal: To be able to walk with a walker without LOB  []? Progressing: []? Met: []? Not Met: []? Adjusted          Overall Progression Towards Functional goals/ Treatment Progress Update:  [] Patient is progressing as expected towards functional goals listed. [] Progression is slowed due to complexities/Impairments listed. [] Progression has been slowed due to co-morbidities.   [x] Plan just implemented, too soon to assess goals progression <30days   [] Goals require adjustment due to lack of progress  [] Patient is not progressing as expected and requires additional follow up with physician  [] Other    Prognosis for POC: [x] Good [] Fair  [] Poor    Patient requires continued skilled intervention: [x] Yes  [] No    Treatment/Activity Tolerance:  [x] Patient able to complete treatment  [] Patient limited by fatigue  [] Patient limited by pain    [] Patient limited by other medical complications  [] Other:     Return to Play: (if applicable)   []  Stage 1: Intro to Strength   []  Stage 2: Return to Run and Strength   []  Stage 3: Return to Jump and Strength   []  Stage 4: Dynamic Strength and Agility   []  Stage 5: Sport Specific Training     []  Ready to Return to Play, Meets All Above Stages   []  Not Ready for Return to Sports   Comments:                         PLAN: See eval  [] Continue per plan of care [] Alter current plan (see comments above)  [x] Plan of care initiated [] Hold pending MD visit [] Discharge    Electronically signed by:  Horacio Alvarez, PT , MPT,ATC  Note: If patient does not return for scheduled/ recommended follow up visits, this note will serve as a discharge from care along with most recent update on progress.

## 2021-08-05 NOTE — PLAN OF CARE
[address], [address] [address] Nicky Grissom  Phone: [phone], [phone]                                                    Physical Therapy Certification    Dear Referring Practitioner: Sofia Cox,    We had the pleasure of evaluating the following patient for physical therapy services at [address] [place] [address]. A summary of our findings can be found in the initial assessment below. This includes our plan of care. If you have any questions or concerns regarding these findings, please do not hesitate to contact me at the office phone number checked above. Thank you for the referral.       Physician Signature:_______________________________Date:__________________  By signing above (or electronic signature), therapists plan is approved by physician      Patient: Diane Forman   : 1955   MRN: [id number]  Referring Physician: Referring Practitioner: Sofia Cox      Evaluation Date: 2021      Medical Diagnosis Information:  Diagnosis: Right Shoulder pain S/P Right Reverse TSA 2021   Treatment Diagnosis: M25.511; H87.211                                         Insurance information: PT Insurance Information: Medicare    Precautions/ Contra-indications/Relevant Medical History: Reverse TSA with Bicep Tenodesis        C-SSRS Triggered by Intake questionnaire (Past 2 wk assessment):   [x] No, Questionnaire did not trigger screening.   [] Yes, Patient intake triggered further evaluation      [] C-SSRS Screening completed  [] PCP notified via Plan of Care  [] Emergency services notified     Latex Allergy:  [x]NO      []YES     Preferred Language for Healthcare:   [x]English       []other:     SUBJECTIVE: Patient stated complaint: Patient is a 60 y/o female who presents s/p Right Reverse TSA with Bicep Tenodesis on 2021. Patient reports having fallen last night but did not hit or fall onto the right UE. Functional Disability Index: Quick Dash: 75% (Total Number Sum: 44/55)    Pain Scale: 2/10  Easing factors: nothing  Provocative factors: wrong movement     Type: []Constant   [x]Intermittent  []Radiating []Localized []other:     Numbness/Tingling: Patient denies numbness and tingling    Functional Limitations/Impairments: [x]Lifting/reaching [x]Grooming [x]Carrying    [x]ADL's []Driving []Sports/Recreations   []Other:    Occupation/School: Disabled    Living Status/Prior Level of Function: Independent with ADLs and IADLs    OBJECTIVE:     CERV ROM     Cervical Flexion WFL    Cervical Extension WFL    Cervical SB WFL    Cervical rotation WFL         ROM Left Right   Shoulder Flex  AROM N.T. Shoulder Abd  \"   Shoulder ER  \"   Shoulder IR  \"   Elbow Flex     Elbow Ext     Wrist Flex     Wrist Ext     Strength  Left Right   Shoulder Flex  N.T. Shoulder Scap  \"   Shoulder ER  \"   Shoulder IR  \"   Elbow Flex     Elbow Ext     Wrist Flex     Wrist Ext     Silviano        Reflexes/Sensation (myotomes/dermatomes):    []Normal    []Abnormal:      Joint mobility:    []Normal    [x]Hypo   []Hyper    Palpation: noted tenderness throughout    Functional Mobility/Transfers: use of wheelchair for mobility due to lack of balance currently due to arm in sling and non use    Posture: right shoulder elevated and held in guarded position    Bandages/Dressings/Incisions: surgical incision     Gait (include devices/WB status): use of w/c primarily since surgery due to poor balance and unable to use walker     Orthopedic Special Tests: n/a Post-Op    [x] Patient history, allergies, meds reviewed. Medical chart reviewed. See intake form. Review Of Systems (ROS):  [x]Performed Review of systems (Integumentary, CardioPulmonary, Neurological) by intake and observation. Intake form has been scanned into medical record.  Patient has been instructed to contact their primary care physician regarding ROS issues if not already being control   []other:      Functional Activity Limitations (from functional questionnaire and intake)   [x]Reduced ability to tolerate prolonged functional positions   [x]Reduced ability or difficulty with changes of positions or transfers between positions   [x]Reduced ability to maintain good posture and demonstrate good body mechanics with sitting, bending, and  lifting   [x] Reduced ability or tolerance with driving and/or computer work   [x]Reduced ability to sleep   [x]Reduced ability to perform lifting, reaching, carrying tasks   [x]Reduced ability to tolerate impact through UE   [x]Reduced ability to reach behind back   [x]Reduced ability to  or hold objects   [x]Reduced ability to throw or toss an object   []other:    Participation Restrictions   [x]Reduced participation in self care activities   [x]Reduced participation in home management activities   [x]Reduced participation in work activities   [x]Reduced participation in social activities. [x]Reduced participation in sport/recreation activities. Classification:   [x]Signs/symptoms consistent with post-surgical status including decreased ROM, strength and function. []Signs/symptoms consistent with joint sprain/strain   []Signs/symptoms consistent with shoulder impingement   []Signs/symptoms consistent with shoulder/elbow/wrist tendinopathy   []Signs/symptoms consistent with Rotator cuff tear   []Signs/symptoms consistent with labral tear   []Signs/symptoms consistent with postural dysfunction    []Signs/symptoms consistent with Glenohumeral IR Deficit - <45 degrees   []Signs/symptoms consistent with facet dysfunction of cervical/thoracic spine    []Signs/symptoms consistent with pathology which may benefit from Dry needling     []other:      Tolerance of evaluation/treatment:    []Excellent   [x]Good    []Fair   []Poor    Physical Therapy Evaluation Complexity Justification   [x] A history of present problem with:  [] no personal factors and/or comorbidities that impact the plan of care;  []1-2 personal factors and/or comorbidities that impact the plan of care  [x]3 personal factors and/or comorbidities that impact the plan of care  [x] An examination of body systems using standardized tests and measures addressing any of the following: body structures and functions (impairments), activity limitations, and/or participation restrictions;:  [] a total of 1-2 or more elements   [] a total of 3 or more elements   [x] a total of 4 or more elements   [x] A clinical presentation with:  [x] stable and/or uncomplicated characteristics   [] evolving clinical presentation with changing characteristics  [] unstable and unpredictable characteristics;   [x] Clinical decision making of [x] low, [] moderate, [] high complexity using standardized patient assessment instrument and/or measurable assessment of functional outcome. [x] EVAL (LOW) 06952 (typically 20 minutes face-to-face)  [] EVAL (MOD) 47923 (typically 30 minutes face-to-face)  [] EVAL (HIGH) 33725 (typically 45 minutes face-to-face)  [] RE-EVAL     PLAN:  Frequency/Duration:  1-2 days per week for 12 weeks:  INTERVENTIONS:  [x]  Therapeutic exercise including: strength training, ROM, for upper extremity and core   [x]  NMR activation and proprioception for UE and Core   [x]  Manual therapy as indicated for UE and spine to include: Dry Needling/IASTM, STM, PROM, Gr I-IV mobilizations, manipulation. [x] Modalities as needed that may include: thermal agents, E-stim, Biofeedback, US, iontophoresis as indicated  [x] Patient education on joint protection, postural re-education, activity modification, progression of HEP. HEP instruction: Refer to [address] [place] [address] access code and exercises on the 1st visit treatment note    GOALS:    Short Term Goals: To be achieved in: 2 weeks  1. Independent in HEP and progression per patient tolerance, in order to prevent re-injury. []? Progressing: []? Met: []?  Not Met: []? Adjusted       2. Patient will have a decrease in pain to facilitate improvement in movement, function, and ADLs as indicated by Functional Deficits. []? Progressing: []? Met: []? Not Met: []? Adjusted          Long Term Goals: To be achieved in: 12 weeks  1. Disability index score of 20% or less for the QuickDash/Oswestry to assist with reaching prior level of function. []? Progressing: []? Met: []? Not Met: []? Adjusted      2. Patient will demonstrate increased AROM to equal the opposite side bilaterally to allow for proper joint functioning as indicated by patients Functional Deficits. []? Progressing: []? Met: []? Not Met: []? Adjusted       3. Patient will demonstrate an increase in strength to of R UE to 4/5 grossly to allow for proper functional mobility as indicated by patients Functional Deficits. []? Progressing: []? Met: []? Not Met: []? Adjusted       4. Patient will return to all transfers, work activities, and functional activities without increased symptoms or restriction. []? Progressing: []? Met: []? Not Met: []? Adjusted       5. Patient will have 0/10 pain with ADL's.  []? Progressing: []? Met: []? Not Met: []? Adjusted       6. Patient stated goal: To be able to perform self care  []? Progressing: []? Met: []? Not Met: []? Adjusted       7. Patient stated goal: To be able to walk with a walker without LOB  []? Progressing: []? Met: []? Not Met: []?  Adjusted            Electronically signed by:  Vandana Levy, PT, MPT,ATC

## 2021-08-10 ENCOUNTER — OFFICE VISIT (OUTPATIENT)
Dept: PULMONOLOGY | Age: 66
End: 2021-08-10
Payer: MEDICARE

## 2021-08-10 VITALS
DIASTOLIC BLOOD PRESSURE: 68 MMHG | HEART RATE: 64 BPM | WEIGHT: 214 LBS | TEMPERATURE: 97.9 F | SYSTOLIC BLOOD PRESSURE: 110 MMHG | BODY MASS INDEX: 35.65 KG/M2 | HEIGHT: 65 IN | RESPIRATION RATE: 22 BRPM | OXYGEN SATURATION: 90 %

## 2021-08-10 DIAGNOSIS — J44.9 COPD, MILD (HCC): Primary | ICD-10-CM

## 2021-08-10 DIAGNOSIS — J96.11 CHRONIC RESPIRATORY FAILURE WITH HYPOXIA (HCC): ICD-10-CM

## 2021-08-10 PROCEDURE — G8417 CALC BMI ABV UP PARAM F/U: HCPCS | Performed by: INTERNAL MEDICINE

## 2021-08-10 PROCEDURE — 1036F TOBACCO NON-USER: CPT | Performed by: INTERNAL MEDICINE

## 2021-08-10 PROCEDURE — 3023F SPIROM DOC REV: CPT | Performed by: INTERNAL MEDICINE

## 2021-08-10 PROCEDURE — 99214 OFFICE O/P EST MOD 30 MIN: CPT | Performed by: INTERNAL MEDICINE

## 2021-08-10 PROCEDURE — G8427 DOCREV CUR MEDS BY ELIG CLIN: HCPCS | Performed by: INTERNAL MEDICINE

## 2021-08-10 PROCEDURE — G8926 SPIRO NO PERF OR DOC: HCPCS | Performed by: INTERNAL MEDICINE

## 2021-08-10 PROCEDURE — 3017F COLORECTAL CA SCREEN DOC REV: CPT | Performed by: INTERNAL MEDICINE

## 2021-08-10 NOTE — PROGRESS NOTES
[facility] Pulmonary, Critical Care, and Sleep    Outpatient Follow Up Note  CC: COPD, SOB  Consulting provider: No ref. provider found    Interval History: 61 y.o. female reports wheezing on exertion and a daily dry cough. Quit smoking in 2020    Initial HPI: Ms Clinton Powell is in for F/U of dyspnea. Since last visit, Stopped taking her inhalers (spirva and advair). Had some problems with insomnia- better after stopping them. Using albuterol 3-4 times per day. Uses it after exertion and has a hard time catching her breath. Has not smoked x 4 months. No wheezing. No cough. No ED visits, no prednisone or abx since last visit. Had carpal tunnel surgery on right since last visit, having surgery on left next week. Riding exercise bike at home.  in nursing home since Feb x 5 years. She was admitted 1/1/14 with pneumonia. Activity limited by back pain and unbalance from foot drop.    Current Medications:    Current Outpatient Medications:     pregabalin (LYRICA) 100 MG capsule, TAKE ONE CAPSULE BY MOUTH 2 TIMES A DAY, Disp: 180 capsule, Rfl: 0    torsemide (DEMADEX) 20 MG tablet, Take 1 tablet by mouth daily, Disp: 90 tablet, Rfl: 0    ondansetron (ZOFRAN) 4 MG tablet, Take 1 tablet by mouth 3 times daily as needed for Nausea or Vomiting, Disp: 15 tablet, Rfl: 0    docusate sodium (COLACE) 100 MG capsule, Take 1 capsule by mouth 2 times daily, Disp: 60 capsule, Rfl: 0    aspirin 325 MG EC tablet, Take 1 tablet by mouth daily, Disp: 30 tablet, Rfl: 0    meloxicam (MOBIC) 7.5 MG tablet, Take 1 tablet by mouth daily, Disp: 90 tablet, Rfl: 0    SYMBICORT 160-4.5 MCG/ACT AERO, Inhale 2 puffs into the lungs 2 times daily, Disp: 10.2 g, Rfl: 1    metoprolol tartrate (LOPRESSOR) 50 MG tablet, TAKE 1 TABLET BY MOUTH TWICE DAILY, Disp: 60 tablet, Rfl: 1    metFORMIN (GLUCOPHAGE) 500 MG tablet, TAKE ONE TABLET BY MOUTH EVERY DAY, Disp: 90 tablet, Rfl: 0    DULoxetine (CYMBALTA) 30 MG extended release capsule, Take 1 capsule by mouth daily, Disp: 90 capsule, Rfl: 0    dilTIAZem (CARDIZEM CD) 120 MG extended release capsule, TAKE ONE CAPSULE BY MOUTH EVERY DAY, Disp: 90 capsule, Rfl: 0    tiZANidine (ZANAFLEX) 2 MG tablet, TAKE ONE TABLET BY MOUTH EVERY DAY, Disp: 30 tablet, Rfl: 2    buPROPion (WELLBUTRIN SR) 100 MG extended release tablet, Take 1 tablet by mouth daily, Disp: 90 tablet, Rfl: 0    lisinopril (PRINIVIL;ZESTRIL) 40 MG tablet, Take 1 tablet by mouth daily, Disp: 30 tablet, Rfl: 2    INCRUSE ELLIPTA 62.5 MCG/INH AEPB, INHALE 1 PUFF INTO THE LUNGS DAILY, Disp: 1 each, Rfl: 5    calcium-vitamin D (OSCAL) 250-125 MG-UNIT per tablet, Take 1 tablet by mouth 2 times daily, Disp: 60 tablet, Rfl: 2    denosumab (PROLIA) 60 MG/ML SOSY SC injection, Inject 1 mL into the skin once for 1 dose, Disp: 1 mL, Rfl: 1    ipratropium-albuterol (DUONEB) 0.5-2.5 (3) MG/3ML SOLN nebulizer solution, Inhale 3 mLs into the lungs every 4 hours, Disp: 360 mL, Rfl: 0    VENTOLIN  (90 Base) MCG/ACT inhaler, Inhale 2 puffs into the lungs every 6 hours as needed for Wheezing, Disp: 54 g, Rfl: 0    blood glucose test strips (CONTOUR NEXT TEST) strip, Test Daily. DX: E11.9, Disp: 100 each, Rfl: 3    Lancets MISC, Test once daily. DX: E11.9, Disp: 100 each, Rfl: 3      Objective:   PHYSICAL EXAM:    /68   Pulse 64   Temp 97.9 °F (36.6 °C)   Resp 22   Ht 5' 5\" (1.651 m)   Wt 214 lb (97.1 kg)   SpO2 90% Comment: with RA started 2L O2  BMI 35.61 kg/m²   Constitutional: In no acute distress. Appears stated age. Well developed and nourished  Eyes: PERRL. No sclera icterus. No conjunctival injection. ENT: Oropharynx clear. Neck: Trachea midline. No thyroid tenderness. Lymph: No cervical LAD. No supraclavicular LAD. Resp: No accessory muscle use. No crackles. No wheezes. No rhonchi. CV: Regular rate. Regular rhythm. No murmur or rub. + lower extremity edema. Skin: Warm and dry. No nodules on exposed extremities.  No rash on exposed extremities. Musc: No clubbing. No cyanosis. No synovitis or joint deformity in digits. Psych: Oriented x 3. Mood and affect normal. Intact judgment and insight. LABS:  Reviewed any pertinent new labs that are available.     PFT   14  FEV/FVC: 76%          FEV1/FVC: 74%           73  FEV1: 2.17L 79%      FEV1: 2.07L 76%          1.98, 74%  FVC: 2.85L 81%         FVC: 2.80L 80%          2.7, 78%  No response to bronchodilators                      No  T.03L 93%       T.27L 79 %            4.63L, 86%  RV: 2.22L 117%       RV: 1.45L 76%               97%  DLCO: 9.01 37%     DLCO: 10.31 43%          DLCO 10.62, 44%      Assessment:       · Mild COPD  · Chronic exertional hypoxic respiratory failure  · Moderate Aortic regurgitation    Plan:       · The pt has been on Incruse and Symbicort and albuterol prn  · Required 2 L with exertion per 6 MWT  · Lung cancer screening followed by PCP

## 2021-08-11 ENCOUNTER — OFFICE VISIT (OUTPATIENT)
Dept: ORTHOPEDIC SURGERY | Age: 66
End: 2021-08-11

## 2021-08-11 VITALS — HEIGHT: 65 IN | BODY MASS INDEX: 35.65 KG/M2 | WEIGHT: 214 LBS

## 2021-08-11 DIAGNOSIS — Z96.611 STATUS POST REVERSE ARTHROPLASTY OF SHOULDER, RIGHT: Primary | ICD-10-CM

## 2021-08-11 PROCEDURE — 99024 POSTOP FOLLOW-UP VISIT: CPT | Performed by: ORTHOPAEDIC SURGERY

## 2021-08-12 ENCOUNTER — HOSPITAL ENCOUNTER (OUTPATIENT)
Dept: PHYSICAL THERAPY | Age: 66
Setting detail: THERAPIES SERIES
Discharge: HOME | End: 2021-08-12
Payer: MEDICARE

## 2021-08-12 PROCEDURE — 97110 THERAPEUTIC EXERCISES: CPT | Performed by: PHYSICAL THERAPY ASSISTANT

## 2021-08-12 PROCEDURE — 97112 NEUROMUSCULAR REEDUCATION: CPT | Performed by: PHYSICAL THERAPY ASSISTANT

## 2021-08-12 PROCEDURE — 97530 THERAPEUTIC ACTIVITIES: CPT | Performed by: PHYSICAL THERAPY ASSISTANT

## 2021-08-12 PROCEDURE — 97140 MANUAL THERAPY 1/> REGIONS: CPT | Performed by: PHYSICAL THERAPY ASSISTANT

## 2021-08-12 NOTE — FLOWSHEET NOTE
Finger Taps 10 x ea Yellow digiflex        PROM Elbow Flex/Ext 20 x         Scap Squeeze 20 x    Shrug with Scap Squeeze 20 x         Upper Trap Stretch 3 x 30\"                   Leg exercises     Seated Add squeeze 5\" x20 HEP   Seated hip abduction 2x10 HEP   Seated marches x10 ea HEP   LAQ 2x10 HEP   Seated HR/TR x30 ea HEP   Standing with gait belt and CGA 3x45\" each @ cable column bar             Manual Intervention (12644)     PROM 15' Seated per pt request                             [employer] access code:  Access Code: [email or web address]  URL: [email or web address]  Date: 08/05/2021  Prepared by: Kendal Reid    Exercises  Seated Forearm Pronation and Supination AROM - 1 x daily - 7 x weekly - 3 sets - 10 reps  Seated Wrist Radial and Ulnar Deviation AROM - 1 x daily - 7 x weekly - 3 sets - 10 reps  Wrist Flexion Extension AROM - Palms Down - 1 x daily - 7 x weekly - 3 sets - 10 reps  Forearm Strengthening with Ball Squeeze - 1 x daily - 7 x weekly - 3 sets - 10 reps  Seated Scapular Retraction - 1 x daily - 7 x weekly - 3 sets - 10 reps  Standing Shoulder Shrugs - 1 x daily - 7 x weekly - 3 sets - 10 reps  Seated Neck Sidebending Stretch - 1 x daily - 7 x weekly - 1 sets - 3 reps - 30\" hold  Seated Finger MP Extension AROM with Blocking - 1 x daily - 7 x weekly - 1 sets - 10 reps                    Patient Education 10' Pt education with HEP and progression of PT along with compliance with HEP to aide with formal PT for optimal outcomes. Therapeutic Exercise and NMR EXR  [x] (28593) Provided verbal/tactile cueing for activities related to strengthening, flexibility, endurance, ROM  for improvements in scapular, scapulothoracic and UE control with self care, reaching, carrying, lifting, house/yardwork, driving/computer work.     [x] (56732) Provided verbal/tactile cueing for activities related to improving balance, coordination, kinesthetic sense, posture, motor skill, proprioception  to assist with IONTO  [x] NMR (49282) x 1    [] VASO  [x] Manual (98052) x 1     [] Other:  [x] TA x      [] Mech Traction (11238)  [] ES(attended) (01947)     [] ES (un) (58846):    ASSESSMENT:  See eval    GOALS:   Short Term Goals: To be achieved in: 2 weeks  1. Independent in HEP and progression per patient tolerance, in order to prevent re-injury. []? Progressing: []? Met: []? Not Met: []? Adjusted       2. Patient will have a decrease in pain to facilitate improvement in movement, function, and ADLs as indicated by Functional Deficits. []? Progressing: []? Met: []? Not Met: []? Adjusted          Long Term Goals: To be achieved in: 12 weeks  1. Disability index score of 20% or less for the QuickDash/Oswestry to assist with reaching prior level of function. []? Progressing: []? Met: []? Not Met: []? Adjusted      2. Patient will demonstrate increased AROM to equal the opposite side bilaterally to allow for proper joint functioning as indicated by patients Functional Deficits. []? Progressing: []? Met: []? Not Met: []? Adjusted       3. Patient will demonstrate an increase in strength to of R UE to 4/5 grossly to allow for proper functional mobility as indicated by patients Functional Deficits. []? Progressing: []? Met: []? Not Met: []? Adjusted       4. Patient will return to all transfers, work activities, and functional activities without increased symptoms or restriction. []? Progressing: []? Met: []? Not Met: []? Adjusted       5. Patient will have 0/10 pain with ADL's.  []? Progressing: []? Met: []? Not Met: []? Adjusted       6. Patient stated goal: To be able to perform self care  []? Progressing: []? Met: []? Not Met: []? Adjusted       7. Patient stated goal: To be able to walk with a walker without LOB  []? Progressing: []? Met: []? Not Met: []? Adjusted          Overall Progression Towards Functional goals/ Treatment Progress Update:  [] Patient is progressing as expected towards functional goals listed. [] Progression is slowed due to complexities/Impairments listed. [] Progression has been slowed due to co-morbidities. [x] Plan just implemented, too soon to assess goals progression <30days   [] Goals require adjustment due to lack of progress  [] Patient is not progressing as expected and requires additional follow up with physician  [] Other    Prognosis for POC: [x] Good [] Fair  [] Poor    Patient requires continued skilled intervention: [x] Yes  [] No    Treatment/Activity Tolerance:  [x] Patient able to complete treatment  [] Patient limited by fatigue  [] Patient limited by pain    [] Patient limited by other medical complications  [] Other:     Return to Play: (if applicable)   []  Stage 1: Intro to Strength   []  Stage 2: Return to Run and Strength   []  Stage 3: Return to Jump and Strength   []  Stage 4: Dynamic Strength and Agility   []  Stage 5: Sport Specific Training     []  Ready to Return to Play, Meets All Above Stages   []  Not Ready for Return to Sports   Comments:                         PLAN: See eval  [x] Continue per plan of care [] Alter current plan (see comments above)  [] Plan of care initiated [] Hold pending MD visit [] Discharge    Electronically signed by:  Justice Ewing PTA  Note: If patient does not return for scheduled/ recommended follow up visits, this note will serve as a discharge from care along with most recent update on progress.

## 2021-08-12 NOTE — PROGRESS NOTES
History of Present Illness:  Aspen Garcia returns for follow-up of her right shoulder. She is now 3 weeks out following right reverse shoulder arthroplasty. She has started physical therapy and she is doing this at the [facility] office. She reports that the pain is improving and she denies any reinjury. She rates her pain levels at 8/10 with movement. Medical History:  Patient's medications, allergies, past medical, surgical, social and family histories were reviewed and updated as appropriate. Pertinent items are noted in HPI  Review of systems reviewed from Patient History Form dated on 6/9/21 and available in the patient's chart under the Media tab. Vital Signs: There were no vitals filed for this visit. Constitutional: In no distress. She appears more comfortable today. Shoulder Examination:    Inspection: The wound is healing nicely. Palpation:  No crepitus with gentle range of motion. Active Range of Motion: deferred. Passive Range of Motion:  She tolerates gentle PROM with some guarding. Strength:  Deferred. Special Tests:  Distal NVI. Assessment :  Aspen Garcia is doing reasonably well. We will advance her PT slowly. Impression:  Encounter Diagnosis   Name Primary?  Status post reverse arthroplasty of shoulder, right Yes       Office Procedures:  Orders Placed This Encounter   Procedures    [facility] [place] Physical Therapy     Referral Priority:   Routine     Referral Type:   Eval and Treat     Referral Reason:   Specialty Services Required     Requested Specialty:   Physical Therapy     Number of Visits Requested:   1       Treatment Plan:  A new PT prescription was provided. She can slowly wean off the sling at home over the next 1-2 weeks. Follow-up in 3 weeks and we will obtain repeat plain radiographs at that time. All questions were answered. Serge Juan MD, PhD  8/11/2021

## 2021-08-18 ENCOUNTER — HOSPITAL ENCOUNTER (OUTPATIENT)
Dept: PHYSICAL THERAPY | Age: 66
Setting detail: THERAPIES SERIES
Discharge: HOME | End: 2021-08-18
Payer: MEDICARE

## 2021-08-18 DIAGNOSIS — Z96.611 STATUS POST REVERSE ARTHROPLASTY OF SHOULDER, RIGHT: Primary | ICD-10-CM

## 2021-08-18 PROCEDURE — 97140 MANUAL THERAPY 1/> REGIONS: CPT | Performed by: PHYSICAL THERAPY ASSISTANT

## 2021-08-18 PROCEDURE — 97530 THERAPEUTIC ACTIVITIES: CPT | Performed by: PHYSICAL THERAPY ASSISTANT

## 2021-08-18 PROCEDURE — 97112 NEUROMUSCULAR REEDUCATION: CPT | Performed by: PHYSICAL THERAPY ASSISTANT

## 2021-08-18 PROCEDURE — 97110 THERAPEUTIC EXERCISES: CPT | Performed by: PHYSICAL THERAPY ASSISTANT

## 2021-08-18 NOTE — FLOWSHEET NOTE
Status post reverse arthroplasty of shoulder, right        Precautions: Subscap, NWB Right Arm    Post Op Instructions:  [] Continuous passive motion (CPM)  [x] Active Elbow range of motion  [] Exercise in plane of scapula   []  Strengthening     [] Pulley and instruction    [x] Home exercise program (copy to patient)   [x] Sling when arm at risk  [] Sling or brace at all times   [x] AAROM: Forward elevation to 90            [x] AAROM: External rotation to 20    [] Isometric external rotator strengthening [] AAROM: internal rotation: up the back  [x] Isometric abductor strengthening  [] AAROM: Internal abduction     [] Isometric internal rotator strengthening [] AAROM: cross-body adduction             Stretching:     Strengthening:  [] Four quadrant (FE, ER, IR, CBA)  [] Rotator cuff (ER, IR, Abd)  [] Forward Elevation    [] External Rotators     [] External Rotation    [] Internal Rotators  [] Internal Rotation: up/back   [] Abductors     [] Internal Rotation: supine in abduction  [] Flexors  [] Cross-body abduction    [] Extensors  [x] Pendulum (FE, Abd/Add, cw/ccw)  [x] Scapular Stabilizers   [] Wall-walking (FE, Abd)    [x] Shoulder shrugs     [] Table slides      [x] Rhomboid pinch  [] Elbow (flex, ext, pron, sup)    [] Lat.  Pull downs     [] Medial epicondylitis program    [] Forward punch   [] Lateral epicondylitis program    [] Internal rotators     [] Progressive resistive exercises  [] Bench Press        [] Bench press plus  Activities:     [] Lateral pull-downs  [] Rowing     [] Progressive two-hand supine press  [] Stepper/Exercise bike   [] Biceps: curls/supination  [] Swimming  [] Water exercises    Modalities: PRN    Return to Sport:  [] Ultrasound     [] Plyometrics  [] Iontophoresis     [] Rhythmic stabilization  [] Moist heat     [] Core strengthening   [] Massage     [] Sports specific program:   [x] Cryotherapy      [] Electrical stimulation     [] Paraffin  [] Whirlpool  [] TENS    [x] Home pain, promoting relaxation,  increasing ROM, reducing/eliminating soft tissue swelling/inflammation/restriction, improving soft tissue extensibility and allowing for proper ROM for normal function with self care, reaching, carrying, lifting, house/yardwork, driving/computer work    Modalities:      Charges:  Timed Code Treatment Minutes: 54'   Total Treatment Minutes:  55'   BWC:  TE TIME:  NMR TIME:  MANUAL TIME:  UNTIMED MINUTES:  Medicare Total:   -  -  -  -  Visit 3   Total $390      [] EVAL (LOW) 19187 (typically 20 minutes face-to-face)  [] EVAL (MOD) 79821 (typically 30 minutes face-to-face)  [] EVAL (HIGH) 72170 (typically 45 minutes face-to-face)  [] RE-EVAL     [x] IB(05687) x 1     [] IONTO  [x] NMR (18994) x 1    [] VASO  [x] Manual (14625) x 1     [] Other:  [x] TA x      [] Mech Traction (22830)  [] ES(attended) (81933)     [] ES (un) (83095):    ASSESSMENT:  See eval    GOALS:   Short Term Goals: To be achieved in: 2 weeks  1. Independent in HEP and progression per patient tolerance, in order to prevent re-injury. [x]? Progressing: []? Met: []? Not Met: []? Adjusted       2. Patient will have a decrease in pain to facilitate improvement in movement, function, and ADLs as indicated by Functional Deficits. [x]? Progressing: []? Met: []? Not Met: []? Adjusted          Long Term Goals: To be achieved in: 12 weeks  1. Disability index score of 20% or less for the QuickDash/Oswestry to assist with reaching prior level of function. []? Progressing: []? Met: []? Not Met: []? Adjusted      2. Patient will demonstrate increased AROM to equal the opposite side bilaterally to allow for proper joint functioning as indicated by patients Functional Deficits. []? Progressing: []? Met: []? Not Met: []? Adjusted       3. Patient will demonstrate an increase in strength to of R UE to 4/5 grossly to allow for proper functional mobility as indicated by patients Functional Deficits. []? Progressing: []?  Met: []? Not Met: []? Adjusted       4. Patient will return to all transfers, work activities, and functional activities without increased symptoms or restriction. []? Progressing: []? Met: []? Not Met: []? Adjusted       5. Patient will have 0/10 pain with ADL's.  []? Progressing: []? Met: []? Not Met: []? Adjusted       6. Patient stated goal: To be able to perform self care  []? Progressing: []? Met: []? Not Met: []? Adjusted       7. Patient stated goal: To be able to walk with a walker without LOB  []? Progressing: []? Met: []? Not Met: []? Adjusted          Overall Progression Towards Functional goals/ Treatment Progress Update:  [] Patient is progressing as expected towards functional goals listed. [x] Progression is slowed due to complexities/Impairments listed. [] Progression has been slowed due to co-morbidities.   [] Plan just implemented, too soon to assess goals progression <30days   [] Goals require adjustment due to lack of progress  [] Patient is not progressing as expected and requires additional follow up with physician  [] Other    Prognosis for POC: [x] Good [] Fair  [] Poor    Patient requires continued skilled intervention: [x] Yes  [] No    Treatment/Activity Tolerance:  [x] Patient able to complete treatment  [] Patient limited by fatigue  [] Patient limited by pain    [] Patient limited by other medical complications  [] Other:     Return to Play: (if applicable)   []  Stage 1: Intro to Strength   []  Stage 2: Return to Run and Strength   []  Stage 3: Return to Jump and Strength   []  Stage 4: Dynamic Strength and Agility   []  Stage 5: Sport Specific Training     []  Ready to Return to Play, Meets All Above Stages   []  Not Ready for Return to Sports   Comments:                         PLAN: See eval  [x] Continue per plan of care [] Alter current plan (see comments above)  [] Plan of care initiated [] Hold pending MD visit [] Discharge    Electronically signed by:  Kim Huffman PTA   Note: If

## 2021-08-24 ENCOUNTER — HOSPITAL ENCOUNTER (OUTPATIENT)
Dept: PHYSICAL THERAPY | Age: 66
Setting detail: THERAPIES SERIES
Discharge: HOME | End: 2021-08-24
Payer: MEDICARE

## 2021-08-24 RX ORDER — CALCIUM CARBONATE-CHOLECALCIFEROL TAB 250 MG-125 UNIT 250-125 MG-UNIT
TAB ORAL
Qty: 100 TABLET | Refills: 0 | Status: SHIPPED | OUTPATIENT
Start: 2021-08-24 | End: 2021-10-11

## 2021-08-24 NOTE — FLOWSHEET NOTE
[facility])    Physical Therapy  Cancellation/No-show Note  Patient Name:  John Melo  :  1955   Date:  2021    Cancelled visits to date: 1  No-shows to date: 0    For today's appointment patient:  [x]  Cancelled  []  Rescheduled appointment  []  No-show     Reason given by patient:  []  Patient ill  []  Conflicting appointment  [x]  No transportation    []  Conflict with work  []  No reason given  []  Other:     Comments:      Phone call information:   []  Phone call made today to patient. []  Patient answered, conversation as follows:    []  Patient did not answer. []  Phone call not made today  [x]  Phone call not needed - pt contacted us to cancel and provided reason for cancellation.      Electronically signed by:  Radha George PTA

## 2021-08-25 ENCOUNTER — TELEPHONE (OUTPATIENT)
Dept: INTERNAL MEDICINE CLINIC | Age: 66
End: 2021-08-25

## 2021-08-26 ENCOUNTER — HOSPITAL ENCOUNTER (OUTPATIENT)
Dept: PHYSICAL THERAPY | Age: 66
Setting detail: THERAPIES SERIES
Discharge: HOME | End: 2021-08-26
Payer: MEDICARE

## 2021-08-26 PROCEDURE — 97110 THERAPEUTIC EXERCISES: CPT | Performed by: PHYSICAL THERAPY ASSISTANT

## 2021-08-26 PROCEDURE — 97140 MANUAL THERAPY 1/> REGIONS: CPT | Performed by: PHYSICAL THERAPY ASSISTANT

## 2021-08-26 PROCEDURE — 97530 THERAPEUTIC ACTIVITIES: CPT | Performed by: PHYSICAL THERAPY ASSISTANT

## 2021-08-26 PROCEDURE — 97112 NEUROMUSCULAR REEDUCATION: CPT | Performed by: PHYSICAL THERAPY ASSISTANT

## 2021-08-26 RX ORDER — METOPROLOL TARTRATE 50 MG/1
TABLET, FILM COATED ORAL
Qty: 60 TABLET | Refills: 0 | Status: SHIPPED | OUTPATIENT
Start: 2021-08-26 | End: 2021-09-29

## 2021-08-26 RX ORDER — LISINOPRIL 40 MG/1
40 TABLET ORAL DAILY
Qty: 30 TABLET | Refills: 0 | Status: SHIPPED | OUTPATIENT
Start: 2021-08-26 | End: 2021-09-29

## 2021-08-26 RX ORDER — BUDESONIDE AND FORMOTEROL FUMARATE DIHYDRATE 160; 4.5 UG/1; UG/1
AEROSOL RESPIRATORY (INHALATION)
Qty: 10.2 G | Refills: 0 | Status: SHIPPED | OUTPATIENT
Start: 2021-08-26 | End: 2021-09-29

## 2021-08-26 NOTE — FLOWSHEET NOTE
[facility], [address] [place] [address] [address], [id number]  Phone: [phone], [phone]    Physical Therapy Treatment Note/ Progress Report:     Date:  2021    Patient Name:  Rain Rios    :  1955  MRN: [id number]  Restrictions/Precautions:    Medical/Treatment Diagnosis Information:  · Diagnosis: Right Shoulder pain S/P Right Reverse TSA 2021  · Treatment Diagnosis: M55.157; K49.346  Insurance/Certification information:  PT Insurance Information: Medicare  Physician Information:  Referring Practitioner: Susanna Doan  Has the plan of care been signed (Y/N):        []  Yes  [x]  No     Date of Patient follow up with Physician: 2021    Is this a Progress Report:     []  Yes  [x]  No      If Yes:  Date Range for reporting period:  Initial Eval: 2021  Beginnin2021 --- Endin2021    Progress report will be due (10 Rx or 30 days whichever is less): 3475     Recertification will be due (POC Duration  / 90 days whichever is less): 11/3/2021      Visit # Insurance Allowable Auth Required   In Person [facility] []  Yes     []  No    [facility] -  []  Yes     []  No    Total 4         Functional Scale: Quick Dash: 75% (Total Number Sum: 44/55)   Date assessed: 2021     Latex Allergy:  [x]NO      []YES  Preferred Language for Healthcare:   [x]English       []other:    Pain level:  0/10 at rest     SUBJECTIVE: (4 weeks post-op on 2021) Patient reports that she has increased pain today for unknown reasons. OBJECTIVE: See eval   Observation:    Test measurements: ROM GOALS:    Flexion 90    ER 20  -          Flexion 100   ER 25  -        9/1    Flexion 110   ER 30  -        9/8    Flexion 120   ER 35  -        9/15  Flexion 130    ER 40    RESTRICTIONS/PRECAUTIONS: Reverse TSA with Bicep Tenodesis    2021  Shoulder Elbow Rehabilitation Referral    Patient Name: Yeyo Vazquez      YOB: 1955     Diagnosis:   1. Status post reverse arthroplasty of shoulder, right        Precautions: Subscap, NWB Right Arm    Post Op Instructions:  [] Continuous passive motion (CPM)  [x] Active Elbow range of motion  [] Exercise in plane of scapula   []  Strengthening     [] Pulley and instruction    [x] Home exercise program (copy to patient)   [x] Sling when arm at risk  [] Sling or brace at all times   [x] AAROM: Forward elevation to 90            [x] AAROM: External rotation to 20    [] Isometric external rotator strengthening [] AAROM: internal rotation: up the back  [x] Isometric abductor strengthening  [] AAROM: Internal abduction     [] Isometric internal rotator strengthening [] AAROM: cross-body adduction             Stretching:     Strengthening:  [] Four quadrant (FE, ER, IR, CBA)  [] Rotator cuff (ER, IR, Abd)  [] Forward Elevation    [] External Rotators     [] External Rotation    [] Internal Rotators  [] Internal Rotation: up/back   [] Abductors     [] Internal Rotation: supine in abduction  [] Flexors  [] Cross-body abduction    [] Extensors  [x] Pendulum (FE, Abd/Add, cw/ccw)  [x] Scapular Stabilizers   [] Wall-walking (FE, Abd)    [x] Shoulder shrugs     [] Table slides      [x] Rhomboid pinch  [] Elbow (flex, ext, pron, sup)    [] Lat.  Pull downs     [] Medial epicondylitis program    [] Forward punch   [] Lateral epicondylitis program    [] Internal rotators     [] Progressive resistive exercises  [] Bench Press        [] Bench press plus  Activities:     [] Lateral pull-downs  [] Rowing     [] Progressive two-hand supine press  [] Stepper/Exercise bike   [] Biceps: curls/supination  [] Swimming  [] Water exercises    Modalities: PRN    Return to Sport:  [] Ultrasound     [] Plyometrics  [] Iontophoresis     [] Rhythmic stabilization  [] Moist heat     [] Core strengthening   [] Massage     [] Sports specific program:   [x] Cryotherapy      [] Electrical stimulation     [] Paraffin  [] Whirlpool  [] TENS    [x] Home exercise program (copy to patient). Perform exercises for:   15     minutes    2-3      times/day  [x] Supervised physical therapy  Frequency: []  1x week  [x] 2x week  [] 3x week  [] Other:   Duration: [] 2 weeks   [] 4 weeks  [x] 6 weeks  [] Other:     Additional Instructions:   May progress Forward Elevation by 10 degrees weekly up to 140 and External Rotation 5 degrees weekly up to 40          Samer JACOB Garcia MD, PhD              Exercises/Interventions:   Therapeutic Ex (01765)  Therapeutic Activity (73178)  NMR re-education (39599) Sets/Reps Notes/CUES   Pulley          Wrist Flex/Ext 20 x  HEP   Wrist Pro/Sup 20 x  HEP   Rad/Uln Deviation 20 x  HEP             Gripping 20 x Red digiflex        PROM Elbow Flex/Ext 20 x         Scap Squeeze 30 x    Shrug with Scap Squeeze 30 x         Upper Trap Stretch 3 x 30\"                   Leg exercises     Seated Add squeeze 5\" x20 HEP   Seated hip abduction 2x10 HEP   Seated marches x10 ea HEP   LAQ 2x10 HEP   Seated HR/TR x30 ea HEP   Standing with gait belt and CGA 3x45\" each @ cable column bar             Manual Intervention (77555)     PROM 15' Supine on high/low table with head raised                             [employer] access code:  Access Code: [email or web address]  URL: [email or web address]  Date: 08/05/2021  Prepared by: Estil Prudent    Exercises  Seated Forearm Pronation and Supination AROM - 1 x daily - 7 x weekly - 3 sets - 10 reps  Seated Wrist Radial and Ulnar Deviation AROM - 1 x daily - 7 x weekly - 3 sets - 10 reps  Wrist Flexion Extension AROM - Palms Down - 1 x daily - 7 x weekly - 3 sets - 10 reps  Forearm Strengthening with Ball Squeeze - 1 x daily - 7 x weekly - 3 sets - 10 reps  Seated Scapular Retraction - 1 x daily - 7 x weekly - 3 sets - 10 reps  Standing Shoulder Shrugs - 1 x daily - 7 x weekly - 3 sets - 10 reps  Seated Neck Sidebending Stretch - 1 x daily - 7 x weekly - 1 sets - 3 reps - 30\" hold  Seated Finger MP Extension AROM with Blocking - 1 x daily - 7 x weekly - 1 sets - 10 reps                    Patient Education 10' Pt education with HEP and progression of PT along with compliance with HEP to aide with formal PT for optimal outcomes. Therapeutic Exercise and NMR EXR  [x] (15107) Provided verbal/tactile cueing for activities related to strengthening, flexibility, endurance, ROM  for improvements in scapular, scapulothoracic and UE control with self care, reaching, carrying, lifting, house/yardwork, driving/computer work. [x] (14900) Provided verbal/tactile cueing for activities related to improving balance, coordination, kinesthetic sense, posture, motor skill, proprioception  to assist with  scapular, scapulothoracic and UE control with self care, reaching, carrying, lifting, house/yardwork, driving/computer work. Therapeutic Activities:    [x] (97151 or 20950) Provided verbal/tactile cueing for activities related to improving balance, coordination, kinesthetic sense, posture, motor skill, proprioception and motor activation to allow for proper function of scapular, scapulothoracic and UE control with self care, carrying, lifting, driving/computer work.      Home Exercise Program:    [x] (51674) Reviewed/Progressed HEP activities related to strengthening, flexibility, endurance, ROM of scapular, scapulothoracic and UE control with self care, reaching, carrying, lifting, house/yardwork, driving/computer work  [x] (98669) Reviewed/Progressed HEP activities related to improving balance, coordination, kinesthetic sense, posture, motor skill, proprioception of scapular, scapulothoracic and UE control with self care, reaching, carrying, lifting, house/yardwork, driving/computer work      Manual Treatments:  PROM / STM / Oscillations-Mobs:  G-I, II, III, IV (PA's, Inf., Post.)  [x] (53300) Provided manual therapy to mobilize soft tissue/joints of cervical/CT, scapular GHJ and UE for the purpose of modulating pain, promoting relaxation,  increasing ROM, reducing/eliminating soft tissue swelling/inflammation/restriction, improving soft tissue extensibility and allowing for proper ROM for normal function with self care, reaching, carrying, lifting, house/yardwork, driving/computer work    Modalities:      Charges:  Timed Code Treatment Minutes: 54'   Total Treatment Minutes:  55'   BWC:  TE TIME:  NMR TIME:  MANUAL TIME:  UNTIMED MINUTES:  Medicare Total:   -  -  -  -  Visit 4   Total $520      [] EVAL (LOW) 68398 (typically 20 minutes face-to-face)  [] EVAL (MOD) 04015 (typically 30 minutes face-to-face)  [] EVAL (HIGH) 20470 (typically 45 minutes face-to-face)  [] RE-EVAL     [x] FK(45565) x 1     [] IONTO  [x] NMR (92045) x 1    [] VASO  [x] Manual (13500) x 1     [] Other:  [x] TA x      [] Mech Traction (88990)  [] ES(attended) (00227)     [] ES (un) (16214):    ASSESSMENT:  See eval    GOALS:   Short Term Goals: To be achieved in: 2 weeks  1. Independent in HEP and progression per patient tolerance, in order to prevent re-injury. [x]? Progressing: []? Met: []? Not Met: []? Adjusted       2. Patient will have a decrease in pain to facilitate improvement in movement, function, and ADLs as indicated by Functional Deficits. [x]? Progressing: []? Met: []? Not Met: []? Adjusted          Long Term Goals: To be achieved in: 12 weeks  1. Disability index score of 20% or less for the QuickDash/Oswestry to assist with reaching prior level of function. []? Progressing: []? Met: []? Not Met: []? Adjusted      2. Patient will demonstrate increased AROM to equal the opposite side bilaterally to allow for proper joint functioning as indicated by patients Functional Deficits. []? Progressing: []? Met: []? Not Met: []? Adjusted       3. Patient will demonstrate an increase in strength to of R UE to 4/5 grossly to allow for proper functional mobility as indicated by patients Functional Deficits. []? Progressing: []? Met: []?  Not Met: []? not return for scheduled/ recommended follow up visits, this note will serve as a discharge from care along with most recent update on progress.

## 2021-08-27 ENCOUNTER — TELEPHONE (OUTPATIENT)
Dept: INTERNAL MEDICINE CLINIC | Age: 66
End: 2021-08-27

## 2021-08-27 ENCOUNTER — CARE COORDINATION (OUTPATIENT)
Dept: CARE COORDINATION | Age: 66
End: 2021-08-27

## 2021-08-27 NOTE — CARE COORDINATION
.  Ambulatory Care Coordination Note  9/2/2021  CM Risk Score: 3  Charlson 10 Year Mortality Risk Score: 47%     ACC: Nadia Grace RN    Summary Note:  ACM received a referral for care coordination from PCP-Kandy. I have spoke with the patient's son , Morena Lee for follow up. He stated his mom is needing several support care items. He is looking for support for home health aids. I have discussed with him options and educated him that home health aids are not paid for by Medicare unless there are skilled needs. Educated on skilled needs verse retirement needs. He is open to a referral to AA for assessment of service support. He stated at one point his mom was eligible for Medicaid but was unsure as to why she no longer has it. She moves about her home in a w/c and has to transfer from bed to w/c or vice versa. She completes her care bathing with some help. She has been denying that she needs assistance at times. Her spouse is  Incarcerated for an unknown amount of time and he is usually a caregiver for this patient Granddaughter is currently staying with her and her son helping where he can. They are looking for resources for caregiver support. He also stated that he feels a lift chair or a power wheelchair would be beneficial for her as well. Discussed with him that lift chair are purchased outright and then you can submit for reimbursement and if Medicare agrees it is a necessity then they will reimburse only for the cost of the component to make it lift. I have advised him to reach out to a Medical supply store such as [employer] to get information on purchase or financing options. Her son also feels she would benefit from a motorized wheelchair. Discussed process of getting evaluated for this. She has a shoulder injury so the use of her current wheelchair is difficult.      Past Medical History:   Diagnosis Date    Abnormal CT scan, chest     COPD (chronic obstructive pulmonary disease) (HCC)     Decreased diffusion capacity of lung     Depression     Dyspnea     Hypertension     Lumbosacral spondylosis without myelopathy 5/9/2016    Osteopenia 12/31/2020    Oxygen dependent     2 L/PRN (usually uses O2)    Pneumonia     Restrictive lung disease     Tobacco abuse     Well controlled type 2 diabetes mellitus ([facility] [address] 10/26/2018     Lab Results   Component Value Date    LABA1C 6.5 07/14/2021    LABA1C 6.5 06/29/2021    LABA1C 6.6 04/19/2021     Lab Results   Component Value Date    .9 07/14/2021    .9 06/29/2021    .7 04/19/2021     Plan   Plan to have patient make appoint to evaluate for equipment needs. Fall safety and home safety  AA7 referral made-  Son to contact [facility] for options on a lift chair and purchase options    referral for service connection and education on support for caregivers. Prior to sending this referral, please add both AC Social Workers to the patient's care team. The referrals are identified through the care team assignment. Please complete the questions below, and/or provide a narrative description of the identified patient need below and include this smart phrase in your patient encounter.     -This patient is referred this date to [facility] for review, assessment, and consultation as needed regarding the following presenting concern(s). Please bold all that apply. No    [facility] assessment of patient's substance use disorder, if any, is indicated and requested. No   Patient has concerns about apparently deteriorating mental status. No   Patient desires assistance with locating an accessible behavioral health treatment provider. Yes   Patient has questions/concerns regarding application and/or eligibility for Medicaid. Yes     Patient has questions/concerns regarding application and/or eligibility for a Medicaid Waiver program ([place], Home Care Waiver, Assisted Living Waiver, etc.).     No   Patient has questions/concerns about the fire, crime, inclement weather or health crisis. : Independent  Ability to ambulate to restroom: Needs Assistance  Ability handle personal hygeine needs (bathing/dressing/grooming): Needs Assistance  Ability to manage Medications: Needs Assistance  Ability to prepare Food Preparation: Needs Assistance  Ability to maintain home (clean home, laundry): Dependent  Ability to drive and/or has transportation: Dependent  Ability to do shopping: Dependent  Ability to manage finances: Needs Assistance  Is patient able to live independently?: No     Current Housing: Private Residence        Per the Fall Risk Screening, did the patient have 2 or more falls or 1 fall with injury in the past year?: Yes  How often do you think you are about to fall and you do NOT fall? For example, you grab something to stabilize yourself or hold onto a wall/furniture?: Occasionally  Use of a Mobility Aid: Yes (Comment: wheelchair)  Difficulty walking/impaired gait: Yes  Issues with feet or shoes like numbness, edema, shoes not fitting: Yes (Comment: no feeling in her foot.  mobility poor )  Changes in vision, poor vision or poor lighting in environment: No  Dizziness: No  Other Fall Risk: Yes  What other fall risks does the patient have?: oxygen      Frequent urination at night?: No  Do you use rails/bars?: Yes  Do you have a non-slip tub mat?: Yes     Are you experiencing loss of meaning?: No  Are you experiencing loss of hope and peace?: No     Thinking about your patient's physical health needs, are there any symptoms or problems (risk indicators) you are unsure about that require further investigation?: Severe symptoms or problems that cause significant impact on daily life   Are the patients physical health problems impacting on their mental well-being?: Moderate to severe impact upon mental well-being and preventing enjoyment of usual activities   Are there any problems with your patients lifestyle behaviors (alcohol, drugs, diet, exercise) that are impacting on physical or mental well-being?: No identified areas of concern   Do you have any other concerns about your patients mental well-being? How would you rate their severity and impact on the patient?: Moderate to severe problems that interfere with function   How would you rate their home environment in terms of safety and stability (including domestic violence, insecure housing, neighbor harassment)?: Consistently safe, supportive, stable, no identified problems   How do daily activities impact on the patient's well-being? (include current or anticipated unemployment, work, caregiving, access to transportation or other): Some general dissatisfaction but no concern   How would you rate their social network (family, work, friends)?: Restricted participation with some degree of social isolation   How would you rate their financial resources (including ability to afford all required medical care)?: [address] insecure, some resource challenges   How wells does the patient now understand their health and well-being (symptoms, signs or risk factors) and what they need to do to manage their health?: Reasonable to good understanding but do not feel able to engage with advice at this time   How well do you think your patient can engage in healthcare discussions? (Barriers include language, deafness, aphasia, alcohol or drug problems, learning difficulties, concentration): Adequate communication, with or without minor barriers   Do other services need to be involved to help this patient?: Other care/services not in place and required   Are current services involved with this patient well-coordinated? (Include coordination with other services you are now recommendation): Required care/services missing and/or fragmented   Suggested Interventions and Community Resources                  Prior to Admission medications    Medication Sig Start Date End Date Taking?  Authorizing Provider   lisinopril (PRINIVIL;ZESTRIL) 40 MG tablet TAKE 1 TABLET BY MOUTH DAILY 8/26/21   Hany Osei MD   SYMBICORT 160-4.5 MCG/ACT AERO INHALE 2 PUFFS INTO THE LUNGS TWICE DAILY 8/26/21   Hany Oesi MD   metoprolol tartrate (LOPRESSOR) 50 MG tablet TAKE 1 TABLET BY MOUTH TWICE DAILY 8/26/21   Hany Osei MD   Calcium Carb-Cholecalciferol (CALCIUM-VITAMIN D3) 250-125 MG-UNIT TABS TAKE ONE TABLET BY MOUTH 2 TIMES A DAY 8/24/21   Dequan Krishnan MD   pregabalin (LYRICA) 100 MG capsule TAKE ONE CAPSULE BY MOUTH 2 TIMES A DAY 8/9/21 11/7/21  Dequan Krishnan MD   torsemide (DEMADEX) 20 MG tablet Take 1 tablet by mouth daily 8/2/21 10/31/21  Dequan Krishnan MD   ondansetron (ZOFRAN) 4 MG tablet Take 1 tablet by mouth 3 times daily as needed for Nausea or Vomiting 7/20/21   Lynn James DO   aspirin 325 MG EC tablet Take 1 tablet by mouth daily 7/20/21 8/19/21  Lynn James DO   meloxicam (MOBIC) 7.5 MG tablet Take 1 tablet by mouth daily 7/12/21   Dequan Krishnan MD   metFORMIN (GLUCOPHAGE) 500 MG tablet TAKE ONE TABLET BY MOUTH EVERY DAY 7/7/21   Dequan Krishnan MD   DULoxetine (CYMBALTA) 30 MG extended release capsule Take 1 capsule by mouth daily 7/7/21   Dequan Krishnan MD   dilTIAZem (CARDIZEM CD) 120 MG extended release capsule TAKE ONE CAPSULE BY MOUTH EVERY DAY 7/7/21   Dequan Krishnan MD   tiZANidine (ZANAFLEX) 2 MG tablet TAKE ONE TABLET BY MOUTH EVERY DAY 6/16/21   Dequan Krishnan MD   buPROPion [facility]) 100 MG extended release tablet Take 1 tablet by mouth daily 6/3/21   Dequan Krishnan MD   INCRUSE ELLIPTA 62.5 MCG/INH AEPB INHALE 1 PUFF INTO THE LUNGS DAILY 4/22/21   Dequan Krishnan MD   calcium-vitamin D ([address]) 250-125 MG-UNIT per tablet Take 1 tablet by mouth 2 times daily 12/28/20   Dequan Krishnan MD   denosumab (PROLIA) 60 MG/ML SOSY SC injection Inject 1 mL into the skin once for 1 dose 12/28/20   Kary Castillo Rachel Arauz MD   ipratropium-albuterol (DUONEB) 0.5-2.5 (3) MG/3ML SOLN nebulizer solution Inhale 3 mLs into the lungs every 4 hours 12/11/20   Gauri Sheikh MD   VENTOLIN  (21 Base) MCG/ACT inhaler Inhale 2 puffs into the lungs every 6 hours as needed for Wheezing 9/21/20   Gauri Sheikh MD   blood glucose test strips (CONTOUR NEXT TEST) strip Test Daily. DX: E11.9 10/26/18   Gauri Sheikh MD   Lancets MISC Test once daily.  DX: E11.9 10/26/18   Gauri Sheikh MD       Future Appointments   Date Time Provider Cyril Angelika   9/2/2021  4:40 PM [facility] None   9/8/2021  2:30 PM Cloprisca Campo, PTA [facility] PT [place] [facility]   9/13/2021  2:00 PM [address] PA-C [id number] [facility]   9/15/2021 12:30 PM [facility] OP NURSING ROOM 1 [facility] [place] [facility]   9/15/2021  2:30 PM Cloretta Campo, [facility] PT [facility]   9/22/2021  2:30 PM Cloretta Campo, PTA [facility] PT [facility]   9/30/2021  1:20 PM Gauri Sheikh MD [place] [facility] None   9/30/2021  2:30 PM Cloretta Campo, [facility] PT [place] [facility]   2/17/2022 10:20 AM Jailyn Gan MD [facility]   3/22/2022  1:00 PM [facility]SING ROOM 1 [facility]     ,   Diabetes Assessment            and   COPD Assessment              Symptoms:

## 2021-08-27 NOTE — TELEPHONE ENCOUNTER
----- Message from Angeline Feliz sent at 8/27/2021  4:15 PM EDT -----  Contact: Jon/brother [phone]    ----- Message -----  From: Britt Ching RN  Sent: 8/27/2021   4:04 PM EDT  To: Angeline Feliz    Enrolled in care coordination   ----- Message -----  From: Rorylit Tray  Sent: 8/25/2021   2:59 PM EDT  To: BARBARA [address] Dr. is wanting to set this patient up [facility] DX COPD and Physcial Debility.  ----- Message -----  From: Enma York MD  Sent: 8/24/2021   2:39 PM EDT  To: Lu Garsia    Call her  ----- Message -----  From: [facility] AT [facility]  Sent: 8/24/2021   1:53 PM EDT  To: Enma York MD    Patient's  is incarcerated and don't know for how long. Patient is unable to care for herself. Is she eligible to get home health care day and evening shifts? Please advise.      Thank you

## 2021-08-27 NOTE — TELEPHONE ENCOUNTER
----- Message from Scott Moreno RN sent at 8/27/2021  4:04 PM EDT -----  Contact: Jon/brother [phone]  Enrolled in care coordination   ----- Message -----  From: Kendragaston Debora  Sent: 8/25/2021   2:59 PM EDT  To: BARBARA Tor[address] Dr. is wanting to set this patient up for Home Aids not [facility]. DX COPD and Physcial Debility.  ----- Message -----  From: Robert Roman MD  Sent: 8/24/2021   2:39 PM EDT  To: Fly Webster    Call her  ----- Message -----  From: Jabari Reeves  Sent: 8/24/2021   1:53 PM EDT  To: Robert Roman MD    Patient's  is incarcerated and don't know for how long. Patient is unable to care for herself. Is she eligible to get home health care day and evening shifts? Please advise.      Thank you

## 2021-08-30 ENCOUNTER — CARE COORDINATION (OUTPATIENT)
Dept: CARE COORDINATION | Age: 66
End: 2021-08-30

## 2021-08-30 NOTE — CARE COORDINATION
LSW contacted pt son Valentina Dempsey, per [facility], at [phone]. Valentina Dempsey voiced that he will be the main contact for pt as her , Lynda Alvarez, is not available and the time frame for his availability is unknown. LSW reviewed that this will be noted on chart. LSW reviewed COA ref and confirmed that [facility] had sent ref as of last week. LSW answered questions in regards to waiver programs and level of assistance. Valentina Dempsey noted only need at this time was for a lift chair and an motorized chair. LSW expressed understanding and indicated that these DME's due require scripts in order to obtain some coverage. LSW inquired about Medicaid eligibility. Valentina Dempsey reviewed they are actively looking into this. LSW reviewed easiest and quickest way to apply being online and setting up portal. LSW reviewed that with this portal, you can track your status, information requests and your eligibility will be established quicker. Valentina Roselyn expressed understanding    Valentina Dempsey expressed no other needs at this time. LSW to monitor

## 2021-08-31 ENCOUNTER — HOSPITAL ENCOUNTER (OUTPATIENT)
Dept: PHYSICAL THERAPY | Age: 66
Setting detail: THERAPIES SERIES
Discharge: HOME | End: 2021-08-31
Payer: MEDICARE

## 2021-08-31 ENCOUNTER — CARE COORDINATION (OUTPATIENT)
Dept: CARE COORDINATION | Age: 66
End: 2021-08-31

## 2021-08-31 ENCOUNTER — TELEPHONE (OUTPATIENT)
Dept: INTERNAL MEDICINE CLINIC | Age: 66
End: 2021-08-31

## 2021-08-31 PROCEDURE — 97110 THERAPEUTIC EXERCISES: CPT | Performed by: PHYSICAL THERAPY ASSISTANT

## 2021-08-31 PROCEDURE — 97530 THERAPEUTIC ACTIVITIES: CPT | Performed by: PHYSICAL THERAPY ASSISTANT

## 2021-08-31 PROCEDURE — 97140 MANUAL THERAPY 1/> REGIONS: CPT | Performed by: PHYSICAL THERAPY ASSISTANT

## 2021-08-31 PROCEDURE — 97112 NEUROMUSCULAR REEDUCATION: CPT | Performed by: PHYSICAL THERAPY ASSISTANT

## 2021-08-31 NOTE — FLOWSHEET NOTE
[facility], [address] [place] [address] Caren Flores 46, [address]  Phone: [phone], [phone]    Physical Therapy Treatment Note/ Progress Report:     Date:  2021    Patient Name:  Jm Cortez    :  1955  MRN: [id number]  Restrictions/Precautions:    Medical/Treatment Diagnosis Information:  · Diagnosis: Right Shoulder pain S/P Right Reverse TSA 2021  · Treatment Diagnosis: Z57.440; R05.978  Insurance/Certification information:  PT Insurance Information: Medicare  Physician Information:  Referring Practitioner: Freddie Duval  Has the plan of care been signed (Y/N):        []  Yes  [x]  No     Date of Patient follow up with Physician: 2021    Is this a Progress Report:     []  Yes  [x]  No      If Yes:  Date Range for reporting period:  Initial Eval: 2021  Beginnin2021 --- Endin2021    Progress report will be due (10 Rx or 30 days whichever is less): 5241     Recertification will be due (POC Duration  / 90 days whichever is less): 11/3/2021      Visit # Insurance Allowable Auth Required   In Person [address] [place] [address] []  Yes     []  No    Tele Health -  []  Yes     []  No    Total 11 6 used previously        Functional Scale: Quick Dash: 75% (Total Number Sum: 44/55)   Date assessed: 2021     Latex Allergy:  [x]NO      []YES  Preferred Language for Healthcare:   [x]English       []other:    Pain level:  0/10 at rest     SUBJECTIVE: (6 weeks post-op on 2021) Doing OK - feels she has been doing better in regards to her legs and is doing more. States she tries to not use her arm but tends to bear weight through the arm when she is transferring from her wheelchair.       OBJECTIVE: See eval   Observation:    Test measurements: ROM GOALS:    Flexion 90    ER 20  -        8/26  Flexion 100   ER 25  -        9/1    Flexion 110   ER 30  -        9/8    Flexion 120   ER 35  -        9/15  Flexion 130    ER 40    RESTRICTIONS/PRECAUTIONS: Reverse TSA with Bicep Tenodesis    8/18/2021  Shoulder Elbow Rehabilitation Referral    Patient Name: Beena Bethea      YOB: 1955/2021     Diagnosis:   1. Status post reverse arthroplasty of shoulder, right        Precautions: Subscap, NWB Right Arm    Post Op Instructions:  [] Continuous passive motion (CPM)  [x] Active Elbow range of motion  [] Exercise in plane of scapula   []  Strengthening     [] Pulley and instruction    [x] Home exercise program (copy to patient)   [x] Sling when arm at risk  [] Sling or brace at all times   [x] AAROM: Forward elevation to 90            [x] AAROM: External rotation to 20    [] Isometric external rotator strengthening [] AAROM: internal rotation: up the back  [x] Isometric abductor strengthening  [] AAROM: Internal abduction     [] Isometric internal rotator strengthening [] AAROM: cross-body adduction             Stretching:     Strengthening:  [] Four quadrant (FE, ER, IR, CBA)  [] Rotator cuff (ER, IR, Abd)  [] Forward Elevation    [] External Rotators     [] External Rotation    [] Internal Rotators  [] Internal Rotation: up/back   [] Abductors     [] Internal Rotation: supine in abduction  [] Flexors  [] Cross-body abduction    [] Extensors  [x] Pendulum (FE, Abd/Add, cw/ccw)  [x] Scapular Stabilizers   [] Wall-walking (FE, Abd)    [x] Shoulder shrugs     [] Table slides      [x] Rhomboid pinch  [] Elbow (flex, ext, pron, sup)    [] Lat.  Pull downs     [] Medial epicondylitis program    [] Forward punch   [] Lateral epicondylitis program    [] Internal rotators     [] Progressive resistive exercises  [] Bench Press        [] Bench press plus  Activities:     [] Lateral pull-downs  [] Rowing     [] Progressive two-hand supine press  [] Stepper/Exercise bike   [] Biceps: curls/supination  [] Swimming  [] Water exercises    Modalities: PRN    Return to Sport:  [] Ultrasound     [] Plyometrics  [] Iontophoresis     [] Rhythmic stabilization  [] Moist heat     [] Core strengthening   [] Massage     [] Sports specific program:   [x] Cryotherapy      [] Electrical stimulation     [] Paraffin  [] Whirlpool  [] TENS    [x] Home exercise program (copy to patient). Perform exercises for:   15     minutes    2-3      times/day  [x] Supervised physical therapy  Frequency: []  1x week  [x] 2x week  [] 3x week  [] Other:   Duration: [] 2 weeks   [] 4 weeks  [x] 6 weeks  [] Other:     Additional Instructions:   May progress Forward Elevation by 10 degrees weekly up to 140 and External Rotation 5 degrees weekly up to 40          Samer SNoel Olea MD, PhD              Exercises/Interventions:   Therapeutic Ex (20370)  Therapeutic Activity (22821)  NMR re-education (66674) Sets/Reps Notes/CUES   Pulley          Wrist Flex/Ext 20 x  HEP   Wrist Pro/Sup 20 x  HEP   Rad/Uln Deviation 20 x  HEP             Gripping 20 x Red digiflex        PROM Elbow Flex/Ext 20 x         Scap Squeeze 30 x    Shrug with Scap Squeeze 30 x         Upper Trap Stretch 3 x 30\"    Supine cane press  X20     Table slides  3 ways 10x10\" ea         Leg exercises     Seated Add squeeze HEP   Seated hip abduction HEP   Seated marches HEP   LAQ HEP   Seated HR/TR HEP   Standing with gait belt and CGA 3x45\" each @ cable column bar   Standing HR at low bar x20    Standing marches at low bar with CGA  X10 R, L    Standing Mini Squat  x10    Manual Intervention (06897)     PROM 15' Supine on high/low table with head raised                             Med[employer] access code:  Access Code: [email or web address]  URL: [email or web address]  Date: 08/05/2021  Prepared by: Deneen Camargo    Exercises  Seated Forearm Pronation and Supination AROM - 1 x daily - 7 x weekly - 3 sets - 10 reps  Seated Wrist Radial and Ulnar Deviation AROM - 1 x daily - 7 x weekly - 3 sets - 10 reps  Wrist Flexion Extension AROM - Palms Down - 1 x daily - 7 x weekly - 3 sets - 10 reps  Forearm Strengthening with Ball Squeeze - 1 x daily - 7 x weekly - 3 sets - 10 reps  Seated Scapular Retraction - 1 x daily - 7 x weekly - 3 sets - 10 reps  Standing Shoulder Shrugs - 1 x daily - 7 x weekly - 3 sets - 10 reps  Seated Neck Sidebending Stretch - 1 x daily - 7 x weekly - 1 sets - 3 reps - 30\" hold  Seated Finger MP Extension AROM with Blocking - 1 x daily - 7 x weekly - 1 sets - 10 reps                    Patient Education 10' Pt education with HEP and progression of PT along with compliance with HEP to aide with formal PT for optimal outcomes. Therapeutic Exercise and NMR EXR  [x] (61823) Provided verbal/tactile cueing for activities related to strengthening, flexibility, endurance, ROM  for improvements in scapular, scapulothoracic and UE control with self care, reaching, carrying, lifting, house/yardwork, driving/computer work. [x] (07811) Provided verbal/tactile cueing for activities related to improving balance, coordination, kinesthetic sense, posture, motor skill, proprioception  to assist with  scapular, scapulothoracic and UE control with self care, reaching, carrying, lifting, house/yardwork, driving/computer work. Therapeutic Activities:    [x] (05790 or 24005) Provided verbal/tactile cueing for activities related to improving balance, coordination, kinesthetic sense, posture, motor skill, proprioception and motor activation to allow for proper function of scapular, scapulothoracic and UE control with self care, carrying, lifting, driving/computer work.      Home Exercise Program:    [x] (93671) Reviewed/Progressed HEP activities related to strengthening, flexibility, endurance, ROM of scapular, scapulothoracic and UE control with self care, reaching, carrying, lifting, house/yardwork, driving/computer work  [x] (00263) Reviewed/Progressed HEP activities related to improving balance, coordination, kinesthetic sense, posture, motor skill, proprioception of scapular, scapulothoracic and UE control with self care, reaching, carrying, lifting, house/yardwork, driving/computer work      Manual Treatments:  PROM / STM / Oscillations-Mobs:  G-I, II, III, IV (PA's, Inf., Post.)  [x] (93255) Provided manual therapy to mobilize soft tissue/joints of cervical/CT, scapular GHJ and UE for the purpose of modulating pain, promoting relaxation,  increasing ROM, reducing/eliminating soft tissue swelling/inflammation/restriction, improving soft tissue extensibility and allowing for proper ROM for normal function with self care, reaching, carrying, lifting, house/yardwork, driving/computer work    Modalities:      Charges:  Timed Code Treatment Minutes: 54'   Total Treatment Minutes:  54'   [facility]:  TE TIME:  NMR TIME:  MANUAL TIME:  UNTIMED MINUTES:  Medicare Total:   -  -  -  -  Visit 5 (this round) (Total 11visits)   Total $1270       [] EVAL (LOW) 78767 (typically 20 minutes face-to-face)  [] EVAL (MOD) 80669 (typically 30 minutes face-to-face)  [] EVAL (HIGH) 07219 (typically 45 minutes face-to-face)  [] RE-EVAL     [x] VA(08010) x 1     [] IONTO  [x] NMR (90899) x 1    [] VASO  [x] Manual (44490) x 1     [] Other:  [x] TA x   1   [] Mech Traction (69974)  [] ES(attended) (23384)     [] ES (un) (11275):    ASSESSMENT:  See eval    GOALS:   Short Term Goals: To be achieved in: 2 weeks  1. Independent in HEP and progression per patient tolerance, in order to prevent re-injury. [x]? Progressing: []? Met: []? Not Met: []? Adjusted       2. Patient will have a decrease in pain to facilitate improvement in movement, function, and ADLs as indicated by Functional Deficits. [x]? Progressing: []? Met: []? Not Met: []? Adjusted          Long Term Goals: To be achieved in: 12 weeks  1. Disability index score of 20% or less for the QuickDash/Oswestry to assist with reaching prior level of function. []? Progressing: []? Met: []? Not Met: []? Adjusted      2.  Patient will demonstrate increased AROM to equal the opposite side bilaterally to allow for proper joint functioning as Above Stages   []  Not Ready for Return to Sports   Comments:                         PLAN: See eval  [x] Continue per plan of care [] Alter current plan (see comments above)  [] Plan of care initiated [] Hold pending MD visit [] Discharge    Electronically signed by:  Wilbur Muir PTA  Note: If patient does not return for scheduled/ recommended follow up visits, this note will serve as a discharge from care along with most recent update on progress.

## 2021-08-31 NOTE — TELEPHONE ENCOUNTER
----- Message from Dipika Mobley RN sent at 8/31/2021  9:16 AM EDT -----  Regarding: DME needs  This patient of Dr. Laith Alford needs an appointment to be assessed for a lift chair and for a power wheelchair. We will need a script and office notes to reflect the need. Can you have someone reach out to the Cruzito Sandran the son to assist with scheduling this for her.      Thanks,   [facility]

## 2021-09-01 ENCOUNTER — APPOINTMENT (OUTPATIENT)
Dept: PHYSICAL THERAPY | Age: 66
End: 2021-09-01
Payer: MEDICARE

## 2021-09-01 ENCOUNTER — TELEPHONE (OUTPATIENT)
Dept: INTERNAL MEDICINE CLINIC | Age: 66
End: 2021-09-01

## 2021-09-01 NOTE — TELEPHONE ENCOUNTER
----- Message from Zoë Butler RN sent at 8/31/2021  9:16 AM EDT -----  Regarding: DME needs  This patient of Dr. Franny Lujan needs an appointment to be assessed for a lift chair and for a power wheelchair. We will need a script and office notes to reflect the need. Can you have someone reach out to the Sahil North the son to assist with scheduling this for her.      Thanks,   Jolyn Schwab

## 2021-09-02 ENCOUNTER — OFFICE VISIT (OUTPATIENT)
Dept: INTERNAL MEDICINE CLINIC | Age: 66
End: 2021-09-02

## 2021-09-02 VITALS — HEIGHT: 65 IN | BODY MASS INDEX: 35.61 KG/M2

## 2021-09-02 DIAGNOSIS — M47.817 LUMBOSACRAL SPONDYLOSIS WITHOUT MYELOPATHY: ICD-10-CM

## 2021-09-02 DIAGNOSIS — E66.01 MORBIDLY OBESE (HCC): ICD-10-CM

## 2021-09-02 DIAGNOSIS — J43.1 PANLOBULAR EMPHYSEMA (HCC): ICD-10-CM

## 2021-09-02 DIAGNOSIS — M51.37 DEGENERATION OF LUMBAR OR LUMBOSACRAL INTERVERTEBRAL DISC: ICD-10-CM

## 2021-09-02 DIAGNOSIS — E11.9 TYPE 2 DIABETES MELLITUS WITHOUT COMPLICATION, WITHOUT LONG-TERM CURRENT USE OF INSULIN (HCC): ICD-10-CM

## 2021-09-02 DIAGNOSIS — I10 ESSENTIAL HYPERTENSION: ICD-10-CM

## 2021-09-02 DIAGNOSIS — J96.11 CHRONIC RESPIRATORY FAILURE WITH HYPOXIA (HCC): ICD-10-CM

## 2021-09-02 DIAGNOSIS — E11.9 WELL CONTROLLED TYPE 2 DIABETES MELLITUS (HCC): ICD-10-CM

## 2021-09-02 DIAGNOSIS — R53.81 PHYSICAL DEBILITY: Primary | ICD-10-CM

## 2021-09-02 DIAGNOSIS — F32.4 MAJOR DEPRESSIVE DISORDER WITH SINGLE EPISODE, IN PARTIAL REMISSION (HCC): ICD-10-CM

## 2021-09-02 PROCEDURE — G8417 CALC BMI ABV UP PARAM F/U: HCPCS | Performed by: INTERNAL MEDICINE

## 2021-09-02 PROCEDURE — 3023F SPIROM DOC REV: CPT | Performed by: INTERNAL MEDICINE

## 2021-09-02 PROCEDURE — 2022F DILAT RTA XM EVC RTNOPTHY: CPT | Performed by: INTERNAL MEDICINE

## 2021-09-02 PROCEDURE — G8926 SPIRO NO PERF OR DOC: HCPCS | Performed by: INTERNAL MEDICINE

## 2021-09-02 PROCEDURE — 1036F TOBACCO NON-USER: CPT | Performed by: INTERNAL MEDICINE

## 2021-09-02 PROCEDURE — 3044F HG A1C LEVEL LT 7.0%: CPT | Performed by: INTERNAL MEDICINE

## 2021-09-02 PROCEDURE — 99212 OFFICE O/P EST SF 10 MIN: CPT | Performed by: INTERNAL MEDICINE

## 2021-09-02 PROCEDURE — 3017F COLORECTAL CA SCREEN DOC REV: CPT | Performed by: INTERNAL MEDICINE

## 2021-09-02 PROCEDURE — G8427 DOCREV CUR MEDS BY ELIG CLIN: HCPCS | Performed by: INTERNAL MEDICINE

## 2021-09-02 NOTE — PROGRESS NOTES
Subjective:      Patient ID: Doreen Esposito is a 61 y.o. female. HPI     61 y.o. female with h.o   chronic back pain , copd, DM- 2,. HTN , tobacco use here for  Power mobility wheel chair exam             - pt reports who reports that he has been progressively getting weak, unable to ambulate much, unable to carry daily activities and getting more depressed requests for powered wheel chair    - recently developed hypoxia with her copd and now on oxygen supply 24/7. Reports her dyspnea did not improve much with oxygen supplementation and feels tired all the time       Has chronic back pain for more than 30 yrs and osteoarthritis in both knees   Has seen multiple back MD, had VJ with not much benefit      Underwent multiple treatments including PT, pain meds and continues to gets worse. Unable to lose weight with back pain and arthritis  Unable to ambulate more than few feet without much help, gets fatigued   ambulates with a  walker currently. Feels unsteady on feet even with walker   Doubt he can use manual wheel chair with significant obesity, hypoxia    and easy fatigue      Her   was helping her but now he is out of picture and unable to get much help     she had a fracture of her right scapula 3 months ago when trying to get up from sitting position. Seen Dr. Mauricio Sandoval and conservative mx advised.  Planning for shoulder surgery once this issue resolves        DM- 2- Taking metformin but not compliant with diet  Last A1c at 6.4  Activity remains low     Depression stable  , compliant with meds - cymbalta      Allergies   Allergen Reactions    Ciprofloxacin Nausea And Vomiting     Past Medical History:   Diagnosis Date    Abnormal CT scan, chest     COPD (chronic obstructive pulmonary disease) (HCC)     Decreased diffusion capacity of lung     Depression     Dyspnea     Hypertension     Lumbosacral spondylosis without myelopathy 5/9/2016    Osteopenia 12/31/2020    Oxygen dependent 2 L/PRN (usually uses O2)    Pneumonia     Restrictive lung disease     Tobacco abuse     Well controlled type 2 diabetes mellitus ([facility] [address] 10/26/2018     Past Surgical History:   Procedure Laterality Date    BACK SURGERY      CARPAL TUNNEL RELEASE Left 12/12/14    HAND SURGERY Right 9/26/14    SHOULDER SURGERY Right 7/20/2021    RIGHT SHOULDER ARTHROTMY, OPEN BICEPS TENODESIS, REMOVAL OF LOOSE BODIES, REVERSE TOTAL SHOULDER REPLACEMENT performed by Rafael Eduardo MD [facility] Right        Current Outpatient Medications   Medication Sig Dispense Refill    lisinopril (PRINIVIL;ZESTRIL) 40 MG tablet TAKE 1 TABLET BY MOUTH DAILY 30 tablet 0    SYMBICORT 160-4.5 MCG/ACT AERO INHALE 2 PUFFS INTO THE LUNGS TWICE DAILY 10.2 g 0    metoprolol tartrate (LOPRESSOR) 50 MG tablet TAKE 1 TABLET BY MOUTH TWICE DAILY 60 tablet 0    Calcium Carb-Cholecalciferol (CALCIUM-VITAMIN D3) 250-125 MG-UNIT TABS TAKE ONE TABLET BY MOUTH 2 TIMES A  tablet 0    pregabalin (LYRICA) 100 MG capsule TAKE ONE CAPSULE BY MOUTH 2 TIMES A  capsule 0    torsemide (DEMADEX) 20 MG tablet Take 1 tablet by mouth daily 90 tablet 0    aspirin 325 MG EC tablet Take 1 tablet by mouth daily 30 tablet 0    meloxicam (MOBIC) 7.5 MG tablet Take 1 tablet by mouth daily 90 tablet 0    metFORMIN (GLUCOPHAGE) 500 MG tablet TAKE ONE TABLET BY MOUTH EVERY DAY 90 tablet 0    DULoxetine (CYMBALTA) 30 MG extended release capsule Take 1 capsule by mouth daily 90 capsule 0    dilTIAZem (CARDIZEM CD) 120 MG extended release capsule TAKE ONE CAPSULE BY MOUTH EVERY DAY 90 capsule 0    buPROPion (WELLBUTRIN SR) 100 MG extended release tablet Take 1 tablet by mouth daily 90 tablet 0    INCRUSE ELLIPTA 62.5 MCG/INH AEPB INHALE 1 PUFF INTO THE LUNGS DAILY 1 each 5    calcium-vitamin D (OSCAL) 250-125 MG-UNIT per tablet Take 1 tablet by mouth 2 times daily 60 tablet 2    denosumab (PROLIA) 60 MG/ML SOSY SC injection Inject 1 mL into of shayy TKR        Left knee: sHe exhibits decreased range of motion. He exhibits no swelling, no effusion and no bony tenderness. Tenderness found.           Right ankle: sHe exhibits no deformity. Left ankle: He exhibits decreased range of motion. No Tenderness. Decreased strength in lower ext 4/5      Low back pain reproducible  SLRT positive  Gait abn , limping with walker    Stops every few steps     Lymphadenopathy:     He has no cervical adenopathy. Neurological: He is alert and oriented to person, place, and time. He has normal reflexes. No cranial nerve deficit. Significant decreased sensation to both feet   Moderate to severe peripheral neuropathy   Skin: Skin is warm and dry. He is not diaphoretic. No erythema. Psychiatric: He has a normal mood and affect. His behavior is normal.           Diagnosis Orders   1. Physical debility     2. Essential hypertension     3. Morbidly obese ([address]     4. Panlobular emphysema ([address]     5. Well controlled type 2 diabetes mellitus ([address]     6. Major depressive disorder with single episode, in partial remission ([address]     7. Degeneration of lumbar or lumbosacral intervertebral disc     8. Lumbosacral spondylosis without myelopathy     9. Type 2 diabetes mellitus without complication, without long-term current use of insulin ([address]     10. Chronic respiratory failure with hypoxia (HCC)            Due to above diagnosis- chronic back pain,  shoulder arthritis issues,  Severe copd with hypoxic resp failure, DM 2,  OA, pt has gait abnormalities .  pt unable to carry his ADL and IADL 's at home-  risk for falls and major fractures      Power mobility device is necessary to carry daily activities- bathing, grooming , preparing food  attend doctor's appt, ADL and IADL's     Has significant back issues  With neuropathy limiting ambulation even with walker, gets easily fatigued with CHF and morbid obesity     Unable to use manual wheel chair with shoulder arthritis, dyspnea and decreased ROM. Would benefit her  and the patient  is mentally and physically capable of operating power mobility device  .  It will be useful to bathe, clean, take meds and make office visits  He is willing and motivated to use PMD/motorized wheelchair at home  He unable to scooter with shoulder issues     Valdemar Ivey MD,

## 2021-09-08 ENCOUNTER — HOSPITAL ENCOUNTER (OUTPATIENT)
Dept: PHYSICAL THERAPY | Age: 66
Setting detail: THERAPIES SERIES
Discharge: HOME | End: 2021-09-08
Payer: MEDICARE

## 2021-09-08 NOTE — FLOWSHEET NOTE
[facility], [facility])    Physical Therapy  Cancellation/No-show Note  Patient Name:  Lucrecia Collier  :  1955   Date:  2021    Cancelled visits to date: 2  No-shows to date: 0    For today's appointment patient:  [x]  Cancelled  []  Rescheduled appointment  []  No-show     Reason given by patient:  []  Patient ill  []  Conflicting appointment  []  No transportation    []  Conflict with work  [x]  No reason given  []  Other:     Comments:      Phone call information:   []  Phone call made today to patient. []  Patient answered, conversation as follows:    []  Patient did not answer. []  Phone call not made today  [x]  Phone call not needed - pt contacted us to cancel and provided reason for cancellation.      Electronically signed by:  Ange Acosta PTA

## 2021-09-09 ENCOUNTER — APPOINTMENT (OUTPATIENT)
Dept: PHYSICAL THERAPY | Age: 66
End: 2021-09-09
Payer: MEDICARE

## 2021-09-13 ENCOUNTER — OFFICE VISIT (OUTPATIENT)
Dept: ORTHOPEDIC SURGERY | Age: 66
End: 2021-09-13

## 2021-09-13 VITALS — HEIGHT: 65 IN | WEIGHT: 214 LBS | BODY MASS INDEX: 35.65 KG/M2

## 2021-09-13 DIAGNOSIS — M25.511 RIGHT SHOULDER PAIN, UNSPECIFIED CHRONICITY: Primary | ICD-10-CM

## 2021-09-13 DIAGNOSIS — Z96.611 STATUS POST REVERSE ARTHROPLASTY OF SHOULDER, RIGHT: ICD-10-CM

## 2021-09-13 PROCEDURE — 99024 POSTOP FOLLOW-UP VISIT: CPT | Performed by: PHYSICIAN ASSISTANT

## 2021-09-13 NOTE — LETTER
Physical Therapy Rehabilitation Referral    Patient Name:  Jenae Vincent      YOB: 1955    Diagnosis:    1. Right shoulder pain, unspecified chronicity        Precautions:     [x] Evaluate and Treat    Post Op Instructions:  [] Continuous passive motion (CPM) [x] Elbow ROM  [x] Exercise in plane of scapula  [x]  Strengthening     [x] Pulley and instruction   [x] Home exercise program (copy to patient)   [] Sling when arm at risk  [] Sling or brace at all times   [x] AAROM: Forward elevation to  140            [x] AAROM: External rotation  To  40    [] Isometric external rotator strengthening [x] AAROM: internal rotation: up the back  [x] Isometric abductor strengthening  [x] AAROM: Internal abduction   [] Isometric internal rotator strengthening [x] AAROM: cross-body adduction             Stretching:     Strengthening:  [] Four quadrant (FE, ER, IR, CBA)  [] Rotator cuff (ER, IR, Abd)  [] Forward Elevation    [] External Rotators     [] External Rotation    [] Internal Rotators  [] Internal Rotation: up/back   [] Abductors     [] Internal Rotation: supine in abduction  [] Sleeper Stretch    [] Flexors  [] Cross-body abduction    [] Extensors  [x] Pendulum (FE, Abd/Add, cw/ccw)  [x] Scapular Stabilizers   [x] Wall-walking (FE, Abd)        [x] Shoulder shrugs     [x] Table slides (FE)                [x] Rhomboid pinch  [] Elbow (flex, ext, pron, sup)        [] Lat.  Pull downs     [] Medial epicondylitis program       [] Forward punch   [] Lateral epicondylitis program       [] Internal rotators     [x] Progressive resistive exercises  [] Bench Press        [] Bench press plus  Activities:     [] Lateral pull-downs  [x] Rowing     [x] Progressive two-hand supine press  [] Stepper/Exercise bike   [x] Biceps: curls/supination  [] Swimming  [] Water exercises    Modalities:     Return to Sport:  [x] Of Choice      [] Plyometrics  [] Ultrasound     [] Rhythmic stabilization  [] Iontophoresis    [] Core strengthening   [] Moist heat     [] Sports specific program:   [] Massage         [x] Cryotherapy      [] Electrical stimulation     [] Paraffin  [] Whirlpool  [] TENS    [x] Home exercise program (copy to patient). Perform exercises for:   15     minutes    3      times/day  [x] Supervised physical therapy  Frequency: []  1x week  [x] 2x week  [] 3x week  [] Other:   Duration: [] 2 weeks   [] 4 weeks  [x] 6 weeks  [] Other:     Additional Instructions:     Serge Rajput MD, PhD

## 2021-09-15 ENCOUNTER — HOSPITAL ENCOUNTER (OUTPATIENT)
Dept: PHYSICAL THERAPY | Age: 66
Setting detail: THERAPIES SERIES
Discharge: HOME | End: 2021-09-15
Payer: MEDICARE

## 2021-09-15 PROCEDURE — 97110 THERAPEUTIC EXERCISES: CPT | Performed by: PHYSICAL THERAPY ASSISTANT

## 2021-09-15 PROCEDURE — 97140 MANUAL THERAPY 1/> REGIONS: CPT | Performed by: PHYSICAL THERAPY ASSISTANT

## 2021-09-15 PROCEDURE — 97112 NEUROMUSCULAR REEDUCATION: CPT | Performed by: PHYSICAL THERAPY ASSISTANT

## 2021-09-15 NOTE — FLOWSHEET NOTE
[facility], [address] [place] [address] Caren Flores 01, [address]  Phone: [phone], [phone]    Physical Therapy Treatment Note/ Progress Report:     Date:  9/15/2021    Patient Name:  Diane Forman    :  1955  MRN: [id number]  Restrictions/Precautions:    Medical/Treatment Diagnosis Information:  · Diagnosis: Right Shoulder pain S/P Right Reverse TSA 2021  · Treatment Diagnosis: A49.839; B91.795  Insurance/Certification information:  PT Insurance Information: Medicare  Physician Information:  Referring Practitioner: Sofia Cox  Has the plan of care been signed (Y/N):        []  Yes  [x]  No     Date of Patient follow up with Physician: 2021    Is this a Progress Report:     []  Yes  [x]  No      If Yes:  Date Range for reporting period:  Initial Eval: 2021  Beginnin2021 --- Endin2021    Progress report will be due (10 Rx or 30 days whichever is less):      Recertification will be due (POC Duration  / 90 days whichever is less): 11/3/2021      Visit # Insurance Allowable Auth Required   In Person [id number] []  Yes     []  No    Tele Health -  []  Yes     []  No    Total 12 6 used previously        Functional Scale: Quick Dash: 75% (Total Number Sum: 44/55)   Date assessed: 2021     Latex Allergy:  [x]NO      []YES  Preferred Language for Healthcare:   [x]English       []other:    Pain level:  0/10 at rest     SUBJECTIVE: (8 weeks post-op on 2021) Saw MD on Monday and they were pleaed with the the shoulder. OK'd to D/C sling. She has been attempting to use walker at home with assistance and feels good about that. A little discomfort in the shoulder but not terrible.       OBJECTIVE: See eval   Observation:    Test measurements: ROM GOALS:    Flexion 90    ER 20  -        8/26  Flexion 100   ER 25  -        9/1    Flexion 110   ER 30  -        9/8    Flexion 120   ER 35  -        9/15  Flexion 130    ER 40    RESTRICTIONS/PRECAUTIONS: Reverse TSA with Bicep Tenodesis    8/18/2021  Shoulder Elbow Rehabilitation Referral    Patient Name: Terrence Arora      YOB: [birth year]     Diagnosis:   1. Status post reverse arthroplasty of shoulder, right        Precautions: Subscap, NWB Right Arm    Post Op Instructions:  [] Continuous passive motion (CPM)  [x] Active Elbow range of motion  [] Exercise in plane of scapula   []  Strengthening     [] Pulley and instruction    [x] Home exercise program (copy to patient)   [x] Sling when arm at risk  [] Sling or brace at all times   [x] AAROM: Forward elevation to 90            [x] AAROM: External rotation to 20    [] Isometric external rotator strengthening [] AAROM: internal rotation: up the back  [x] Isometric abductor strengthening  [] AAROM: Internal abduction     [] Isometric internal rotator strengthening [] AAROM: cross-body adduction             Stretching:     Strengthening:  [] Four quadrant (FE, ER, IR, CBA)  [] Rotator cuff (ER, IR, Abd)  [] Forward Elevation    [] External Rotators     [] External Rotation    [] Internal Rotators  [] Internal Rotation: up/back   [] Abductors     [] Internal Rotation: supine in abduction  [] Flexors  [] Cross-body abduction    [] Extensors  [x] Pendulum (FE, Abd/Add, cw/ccw)  [x] Scapular Stabilizers   [] Wall-walking (FE, Abd)    [x] Shoulder shrugs     [] Table slides      [x] Rhomboid pinch  [] Elbow (flex, ext, pron, sup)    [] Lat.  Pull downs     [] Medial epicondylitis program    [] Forward punch   [] Lateral epicondylitis program    [] Internal rotators     [] Progressive resistive exercises  [] Bench Press        [] Bench press plus  Activities:     [] Lateral pull-downs  [] Rowing     [] Progressive two-hand supine press  [] Stepper/Exercise bike   [] Biceps: curls/supination  [] Swimming  [] Water exercises    Modalities: PRN    Return to Sport:  [] Ultrasound     [] Plyometrics  [] Iontophoresis     [] Rhythmic stabilization  [] Moist heat     [] Core strengthening   [] Massage     [] Sports specific program:   [x] Cryotherapy      [] Electrical stimulation     [] Paraffin  [] Whirlpool  [] TENS    [x] Home exercise program (copy to patient). Perform exercises for:   15     minutes    2-3      times/day  [x] Supervised physical therapy  Frequency: []  1x week  [x] 2x week  [] 3x week  [] Other:   Duration: [] 2 weeks   [] 4 weeks  [x] 6 weeks  [] Other:     Additional Instructions:   May progress Forward Elevation by 10 degrees weekly up to 140 and External Rotation 5 degrees weekly up to 40          Finar JACOB Brantley MD, PhD              Exercises/Interventions:   Therapeutic Ex (48979)  Therapeutic Activity (34877)  NMR re-education (20397) Sets/Reps Notes/CUES   Pulley          Wrist Flex/Ext 20 x  HEP   Wrist Pro/Sup 20 x  HEP   Rad/Uln Deviation 20 x  HEP             Gripping 20 x Red digiflex        PROM Elbow Flex/Ext 20 x         T-Band Row / Ext  30 x Yellow     30 x         Upper Trap Stretch 3 x 30\"    Supine cane press  X20     Table slides  3 ways 10x10\" ea         Leg exercises     Seated Add squeeze HEP   Seated hip abduction HEP   Seated marches HEP   LAQ HEP   Seated HR/TR HEP   Standing with gait belt and CGA 3x45\" each @ cable column bar   Standing HR at low bar x20    Standing marches at low bar with CGA  X10 R, L    Standing Mini Squat  x10    Manual Intervention (22162)     PROM 15' Supine on high/low table with head raised                             Medbridge access code:  Access Code: [email or web address]  URL: [email or web address]  Date: 08/05/2021  Prepared by: Felix Elizondo    Exercises  Seated Forearm Pronation and Supination AROM - 1 x daily - 7 x weekly - 3 sets - 10 reps  Seated Wrist Radial and Ulnar Deviation AROM - 1 x daily - 7 x weekly - 3 sets - 10 reps  Wrist Flexion Extension AROM - Palms Down - 1 x daily - 7 x weekly - 3 sets - 10 reps  Forearm Strengthening with Ball Squeeze - 1 x daily - 7 x weekly - 3 sets - 10 reps  Seated Scapular Retraction - 1 x daily - 7 x weekly - 3 sets - 10 reps  Standing Shoulder Shrugs - 1 x daily - 7 x weekly - 3 sets - 10 reps  Seated Neck Sidebending Stretch - 1 x daily - 7 x weekly - 1 sets - 3 reps - 30\" hold  Seated Finger MP Extension AROM with Blocking - 1 x daily - 7 x weekly - 1 sets - 10 reps                    Patient Education 10' Pt education with HEP and progression of PT along with compliance with HEP to aide with formal PT for optimal outcomes. Therapeutic Exercise and NMR EXR  [x] (29763) Provided verbal/tactile cueing for activities related to strengthening, flexibility, endurance, ROM  for improvements in scapular, scapulothoracic and UE control with self care, reaching, carrying, lifting, house/yardwork, driving/computer work. [x] (18340) Provided verbal/tactile cueing for activities related to improving balance, coordination, kinesthetic sense, posture, motor skill, proprioception  to assist with  scapular, scapulothoracic and UE control with self care, reaching, carrying, lifting, house/yardwork, driving/computer work. Therapeutic Activities:    [x] (24632 or 67491) Provided verbal/tactile cueing for activities related to improving balance, coordination, kinesthetic sense, posture, motor skill, proprioception and motor activation to allow for proper function of scapular, scapulothoracic and UE control with self care, carrying, lifting, driving/computer work.      Home Exercise Program:    [x] (38980) Reviewed/Progressed HEP activities related to strengthening, flexibility, endurance, ROM of scapular, scapulothoracic and UE control with self care, reaching, carrying, lifting, house/yardwork, driving/computer work  [x] (84736) Reviewed/Progressed HEP activities related to improving balance, coordination, kinesthetic sense, posture, motor skill, proprioception of scapular, scapulothoracic and UE control with self care, reaching, carrying, lifting, house/yardwork, driving/computer work      Manual Treatments:  PROM / STM / Oscillations-Mobs:  G-I, II, III, IV (PA's, Inf., Post.)  [x] (31957) Provided manual therapy to mobilize soft tissue/joints of cervical/CT, scapular GHJ and UE for the purpose of modulating pain, promoting relaxation,  increasing ROM, reducing/eliminating soft tissue swelling/inflammation/restriction, improving soft tissue extensibility and allowing for proper ROM for normal function with self care, reaching, carrying, lifting, house/yardwork, driving/computer work    Modalities:      Charges:  Timed Code Treatment Minutes: 39'   Total Treatment Minutes:  45'   [facility]:  TE TIME:  NMR TIME:  MANUAL TIME:  UNTIMED MINUTES:  Medicare Total:   -  -  -  -  Visit 6 (this round) (Total 12visits)   Total $1370       [] EVAL (LOW) 94173 (typically 20 minutes face-to-face)  [] EVAL (MOD) 99326 (typically 30 minutes face-to-face)  [] EVAL (HIGH) 56246 (typically 45 minutes face-to-face)  [] RE-EVAL     [x] FH(35525) x 1     [] IONTO  [] NMR (41795) x    [] VASO  [x] Manual (16895) x 1     [] Other:  [x] TA x   1   [] Mech Traction (73418)  [] ES(attended) (49399)     [] ES (un) (36296):    ASSESSMENT:  See eval    GOALS:   Short Term Goals: To be achieved in: 2 weeks  1. Independent in HEP and progression per patient tolerance, in order to prevent re-injury. [x]? Progressing: []? Met: []? Not Met: []? Adjusted       2. Patient will have a decrease in pain to facilitate improvement in movement, function, and ADLs as indicated by Functional Deficits. [x]? Progressing: []? Met: []? Not Met: []? Adjusted          Long Term Goals: To be achieved in: 12 weeks  1. Disability index score of 20% or less for the QuickDash/Oswestry to assist with reaching prior level of function. []? Progressing: []? Met: []? Not Met: []? Adjusted      2.  Patient will demonstrate increased AROM to equal the opposite side bilaterally to allow for proper joint functioning as indicated by patients Functional Deficits. []? Progressing: []? Met: []? Not Met: []? Adjusted       3. Patient will demonstrate an increase in strength to of R UE to 4/5 grossly to allow for proper functional mobility as indicated by patients Functional Deficits. []? Progressing: []? Met: []? Not Met: []? Adjusted       4. Patient will return to all transfers, work activities, and functional activities without increased symptoms or restriction. []? Progressing: []? Met: []? Not Met: []? Adjusted       5. Patient will have 0/10 pain with ADL's.  []? Progressing: []? Met: []? Not Met: []? Adjusted       6. Patient stated goal: To be able to perform self care  []? Progressing: []? Met: []? Not Met: []? Adjusted       7. Patient stated goal: To be able to walk with a walker without LOB  []? Progressing: []? Met: []? Not Met: []? Adjusted          Overall Progression Towards Functional goals/ Treatment Progress Update:  [] Patient is progressing as expected towards functional goals listed. [x] Progression is slowed due to complexities/Impairments listed. [] Progression has been slowed due to co-morbidities.   [] Plan just implemented, too soon to assess goals progression <30days   [] Goals require adjustment due to lack of progress  [] Patient is not progressing as expected and requires additional follow up with physician  [] Other    Prognosis for POC: [x] Good [] Fair  [] Poor    Patient requires continued skilled intervention: [x] Yes  [] No    Treatment/Activity Tolerance:  [x] Patient able to complete treatment  [] Patient limited by fatigue  [] Patient limited by pain    [] Patient limited by other medical complications  [] Other:     Return to Play: (if applicable)   []  Stage 1: Intro to Strength   []  Stage 2: Return to Run and Strength   []  Stage 3: Return to Jump and Strength   []  Stage 4: Dynamic Strength and Agility   []  Stage 5: Sport Specific Training     []  Ready to Return to Play, [employer] All Above Stages   []  Not Ready for Return to Sports   Comments:                         PLAN: See eval  [x] Continue per plan of care [] Alter current plan (see comments above)  [] Plan of care initiated [] Hold pending MD visit [] Discharge    Electronically signed by:  Phoenix Gracia PTA  Note: If patient does not return for scheduled/ recommended follow up visits, this note will serve as a discharge from care along with most recent update on progress.

## 2021-09-16 ENCOUNTER — APPOINTMENT (OUTPATIENT)
Dept: PHYSICAL THERAPY | Age: 66
End: 2021-09-16
Payer: MEDICARE

## 2021-09-20 NOTE — PROGRESS NOTES
History of Present Illness:  John Melo is a 61 y.o. female who presents for a post operative visit. The patient underwent a right reverse total shoulder arthroplasty on 7/20/2021. She is currently 8 weeks postop. She reports her pain levels are dull and rates it at 2/10 VAS today. She has some difficulty with sleep at nighttime otherwise she reports she has not had any setbacks or injury since her last visit. The patient deny fevers, chills, numbness, tingling, and shortness of breath. Medical History:  Patient's medications, allergies, past medical, surgical, social and family histories were reviewed and updated as appropriate. Review of Systems    Done: 6/9/21  Patient has had no medical changes since last evaluated      Review of Systems  A 14 point review of systems was completed by the patient is available in the media section of the scanned medical record and was reviewed on 9/20/2021. Vital Signs:  Vitals:       General/Appearance: Alert and oriented and in no apparent distress. Skin:  There are no skin lesions, cellulitis, or extreme edema. The patient has warm and well-perfused Bilateral upper extremities with brisk capillary refill.      right Shoulder Exam:    Inspection: right shoulder incision is fully healed. There is no erythema, drainage or other signs of infection    Palpation:  No crepitus to gentle motion    Active Range of Motion: Forward elevation with some active assisted movements to 120 and external rotation of 40 and internal rotation to back to back pocket. Strength: 4/5 external rotation with resistance, +4/5 internal rotation with resistance    Special Tests:  Deferred. Neurovascular: Sensation to light touch is intact, no motor deficits, palpable radial pulses 2+    Radiology:     Plain radiographs not obtained today.            Assessment :  Ms. John Melo is a 61 y.o. patient who underwent a right reverse total shoulder arthroplasty nearly 8 weeks ago.  Surgery was 7/20/2021      Impression:  Encounter Diagnoses   Name Primary?  Right shoulder pain, unspecified chronicity Yes    Status post reverse arthroplasty of shoulder, right        Office Procedures:  Orders Placed This Encounter   Procedures    XR SHOULDER RIGHT (MIN 2 VIEWS)     Standing Status:   Future     Number of Occurrences:   1     Standing Expiration Date:   9/13/2022       Treatment Plan:    Overall the patient is doing well. The pain is well-controlled. She was asked to continue going to physical therapy twice a week. Updated therapy order was provided to the patient today. We will see her back in 4 weeks for follow-up visit. 12:34 PM      Sahil North PA-C  Orthopaedic Sports Medicine Physician Assistant    This dictation was performed with a verbal recognition program [facility]) and it was checked for errors. It is possible that there are still dictated errors within this office note. If so, please bring any errors to my attention for an addendum. All efforts were made to ensure that this office note is accurate.

## 2021-09-21 RX ORDER — UMECLIDINIUM 62.5 UG/1
AEROSOL, POWDER ORAL
Qty: 30 EACH | Refills: 0 | Status: SHIPPED | OUTPATIENT
Start: 2021-09-21 | End: 2022-01-18

## 2021-09-22 ENCOUNTER — HOSPITAL ENCOUNTER (OUTPATIENT)
Dept: PHYSICAL THERAPY | Age: 66
Setting detail: THERAPIES SERIES
Discharge: HOME | End: 2021-09-22
Payer: MEDICARE

## 2021-09-22 PROCEDURE — 97110 THERAPEUTIC EXERCISES: CPT | Performed by: PHYSICAL THERAPY ASSISTANT

## 2021-09-22 PROCEDURE — 97112 NEUROMUSCULAR REEDUCATION: CPT | Performed by: PHYSICAL THERAPY ASSISTANT

## 2021-09-22 PROCEDURE — 97140 MANUAL THERAPY 1/> REGIONS: CPT | Performed by: PHYSICAL THERAPY ASSISTANT

## 2021-09-22 NOTE — FLOWSHEET NOTE
[facility],  [place] [address], [address]  Phone: [phone], [phone]    Physical Therapy Treatment Note/ Progress Report:     Date:  2021    Patient Name:  Jenae Vincent    :  1955  MRN: [id number]  Restrictions/Precautions:    Medical/Treatment Diagnosis Information:  · Diagnosis: Right Shoulder pain S/P Right Reverse TSA 2021  · Treatment Diagnosis: J95.743; C66.321  Insurance/Certification information:  PT Insurance Information: Medicare  Physician Information:  Referring Practitioner: Maria Ines Olea  Has the plan of care been signed (Y/N):        []  Yes  [x]  No     Date of Patient follow up with Physician: 2021    Is this a Progress Report:     []  Yes  [x]  No      If Yes:  Date Range for reporting period:  Initial Eval: 2021  Beginnin2021 --- Endin2021    Progress report will be due (10 Rx or 30 days whichever is less): 9585     Recertification will be due (POC Duration  / 90 days whichever is less): 11/3/2021      Visit # Insurance Allowable Auth Required   In Person [address] []  Yes     []  No    Tele Health -  []  Yes     []  No    Total 12 6 used previously        Functional Scale: Quick Dash: 75% (Total Number Sum: 44/55)   Date assessed: 2021     Latex Allergy:  [x]NO      []YES  Preferred Language for Healthcare:   [x]English       []other:    Pain level:  0/10 at rest     SUBJECTIVE: (8 weeks post-op on 2021) Doing OK. States she has been trying to use walker at home and this is feeling OK.    OBJECTIVE: See eval   Observation:    Test measurements: ROM GOALS:    Flexion 90    ER 20  -        8/26  Flexion 100   ER 25  -        9/1    Flexion 110   ER 30  -        9/8    Flexion 120   ER 35  -        9/15  Flexion 130    ER 40    RESTRICTIONS/PRECAUTIONS: Reverse TSA with Bicep Tenodesis    2021  Shoulder Elbow Rehabilitation Referral    Patient Name: Redd Carol Ann      Date of birth: 1955 8/18/2021     Diagnosis:   1. Status post reverse arthroplasty of shoulder, right        Precautions: Subscap, NWB Right Arm    Post Op Instructions:  [] Continuous passive motion (CPM)  [x] Active Elbow range of motion  [] Exercise in plane of scapula   []  Strengthening     [] Pulley and instruction    [x] Home exercise program (copy to patient)   [x] Sling when arm at risk  [] Sling or brace at all times   [x] AAROM: Forward elevation to 90            [x] AAROM: External rotation to 20    [] Isometric external rotator strengthening [] AAROM: internal rotation: up the back  [x] Isometric abductor strengthening  [] AAROM: Internal abduction     [] Isometric internal rotator strengthening [] AAROM: cross-body adduction             Stretching:     Strengthening:  [] Four quadrant (FE, ER, IR, CBA)  [] Rotator cuff (ER, IR, Abd)  [] Forward Elevation    [] External Rotators     [] External Rotation    [] Internal Rotators  [] Internal Rotation: up/back   [] Abductors     [] Internal Rotation: supine in abduction  [] Flexors  [] Cross-body abduction    [] Extensors  [x] Pendulum (FE, Abd/Add, cw/ccw)  [x] Scapular Stabilizers   [] Wall-walking (FE, Abd)    [x] Shoulder shrugs     [] Table slides      [x] Rhomboid pinch  [] Elbow (flex, ext, pron, sup)    [] Lat.  Pull downs     [] Medial epicondylitis program    [] Forward punch   [] Lateral epicondylitis program    [] Internal rotators     [] Progressive resistive exercises  [] Bench Press        [] Bench press plus  Activities:     [] Lateral pull-downs  [] Rowing     [] Progressive two-hand supine press  [] Stepper/Exercise bike   [] Biceps: curls/supination  [] Swimming  [] Water exercises    Modalities: PRN    Return to Sport:  [] Ultrasound     [] Plyometrics  [] Iontophoresis     [] Rhythmic stabilization  [] Moist heat     [] Core strengthening   [] Massage     [] Sports specific program:   [x] Cryotherapy      [] Electrical stimulation     [] Paraffin  [] Whirlpool  [] TENS    [x] Home exercise program (copy to patient). Perform exercises for:   15     minutes    2-3      times/day  [x] Supervised physical therapy  Frequency: []  1x week  [x] 2x week  [] 3x week  [] Other:   Duration: [] 2 weeks   [] 4 weeks  [x] 6 weeks  [] Other:     Additional Instructions:   May progress Forward Elevation by 10 degrees weekly up to 140 and External Rotation 5 degrees weekly up to 40          Samer S. Jessi Shrestha MD, PhD              Exercises/Interventions:   Therapeutic Ex (91572)  Therapeutic Activity (86031)  NMR re-education (92129) Sets/Reps Notes/CUES   Pulley          Wrist Flex/Ext 20 x  HEP   Wrist Pro/Sup 20 x  HEP   Rad/Uln Deviation 20 x  HEP             Gripping 20 x Red digiflex        PROM Elbow Flex/Ext 20 x         T-Band Row / Ext  30 x/ x20 Yellow    T-Band ER / IR  Yellow x20 ea          isos flexion/ext / IR / ER  10x10\" ea                         Upper Trap Stretch 3 x 30\"    Supine cane press  X20     Table slides  3 ways 10x10\" ea         Leg exercises     Seated Add squeeze HEP   Seated hip abduction HEP   Seated marches HEP   LAQ HEP   Seated HR/TR HEP   Standing with gait belt and CGA 3x45\" each @ cable column bar   Standing HR at low bar x20    Standing marches at low bar with CGA  X10 R, L    Standing Mini Squat  x10    Manual Intervention (55206)     PROM 15' Supine on high/low table with head raised                             [employer] access code:  Access Code: [email or web address]  URL: [email or web address]  Date: 08/05/2021  Prepared by: Lynette Montes    Exercises  Seated Forearm Pronation and Supination AROM - 1 x daily - 7 x weekly - 3 sets - 10 reps  Seated Wrist Radial and Ulnar Deviation AROM - 1 x daily - 7 x weekly - 3 sets - 10 reps  Wrist Flexion Extension AROM - Palms Down - 1 x daily - 7 x weekly - 3 sets - 10 reps  Forearm Strengthening with Ball Squeeze - 1 x daily - 7 x weekly - 3 sets - 10 reps  Seated Scapular Retraction - 1 x daily - 7 x weekly - 3 sets - 10 reps  Standing Shoulder Shrugs - 1 x daily - 7 x weekly - 3 sets - 10 reps  Seated Neck Sidebending Stretch - 1 x daily - 7 x weekly - 1 sets - 3 reps - 30\" hold  Seated Finger MP Extension AROM with Blocking - 1 x daily - 7 x weekly - 1 sets - 10 reps                    Patient Education 10' Pt education with HEP and progression of PT along with compliance with HEP to aide with formal PT for optimal outcomes. Therapeutic Exercise and NMR EXR  [x] (31839) Provided verbal/tactile cueing for activities related to strengthening, flexibility, endurance, ROM  for improvements in scapular, scapulothoracic and UE control with self care, reaching, carrying, lifting, house/yardwork, driving/computer work. [x] (62060) Provided verbal/tactile cueing for activities related to improving balance, coordination, kinesthetic sense, posture, motor skill, proprioception  to assist with  scapular, scapulothoracic and UE control with self care, reaching, carrying, lifting, house/yardwork, driving/computer work. Therapeutic Activities:    [x] (16309 or 06960) Provided verbal/tactile cueing for activities related to improving balance, coordination, kinesthetic sense, posture, motor skill, proprioception and motor activation to allow for proper function of scapular, scapulothoracic and UE control with self care, carrying, lifting, driving/computer work.      Home Exercise Program:    [x] (65128) Reviewed/Progressed HEP activities related to strengthening, flexibility, endurance, ROM of scapular, scapulothoracic and UE control with self care, reaching, carrying, lifting, house/yardwork, driving/computer work  [x] (36596) Reviewed/Progressed HEP activities related to improving balance, coordination, kinesthetic sense, posture, motor skill, proprioception of scapular, scapulothoracic and UE control with self care, reaching, carrying, lifting, house/yardwork, driving/computer work      Manual Treatments:  PROM / STM / Oscillations-Mobs:  G-I, II, III, IV (PA's, Inf., Post.)  [x] (78323) Provided manual therapy to mobilize soft tissue/joints of cervical/CT, scapular GHJ and UE for the purpose of modulating pain, promoting relaxation,  increasing ROM, reducing/eliminating soft tissue swelling/inflammation/restriction, improving soft tissue extensibility and allowing for proper ROM for normal function with self care, reaching, carrying, lifting, house/yardwork, driving/computer work    Modalities:      Charges:  Timed Code Treatment Minutes: 39'   Total Treatment Minutes:  45'   BWC:  TE TIME:  NMR TIME:  MANUAL TIME:  UNTIMED MINUTES:  Medicare Total:   -  -  -  -  Visit 7 (this round) (Total 12visits)   Total $1470       [] EVAL (LOW) 73964 (typically 20 minutes face-to-face)  [] EVAL (MOD) 73776 (typically 30 minutes face-to-face)  [] EVAL (HIGH) 36509 (typically 45 minutes face-to-face)  [] RE-EVAL     [x] GB(45880) x 1     [] IONTO  [] NMR (30198) x    [] VASO  [x] Manual (75918) x 1     [] Other:  [x] TA x   1   [] Mech Traction (72617)  [] ES(attended) (84099)     [] ES (un) (43807):    ASSESSMENT:  See eval    GOALS:   Short Term Goals: To be achieved in: 2 weeks  1. Independent in HEP and progression per patient tolerance, in order to prevent re-injury. [x]? Progressing: []? Met: []? Not Met: []? Adjusted       2. Patient will have a decrease in pain to facilitate improvement in movement, function, and ADLs as indicated by Functional Deficits. [x]? Progressing: []? Met: []? Not Met: []? Adjusted          Long Term Goals: To be achieved in: 12 weeks  1. Disability index score of 20% or less for the QuickDash/Oswestry to assist with reaching prior level of function. []? Progressing: []? Met: []? Not Met: []? Adjusted      2. Patient will demonstrate increased AROM to equal the opposite side bilaterally to allow for proper joint functioning as indicated by patients Functional Deficits.    []? Progressing: []? Met: []? Not Met: []? Adjusted       3. Patient will demonstrate an increase in strength to of R UE to 4/5 grossly to allow for proper functional mobility as indicated by patients Functional Deficits. []? Progressing: []? Met: []? Not Met: []? Adjusted       4. Patient will return to all transfers, work activities, and functional activities without increased symptoms or restriction. []? Progressing: []? Met: []? Not Met: []? Adjusted       5. Patient will have 0/10 pain with ADL's.  []? Progressing: []? Met: []? Not Met: []? Adjusted       6. Patient stated goal: To be able to perform self care  []? Progressing: []? Met: []? Not Met: []? Adjusted       7. Patient stated goal: To be able to walk with a walker without LOB  []? Progressing: []? Met: []? Not Met: []? Adjusted          Overall Progression Towards Functional goals/ Treatment Progress Update:  [] Patient is progressing as expected towards functional goals listed. [x] Progression is slowed due to complexities/Impairments listed. [] Progression has been slowed due to co-morbidities.   [] Plan just implemented, too soon to assess goals progression <30days   [] Goals require adjustment due to lack of progress  [] Patient is not progressing as expected and requires additional follow up with physician  [] Other    Prognosis for POC: [x] Good [] Fair  [] Poor    Patient requires continued skilled intervention: [x] Yes  [] No    Treatment/Activity Tolerance:  [x] Patient able to complete treatment  [] Patient limited by fatigue  [] Patient limited by pain    [] Patient limited by other medical complications  [] Other:     Return to Play: (if applicable)   []  Stage 1: Intro to Strength   []  Stage 2: Return to Run and Strength   []  Stage 3: Return to Jump and Strength   []  Stage 4: Dynamic Strength and Agility   []  Stage 5: Sport Specific Training     []  Ready to Return to Play, Meets All Above Stages   []  Not Ready for Return to Sports   Comments:                         PLAN: See eval  [x] Continue per plan of care [] Alter current plan (see comments above)  [] Plan of care initiated [] Hold pending MD visit [] Discharge    Electronically signed by:  Naren Belcher PTA  Note: If patient does not return for scheduled/ recommended follow up visits, this note will serve as a discharge from care along with most recent update on progress.

## 2021-09-23 ENCOUNTER — APPOINTMENT (OUTPATIENT)
Dept: PHYSICAL THERAPY | Age: 66
End: 2021-09-23
Payer: MEDICARE

## 2021-09-28 RX ORDER — TIZANIDINE 2 MG/1
TABLET ORAL
Qty: 30 TABLET | Refills: 2 | Status: SHIPPED | OUTPATIENT
Start: 2021-09-28 | End: 2021-12-13

## 2021-09-29 DIAGNOSIS — M51.37 DEGENERATION OF LUMBAR OR LUMBOSACRAL INTERVERTEBRAL DISC: Chronic | ICD-10-CM

## 2021-09-29 RX ORDER — DILTIAZEM HYDROCHLORIDE 120 MG/1
CAPSULE, EXTENDED RELEASE ORAL
Qty: 90 CAPSULE | Refills: 0 | Status: SHIPPED | OUTPATIENT
Start: 2021-09-29 | End: 2021-10-14

## 2021-09-29 RX ORDER — BUDESONIDE AND FORMOTEROL FUMARATE DIHYDRATE 160; 4.5 UG/1; UG/1
AEROSOL RESPIRATORY (INHALATION)
Qty: 10.2 G | Refills: 0 | Status: SHIPPED | OUTPATIENT
Start: 2021-09-29 | End: 2021-10-26

## 2021-09-29 RX ORDER — DULOXETIN HYDROCHLORIDE 30 MG/1
30 CAPSULE, DELAYED RELEASE ORAL DAILY
Qty: 90 CAPSULE | Refills: 0 | Status: SHIPPED | OUTPATIENT
Start: 2021-09-29 | End: 2021-12-27

## 2021-09-29 RX ORDER — LISINOPRIL 40 MG/1
40 TABLET ORAL DAILY
Qty: 30 TABLET | Refills: 0 | Status: SHIPPED | OUTPATIENT
Start: 2021-09-29 | End: 2021-10-26

## 2021-09-29 RX ORDER — METOPROLOL TARTRATE 50 MG/1
TABLET, FILM COATED ORAL
Qty: 60 TABLET | Refills: 0 | Status: SHIPPED | OUTPATIENT
Start: 2021-09-29 | End: 2021-10-26

## 2021-09-30 ENCOUNTER — HOSPITAL ENCOUNTER (OUTPATIENT)
Dept: PHYSICAL THERAPY | Age: 66
Setting detail: THERAPIES SERIES
Discharge: HOME | End: 2021-09-30
Payer: MEDICARE

## 2021-09-30 NOTE — FLOWSHEET NOTE
[facility], [facility] ([facility])    Physical Therapy  Cancellation/No-show Note  Patient Name:  Lorrie Harrell  :  1955   Date:  2021    Cancelled visits to date: 1  No-shows to date: 1    For today's appointment patient:  [x]  Cancelled  []  Rescheduled appointment  [x]  No-show     Reason given by patient:  []  Patient ill  []  Conflicting appointment  []  No transportation    []  Conflict with work  [x]  No reason given  []  Other:     Comments:      Phone call information:   [x]  Phone call made today to patient. []  Patient answered, conversation as follows:    [x]  Patient did not answer. []  Phone call not made today  []  Phone call not needed - pt contacted us to cancel and provided reason for cancellation.      Electronically signed by:  Mariluz Garcia PTA

## 2021-10-06 RX ORDER — MELOXICAM 7.5 MG/1
7.5 TABLET ORAL DAILY
Qty: 90 TABLET | Refills: 0 | Status: SHIPPED | OUTPATIENT
Start: 2021-10-06 | End: 2021-12-29

## 2021-10-07 ENCOUNTER — CARE COORDINATION (OUTPATIENT)
Dept: CARE COORDINATION | Age: 66
End: 2021-10-07

## 2021-10-07 NOTE — CARE COORDINATION
ACM attempted outreach. Left message for son Marley Reid regarding follow up on DME needs and status . Contact information for call back provided.        Plan    Fall safety and home safety  AA7 referral made- status  Son to contact [facility] for options on a lift chair and purchase options

## 2021-10-11 RX ORDER — CALCIUM CARBONATE-CHOLECALCIFEROL TAB 250 MG-125 UNIT 250-125 MG-UNIT
TAB ORAL
Qty: 100 TABLET | Refills: 0 | Status: SHIPPED | OUTPATIENT
Start: 2021-10-11 | End: 2021-11-29

## 2021-10-14 RX ORDER — DILTIAZEM HYDROCHLORIDE 120 MG/1
CAPSULE, EXTENDED RELEASE ORAL
Qty: 90 CAPSULE | Refills: 0 | Status: SHIPPED | OUTPATIENT
Start: 2021-10-14 | End: 2022-02-02 | Stop reason: SDUPTHER

## 2021-10-14 RX ORDER — BUPROPION HYDROCHLORIDE 100 MG/1
100 TABLET, EXTENDED RELEASE ORAL DAILY
Qty: 90 TABLET | Refills: 0 | Status: SHIPPED | OUTPATIENT
Start: 2021-10-14 | End: 2022-02-01

## 2021-10-26 RX ORDER — BUDESONIDE AND FORMOTEROL FUMARATE DIHYDRATE 160; 4.5 UG/1; UG/1
AEROSOL RESPIRATORY (INHALATION)
Qty: 10.2 G | Refills: 2 | Status: SHIPPED | OUTPATIENT
Start: 2021-10-26 | End: 2022-01-17

## 2021-10-26 RX ORDER — LISINOPRIL 40 MG/1
40 TABLET ORAL DAILY
Qty: 30 TABLET | Refills: 2 | Status: SHIPPED | OUTPATIENT
Start: 2021-10-26 | End: 2022-01-17

## 2021-10-26 RX ORDER — METOPROLOL TARTRATE 50 MG/1
TABLET, FILM COATED ORAL
Qty: 60 TABLET | Refills: 2 | Status: SHIPPED | OUTPATIENT
Start: 2021-10-26 | End: 2022-01-17

## 2021-10-27 RX ORDER — TORSEMIDE 20 MG/1
20 TABLET ORAL DAILY
Qty: 90 TABLET | Refills: 3 | Status: SHIPPED | OUTPATIENT
Start: 2021-10-27 | End: 2022-06-07 | Stop reason: DRUGHIGH

## 2021-10-28 NOTE — PROGRESS NOTES
AM assessment completed, see flow sheet. Pt is alert and oriented. Vital signs are WNL. Respirations are even & easy and 91% on RA. Pt c/o chronic right shoulder pain 6/10, given Tylenol 650 mg po. IV leaking held AM lasix dose and MD aware. Awaiting new orders after AM assessment per MD. Pt denies needs at this time. SR up x 2, and bed in low position. Call light is within reach. none...

## 2021-11-08 DIAGNOSIS — M47.817 LUMBOSACRAL SPONDYLOSIS WITHOUT MYELOPATHY: Chronic | ICD-10-CM

## 2021-11-08 RX ORDER — PREGABALIN 100 MG/1
CAPSULE ORAL
Qty: 180 CAPSULE | Refills: 0 | Status: SHIPPED | OUTPATIENT
Start: 2021-11-08 | End: 2022-02-01

## 2021-11-09 ENCOUNTER — CARE COORDINATION (OUTPATIENT)
Dept: CARE COORDINATION | Age: 66
End: 2021-11-09

## 2021-11-29 RX ORDER — CALCIUM CARBONATE-CHOLECALCIFEROL TAB 250 MG-125 UNIT 250-125 MG-UNIT
TAB ORAL
Qty: 100 TABLET | Refills: 0 | Status: SHIPPED | OUTPATIENT
Start: 2021-11-29 | End: 2022-01-13

## 2021-11-30 ENCOUNTER — TELEPHONE (OUTPATIENT)
Dept: CASE MANAGEMENT | Age: 66
End: 2021-11-30

## 2021-12-13 RX ORDER — TIZANIDINE 2 MG/1
TABLET ORAL
Qty: 30 TABLET | Refills: 0 | Status: SHIPPED | OUTPATIENT
Start: 2021-12-13 | End: 2022-02-02 | Stop reason: ALTCHOICE

## 2021-12-14 ENCOUNTER — CARE COORDINATION (OUTPATIENT)
Dept: CARE COORDINATION | Age: 66
End: 2021-12-14

## 2021-12-14 NOTE — CARE COORDINATION
ACM attempted outreach. Left message. Contact information for call back provided. 3rd attempt at outreach. Care coordination completed.

## 2021-12-27 DIAGNOSIS — M51.37 DEGENERATION OF LUMBAR OR LUMBOSACRAL INTERVERTEBRAL DISC: Chronic | ICD-10-CM

## 2021-12-27 RX ORDER — DULOXETIN HYDROCHLORIDE 30 MG/1
30 CAPSULE, DELAYED RELEASE ORAL DAILY
Qty: 90 CAPSULE | Refills: 0 | Status: SHIPPED | OUTPATIENT
Start: 2021-12-27 | End: 2022-03-22

## 2021-12-29 RX ORDER — MELOXICAM 7.5 MG/1
7.5 TABLET ORAL DAILY
Qty: 90 TABLET | Refills: 0 | Status: SHIPPED | OUTPATIENT
Start: 2021-12-29 | End: 2022-03-23

## 2022-01-03 ENCOUNTER — TELEPHONE (OUTPATIENT)
Dept: CASE MANAGEMENT | Age: 67
End: 2022-01-03

## 2022-01-03 NOTE — TELEPHONE ENCOUNTER
Patient due for annual CT Lung Screening. Reminder letter mailed.     Julianne Mittal [address] Lung Navigator  [phone]

## 2022-01-13 RX ORDER — CALCIUM CARBONATE-CHOLECALCIFEROL TAB 250 MG-125 UNIT 250-125 MG-UNIT
TAB ORAL
Qty: 100 TABLET | Refills: 0 | Status: SHIPPED | OUTPATIENT
Start: 2022-01-13 | End: 2022-02-28

## 2022-01-17 RX ORDER — METOPROLOL TARTRATE 50 MG/1
TABLET, FILM COATED ORAL
Qty: 60 TABLET | Refills: 0 | Status: SHIPPED | OUTPATIENT
Start: 2022-01-17 | End: 2022-04-20

## 2022-01-17 RX ORDER — BUDESONIDE AND FORMOTEROL FUMARATE DIHYDRATE 160; 4.5 UG/1; UG/1
AEROSOL RESPIRATORY (INHALATION)
Qty: 10.2 G | Refills: 0 | Status: SHIPPED | OUTPATIENT
Start: 2022-01-17 | End: 2022-07-07

## 2022-01-17 RX ORDER — LISINOPRIL 40 MG/1
40 TABLET ORAL DAILY
Qty: 30 TABLET | Refills: 0 | Status: SHIPPED | OUTPATIENT
Start: 2022-01-17 | End: 2022-02-02 | Stop reason: SDUPTHER

## 2022-01-18 RX ORDER — UMECLIDINIUM 62.5 UG/1
AEROSOL, POWDER ORAL
Qty: 30 EACH | Refills: 0 | Status: SHIPPED | OUTPATIENT
Start: 2022-01-18 | End: 2022-02-14

## 2022-02-01 ENCOUNTER — TELEPHONE (OUTPATIENT)
Dept: CASE MANAGEMENT | Age: 67
End: 2022-02-01

## 2022-02-01 DIAGNOSIS — M47.817 LUMBOSACRAL SPONDYLOSIS WITHOUT MYELOPATHY: Chronic | ICD-10-CM

## 2022-02-01 RX ORDER — PREGABALIN 100 MG/1
CAPSULE ORAL
Qty: 180 CAPSULE | Refills: 0 | Status: SHIPPED | OUTPATIENT
Start: 2022-02-07 | End: 2022-04-27

## 2022-02-01 RX ORDER — BUPROPION HYDROCHLORIDE 100 MG/1
100 TABLET, EXTENDED RELEASE ORAL DAILY
Qty: 90 TABLET | Refills: 0 | Status: SHIPPED | OUTPATIENT
Start: 2022-02-01 | End: 2022-04-28

## 2022-02-01 NOTE — TELEPHONE ENCOUNTER
Patient due for annual CT Lung Screening. Third and final reminder letter mailed.     Julianne Borrero Lung Navigator  [phone]

## 2022-02-02 ENCOUNTER — OFFICE VISIT (OUTPATIENT)
Dept: INTERNAL MEDICINE CLINIC | Age: 67
End: 2022-02-02

## 2022-02-02 ENCOUNTER — HOSPITAL ENCOUNTER (OUTPATIENT)
Age: 67
Discharge: HOME | End: 2022-02-02
Payer: MEDICARE

## 2022-02-02 VITALS
HEART RATE: 70 BPM | WEIGHT: 193 LBS | RESPIRATION RATE: 18 BRPM | BODY MASS INDEX: 32.15 KG/M2 | DIASTOLIC BLOOD PRESSURE: 75 MMHG | HEIGHT: 65 IN | SYSTOLIC BLOOD PRESSURE: 140 MMHG

## 2022-02-02 DIAGNOSIS — F32.4 MAJOR DEPRESSIVE DISORDER WITH SINGLE EPISODE, IN PARTIAL REMISSION (HCC): ICD-10-CM

## 2022-02-02 DIAGNOSIS — M47.817 LUMBOSACRAL SPONDYLOSIS WITHOUT MYELOPATHY: ICD-10-CM

## 2022-02-02 DIAGNOSIS — E11.9 WELL CONTROLLED TYPE 2 DIABETES MELLITUS (HCC): ICD-10-CM

## 2022-02-02 DIAGNOSIS — E55.9 VITAMIN D DEFICIENCY: ICD-10-CM

## 2022-02-02 DIAGNOSIS — R53.81 PHYSICAL DEBILITY: Primary | ICD-10-CM

## 2022-02-02 DIAGNOSIS — E66.01 MORBIDLY OBESE (HCC): ICD-10-CM

## 2022-02-02 DIAGNOSIS — Z87.891 PERSONAL HISTORY OF TOBACCO USE: ICD-10-CM

## 2022-02-02 DIAGNOSIS — I10 ESSENTIAL HYPERTENSION: ICD-10-CM

## 2022-02-02 DIAGNOSIS — J96.11 CHRONIC RESPIRATORY FAILURE WITH HYPOXIA (HCC): ICD-10-CM

## 2022-02-02 DIAGNOSIS — J43.1 PANLOBULAR EMPHYSEMA (HCC): ICD-10-CM

## 2022-02-02 DIAGNOSIS — M51.37 DEGENERATION OF LUMBAR OR LUMBOSACRAL INTERVERTEBRAL DISC: ICD-10-CM

## 2022-02-02 LAB
A/G RATIO: 1.3 (ref 1.1–2.2)
ALBUMIN SERPL-MCNC: 3.7 G/DL (ref 3.4–5)
ALP BLD-CCNC: 93 U/L (ref 40–129)
ALT SERPL-CCNC: 11 U/L (ref 10–40)
ANION GAP SERPL CALCULATED.3IONS-SCNC: 14 MMOL/L (ref 3–16)
AST SERPL-CCNC: 13 U/L (ref 15–37)
BASOPHILS ABSOLUTE: 0 K/UL (ref 0–0.2)
BASOPHILS RELATIVE PERCENT: 0.4 %
BILIRUB SERPL-MCNC: 0.3 MG/DL (ref 0–1)
BUN BLDV-MCNC: 7 MG/DL (ref 7–20)
CALCIUM SERPL-MCNC: 9.3 MG/DL (ref 8.3–10.6)
CHLORIDE BLD-SCNC: 102 MMOL/L (ref 99–110)
CO2: 23 MMOL/L (ref 21–32)
CREAT SERPL-MCNC: 0.7 MG/DL (ref 0.6–1.2)
EOSINOPHILS ABSOLUTE: 0.1 K/UL (ref 0–0.6)
EOSINOPHILS RELATIVE PERCENT: 1.2 %
GFR AFRICAN AMERICAN: >60
GFR NON-AFRICAN AMERICAN: >60
GLUCOSE BLD-MCNC: 95 MG/DL (ref 70–99)
HCT VFR BLD CALC: 41.3 % (ref 36–48)
HEMOGLOBIN: 13.4 G/DL (ref 12–16)
LYMPHOCYTES ABSOLUTE: 0.8 K/UL (ref 1–5.1)
LYMPHOCYTES RELATIVE PERCENT: 8.6 %
MCH RBC QN AUTO: 27.6 PG (ref 26–34)
MCHC RBC AUTO-ENTMCNC: 32.3 G/DL (ref 31–36)
MCV RBC AUTO: 85.5 FL (ref 80–100)
MONOCYTES ABSOLUTE: 0.6 K/UL (ref 0–1.3)
MONOCYTES RELATIVE PERCENT: 6.8 %
NEUTROPHILS ABSOLUTE: 7.3 K/UL (ref 1.7–7.7)
NEUTROPHILS RELATIVE PERCENT: 83 %
PDW BLD-RTO: 20 % (ref 12.4–15.4)
PLATELET # BLD: 317 K/UL (ref 135–450)
PMV BLD AUTO: 8.4 FL (ref 5–10.5)
POTASSIUM SERPL-SCNC: 4.1 MMOL/L (ref 3.5–5.1)
RBC # BLD: 4.83 M/UL (ref 4–5.2)
SODIUM BLD-SCNC: 139 MMOL/L (ref 136–145)
TOTAL PROTEIN: 6.6 G/DL (ref 6.4–8.2)
VITAMIN D 25-HYDROXY: 36.6 NG/ML
WBC # BLD: 8.8 K/UL (ref 4–11)

## 2022-02-02 PROCEDURE — 36415 COLL VENOUS BLD VENIPUNCTURE: CPT

## 2022-02-02 PROCEDURE — 80053 COMPREHEN METABOLIC PANEL: CPT

## 2022-02-02 PROCEDURE — 85025 COMPLETE CBC W/AUTO DIFF WBC: CPT

## 2022-02-02 PROCEDURE — 83036 HEMOGLOBIN GLYCOSYLATED A1C: CPT

## 2022-02-02 PROCEDURE — 82306 VITAMIN D 25 HYDROXY: CPT

## 2022-02-02 RX ORDER — TIZANIDINE 2 MG/1
TABLET ORAL
Qty: 90 TABLET | Refills: 1 | Status: SHIPPED | OUTPATIENT
Start: 2022-02-02 | End: 2022-07-05

## 2022-02-02 RX ORDER — LISINOPRIL 40 MG/1
40 TABLET ORAL DAILY
Qty: 90 TABLET | Refills: 2 | Status: SHIPPED | OUTPATIENT
Start: 2022-02-02 | End: 2022-06-09

## 2022-02-02 RX ORDER — DILTIAZEM HYDROCHLORIDE 120 MG/1
CAPSULE, COATED, EXTENDED RELEASE ORAL
Qty: 90 CAPSULE | Refills: 2 | Status: SHIPPED | OUTPATIENT
Start: 2022-02-02

## 2022-02-02 NOTE — PROGRESS NOTES
Patient ID: Caleb Rose is a 61 y.o. female. HPI     61 y.o. female with h.o   chronic back pain , copd, DM- 2,. HTN , tobacco use here for  Power mobility wheel chair exam       - since last year pt clinical status declined with need for oxygen for her progressive dyspnea. Has more leg edema needing diuretic initiation and now her activity has significantly declined with arthritis in multiple joints. Now living alone as  is away and expects him to be back this summer    Reports not doing much at home as she has severe arthritis and also stopped taking diuretic due to polyuria and unable to make it to bathroom on time . getting more depressed requests for powered wheel chair       Severe copd with chronic resp failure on 2 L o2   . Reports her dyspnea did not improve much with oxygen supplementation and feels tired all the time     chronic bilateral knee OA - reports her knees are going bad and has severe crunching with any movements   Unable to stand or move much with out resting    OA of right shoulder leading to surgery in the past with residual limited movements      Chronic lumbar spinal stenosis with chronic back pain on lyrica. Off pain meds. VJ did not help much  Cannot bend or twist much , not cooking much either      Underwent multiple treatments including PT, pain meds and continues to gets worse. Unable to lose weight with back pain and arthritis    ambulates with a  Walker/wheelchair currently. Feels unsteady on feet even with walker   Doubt she can use manual wheel chair with significant obesity, hypoxia    and easy fatigue      Her   was helping her but now he is out of picture and unable to get much help     she had a fracture of her right scapula 6 months ago when trying to get up from sitting position. Seen Dr. Lisa Hernandez and conservative mx advised.         DM- 2- Taking metformin but not compliant with diet  Last A1c at 6.5    Not checking sugars- no low meloxicam (MOBIC) 7.5 MG tablet Take 1 tablet by mouth daily 90 tablet 0    DULoxetine (CYMBALTA) 30 MG extended release capsule Take 1 capsule by mouth daily 90 capsule 0    metFORMIN (GLUCOPHAGE) 500 MG tablet TAKE ONE TABLET BY MOUTH EVERY DAY 90 tablet 0    torsemide (DEMADEX) 20 MG tablet Take 1 tablet by mouth daily 90 tablet 3    aspirin 325 MG EC tablet Take 1 tablet by mouth daily 30 tablet 0    calcium-vitamin D (OSCAL) 250-125 MG-UNIT per tablet Take 1 tablet by mouth 2 times daily 60 tablet 2    denosumab (PROLIA) 60 MG/ML SOSY SC injection Inject 1 mL into the skin once for 1 dose 1 mL 1    ipratropium-albuterol (DUONEB) 0.5-2.5 (3) MG/3ML SOLN nebulizer solution Inhale 3 mLs into the lungs every 4 hours 360 mL 0    VENTOLIN  (90 Base) MCG/ACT inhaler Inhale 2 puffs into the lungs every 6 hours as needed for Wheezing 54 g 0    blood glucose test strips (CONTOUR NEXT TEST) strip Test Daily. DX: E11.9 100 each 3    Lancets MISC Test once daily. DX: E11.9 100 each 3     No current facility-administered medications for this visit. ROS as above      Objective:   Physical Exam    Vitals:    02/02/22 1324   BP: (!) 140/75   Pulse: 70   Resp: 25       Elderly female, appropriate for age, short built , seen in wheelchair initially and then on exam table    Constitutional:  right handed obese female  He is oriented to person, place, and time. He appears well-developed and well-nourished. No distress. HENT:   Head: Normocephalic and atraumatic. Right Ear: External ear normal.   Left Ear: External ear normal.   Nose: Nose normal.   Mouth/Throat: Oropharynx is clear and moist.      Eyes: Conjunctivae and EOM are normal. Pupils are equal, round, and reactive to light.      Neck: Normal range of motion. Short neck noted             Neck supple. No tracheal deviation present. No thyromegaly present.    Cardiovascular: Normal rate, regular  and normal heart sounds.       Pulmonary/Chest: Effort normal and breath sounds diminished  With occasional wheeze. No respiratory distress. He has oxygen via NC     Abdominal: Soft obese, . Bowel sounds are normal. There is no tenderness. There is no rebound tenderness     Musculoskeletal: she exhibits tenderness. exhibits 2+ shayy pedal edema.           Right shoulder: she exhibits decreased range of motion and tenderness. sHe exhibits +  Deformity at right shoulder  and pain with movements 3/5     Left shoulder - good range of movements with minimal limitation of movements 4/5          Right hip: sHe exhibits decreased strength and limited ROM       Left hip: sHe exhibits decreased range of motion and decreased strength 3/5       Right knee: sHe exhibits decreased range of motion. Crepitus on moving , no effusion          Left knee: sHe exhibits decreased range of motion. He exhibits no swelling, no effusion and no bony tenderness. Tenderness found.           Right ankle: sHe exhibits no deformity has shayy pedal edema        Left ankle: He exhibits decreased range of motion. No Tenderness. Decreased strength in lower ext 3/5      Low back pain reproducible  SLRT positive  Gait abn , limping and needing 1 person assist to get up to exam table   Stops every few steps     Lymphadenopathy:     He has no cervical adenopathy. Neurological: she is alert and oriented to person, place, and time. He has normal reflexes. No cranial nerve deficit. Significant decreased sensation to both feet   Moderate to severe peripheral neuropathy   Skin: Skin is warm and dry. He is not diaphoretic. No erythema. Psychiatric: He has a normal mood and affect. Her behavior is normal.        Wt Readings from Last 3 Encounters:   02/02/22 193 lb (87.5 kg)   09/13/21 214 lb (97.1 kg)   08/11/21 214 lb (97.1 kg)        Diagnosis Orders   1. Physical debility     2. Personal history of tobacco use  ID VISIT TO DISCUSS LUNG CA SCREEN W LDCT    CT Lung Screen (Annual)   3.  Chronic respiratory failure with hypoxia (HCC)     4. Panlobular emphysema ([facility]     5. Major depressive disorder with single episode, in partial remission ([facility] [facility]     6. Well controlled type 2 diabetes mellitus ([facility]  COMPREHENSIVE METABOLIC PANEL    CBC WITH AUTO DIFFERENTIAL    HEMOGLOBIN A1C   7. Morbidly obese ([facility] [facility]     8. Essential hypertension     9. Lumbosacral spondylosis without myelopathy     10. Degeneration of lumbar or lumbosacral intervertebral disc     11. Vitamin D deficiency  VITAMIN D 25 HYDROXY          Due to above diagnosis- chronic back pain,  shoulder arthritis issues,  Bilateral knee OA, Severe copd with hypoxic resp failure, DM 2,  OA, pt has gait abnormalities . pt unable to carry his ADL and IADL 's at home-  risk for falls and major fractures      Power mobility device is necessary to carry daily activities- bathing, grooming , preparing food  attend doctor's appt, ADL and IADL's     Has significant back issues  With neuropathy limiting ambulation even with walker, gets easily fatigued with CHF and morbid obesity     Unable to use manual wheel chair with shoulder arthritis, dyspnea and decreased ROM. Would benefit her  and the patient  is mentally and physically capable of operating power mobility device  .  It will be useful to bathe, clean, take meds and make office visits    she is willing and motivated to use PMD/motorized wheelchair at home  He unable to scooter with shoulder issues     Yumiko Baez MD,

## 2022-02-03 LAB
ESTIMATED AVERAGE GLUCOSE: 131.2 MG/DL
HBA1C MFR BLD: 6.2 %

## 2022-02-14 RX ORDER — UMECLIDINIUM 62.5 UG/1
AEROSOL, POWDER ORAL
Qty: 30 EACH | Refills: 0 | Status: SHIPPED | OUTPATIENT
Start: 2022-02-14 | End: 2022-03-10

## 2022-02-17 ENCOUNTER — TELEMEDICINE (OUTPATIENT)
Dept: PULMONOLOGY | Age: 67
End: 2022-02-17
Payer: MEDICARE

## 2022-02-17 DIAGNOSIS — J96.11 CHRONIC RESPIRATORY FAILURE WITH HYPOXIA (HCC): ICD-10-CM

## 2022-02-17 DIAGNOSIS — J44.9 COPD, MILD (HCC): Primary | ICD-10-CM

## 2022-02-17 PROCEDURE — 99443 PR PHYS/QHP TELEPHONE EVALUATION 21-30 MIN: CPT | Performed by: INTERNAL MEDICINE

## 2022-02-17 NOTE — PROGRESS NOTES
Loni Soulier is a 74 y.o. female evaluated via telephone on 2/17/2022. She and/or health care decision maker is aware that that she may receive a bill for this telephone service, which includes applicable co-pays, depending on her insurance coverage, and has provided verbal consent to proceed. I affirm this is a Patient Initiated Episode with a Patient who has not had a related appointment within my department in the past 7 days or scheduled within the next 24 hours. Patient identification was verified at the start of the visit: Yes Total Time: minutes: 21-30 minutes Loni Soulier was evaluated through a synchronous (real-time) audio encounter. The patient was located at home in a state where the provider was licensed to provide care. [facility]    Outpatient Follow Up Note  CC: COPD, SOB  Consulting provider: No ref. provider found    Interval History: 74 y.o. female at her baseline [place]. No exacerbations. Less mobile. Using her O2. Quit smoking in 2020    Initial HPI: Ms Annmarie Rothman is in for F/U of dyspnea. Since last visit, Stopped taking her inhalers (spirva and advair). Had some problems with insomnia- better after stopping them. Using albuterol 3-4 times per day. Uses it after exertion and has a hard time catching her breath. Has not smoked x 4 months. No wheezing. No cough. No ED visits, no prednisone or abx since last visit. Had carpal tunnel surgery on right since last visit, having surgery on left next week. Riding exercise bike at home.  in snf since Feb x 5 years. She was admitted 1/1/14 with pneumonia. Activity limited by back pain and unbalance from foot drop.    Current Medications:    Current Outpatient Medications:     INCRUSE ELLIPTA 62.5 MCG/INH AEPB, INHALE 1 PUFF INTO THE LUNGS DAILY, Disp: 30 each, Rfl: 0    dilTIAZem (CARTIA XT) 120 MG extended release capsule, TAKE ONE CAPSULE BY MOUTH EVERY DAY, Disp: 90 capsule, Rfl: 2    lisinopril (PRINIVIL;ZESTRIL) 40 MG tablet, Take 1 tablet by mouth daily, Disp: 90 tablet, Rfl: 2    tiZANidine (ZANAFLEX) 2 MG tablet, TAKE ONE TABLET BY MOUTH EVERY DAY, Disp: 90 tablet, Rfl: 1    buPROPion (WELLBUTRIN SR) 100 MG extended release tablet, Take 1 tablet by mouth daily, Disp: 90 tablet, Rfl: 0    pregabalin (LYRICA) 100 MG capsule, TAKE ONE CAPSULE BY MOUTH 2 TIMES A DAY, Disp: 180 capsule, Rfl: 0    metoprolol tartrate (LOPRESSOR) 50 MG tablet, TAKE 1 TABLET BY MOUTH TWICE DAILY, Disp: 60 tablet, Rfl: 0    SYMBICORT 160-4.5 MCG/ACT AERO, INHALE 2 PUFFS INTO THE LUNGS TWICE DAILY, Disp: 10.2 g, Rfl: 0    Calcium Carb-Cholecalciferol (CALCIUM-VITAMIN D3) 250-125 MG-UNIT TABS, TAKE ONE TABLET BY MOUTH 2 TIMES A DAY, Disp: 100 tablet, Rfl: 0    meloxicam (MOBIC) 7.5 MG tablet, Take 1 tablet by mouth daily, Disp: 90 tablet, Rfl: 0    DULoxetine (CYMBALTA) 30 MG extended release capsule, Take 1 capsule by mouth daily, Disp: 90 capsule, Rfl: 0    metFORMIN (GLUCOPHAGE) 500 MG tablet, TAKE ONE TABLET BY MOUTH EVERY DAY, Disp: 90 tablet, Rfl: 0    torsemide (DEMADEX) 20 MG tablet, Take 1 tablet by mouth daily, Disp: 90 tablet, Rfl: 3    aspirin 325 MG EC tablet, Take 1 tablet by mouth daily, Disp: 30 tablet, Rfl: 0    calcium-vitamin D (OSCAL) 250-125 MG-UNIT per tablet, Take 1 tablet by mouth 2 times daily, Disp: 60 tablet, Rfl: 2    denosumab (PROLIA) 60 MG/ML SOSY SC injection, Inject 1 mL into the skin once for 1 dose, Disp: 1 mL, Rfl: 1    ipratropium-albuterol (DUONEB) 0.5-2.5 (3) MG/3ML SOLN nebulizer solution, Inhale 3 mLs into the lungs every 4 hours, Disp: 360 mL, Rfl: 0    VENTOLIN  (90 Base) MCG/ACT inhaler, Inhale 2 puffs into the lungs every 6 hours as needed for Wheezing, Disp: 54 g, Rfl: 0    blood glucose test strips (CONTOUR NEXT TEST) strip, Test Daily. DX: E11.9, Disp: 100 each, Rfl: 3    Lancets MISC, Test once daily.  DX: E11.9, Disp: 100 each, Rfl: 3      Objective: PHYSICAL EXAM:   Tele    PFT       14             FEV1/FVC: 74%         73  FEV1: 2.07L 76%        1.98, 74%  FVC: 2.80L 80%          2.7, 78%  response to bd             No  T.27L 79 %         4.63L, 86%  RV: 1.45L 76%             97%  DLCO: 10.31 43%       DLCO 44%    21 6MWT: 2lpm at rest and ambulation, 660ft    Assessment:       · Mild COPD  · Chronic hypoxic respiratory failure  · Moderate Aortic regurgitation    Plan:       · Repeat PFTs/6MWT next visit  · The pt has been on Incruse and Symbicort and albuterol prn  · Requires 2lpm O2  · Lung cancer screening followed by PCP    This visit was completed using a synchronous (real-time) audio-video encounter. The patient (or guardian or caregiver if applicable) is aware that this is a billable service, which included applicable co-pays. This virtual visit was completed with the patient's or guardian's consent. The visit was conducted pursuant to the emergency declaration under the [facility], [address] [place] [address] waiver authority and the [employer] General Act. Patient identification was verified, and a caregiver was present when appropriate. The patient was located in a state where the provider is licensed to provide care.

## 2022-02-28 RX ORDER — CALCIUM CARBONATE-CHOLECALCIFEROL TAB 250 MG-125 UNIT 250-125 MG-UNIT
TAB ORAL
Qty: 100 TABLET | Refills: 0 | Status: SHIPPED | OUTPATIENT
Start: 2022-02-28 | End: 2022-04-14

## 2022-03-10 RX ORDER — UMECLIDINIUM 62.5 UG/1
AEROSOL, POWDER ORAL
Qty: 30 EACH | Refills: 0 | Status: SHIPPED | OUTPATIENT
Start: 2022-03-10 | End: 2022-04-06

## 2022-03-22 DIAGNOSIS — M51.37 DEGENERATION OF LUMBAR OR LUMBOSACRAL INTERVERTEBRAL DISC: Chronic | ICD-10-CM

## 2022-03-22 RX ORDER — DULOXETIN HYDROCHLORIDE 30 MG/1
30 CAPSULE, DELAYED RELEASE ORAL DAILY
Qty: 90 CAPSULE | Refills: 0 | Status: SHIPPED | OUTPATIENT
Start: 2022-03-22 | End: 2022-06-14

## 2022-03-23 RX ORDER — MELOXICAM 7.5 MG/1
7.5 TABLET ORAL DAILY
Qty: 90 TABLET | Refills: 0 | Status: SHIPPED | OUTPATIENT
Start: 2022-03-23 | End: 2022-06-17

## 2022-04-06 RX ORDER — UMECLIDINIUM 62.5 UG/1
AEROSOL, POWDER ORAL
Qty: 30 EACH | Refills: 0 | Status: SHIPPED | OUTPATIENT
Start: 2022-04-06 | End: 2022-05-10

## 2022-04-14 RX ORDER — CALCIUM CARBONATE-CHOLECALCIFEROL TAB 250 MG-125 UNIT 250-125 MG-UNIT
TAB ORAL
Qty: 100 TABLET | Refills: 0 | Status: SHIPPED | OUTPATIENT
Start: 2022-04-14 | End: 2022-05-24

## 2022-04-20 RX ORDER — METOPROLOL TARTRATE 50 MG/1
TABLET, FILM COATED ORAL
Qty: 180 TABLET | Refills: 0 | OUTPATIENT
Start: 2022-04-20

## 2022-04-20 RX ORDER — METOPROLOL TARTRATE 50 MG/1
TABLET, FILM COATED ORAL
Qty: 60 TABLET | Refills: 0 | Status: SHIPPED | OUTPATIENT
Start: 2022-04-20 | End: 2022-05-16

## 2022-04-27 DIAGNOSIS — M47.817 LUMBOSACRAL SPONDYLOSIS WITHOUT MYELOPATHY: Chronic | ICD-10-CM

## 2022-04-28 RX ORDER — BUPROPION HYDROCHLORIDE 100 MG/1
100 TABLET, EXTENDED RELEASE ORAL DAILY
Qty: 90 TABLET | Refills: 0 | Status: SHIPPED | OUTPATIENT
Start: 2022-04-28 | End: 2022-07-19

## 2022-04-28 RX ORDER — PREGABALIN 100 MG/1
CAPSULE ORAL
Qty: 180 CAPSULE | Refills: 0 | Status: SHIPPED | OUTPATIENT
Start: 2022-05-02 | End: 2022-07-25

## 2022-05-10 RX ORDER — UMECLIDINIUM 62.5 UG/1
AEROSOL, POWDER ORAL
Qty: 30 EACH | Refills: 0 | Status: SHIPPED | OUTPATIENT
Start: 2022-05-10 | End: 2022-06-02

## 2022-05-16 RX ORDER — METOPROLOL TARTRATE 50 MG/1
TABLET, FILM COATED ORAL
Qty: 60 TABLET | Refills: 2 | Status: SHIPPED | OUTPATIENT
Start: 2022-05-16 | End: 2022-09-01

## 2022-05-24 RX ORDER — CALCIUM CARBONATE-CHOLECALCIFEROL TAB 250 MG-125 UNIT 250-125 MG-UNIT
TAB ORAL
Qty: 100 TABLET | Refills: 0 | Status: SHIPPED | OUTPATIENT
Start: 2022-05-24 | End: 2022-09-26

## 2022-06-02 RX ORDER — UMECLIDINIUM 62.5 UG/1
AEROSOL, POWDER ORAL
Qty: 30 EACH | Refills: 0 | Status: SHIPPED | OUTPATIENT
Start: 2022-06-02 | End: 2022-07-07

## 2022-06-07 ENCOUNTER — OFFICE VISIT (OUTPATIENT)
Dept: INTERNAL MEDICINE CLINIC | Age: 67
End: 2022-06-07

## 2022-06-07 VITALS
SYSTOLIC BLOOD PRESSURE: 182 MMHG | WEIGHT: 201 LBS | BODY MASS INDEX: 33.49 KG/M2 | HEART RATE: 65 BPM | HEIGHT: 65 IN | RESPIRATION RATE: 20 BRPM | DIASTOLIC BLOOD PRESSURE: 76 MMHG

## 2022-06-07 DIAGNOSIS — E11.9 WELL CONTROLLED TYPE 2 DIABETES MELLITUS (HCC): ICD-10-CM

## 2022-06-07 DIAGNOSIS — M51.37 DEGENERATION OF LUMBAR OR LUMBOSACRAL INTERVERTEBRAL DISC: ICD-10-CM

## 2022-06-07 DIAGNOSIS — I10 ESSENTIAL HYPERTENSION: ICD-10-CM

## 2022-06-07 DIAGNOSIS — E55.9 VITAMIN D DEFICIENCY: ICD-10-CM

## 2022-06-07 DIAGNOSIS — F32.4 MAJOR DEPRESSIVE DISORDER WITH SINGLE EPISODE, IN PARTIAL REMISSION (HCC): ICD-10-CM

## 2022-06-07 DIAGNOSIS — M47.817 LUMBOSACRAL SPONDYLOSIS WITHOUT MYELOPATHY: ICD-10-CM

## 2022-06-07 DIAGNOSIS — Z12.11 SCREENING FOR COLORECTAL CANCER: ICD-10-CM

## 2022-06-07 DIAGNOSIS — J96.11 CHRONIC RESPIRATORY FAILURE WITH HYPOXIA (HCC): ICD-10-CM

## 2022-06-07 DIAGNOSIS — M80.80XS LOCALIZED OSTEOPOROSIS WITH CURRENT PATHOLOGICAL FRACTURE, SEQUELA: ICD-10-CM

## 2022-06-07 DIAGNOSIS — E66.01 MORBIDLY OBESE (HCC): ICD-10-CM

## 2022-06-07 DIAGNOSIS — Z00.00 MEDICARE ANNUAL WELLNESS VISIT, SUBSEQUENT: Primary | ICD-10-CM

## 2022-06-07 DIAGNOSIS — R53.81 PHYSICAL DEBILITY: ICD-10-CM

## 2022-06-07 DIAGNOSIS — Z12.12 SCREENING FOR COLORECTAL CANCER: ICD-10-CM

## 2022-06-07 DIAGNOSIS — Z87.891 PERSONAL HISTORY OF TOBACCO USE: ICD-10-CM

## 2022-06-07 PROCEDURE — 3044F HG A1C LEVEL LT 7.0%: CPT | Performed by: INTERNAL MEDICINE

## 2022-06-07 PROCEDURE — 3017F COLORECTAL CA SCREEN DOC REV: CPT | Performed by: INTERNAL MEDICINE

## 2022-06-07 PROCEDURE — 81002 URINALYSIS NONAUTO W/O SCOPE: CPT | Performed by: INTERNAL MEDICINE

## 2022-06-07 PROCEDURE — G0439 PPPS, SUBSEQ VISIT: HCPCS | Performed by: INTERNAL MEDICINE

## 2022-06-07 PROCEDURE — 1123F ACP DISCUSS/DSCN MKR DOCD: CPT | Performed by: INTERNAL MEDICINE

## 2022-06-07 RX ORDER — ALBUTEROL SULFATE 90 UG/1
AEROSOL, METERED RESPIRATORY (INHALATION)
Qty: 54 G | Refills: 0 | Status: SHIPPED | OUTPATIENT
Start: 2022-06-07

## 2022-06-07 RX ORDER — ASPIRIN 81 MG/1
81 TABLET ORAL DAILY
Qty: 30 TABLET | Refills: 5
Start: 2022-06-07 | End: 2022-08-09

## 2022-06-07 ASSESSMENT — PATIENT HEALTH QUESTIONNAIRE - PHQ9
SUM OF ALL RESPONSES TO PHQ QUESTIONS 1-9: 1
SUM OF ALL RESPONSES TO PHQ QUESTIONS 1-9: 1
1. LITTLE INTEREST OR PLEASURE IN DOING THINGS: 0
SUM OF ALL RESPONSES TO PHQ QUESTIONS 1-9: 0
SUM OF ALL RESPONSES TO PHQ9 QUESTIONS 1 & 2: 0
SUM OF ALL RESPONSES TO PHQ QUESTIONS 1-9: 0
SUM OF ALL RESPONSES TO PHQ QUESTIONS 1-9: 1
2. FEELING DOWN, DEPRESSED OR HOPELESS: 0
SUM OF ALL RESPONSES TO PHQ QUESTIONS 1-9: 0
3. TROUBLE FALLING OR STAYING ASLEEP: 1
SUM OF ALL RESPONSES TO PHQ QUESTIONS 1-9: 0
SUM OF ALL RESPONSES TO PHQ QUESTIONS 1-9: 1
2. FEELING DOWN, DEPRESSED OR HOPELESS: 0

## 2022-06-07 ASSESSMENT — LIFESTYLE VARIABLES
HOW MANY STANDARD DRINKS CONTAINING ALCOHOL DO YOU HAVE ON A TYPICAL DAY: 3 OR 4
HOW OFTEN DO YOU HAVE A DRINK CONTAINING ALCOHOL: 4 OR MORE TIMES A WEEK

## 2022-06-07 NOTE — PROGRESS NOTES
Medicare Annual Wellness Visit    Kian Ann is here for Medicare AWV    Assessment & Plan   Medicare annual wellness visit, subsequent  Lumbosacral spondylosis without myelopathy  Degeneration of lumbar or lumbosacral intervertebral disc  Chronic respiratory failure with hypoxia ([address]  Morbidly obese ([address]  Physical debility  Essential hypertension  Well controlled type 2 diabetes mellitus ([facility]  Major depressive disorder with single episode, in partial remission ([address]      Recommendations for Preventive Services Due: see orders and patient instructions/AVS.  Recommended screening schedule for the next 5-10 years is provided to the patient in written form: see Patient Instructions/AVS.     Return in about 4 months (around 10/7/2022) for htn dm. Subjective        74 y.o. female with h.o   chronic back pain , copd, DM- 2,. HTN , tobacco use here for annual exam      Recent PFT with mild obstructive defect , severe reduction in diffusion capacity     notes she had her first prolia shot in December , however she had a fracture of her right scapula 3 weeks ago when trying to get up from sitting position. Seen Dr. Allen Sharma and conservative mx advised. Planning for shoulder surgery once this issue resolves       DM- 2- Taking metformin but not compliant with diet  Last A1c at 6.4  Activity remains low     More arthritis and back pain with weight gain      Reports ongoing aches and pains  Chronic back pain - s.p  surgeries x 4  S.p  VJ by  Dr Grecia Hurtado-   Not back on narcotics yet but considering pain pump soon  Remains on lyrica which is helping some       Using cane/walker  for ambulation . No recent falls  Reports chronic fatigue      Depression better , compliant with meds - cymbalta        The following acute and/or chronic problems were also addressed today:  copd    Patient's complete Health Risk Assessment and screening values have been reviewed and are found in Flowsheets.  The following problems were reviewed today and where indicated follow up appointments were made and/or referrals ordered. Positive Risk Factor Screenings with Interventions:    Fall Risk:  Do you feel unsteady or are you worried about falling? : (!) yes  2 or more falls in past year?: no  Fall with injury in past year?: no     Fall Risk Interventions:    · Home safety tips provided       Alcohol Screening:  AUDIT-C:   Alcohol Use: Heavy Drinker    Frequency of Alcohol Consumption: 4 or more times a week    Average Number of Drinks: 3 or 4    Frequency of Binge Drinking: Never        A total score of 8 or more is associated with harmful or hazardous drinking. A score of 13 or more in women, and 15 or more in men, is likely to indicate alcohol dependence.     Substance Use - Alcohol Interventions:  Reviewed recommended alcohol consumption guidelines with the patient          General Health and ACP:  General  In general, how would you say your health is?: Fair  In the past 7 days, have you experienced any of the following: New or Increased Pain, New or Increased Fatigue, Loneliness, Social Isolation, Stress or Anger?: (!) Yes  Select all that apply: (!) New or Increased Fatigue  Do you get the social and emotional support that you need?: Yes  Do you have a Living Will?: (!) No    Advance Directives     Power of [address] [place] [address] Will ACP-Advance Directive ACP-Power of     Not on File Not on File Not on File Not on File      General Health Risk Interventions:  · No Living Will: [address] addressed with patient today    Health Habits/Nutrition:     Physical Activity: Inactive    Days of Exercise per Week: 0 days    Minutes of Exercise per Session: 0 min     Have you lost any weight without trying in the past 3 months?: No  Body mass index: (!) 33.44  Have you seen the dentist within the past year?: (!) No    Health Habits/Nutrition Interventions:  · Inadequate physical activity:  patient is not ready to increase his/her physical activity level at this time  · Dental exam overdue:  patient encouraged to make appointment with his/her dentist    Hearing/Vision:  Do you or your family notice any trouble with your hearing that hasn't been managed with hearing aids?: No  Do you have difficulty driving, watching TV, or doing any of your daily activities because of your eyesight?: (!) Yes  Have you had an eye exam within the past year?: Yes  No exam data present    Hearing/Vision Interventions:  · Vision concerns:  patient encouraged to make appointment with his/her eye specialist    Safety:  Do you have working smoke detectors?: Yes  Do you have any tripping hazards - loose or unsecured carpets or rugs?: (!) Yes  Do you have any tripping hazards - clutter in doorways, halls, or stairs?: (!) Yes  Do you have either shower bars, grab bars, non-slip mats or non-slip surfaces in your shower or bathtub?: Yes  Do all of your stairways have a railing or banister?: Not Applicable  Do you always fasten your seatbelt when you are in a car?: (!) No    Safety Interventions:  · Home safety tips provided    ADLs:  In the past 7 days, did you need help from others to perform any of the following everyday activities: Eating, dressing, grooming, bathing, toileting, or walking/balance?: (!) Yes  Select all that apply: (!) Eating,Dressing,Grooming,Bathing,Toileting,Walking/Balance  In the past 7 days, did you need help from others to take care of any of the following: Laundry, housekeeping, banking/finances, shopping, telephone use, food preparation, transportation, or taking medications?: (!) Yes  Select all that apply: (!) Laundry,Housekeeping,Banking/Finances,Shopping,Telephone Use,Food Preparation,Transportation,Taking Medications    ADL Interventions:  · Patient declines any further evaluation/treatment for this issue          Objective   Vitals:    06/07/22 1331 06/07/22 1357   BP: (!) 180/75 (!) 182/76   Pulse: 65 65   Resp: 20 20   Weight: 201 lb (91.2 kg)    Height: 5' 5\" (1.651 m)       Body mass index is 33.45 kg/m². Allergies   Allergen Reactions    Ciprofloxacin Nausea And Vomiting     Prior to Visit Medications    Medication Sig Taking?  Authorizing Provider   torsemide 40 MG TABS Take 40 mg by mouth daily Yes Clem Sánchez MD   aspirin 81 MG EC tablet Take 1 tablet by mouth daily Yes Clem Sánchez MD   INCRUSE ELLIPTA 62.5 MCG/INH AEPB INHALE 1 PUFF INTO THE LUNGS DAILY  Clem Sánchez MD   Calcium Carb-Cholecalciferol (CALCIUM-VITAMIN D3) 250-125 MG-UNIT TABS TAKE ONE TABLET BY MOUTH 2 TIMES A DAY  Clem Sánchez MD   metoprolol tartrate (LOPRESSOR) 50 MG tablet TAKE 1 TABLET BY MOUTH TWICE DAILY  Clem Sánchez MD   buPROPion [facility] SR) 100 MG extended release tablet Take 1 tablet by mouth daily  Clem Sánchez MD   pregabalin (LYRICA) 100 MG capsule TAKE ONE CAPSULE BY MOUTH 2 TIMES A DAY  Clem Sánchez MD   meloxicam ([address]) 7.5 MG tablet TAKE 1 TABLET BY MOUTH DAILY  Clem Sánchez MD   DULoxetine (CYMBALTA) 30 MG extended release capsule Take 1 capsule by mouth daily  Clem Sánchez MD   metFORMIN (GLUCOPHAGE) 500 MG tablet TAKE ONE TABLET BY MOUTH EVERY DAY  Clem Sánchez MD   dilTIAZem (CARTIA XT) 120 MG extended release capsule TAKE ONE CAPSULE BY MOUTH EVERY DAY  Clem Sánchez MD   lisinopril (PRINIVIL;ZESTRIL) 40 MG tablet Take 1 tablet by mouth daily  Clem Sánchez MD   tiZANidine (Floyce Puller) 2 MG tablet TAKE ONE TABLET BY MOUTH EVERY DAY  Clem Sánchez MD   SYMBICORT 160-4.5 MCG/ACT AERO INHALE 2 PUFFS INTO THE LUNGS TWICE DAILY  Clem Sánchez MD   calcium-vitamin D (OSCAL) 250-125 MG-UNIT per tablet Take 1 tablet by mouth 2 times daily  Patient not taking: Reported on 2/17/2022  Clem Sánchez MD   denosumab (PROLIA) 60 MG/ML SOSY SC injection Inject 1 mL into the skin once for 1 dose  Clem Sánchez MD ipratropium-albuterol (DUONEB) 0.5-2.5 (3) MG/3ML SOLN nebulizer solution Inhale 3 mLs into the lungs every 4 hours  Patient not taking: Reported on 2/17/2022  Jenny Enriquez MD   VENTOLIN  (10 Base) MCG/ACT inhaler Inhale 2 puffs into the lungs every 6 hours as needed for Wheezing  Jenny Enriquez MD   blood glucose test strips (CONTOUR NEXT TEST) strip Test Daily. DX: E11.9  Jenny Enriquez MD   Lancets MISC Test once daily. DX: E11.9  Jenny Enriquez MD       Vitals:    06/07/22 1357   BP: (!) 182/76   Pulse: 65   Resp: 20       General: older appearing female,  Awake, alert and oriented. Chronic  dyspnea noted  Mucous Membranes:  Pink , anicteric  Neck: No JVD, no carotid bruit, no thyromegaly  Chest:  Resolved wheeze after resting for few min. Clear shayy   Cardiovascular:  RRR S1S2 heard, no murmurs or gallops  Abdomen:  Soft,obese  undistended, non tender, no organomegaly, BS present  Extremities: 3+  edema to both LE  Distal pulses well felt  Neurological : grossly normal mood   CN 2 to 12 intact coordination normal  Gait stuporous         ECHO 4/21     Summary   Technically difficult examination. Left ventricular systolic function is normal with ejection fraction   estimated at 55-60 %. There is moderate concentric left ventricular hypertrophy. Grade I diastolic dysfunction with normal filling pressure. Mild mitral regurgitation. Mild-to-moderate aortic regurgitation is present. Mild tricuspid regurgitation. Systolic pulmonary artery pressure (SPAP) estimated at 40   mmHg (RA pressure 3 mmHg), consistent with mild pulmonary hypertension. Trivial pulmonic regurgitation present. Walk test   42-year-old female who underwent a 6-minute walk test.   The patient's baseline oxygen saturation was 84% at room air at rest  with a heart rate of 91, respiratory rate of 20, dyspnea-modified Sea  scale of 3, fatigue-modified Sea scale of 3.   The patient had to be put  on supplemental oxygen, which had to be increased to 2 liters per  minute. On the study, the patient's 6-minute walk parameters were 91%  of oxygen saturation at 2 liters by minute. PFT     1.  Spirometry:  The FEV1 is 1.48 liters or 58% of predicted.  The FVC  is 2.10 liters or 63% of predicted.  The FEV1/FVC ratio is 71%.  There  is no demonstrated response to inhaled bronchodilators. 2.  Plethysmography:  The total lung capacity is 4.00 liters or 75% of  predicted. 3.  Diffusion Capacity:  The diffusion capacity of carbon monoxide is  8.04 mL/min/mmHg which is 34% of predicted. 4.  Flow Volume Loops:  The tracings show a normal pattern.     IMPRESSION:  1.  There is a mild obstructive lung defect without demonstrated  response to inhaled bronchodilators. 2. Twin Spatz is a mild restrictive ventilatory defect. 3. Twin Spatz is a severe reduction in diffusion capacity. 4.  Overall, the pulmonary function tests showing a combined restrictive  and obstructive defect pattern with impaired diffusion capacity, could  be seen in COPD with a concomitant restrictive defect or interstitial  lung disease.  Clinical correlation is recommended.     Ct shoulder     Acute and comminuted fracture of the scapula involving the scapular body   and scapular spine.  Surrounding soft tissue and intramuscular edema. 2. Complete loss of the acromial humeral interval consistent with rotator   cuff insufficiency.  Severe associated atrophy of the rotator cuff   musculature. 3. Moderate glenohumeral effusion with numerous large articular bodies   throughout the joint. 4. Moderate to severe glenohumeral and moderate acromioclavicular   osteoarthritis. Wt Readings from Last 3 Encounters:   06/07/22 201 lb (91.2 kg)   02/02/22 193 lb (87.5 kg)   09/13/21 214 lb (97.1 kg)            Assessment:      Diagnosis Orders   1. Medicare annual wellness visit, subsequent     2. Lumbosacral spondylosis without myelopathy     3.  Degeneration of lumbar or lumbosacral intervertebral disc     4. Chronic respiratory failure with hypoxia (HCC)     5. Morbidly obese ([address]     6. Physical debility     7. Essential hypertension     8. Well controlled type 2 diabetes mellitus ([address]     9. Major depressive disorder with single episode, in partial remission ([address]              Plan:       COPD with chronic hypoxic respiratory failure   f/w Dr> Major . Off advair and now on Incruse Ellipta. Albuterol prn  Remains off smoking for 2 yrs , on o2 at night 2 L   Chronic dyspnea remains    DM- 2, A1c  At 6.4- on metformin 500mg daily,  Already on ACEI  Need eye exam this year  Already on ACEI and will     Anxiety and severe  depression - has used prozac and klonopin in the past   Weaned off klonopin 2015    - now on cymbalta 30 mg daily -better controlled  - no suicidal thoughts  - doing better since last year since her    returned from longterm    HTN - remains high today   Not compliant with meds  Resume ACEI, cardizem, metoprolol and prn demadex  Need home monitoring     Aortic regurgitation - no acute symptoms. No CHF but has dependant edema which is worsening with no activity   Edema remains , pt not using diuretic, resume with  demadex 40 mg daily   Elevate legs      F/w Dr. Pedro Christianson abuse  - has quit smoking 2 yrs ago     pulmonary nodules- stable on recent Ct       Chronic back pain - previously seen Dr. Contreras Man  Not seeing him any more    on zanaflex and lyrica.     Osteoporosis - need to resume prolia      Had pna, covid vaccines  Need shingrix  Need covid booster   For prevnar today   Need colonoscopy, mammo- pt not interested    Need living will and eye, dental exam       CareTeam (Including outside providers/suppliers regularly involved in providing care):   Patient Care Team:  Randolph Esquivel MD as PCP - General (Internal Medicine)  Randolph Esquivel MD as PCP - [facility]  Nadiya Duarte MD as Consulting Physician (Pulmonology)  Bry Ang MD as Consulting Physician (Pulmonology)  Hemal Ornelas MD as Consulting Physician (Cardiology)  Severiano Leon MD (Orthopedic Surgery)  Arnulfo Hill MSW, LSW as  (Licensed Clinical )     Reviewed and updated this visit:  Tobacco  Allergies  Meds  Problems  Med Hx  Surg Hx  Soc Hx  Fam Hx                  Advance Care Planning   Advanced Care Planning: Discussed the patients choices for care and treatment in case of a health event that adversely affects decision-making abilities. Also discussed the patients long-term treatment options. Reviewed with the patient the appropriate state-specific advance directive documents. Reviewed the process of designating a competent adult as an Agent (or -in-fact) that could take make health care decisions for the patient if incompetent. Patient was asked to complete the declaration forms, either acknowledge the forms by a public notary or an eligible witness and provide a signed copy to the practice office.   Time spent (minutes): 2 min

## 2022-06-07 NOTE — PATIENT INSTRUCTIONS
What is lung cancer screening? Lung cancer screening is a way in which doctors check the lungs for early signs of cancer in people who have no symptoms of lung cancer. A low-dose CT scan uses much less radiation than a normal CT scan and shows a more detailed image of the lungs than a standard X-ray. The goal of lung cancer screening is to find cancer early, before it has a chance to grow, spread, or cause problems. One large study found that smokers who were screened with low-dose CT scans were less likely to die of lung cancer than those who were screened with standard X-ray. Below is a summary of the things you need to know regarding screening for lung cancer with low-dose computed tomography (LDCT). This is a screening program that involves routine annual screening with LDCT studies of the lung. The LDCTs are done using low-dose radiation that is not thought to increase your cancer risk. If you have other serious medical conditions (other cancers, congestive heart failure) that limit your life expectancy to less than 10 years, you should not undergo lung cancer screening with LDCT. The chance is 20%-60% that the LDCT result will show abnormalities. This would require additional testing which could include repeat imaging or even invasive procedures. Most (about 95%) of \"abnormal\" LDCT results are false in the sense that no lung cancer is ultimately found. Additionally, some (about 10%) of the cancers found would not affect your life expectancy, even if undetected and untreated. If you are still smoking, the single most important thing that you can do to reduce your risk of dying of lung cancer is to quit. For this screening to be covered by Medicare and most other insurers, strict criteria must be met. If you do not meet these criteria, but still wish to undergo LDCT testing, you will be required to sign a waiver indicating your willingness to pay for the scan.   Personalized Preventive Plan for Aspen Garcia - 6/7/2022  Medicare offers a range of preventive health benefits. Some of the tests and screenings are paid in full while other may be subject to a deductible, co-insurance, and/or copay. Some of these benefits include a comprehensive review of your medical history including lifestyle, illnesses that may run in your family, and various assessments and screenings as appropriate. After reviewing your medical record and screening and assessments performed today your provider may have ordered immunizations, labs, imaging, and/or referrals for you. A list of these orders (if applicable) as well as your Preventive Care list are included within your After Visit Summary for your review. Other Preventive Recommendations:    · A preventive eye exam performed by an eye specialist is recommended every 1-2 years to screen for glaucoma; cataracts, macular degeneration, and other eye disorders. · A preventive dental visit is recommended every 6 months. · Try to get at least 150 minutes of exercise per week or 10,000 steps per day on a pedometer . · Order or download the FREE \"Exercise & Physical Activity: Your Everyday Guide\" from [employer]. Call 6-345.494.6570 or search [employer] online. · You need 0383-0020 mg of calcium and 4647-5923 IU of vitamin D per day. It is possible to meet your calcium requirement with diet alone, but a vitamin D supplement is usually necessary to meet this goal.  · When exposed to the sun, use a sunscreen that protects against both UVA and UVB radiation with an SPF of 30 or greater. Reapply every 2 to 3 hours or after sweating, drying off with a towel, or swimming. · Always wear a seat belt when traveling in a car. Always wear a helmet when riding a bicycle or motorcycle.

## 2022-06-08 LAB
A/G RATIO: 1.7 (ref 1.1–2.2)
ALBUMIN SERPL-MCNC: 4 G/DL (ref 3.4–5)
ALP BLD-CCNC: 85 U/L (ref 40–129)
ALT SERPL-CCNC: 13 U/L (ref 10–40)
ANION GAP SERPL CALCULATED.3IONS-SCNC: 20 MMOL/L (ref 3–16)
AST SERPL-CCNC: 14 U/L (ref 15–37)
BILIRUB SERPL-MCNC: <0.2 MG/DL (ref 0–1)
BUN BLDV-MCNC: 7 MG/DL (ref 7–20)
CALCIUM SERPL-MCNC: 9.3 MG/DL (ref 8.3–10.6)
CHLORIDE BLD-SCNC: 100 MMOL/L (ref 99–110)
CHOLESTEROL, FASTING: 191 MG/DL (ref 0–199)
CO2: 20 MMOL/L (ref 21–32)
CREAT SERPL-MCNC: 0.8 MG/DL (ref 0.6–1.2)
CREATININE URINE: 18.4 MG/DL (ref 28–259)
ESTIMATED AVERAGE GLUCOSE: 134.1 MG/DL
GFR AFRICAN AMERICAN: >60
GFR NON-AFRICAN AMERICAN: >60
GLUCOSE BLD-MCNC: 102 MG/DL (ref 70–99)
HBA1C MFR BLD: 6.3 %
HDLC SERPL-MCNC: 73 MG/DL (ref 40–60)
LDL CHOLESTEROL CALCULATED: 86 MG/DL
MICROALBUMIN UR-MCNC: <1.2 MG/DL
MICROALBUMIN/CREAT UR-RTO: ABNORMAL MG/G (ref 0–30)
POTASSIUM SERPL-SCNC: 4.6 MMOL/L (ref 3.5–5.1)
SODIUM BLD-SCNC: 140 MMOL/L (ref 136–145)
TOTAL PROTEIN: 6.3 G/DL (ref 6.4–8.2)
TRIGLYCERIDE, FASTING: 159 MG/DL (ref 0–150)
TSH REFLEX: 1.21 UIU/ML (ref 0.27–4.2)
URIC ACID, SERUM: 6.4 MG/DL (ref 2.6–6)
VITAMIN D 25-HYDROXY: 43.6 NG/ML
VLDLC SERPL CALC-MCNC: 32 MG/DL

## 2022-06-09 ENCOUNTER — TELEPHONE (OUTPATIENT)
Dept: INTERNAL MEDICINE CLINIC | Age: 67
End: 2022-06-09

## 2022-06-09 RX ORDER — LOSARTAN POTASSIUM AND HYDROCHLOROTHIAZIDE 25; 100 MG/1; MG/1
1 TABLET ORAL DAILY
Qty: 30 TABLET | Refills: 2 | Status: SHIPPED | OUTPATIENT
Start: 2022-06-09 | End: 2022-08-25 | Stop reason: SDUPTHER

## 2022-06-09 NOTE — TELEPHONE ENCOUNTER
----- Message from Robert Roman MD sent at 6/9/2022  3:08 PM EDT -----    Stop cardizem for worsening pedal edema   Stop lisinopril and start on hyzaar 100/25 mg daily   Check BP at home in 1 week  ----- Message -----  From: Felicity Ramsay  Sent: 6/9/2022   1:17 PM EDT  To: Robert Roman MD    Was seen yesterday and said you were going to call in a different BP med, but the pharmacy doesn't have anything yet.   [facility]) [phone]

## 2022-06-09 NOTE — TELEPHONE ENCOUNTER
----- Message from Dequan Krishnan MD sent at 6/9/2022  3:08 PM EDT -----    Stop cardizem for worsening pedal edema   Stop lisinopril and start on hyzaar 100/25 mg daily   Check BP at home in 1 week  ----- Message -----  From: Ramila Cisneros  Sent: 6/9/2022   1:17 PM EDT  To: Dequan Krishnan MD    Was seen yesterday and said you were going to call in a different BP med, but the pharmacy doesn't have anything yet.   [place] [phone]

## 2022-06-14 DIAGNOSIS — M51.37 DEGENERATION OF LUMBAR OR LUMBOSACRAL INTERVERTEBRAL DISC: Chronic | ICD-10-CM

## 2022-06-14 RX ORDER — DULOXETIN HYDROCHLORIDE 30 MG/1
30 CAPSULE, DELAYED RELEASE ORAL DAILY
Qty: 90 CAPSULE | Refills: 0 | Status: SHIPPED | OUTPATIENT
Start: 2022-06-14 | End: 2022-09-12

## 2022-06-17 RX ORDER — MELOXICAM 7.5 MG/1
7.5 TABLET ORAL DAILY
Qty: 90 TABLET | Refills: 0 | Status: SHIPPED | OUTPATIENT
Start: 2022-06-17 | End: 2022-09-12

## 2022-07-05 RX ORDER — TIZANIDINE 2 MG/1
TABLET ORAL
Qty: 90 TABLET | Refills: 0 | Status: SHIPPED | OUTPATIENT
Start: 2022-07-05 | End: 2022-09-26

## 2022-07-07 RX ORDER — BUDESONIDE AND FORMOTEROL FUMARATE DIHYDRATE 160; 4.5 UG/1; UG/1
AEROSOL RESPIRATORY (INHALATION)
Qty: 10.2 G | Refills: 0 | Status: SHIPPED | OUTPATIENT
Start: 2022-07-07 | End: 2022-08-02

## 2022-07-07 RX ORDER — UMECLIDINIUM 62.5 UG/1
AEROSOL, POWDER ORAL
Qty: 1 EACH | Refills: 2 | Status: SHIPPED | OUTPATIENT
Start: 2022-07-07

## 2022-07-19 RX ORDER — BUPROPION HYDROCHLORIDE 100 MG/1
100 TABLET, EXTENDED RELEASE ORAL DAILY
Qty: 90 TABLET | Refills: 0 | Status: SHIPPED | OUTPATIENT
Start: 2022-07-19 | End: 2022-09-29

## 2022-07-25 DIAGNOSIS — M47.817 LUMBOSACRAL SPONDYLOSIS WITHOUT MYELOPATHY: Chronic | ICD-10-CM

## 2022-07-25 RX ORDER — PREGABALIN 100 MG/1
CAPSULE ORAL
Qty: 180 CAPSULE | Refills: 0 | Status: SHIPPED | OUTPATIENT
Start: 2022-07-25 | End: 2022-10-21

## 2022-08-02 ENCOUNTER — HOSPITAL ENCOUNTER (OUTPATIENT)
Dept: PULMONOLOGY | Age: 67
Discharge: HOME | End: 2022-08-02
Payer: MEDICARE

## 2022-08-02 DIAGNOSIS — J44.9 COPD, MILD (HCC): ICD-10-CM

## 2022-08-02 LAB
DLCO %PRED: 34 %
DLCO PRED: NORMAL
DLCO/VA %PRED: NORMAL
DLCO/VA PRED: NORMAL
DLCO/VA: NORMAL
DLCO: NORMAL
EXPIRATORY TIME-POST: NORMAL
EXPIRATORY TIME: NORMAL
FEF 25-75% %CHNG: NORMAL
FEF 25-75% %PRED-POST: NORMAL
FEF 25-75% %PRED-PRE: NORMAL
FEF 25-75% PRED: NORMAL
FEF 25-75%-POST: NORMAL
FEF 25-75%-PRE: NORMAL
FEV1 %PRED-POST: 58 %
FEV1 %PRED-PRE: 54 %
FEV1 PRED: NORMAL
FEV1-POST: NORMAL
FEV1-PRE: NORMAL
FEV1/FVC %PRED-POST: NORMAL
FEV1/FVC %PRED-PRE: NORMAL
FEV1/FVC PRED: NORMAL
FEV1/FVC-POST: 72 %
FEV1/FVC-PRE: 70 %
FVC %PRED-POST: NORMAL
FVC %PRED-PRE: NORMAL
FVC PRED: NORMAL
FVC-POST: NORMAL
FVC-PRE: NORMAL
GAW %PRED: NORMAL
GAW PRED: NORMAL
GAW: NORMAL
IC %PRED: NORMAL
IC PRED: NORMAL
IC: NORMAL
MEP: NORMAL
MIP: NORMAL
MVV %PRED-PRE: NORMAL
MVV PRED: NORMAL
MVV-PRE: NORMAL
PEF %PRED-POST: NORMAL
PEF %PRED-PRE: NORMAL
PEF PRED: NORMAL
PEF%CHNG: NORMAL
PEF-POST: NORMAL
PEF-PRE: NORMAL
RAW %PRED: NORMAL
RAW PRED: NORMAL
RAW: NORMAL
RV %PRED: NORMAL
RV PRED: NORMAL
RV: NORMAL
SVC %PRED: NORMAL
SVC PRED: NORMAL
SVC: NORMAL
TLC %PRED: 72 %
TLC PRED: NORMAL
TLC: NORMAL
VA %PRED: NORMAL
VA PRED: NORMAL
VA: NORMAL
VTG %PRED: NORMAL
VTG PRED: NORMAL
VTG: NORMAL

## 2022-08-02 PROCEDURE — 6370000000 HC RX 637 (ALT 250 FOR IP): Performed by: INTERNAL MEDICINE

## 2022-08-02 PROCEDURE — 94726 PLETHYSMOGRAPHY LUNG VOLUMES: CPT

## 2022-08-02 PROCEDURE — 94060 EVALUATION OF WHEEZING: CPT

## 2022-08-02 PROCEDURE — 94618 PULMONARY STRESS TESTING: CPT

## 2022-08-02 PROCEDURE — 94729 DIFFUSING CAPACITY: CPT

## 2022-08-02 PROCEDURE — 94640 AIRWAY INHALATION TREATMENT: CPT

## 2022-08-02 RX ORDER — ALBUTEROL SULFATE 90 UG/1
4 AEROSOL, METERED RESPIRATORY (INHALATION) ONCE
Status: COMPLETED | OUTPATIENT
Start: 2022-08-02 | End: 2022-08-02

## 2022-08-02 RX ORDER — BUDESONIDE AND FORMOTEROL FUMARATE DIHYDRATE 160; 4.5 UG/1; UG/1
AEROSOL RESPIRATORY (INHALATION)
Qty: 10.2 G | Refills: 0 | Status: SHIPPED | OUTPATIENT
Start: 2022-08-02

## 2022-08-02 RX ADMIN — Medication 4 PUFF: at 14:14

## 2022-08-02 ASSESSMENT — PULMONARY FUNCTION TESTS
FEV1_PERCENT_PREDICTED_PRE: 54
FEV1_PERCENT_PREDICTED_POST: 58
FEV1/FVC_PRE: 70
FEV1/FVC_POST: 72

## 2022-08-02 NOTE — PROCEDURES
[address]                 [address] [place] [address]                               PULMONARY FUNCTION    PATIENT NAME: Estuardo Cuadra                   :        1955  MED REC NO:   [id number]                          ROOM:  ACCOUNT NO:   [de-identified]                           ADMIT DATE: 2022  PROVIDER:     Alvera Cranker, MD    DATE OF PROCEDURE:  2022    INDICATION:  COPD. FINDINGS:  1. Spirometry: The FEV1 is 1.36 liters, which is 54% of predicted. The FEV1/FVC ratio was 0.7. Inhaled bronchodilators are given. There  is no significant improvement. 2.  Lung volumes: Total lung capacity is 3.84 liters, which is 72% of  predicted. 3.  Diffusion capacity:  DLCO is 7.88 mL/min/mmHg, which is 34% of  predicted. 4.  Flow volume loop does not show an obstructive pattern. 5.  Six-minute walk test per Mariusz protocol. Baseline oxygen  saturation 95% on room air. Lowest oxygen saturation 86% on room air. The patient required 1 liter of supplemental oxygen to complete  six-minute walk test.  She ambulated 160 feet. IMPRESSION:  Mixed obstructive and restrictive lung defects. Severe  reduction in diffusion capacity. Significant oxyhemoglobin desaturation  on six-minute walk testing, requiring supplemental oxygen.         Shanthi Murphy MD    D: 2022 16:44:54       T: 2022 17:25:09     [id number]  Job#: [id number]     Doc#: [id number]    CC:

## 2022-08-08 NOTE — CARE COORDINATION
Case Management Assessment  Initial Evaluation      Patient Name: Gonzalo Griggs  YOB: 1955  Diagnosis: COPD (chronic obstructive pulmonary disease) ([facility] [J44.9]  Date / Time: 4/19/2021  2:53 PM    Admission status/Date: 4/19 inpt  Chart Reviewed: Yes      Patient Interviewed: Yes   Family Interviewed:  Yes - spouse      Hospitalization in the last 30 days:  No      Health Care Decision Maker :   Primary Decision Maker: Gerry Pizarro - [phone]    (CM - must 1st enter selection under Navigator - emergency contact- [phone] Relationship and pick relationship)   Who do you trust or have selected to make healthcare decisions for you      Met with:  patient  Interview conducted  (bedside/phone):    Current PCP:  [facility]    Financial  Medicare  Precert required for SNF : N          3 night stay required - Y    ADLS  Support Systems/Care Needs: Spouse/Significant Other  Transportation: self/spouse    Meal Preparation:  spouse    Housing  Living Arrangements:  Lives 1 story home  Steps: 0  Intent for return to present living arrangements: Yes  Identified Issues: [address] [place] [address]  Active with Home Health Care : No Agency:(Services)  Type of Home Care Services: None  Passport/Waiver : No  :                      Phone Number:    Passport/Waiver Services: N/A          Durable Medical Equiptment   DME Provider:  N/A  Equipment:   Walker_x__Cane_x__RTS___ BSC___Shower Chair_x__Hospital Bed___W/C____Other________  02 at ____Liter(s)---wears(frequency)_______ HHN _x__ CPAP___ BiPap___   N/A____      Home O2 Use :  No    If No for home O2---if presently on O2 during hospitalization:  No  if yes CM to follow for potential DC O2 need  Informed of need for care provider to bring portable home O2 tank on day of discharge for nursing to connect prior to leaving:   Not Indicated  Verbalized agreement/Understanding:   Not Indicated    Community Service · Continue Protonix 40 mg daily Affiliation  Dialysis:  No    · Agency:  · Location:  · Dialysis Schedule:  · Phone:   · Fax: Other Community Services: (ex:PT/OT,Mental Health,Wound Clinic, [facility] [address]) [facility] outpt therapy    DISCHARGE PLAN: Explained Case Management role/services. Reviewed chart and met with pt and spouse at bedside. Role of CM explained. States lives home with spouse and plan return. States Uses walker at home. States goes to [facility] for [employer]. Denies other needs. Will follow for poss home O2 at OR.  Noted is currently on RA with sat 90-91% on RA at rest.

## 2022-08-09 ENCOUNTER — OFFICE VISIT (OUTPATIENT)
Dept: PULMONOLOGY | Age: 67
End: 2022-08-09
Payer: MEDICARE

## 2022-08-09 VITALS
RESPIRATION RATE: 20 BRPM | SYSTOLIC BLOOD PRESSURE: 140 MMHG | BODY MASS INDEX: 34.82 KG/M2 | HEIGHT: 65 IN | DIASTOLIC BLOOD PRESSURE: 68 MMHG | OXYGEN SATURATION: 96 % | WEIGHT: 209 LBS | HEART RATE: 69 BPM

## 2022-08-09 DIAGNOSIS — R06.02 SOB (SHORTNESS OF BREATH): Primary | ICD-10-CM

## 2022-08-09 DIAGNOSIS — I50.22 CHRONIC SYSTOLIC (CONGESTIVE) HEART FAILURE (HCC): ICD-10-CM

## 2022-08-09 PROCEDURE — 1090F PRES/ABSN URINE INCON ASSESS: CPT | Performed by: INTERNAL MEDICINE

## 2022-08-09 PROCEDURE — G8399 PT W/DXA RESULTS DOCUMENT: HCPCS | Performed by: INTERNAL MEDICINE

## 2022-08-09 PROCEDURE — 3017F COLORECTAL CA SCREEN DOC REV: CPT | Performed by: INTERNAL MEDICINE

## 2022-08-09 PROCEDURE — G8427 DOCREV CUR MEDS BY ELIG CLIN: HCPCS | Performed by: INTERNAL MEDICINE

## 2022-08-09 PROCEDURE — 1036F TOBACCO NON-USER: CPT | Performed by: INTERNAL MEDICINE

## 2022-08-09 PROCEDURE — 1123F ACP DISCUSS/DSCN MKR DOCD: CPT | Performed by: INTERNAL MEDICINE

## 2022-08-09 PROCEDURE — 99214 OFFICE O/P EST MOD 30 MIN: CPT | Performed by: INTERNAL MEDICINE

## 2022-08-09 PROCEDURE — G8417 CALC BMI ABV UP PARAM F/U: HCPCS | Performed by: INTERNAL MEDICINE

## 2022-08-09 RX ORDER — BUDESONIDE, GLYCOPYRROLATE, AND FORMOTEROL FUMARATE 160; 9; 4.8 UG/1; UG/1; UG/1
2 AEROSOL, METERED RESPIRATORY (INHALATION) 2 TIMES DAILY
Qty: 1 EACH | Refills: 1 | Status: SHIPPED | OUTPATIENT
Start: 2022-08-09 | End: 2022-10-01

## 2022-08-09 RX ORDER — FUROSEMIDE 20 MG/1
20 TABLET ORAL DAILY
Qty: 30 TABLET | Refills: 3 | Status: SHIPPED | OUTPATIENT
Start: 2022-08-09 | End: 2022-09-30 | Stop reason: ALTCHOICE

## 2022-08-09 ASSESSMENT — SLEEP AND FATIGUE QUESTIONNAIRES
HOW LIKELY ARE YOU TO NOD OFF OR FALL ASLEEP IN A CAR, WHILE STOPPED FOR A FEW MINUTES IN TRAFFIC: 0
HOW LIKELY ARE YOU TO NOD OFF OR FALL ASLEEP WHILE WATCHING TV: 3
HOW LIKELY ARE YOU TO NOD OFF OR FALL ASLEEP WHILE SITTING AND TALKING TO SOMEONE: 0
HOW LIKELY ARE YOU TO NOD OFF OR FALL ASLEEP WHEN YOU ARE A PASSENGER IN A CAR FOR AN HOUR WITHOUT A BREAK: 0
ESS TOTAL SCORE: 3
HOW LIKELY ARE YOU TO NOD OFF OR FALL ASLEEP WHILE SITTING QUIETLY AFTER LUNCH WITHOUT ALCOHOL: 0
NECK CIRCUMFERENCE (INCHES): 14
HOW LIKELY ARE YOU TO NOD OFF OR FALL ASLEEP WHILE SITTING INACTIVE IN A PUBLIC PLACE: 0
HOW LIKELY ARE YOU TO NOD OFF OR FALL ASLEEP WHILE LYING DOWN TO REST IN THE AFTERNOON WHEN CIRCUMSTANCES PERMIT: 0
HOW LIKELY ARE YOU TO NOD OFF OR FALL ASLEEP WHILE SITTING AND READING: 0

## 2022-08-09 NOTE — PROGRESS NOTES
[facility]                                               Pulmonary Clinic Consult     I had the pleasure of seeing  Lakhwinder Rush     Chief Complaint   Patient presents with    COPD    Results     PFT        HISTORY OF PRESENT ILLNESS:    Lakhwinder Rush is a 74y.o. year old  Who start smoking at age 26 And increase gradually 1 pack an quit 2 years ago ,she smoke 40 PPY    The Patient comes in with SOB that has been going on the last few years and got worse last 12 months Associated with .mild cough , She  states that it get worse with exercise or walking long distance and she can walk . shot rt distance however that it is mainly loss balance   Can not do stairs         She does not snore and she does not quit breathing     Sleep Medicine 8/9/2022   Sitting and reading 0   Watching TV 3   Sitting, inactive in a public place (e.g. a theatre or a meeting) 0   As a passenger in a car for an hour without a break 0   Lying down to rest in the afternoon when circumstances permit 0   Sitting and talking to someone 0   Sitting quietly after a lunch without alcohol 0   In a car, while stopped for a few minutes in traffic 0   Total score 3   Neck circumference (Inches) 14             ALLERGIES:    Allergies   Allergen Reactions    Ciprofloxacin Nausea And Vomiting       PAST MEDICAL HISTORY:       Diagnosis Date    Abnormal CT scan, chest     COPD (chronic obstructive pulmonary disease) (HCC)     Decreased diffusion capacity of lung     Depression     Dyspnea     Hypertension     Lumbosacral spondylosis without myelopathy 5/9/2016    Osteopenia 12/31/2020    Oxygen dependent     2 L/PRN (usually uses O2)    Pneumonia     Restrictive lung disease     Tobacco abuse     Well controlled type 2 diabetes mellitus ([facility] [address] 10/26/2018       MEDICATIONS:   Current Outpatient Medications   Medication Sig Dispense Refill    SYMBICORT 160-4.5 MCG/ACT AERO INHALE 2 PUFFS INTO THE LUNGS TWICE DAILY 10.2 g 0    pregabalin (LYRICA) 100 MG capsule TAKE ONE CAPSULE BY MOUTH 2 TIMES A  capsule 0    buPROPion (WELLBUTRIN SR) 100 MG extended release tablet TAKE 1 TABLET BY MOUTH DAILY 90 tablet 0    tiZANidine (ZANAFLEX) 2 MG tablet TAKE ONE TABLET BY MOUTH EVERY DAY 90 tablet 0    meloxicam (MOBIC) 7.5 MG tablet TAKE 1 TABLET BY MOUTH DAILY 90 tablet 0    DULoxetine (CYMBALTA) 30 MG extended release capsule Take 1 capsule by mouth daily 90 capsule 0    metFORMIN (GLUCOPHAGE) 500 MG tablet TAKE ONE TABLET BY MOUTH EVERY DAY 90 tablet 0    losartan-hydroCHLOROthiazide (HYZAAR) 100-25 MG per tablet Take 1 tablet by mouth daily 30 tablet 2    albuterol sulfate HFA (VENTOLIN HFA) 108 (90 Base) MCG/ACT inhaler Inhale 2 puffs into the lungs every 6 hours as needed for Wheezing 54 g 0    Calcium Carb-Cholecalciferol (CALCIUM-VITAMIN D3) 250-125 MG-UNIT TABS TAKE ONE TABLET BY MOUTH 2 TIMES A  tablet 0    metoprolol tartrate (LOPRESSOR) 50 MG tablet TAKE 1 TABLET BY MOUTH TWICE DAILY 60 tablet 2    blood glucose test strips (CONTOUR NEXT TEST) strip Test Daily. DX: E11.9 100 each 3    Lancets MISC Test once daily.  DX: E11.9 100 each 3    INCRUSE ELLIPTA 62.5 MCG/INH AEPB INHALE 1 PUFF INTO THE LUNGS DAILY 1 each 2    torsemide 40 MG TABS Take 40 mg by mouth daily (Patient not taking: Reported on 8/9/2022) 90 tablet 3    dilTIAZem (CARTIA XT) 120 MG extended release capsule TAKE ONE CAPSULE BY MOUTH EVERY DAY (Patient not taking: Reported on 8/9/2022) 90 capsule 2    calcium-vitamin D (OSCAL) 250-125 MG-UNIT per tablet Take 1 tablet by mouth 2 times daily (Patient not taking: Reported on 8/9/2022) 60 tablet 2    denosumab (PROLIA) 60 MG/ML SOSY SC injection Inject 1 mL into the skin once for 1 dose (Patient not taking: Reported on 8/9/2022) 1 mL 1    ipratropium-albuterol (DUONEB) 0.5-2.5 (3) MG/3ML SOLN nebulizer solution Inhale 3 mLs into the lungs every 4 hours (Patient not taking: No sig oriented, cooperative and in no apparent distress. Head was normocephalic and atraumatic. EENT: Mallampati class :III  Extraocular muscles intact. External canals are patent and no discharge was appreciated. Septum was midline,   mucosa was without erythema, exudates or cobblestoning. No thrush was noted. Neck: Supple without thyromegaly. No elevated JVP. Trachea was midline. No carotid bruits were auscultated. Respiratory: rhonchi     Cardiovascular: Regular without murmur, clicks, gallops or rubs. There is no left or right ventricular heave. Pulses:  Carotid, radial and femoral pulses were equally bilaterally. Abdomen: Slightly rounded and soft without organomegaly. No rebound, rigidity or guarding was appreciated. Lymphatic: No lymphadenopathy. Musculoskeletal: no joint defrmity . Extremities: edema+2   Skin:  Warm and dry. Good color, turgor and pigmentation. No lesions or scars. Neurological/Psychiatric: The patient's general behavior, level of consciousness, thought content and emotional status is normal.  Cranial nerves II-XII are intact. DATA: Spirometry done in the office today demonstrates an FVC of 1.94 liters which is 59 % of predicted with an FEV1 of  1.36 liters which is 54  % of predicted. FEV1/FVC ratio is 70    Moderate sever COPD /restrictive pattern as well  ERV 24   1-Mixed obstructive and restrictive lung disease   2-Lung nodules   3-GRACIELA   4-Hign BMNI  5- Hypoxia              6- PHTN   PLAN:      -SOB multifactorial,has large sylvia  -Lasix 20 mg daily and increase by primary   BMP in 7 days   -sleep study   _ change inhalers top Breztri 2 puff bid   Advise o2 and o2  They will call with CT results ordered by PCP     Flu and Pneumovax as per rpimary   See in 3 months   PHT seems type 3     Thank you for allowing me to participate in Health system.  I will keep following with you ,should you have any concerns ,please contact us at [facility] pulmonary office     Sincerely,        Deniscorwin Ann MD  Pulmonary & Critical Care Medicine     NOTE: This report was transcribed using voice recognition software. Every effort was made to ensure accuracy; however, inadvertent computerized transcription errors may be present.

## 2022-08-10 ENCOUNTER — HOSPITAL ENCOUNTER (OUTPATIENT)
Dept: CT IMAGING | Age: 67
Discharge: HOME | End: 2022-08-10
Payer: MEDICARE

## 2022-08-10 DIAGNOSIS — Z87.891 PERSONAL HISTORY OF TOBACCO USE: ICD-10-CM

## 2022-08-10 PROCEDURE — 71271 CT THORAX LUNG CANCER SCR C-: CPT

## 2022-08-25 RX ORDER — LOSARTAN POTASSIUM AND HYDROCHLOROTHIAZIDE 25; 100 MG/1; MG/1
1 TABLET ORAL DAILY
Qty: 90 TABLET | Refills: 0 | Status: SHIPPED | OUTPATIENT
Start: 2022-08-25 | End: 2022-08-30

## 2022-08-30 RX ORDER — LOSARTAN POTASSIUM AND HYDROCHLOROTHIAZIDE 25; 100 MG/1; MG/1
1 TABLET ORAL DAILY
Qty: 30 TABLET | Refills: 0 | Status: SHIPPED | OUTPATIENT
Start: 2022-08-30 | End: 2022-09-19

## 2022-09-02 RX ORDER — METOPROLOL TARTRATE 50 MG/1
TABLET, FILM COATED ORAL
Qty: 60 TABLET | Refills: 2 | Status: SHIPPED | OUTPATIENT
Start: 2022-09-02

## 2022-09-12 DIAGNOSIS — M51.37 DEGENERATION OF LUMBAR OR LUMBOSACRAL INTERVERTEBRAL DISC: Chronic | ICD-10-CM

## 2022-09-12 RX ORDER — MELOXICAM 7.5 MG/1
7.5 TABLET ORAL DAILY
Qty: 30 TABLET | Refills: 0 | Status: SHIPPED | OUTPATIENT
Start: 2022-09-12

## 2022-09-12 RX ORDER — DULOXETIN HYDROCHLORIDE 30 MG/1
30 CAPSULE, DELAYED RELEASE ORAL DAILY
Qty: 30 CAPSULE | Refills: 0 | Status: SHIPPED | OUTPATIENT
Start: 2022-09-12

## 2022-09-19 RX ORDER — LOSARTAN POTASSIUM AND HYDROCHLOROTHIAZIDE 25; 100 MG/1; MG/1
1 TABLET ORAL DAILY
Qty: 30 TABLET | Refills: 0 | Status: SHIPPED | OUTPATIENT
Start: 2022-09-19 | End: 2022-10-11

## 2022-09-26 RX ORDER — TIZANIDINE 2 MG/1
TABLET ORAL
Qty: 30 TABLET | Refills: 0 | Status: SHIPPED | OUTPATIENT
Start: 2022-09-26 | End: 2022-10-21

## 2022-09-28 ENCOUNTER — TELEPHONE (OUTPATIENT)
Dept: PULMONOLOGY | Age: 67
End: 2022-09-28

## 2022-09-28 NOTE — TELEPHONE ENCOUNTER
Pt completed ct lung screen ordered by pcp. Patient is scheduled for fua 22      Narrative   EXAMINATION:   LOW DOSE SCREENING CT OF THE CHEST WITHOUT CONTRAST       8/10/2022 1:49 pm       TECHNIQUE:   Low dose lung cancer screening CT of the chest was performed without the   administration of intravenous contrast.  Multiplanar reformatted images are   provided for review. Automated exposure control, iterative reconstruction,   and/or weight based adjustment of the mA/kV was utilized to reduce the   radiation dose to as low as reasonably achievable. COMPARISON:          HISTORY:   ORDERING SYSTEM PROVIDED HISTORY: Personal history of tobacco use   TECHNOLOGIST PROVIDED HISTORY:   Age: Patient is 61 y.o. Smoking History: Social History   Tobacco Use   Smoking status: Former Smoker   Years: 30.00   Types: Cigarettes   Quit date: 2020   Years since quittin.1   Smokeless tobacco: Former User   Quit date: 2016   Alcohol use: No   Drug use: No   Pack years: 0       Date of last lung cancer screenin2020   Is there documentation of shared decision making? ->Yes   Is this a low dose CT or a routine CT?->Low Dose CT   Is this the first (baseline) CT or an annual exam?->Annual   Does the patient show any signs or symptoms of lung cancer? ->No   Smoking Status? ->Former   Date quit smoking? (must be within 15 years)->20   Smoking packs per day?->1.5   Years smoking?->30   CTDlvol (mGy)->1.66   DLP (mGy*cm)->57.6   Reconstructed image width (mm)->2.5       FINDINGS:   Mediastinum:  Thyroid gland is unremarkable. Atherosclerotic change seen in   aorta. Moderate to severe coronary artery calcification is seen. No   pericardial effusion is seen. Main pulmonary artery is enlarged measuring   4.1 cm. No significant mediastinal or hilar adenopathy. Lungs/Pleura:  Mild underlying emphysema is seen. No large blebs or bulla.    Scattered areas of bronchial wall thickening are seen.  Scattered calcified   granuloma are seen. A few tiny punctate noncalcified pulmonary nodules   described previously are unchanged, measuring 3-4 mm in size or less       Upper Abdomen:  Right adrenal gland is normal.  Left adrenal gland is normal.   Atherosclerotic change seen in abdominal aorta. Soft Tissues/Bones: Spurring is seen in the spine. Spurring is seen in the   left shoulder joint. Streak artifact is seen from right shoulder   arthroplasty. .  Remote appearing rib fractures are seen           Impression   Emphysema with stable tiny pulmonary nodules. Scattered calcified granuloma   are also seen       Moderate to severe coronary artery calcification       LUNG RADS:   Per ACR Lung-RADS Version 1.1       Category 2, Benign appearance or behavior. Management:  Continue annual lung screening with LDCT in 12 months. RECOMMENDATIONS:   Unavailable                 CT Lung Screen (Annual): Result Notes   Roxanne Hall   8/16/2022  8:50 AM EDT       Patient informed     [facility]   8/15/2022  9:12 AM EDT       Left message to return call.     Amey Lesch, MD   8/15/2022  6:51 AM EDT       Copd findings noted in ct scan  No suspicious nodules   Repeat 1 year

## 2022-09-30 RX ORDER — TORSEMIDE 20 MG/1
TABLET ORAL
Qty: 30 TABLET | Refills: 0 | Status: SHIPPED | OUTPATIENT
Start: 2022-09-30

## 2022-09-30 RX ORDER — BUPROPION HYDROCHLORIDE 100 MG/1
100 TABLET, EXTENDED RELEASE ORAL DAILY
Qty: 30 TABLET | Refills: 0 | Status: SHIPPED | OUTPATIENT
Start: 2022-09-30

## 2022-10-01 RX ORDER — BUDESONIDE, GLYCOPYRROLATE, AND FORMOTEROL FUMARATE 160; 9; 4.8 UG/1; UG/1; UG/1
2 AEROSOL, METERED RESPIRATORY (INHALATION) 2 TIMES DAILY
Qty: 10.7 G | Refills: 5 | Status: SHIPPED | OUTPATIENT
Start: 2022-10-01

## 2022-10-11 RX ORDER — LOSARTAN POTASSIUM AND HYDROCHLOROTHIAZIDE 25; 100 MG/1; MG/1
1 TABLET ORAL DAILY
Qty: 30 TABLET | Refills: 0 | Status: SHIPPED | OUTPATIENT
Start: 2022-10-11

## 2022-10-20 DIAGNOSIS — M47.817 LUMBOSACRAL SPONDYLOSIS WITHOUT MYELOPATHY: Chronic | ICD-10-CM

## 2022-10-21 RX ORDER — TIZANIDINE 2 MG/1
TABLET ORAL
Qty: 30 TABLET | Refills: 0 | Status: SHIPPED | OUTPATIENT
Start: 2022-10-21

## 2022-10-21 RX ORDER — PREGABALIN 100 MG/1
CAPSULE ORAL
Qty: 180 CAPSULE | Refills: 0 | Status: SHIPPED | OUTPATIENT
Start: 2022-10-21 | End: 2023-01-19

## 2022-11-17 RX ORDER — TIZANIDINE 2 MG/1
TABLET ORAL
Qty: 30 TABLET | Refills: 0 | Status: SHIPPED | OUTPATIENT
Start: 2022-11-17

## 2022-11-22 RX ORDER — FUROSEMIDE 20 MG/1
20 TABLET ORAL DAILY
Qty: 30 TABLET | Refills: 0 | OUTPATIENT
Start: 2022-11-22

## 2022-11-22 RX ORDER — METOPROLOL TARTRATE 50 MG/1
TABLET, FILM COATED ORAL
Qty: 60 TABLET | Refills: 2 | Status: SHIPPED | OUTPATIENT
Start: 2022-11-22

## 2022-12-01 RX ORDER — CALCIUM CARBONATE-CHOLECALCIFEROL TAB 250 MG-125 UNIT 250-125 MG-UNIT
TAB ORAL
Qty: 60 TABLET | Refills: 1 | Status: SHIPPED | OUTPATIENT
Start: 2022-12-01

## 2022-12-07 ENCOUNTER — OFFICE VISIT (OUTPATIENT)
Dept: PULMONOLOGY | Age: 67
End: 2022-12-07
Payer: MEDICARE

## 2022-12-07 VITALS
SYSTOLIC BLOOD PRESSURE: 112 MMHG | HEART RATE: 60 BPM | DIASTOLIC BLOOD PRESSURE: 60 MMHG | BODY MASS INDEX: 34.82 KG/M2 | HEIGHT: 65 IN | OXYGEN SATURATION: 93 % | WEIGHT: 209 LBS

## 2022-12-07 DIAGNOSIS — R91.1 LUNG NODULE: Primary | ICD-10-CM

## 2022-12-07 DIAGNOSIS — Z87.891 PERSONAL HISTORY OF TOBACCO USE: ICD-10-CM

## 2022-12-07 PROCEDURE — 99214 OFFICE O/P EST MOD 30 MIN: CPT | Performed by: INTERNAL MEDICINE

## 2022-12-07 PROCEDURE — 1123F ACP DISCUSS/DSCN MKR DOCD: CPT | Performed by: INTERNAL MEDICINE

## 2022-12-07 PROCEDURE — G8484 FLU IMMUNIZE NO ADMIN: HCPCS | Performed by: INTERNAL MEDICINE

## 2022-12-07 PROCEDURE — G0296 VISIT TO DETERM LDCT ELIG: HCPCS | Performed by: INTERNAL MEDICINE

## 2022-12-07 PROCEDURE — G8399 PT W/DXA RESULTS DOCUMENT: HCPCS | Performed by: INTERNAL MEDICINE

## 2022-12-07 PROCEDURE — 1036F TOBACCO NON-USER: CPT | Performed by: INTERNAL MEDICINE

## 2022-12-07 PROCEDURE — 3078F DIAST BP <80 MM HG: CPT | Performed by: INTERNAL MEDICINE

## 2022-12-07 PROCEDURE — 3074F SYST BP LT 130 MM HG: CPT | Performed by: INTERNAL MEDICINE

## 2022-12-07 PROCEDURE — G8427 DOCREV CUR MEDS BY ELIG CLIN: HCPCS | Performed by: INTERNAL MEDICINE

## 2022-12-07 PROCEDURE — G8417 CALC BMI ABV UP PARAM F/U: HCPCS | Performed by: INTERNAL MEDICINE

## 2022-12-07 PROCEDURE — 1090F PRES/ABSN URINE INCON ASSESS: CPT | Performed by: INTERNAL MEDICINE

## 2022-12-07 PROCEDURE — 3017F COLORECTAL CA SCREEN DOC REV: CPT | Performed by: INTERNAL MEDICINE

## 2022-12-07 NOTE — PROGRESS NOTES
P  Pulmonary, Critical Care [facility] Medicine [facility]                                               Pulmonary Clinic Consult     I had the pleasure of seeing  Austyn Ye     Chief Complaint   Patient presents with    Shortness of Breath     Review CT results from Aug       HISTORY OF PRESENT ILLNESS:    Austyn Ye is a 74y.o. year old  Who start smoking at age 26 And increase gradually 1 pack an quit 2 years ago ,she smoke 40 PPY    The Patient comes in with SOB that has been going on the last few years and got worse last 12 months Associated with .mild cough , She  states that it get worse with exercise or walking long distance and she can walk . shot rt distance however that it is mainly loss balance   Can not do stairs         She does not snore and she does not quit breathing     Today Visit   `she has been doing better  She is using inhalers and lasix  Also has some back pain and can not ambulate         ALLERGIES:    Allergies   Allergen Reactions    Ciprofloxacin Nausea And Vomiting       PAST MEDICAL HISTORY:       Diagnosis Date    Abnormal CT scan, chest     COPD (chronic obstructive pulmonary disease) (HCC)     Decreased diffusion capacity of lung     Depression     Dyspnea     Hypertension     Lumbosacral spondylosis without myelopathy 5/9/2016    Osteopenia 12/31/2020    Oxygen dependent     2 L/PRN (usually uses O2)    Pneumonia     Restrictive lung disease     Tobacco abuse     Well controlled type 2 diabetes mellitus ([facility] [address] 10/26/2018       MEDICATIONS:   Current Outpatient Medications   Medication Sig Dispense Refill    Calcium Carb-Cholecalciferol (CALCIUM-VITAMIN D3) 250-3. 125 MG-MCG TABS TAKE ONE TABLET BY MOUTH 2 TIMES A DAY 60 tablet 1    metoprolol tartrate (LOPRESSOR) 50 MG tablet TAKE 1 TABLET BY MOUTH TWICE DAILY 60 tablet 2    tiZANidine (ZANAFLEX) 2 MG tablet TAKE ONE TABLET BY MOUTH EVERY DAY 30 tablet 0    pregabalin (LYRICA) 100 MG capsule TAKE ONE CAPSULE BY MOUTH 2 TIMES A  capsule 0    losartan-hydroCHLOROthiazide (HYZAAR) 100-25 MG per tablet Take 1 tablet by mouth daily 30 tablet 0    BREZTRI AEROSPHERE 160-9-4.8 MCG/ACT AERO Inhale 2 puffs into the lungs 2 times daily 10.7 g 5    torsemide (DEMADEX) 20 MG tablet TAKE 1 TABLET BY MOUTH DAILY 30 tablet 0    DULoxetine (CYMBALTA) 30 MG extended release capsule TAKE 1 CAPSULE BY MOUTH DAILY 30 capsule 0    metFORMIN (GLUCOPHAGE) 500 MG tablet TAKE ONE TABLET BY MOUTH EVERY DAY 30 tablet 0    meloxicam (MOBIC) 7.5 MG tablet TAKE 1 TABLET BY MOUTH DAILY 30 tablet 0    albuterol sulfate HFA (VENTOLIN HFA) 108 (90 Base) MCG/ACT inhaler Inhale 2 puffs into the lungs every 6 hours as needed for Wheezing 54 g 0    dilTIAZem (CARTIA XT) 120 MG extended release capsule TAKE ONE CAPSULE BY MOUTH EVERY DAY 90 capsule 2    blood glucose test strips (CONTOUR NEXT TEST) strip Test Daily. DX: E11.9 100 each 3    buPROPion (WELLBUTRIN SR) 100 MG extended release tablet TAKE 1 TABLET BY MOUTH DAILY (Patient not taking: Reported on 12/7/2022) 30 tablet 0    SYMBICORT 160-4.5 MCG/ACT AERO INHALE 2 PUFFS INTO THE LUNGS TWICE DAILY (Patient not taking: Reported on 12/7/2022) 10.2 g 0    INCRUSE ELLIPTA 62.5 MCG/INH AEPB INHALE 1 PUFF INTO THE LUNGS DAILY (Patient not taking: Reported on 12/7/2022) 1 each 2    torsemide 40 MG TABS Take 40 mg by mouth daily (Patient not taking: Reported on 8/9/2022) 90 tablet 3    denosumab (PROLIA) 60 MG/ML SOSY SC injection Inject 1 mL into the skin once for 1 dose (Patient not taking: No sig reported) 1 mL 1    ipratropium-albuterol (DUONEB) 0.5-2.5 (3) MG/3ML SOLN nebulizer solution Inhale 3 mLs into the lungs every 4 hours (Patient not taking: No sig reported) 360 mL 0    Lancets MISC Test once daily. DX: E11.9 (Patient not taking: Reported on 12/7/2022) 100 each 3     No current facility-administered medications for this visit.        SOCIAL AND OCCUPATIONAL HEALTH:  Social History     Tobacco Use Smoking Status Former    Packs/day: 1.00    Years: 30.00    Pack years: 30.00    Types: Cigarettes    Quit date: 2020    Years since quittin.9   Smokeless Tobacco Former    Quit date: 2016     TB :  Pets cat and dog    Industrial exposure:no       SURGICAL HISTORY:   Past Surgical History:   Procedure Laterality Date    BACK SURGERY      CARPAL TUNNEL RELEASE Left 14    HAND SURGERY Right 14    SHOULDER SURGERY Right 2021    RIGHT SHOULDER ARTHROTMY, OPEN BICEPS TENODESIS, REMOVAL OF LOOSE BODIES, REVERSE TOTAL SHOULDER REPLACEMENT performed by Amy Villafana MD at [address] Right        FAMILY HISTORY:   Lung cancer:No  DVT or PE +    REVIEW OF SYSTEMS:  Constitutional: General health is good . There has been no weight changes. No fevers, fatigue or weakness. Head: Patient denies any history of trauma, convulsive disorder or syncope. Skin:  Patient denies history of changes in pigmentation, eruptions or pruritus. No easy bruising or bleeding. EENT: no nasal congestion   Cardiovascular ,No chest pain ,No edema ,  Respiration:SOB:+  ,VALERO :++  Gastrointestinal:No GI bleed ,no melena  ,no hematemesis    Musculoskeletal: no joint pain ,no swelling  Neurological:no , syncope. Denies twitching, convulsions, loss of consciousness or memory. Endocrine:  . No history of goiter, exophthalmos or dryness of skin. The patient has no history of diabetes. Hematopoietic:  No history of bleeding disorders or easy bruising. Rheumatic:  No connective tissue disease history or polyarthritis/inflammatory joint disease. PHYSICAL EXAMINATION:  Vitals:    22 1413   BP: 112/60   Pulse: 60   SpO2: 93%     Constitutional: This is a well developed, well nourished 74y.o. year old female who is alert, oriented, cooperative and in no apparent distress. Head was normocephalic and atraumatic. EENT: Mallampati class :III  Extraocular muscles intact.    External canals are patent and no discharge was appreciated. Septum was midline,   mucosa was without erythema, exudates or cobblestoning. No thrush was noted. Neck: Supple without thyromegaly. No elevated JVP. Trachea was midline. No carotid bruits were auscultated. Respiratory: rhonchi     Cardiovascular: Regular without murmur, clicks, gallops or rubs. There is no left or right ventricular heave. Pulses:  Carotid, radial and femoral pulses were equally bilaterally. Abdomen: Slightly rounded and soft without organomegaly. No rebound, rigidity or guarding was appreciated. Lymphatic: No lymphadenopathy. Musculoskeletal: no joint defrmity . Extremities: edema+2   Skin:  Warm and dry. Good color, turgor and pigmentation. No lesions or scars. Neurological/Psychiatric: The patient's general behavior, level of consciousness, thought content and emotional status is normal.  Cranial nerves II-XII are intact. DATA: Spirometry done in the office today demonstrates an FVC of 1.94 liters which is 59 % of predicted with an FEV1 of  1.36 liters which is 54  % of predicted. FEV1/FVC ratio is 70  IMPRESSION:  Mixed obstructive and restrictive lung defects. Severe  reduction in diffusion capacity. Significant oxyhemoglobin desaturation  on six-minute walk testing, requiring supplemental oxygen. Moderate sever COPD /restrictive pattern as well  ERV 24   1-Mixed obstructive and restrictive lung disease   2-Lung nodules   3-GRACIELA   4-Hign BMNI  5- Hypoxia              6- PHTN   PLAN:        -SOB multifactorial,has large edema   -Lasix 20 mg daily and increase by primary   -sleep study declined   _ change inhalers top Breztri 2 puff bid   Advise o2 and o2  CT emphysema and stable nodule ,continue screen   Ambulated few steps and she did well. advise  to get help,home health to start PT&OT  Flu and Pneumovax as per rpimary   See in 3 months   PHT seems type 3     Thank you for allowing me to participate in Eric Canseco care. I will keep following with you ,should you have any concerns ,please contact us at [facility] pulmonary office     Sincerely,        Tamera Brandt MD  Pulmonary & Critical Care Medicine     NOTE: This report was transcribed using voice recognition software. Every effort was made to ensure accuracy; however, inadvertent computerized transcription errors may be present. Discussed with the patient the current USPSTF guidelines released March 9, 2021 for screening for lung cancer. For adults aged 48 to [de-identified] years who have a 20 pack-year smoking history and currently smoke or have quit within the past 15 years the grade B recommendation is to:  Screen for lung cancer with low-dose computed tomography (LDCT) every year. Stop screening once a person has not smoked for 15 years or has a health problem that limits life expectancy or the ability to have lung surgery. The patient  reports that she quit smoking about 1 years ago. Her smoking use included cigarettes. She has a 30.00 pack-year smoking history. She quit smokeless tobacco use about 6 years ago. . Discussed with patient the risks and benefits of screening, including over-diagnosis, false positive rate, and total radiation exposure. The patient currently exhibits no signs or symptoms suggestive of lung cancer. Discussed with patient the importance of compliance with yearly annual lung cancer screenings and willingness to undergo diagnosis and treatment if screening scan is positive. In addition, the patient was counseled regarding the importance of remaining smoke free and/or total smoking cessation.     Also reviewed the following if the patient has Medicare that as of February 10, 2022, Medicare only covers LDCT screening in patients aged 51-72 with at least a 20 pack-year smoking history who currently smoke or have quit in the last 15 years

## 2022-12-07 NOTE — PATIENT INSTRUCTIONS
decide your lung cancer risk. What are the risks of screening? CT screening for lung cancer isn't perfect. It can show an abnormal result when it turns out there wasn't any cancer. This is called a false-positive result. This means you may need more tests to make sure you don't have cancer. These tests can be harmful and cause a lot of worry. These tests may include more CT scans and invasive testing like a lung biopsy. In a biopsy, the doctor takes a sample of tissue from inside your lung so it can be looked at under a microscope. A biopsy is the only way to tell if you have lung cancer. If the biopsy finds cancer, you and your doctor will have to decide how or whether to treat it. Some lung cancers found on CT scans are harmless and would not have caused a problem if they had not been found through screening. But because doctors can't tell which ones will turn out to be harmless, most will be treated. This means that you may get treatment--including surgery, radiation, or chemotherapy--that you don't need. There is a risk of damage to cells or tissue from being exposed to radiation, including the small amounts used in CTs, X-rays, and other medical tests. Over time, exposure to radiation may cause cancer and other health problems. But in most cases, the risk of getting cancer from being exposed to small amounts of radiation is low. It's not a reason to avoid these tests for most people. What are the benefits of screening? Your scan may be normal (negative). For some people who are at higher risk, screening lowers the chance of dying of lung cancer. How much and how long you smoked helps to determine your risk level. Screening can find some cancers early, when treatment may be more likely to work. What happens after screening? The results of your CT scan will be sent to your doctor. Someone from your care team will explain the results of your scan and answer any questions you may have.  If you need any follow-up, he or she will help you understand what to do next. After a lung cancer screening, you can go back to your usual activities right away. A lung cancer screening test can't tell if you have lung cancer. If your results are positive, your doctor can't tell whether an abnormal finding is a harmless nodule, cancer, or something else without doing more tests. What can you do to help prevent lung cancer? Some lung cancers can't be prevented. But if you smoke, quitting smoking is the best step you can take to prevent lung cancer. If you want to quit, your doctor can recommend medicines or other ways to help. Follow-up care is a key part of your treatment and safety. Be sure to make and go to all appointments, and call your doctor if you are having problems. It's also a good idea to know your test results and keep a list of the medicines you take. Where can you learn more? Go to https://MeeWee.[employer]imb[employer] (formerly [employer]). org and sign in to your [employer] account. Enter K594 in the Regulatory[employer]inder box to learn more about \"Learning About Lung Cancer Screening. \"     If you do not have an account, please click on the \"Sign Up Now\" link. Current as of: May 4, 2022               Content Version: 13.4  © 2006-2022 Healthwise, Incorporated. Care instructions adapted under license by [facility] ([facility]). If you have questions about a medical condition or this instruction, always ask your healthcare professional. [place] [address] any warranty or liability for your use of this information.

## 2022-12-08 DIAGNOSIS — M51.37 DEGENERATION OF LUMBAR OR LUMBOSACRAL INTERVERTEBRAL DISC: Chronic | ICD-10-CM

## 2022-12-08 RX ORDER — MELOXICAM 7.5 MG/1
7.5 TABLET ORAL DAILY
Qty: 90 TABLET | Refills: 0 | Status: SHIPPED | OUTPATIENT
Start: 2022-12-08

## 2022-12-08 RX ORDER — TIZANIDINE 2 MG/1
TABLET ORAL
Qty: 30 TABLET | Refills: 0 | Status: SHIPPED | OUTPATIENT
Start: 2022-12-08

## 2022-12-08 RX ORDER — DULOXETIN HYDROCHLORIDE 30 MG/1
30 CAPSULE, DELAYED RELEASE ORAL DAILY
Qty: 90 CAPSULE | Refills: 0 | Status: SHIPPED | OUTPATIENT
Start: 2022-12-08

## 2023-01-03 RX ORDER — TIZANIDINE 2 MG/1
TABLET ORAL
Qty: 30 TABLET | Refills: 0 | Status: SHIPPED | OUTPATIENT
Start: 2023-01-03

## 2023-01-04 RX ORDER — TORSEMIDE 20 MG/1
TABLET ORAL
Qty: 30 TABLET | Refills: 0 | Status: SHIPPED | OUTPATIENT
Start: 2023-01-04

## 2023-01-04 RX ORDER — LOSARTAN POTASSIUM AND HYDROCHLOROTHIAZIDE 25; 100 MG/1; MG/1
1 TABLET ORAL DAILY
Qty: 30 TABLET | Refills: 0 | Status: SHIPPED | OUTPATIENT
Start: 2023-01-04

## 2023-01-04 RX ORDER — BUPROPION HYDROCHLORIDE 100 MG/1
100 TABLET, EXTENDED RELEASE ORAL DAILY
Qty: 30 TABLET | Refills: 0 | Status: SHIPPED | OUTPATIENT
Start: 2023-01-04

## 2023-01-17 DIAGNOSIS — M47.817 LUMBOSACRAL SPONDYLOSIS WITHOUT MYELOPATHY: Chronic | ICD-10-CM

## 2023-01-17 RX ORDER — PREGABALIN 100 MG/1
CAPSULE ORAL
Qty: 180 CAPSULE | Refills: 0 | Status: SHIPPED | OUTPATIENT
Start: 2023-01-19 | End: 2023-04-19

## 2023-01-31 RX ORDER — TIZANIDINE 2 MG/1
TABLET ORAL
Qty: 30 TABLET | Refills: 0 | Status: SHIPPED | OUTPATIENT
Start: 2023-01-31

## 2023-02-07 DIAGNOSIS — J44.9 COPD, MILD (HCC): ICD-10-CM

## 2023-02-07 DIAGNOSIS — R06.02 SOB (SHORTNESS OF BREATH): Primary | ICD-10-CM

## 2023-02-07 DIAGNOSIS — M51.37 DEGENERATION OF LUMBAR OR LUMBOSACRAL INTERVERTEBRAL DISC: Chronic | ICD-10-CM

## 2023-02-07 RX ORDER — CALCIUM CARBONATE-CHOLECALCIFEROL TAB 250 MG-125 UNIT 250-125 MG-UNIT
TAB ORAL
Qty: 100 TABLET | Refills: 0 | Status: SHIPPED | OUTPATIENT
Start: 2023-02-07

## 2023-02-07 RX ORDER — BUPROPION HYDROCHLORIDE 100 MG/1
100 TABLET, EXTENDED RELEASE ORAL DAILY
Qty: 30 TABLET | Refills: 0 | Status: SHIPPED | OUTPATIENT
Start: 2023-02-07

## 2023-02-07 RX ORDER — MELOXICAM 7.5 MG/1
7.5 TABLET ORAL DAILY
Qty: 90 TABLET | Refills: 0 | Status: SHIPPED | OUTPATIENT
Start: 2023-02-07

## 2023-02-07 RX ORDER — METOPROLOL TARTRATE 50 MG/1
TABLET, FILM COATED ORAL
Qty: 60 TABLET | Refills: 0 | Status: SHIPPED | OUTPATIENT
Start: 2023-02-07

## 2023-02-07 RX ORDER — FUROSEMIDE 20 MG/1
20 TABLET ORAL DAILY
Qty: 30 TABLET | Refills: 0 | Status: SHIPPED | OUTPATIENT
Start: 2023-02-07

## 2023-02-07 RX ORDER — TIZANIDINE 2 MG/1
TABLET ORAL
Qty: 30 TABLET | Refills: 0 | Status: SHIPPED | OUTPATIENT
Start: 2023-02-07

## 2023-02-07 RX ORDER — TORSEMIDE 20 MG/1
TABLET ORAL
Qty: 30 TABLET | Refills: 0 | Status: SHIPPED | OUTPATIENT
Start: 2023-02-07

## 2023-02-07 RX ORDER — LOSARTAN POTASSIUM AND HYDROCHLOROTHIAZIDE 25; 100 MG/1; MG/1
1 TABLET ORAL DAILY
Qty: 30 TABLET | Refills: 0 | Status: SHIPPED | OUTPATIENT
Start: 2023-02-07

## 2023-02-07 RX ORDER — DULOXETIN HYDROCHLORIDE 30 MG/1
30 CAPSULE, DELAYED RELEASE ORAL DAILY
Qty: 90 CAPSULE | Refills: 0 | Status: SHIPPED | OUTPATIENT
Start: 2023-02-07

## 2023-02-13 ENCOUNTER — OFFICE VISIT (OUTPATIENT)
Dept: INTERNAL MEDICINE CLINIC | Age: 68
End: 2023-02-13

## 2023-02-13 VITALS — BODY MASS INDEX: 31.99 KG/M2 | HEIGHT: 65 IN | WEIGHT: 192 LBS

## 2023-02-13 DIAGNOSIS — I50.32 CHRONIC DIASTOLIC CHF (CONGESTIVE HEART FAILURE) (HCC): ICD-10-CM

## 2023-02-13 DIAGNOSIS — M51.37 DEGENERATION OF LUMBAR OR LUMBOSACRAL INTERVERTEBRAL DISC: ICD-10-CM

## 2023-02-13 DIAGNOSIS — F32.4 MAJOR DEPRESSIVE DISORDER WITH SINGLE EPISODE, IN PARTIAL REMISSION (HCC): ICD-10-CM

## 2023-02-13 DIAGNOSIS — I10 ESSENTIAL HYPERTENSION: ICD-10-CM

## 2023-02-13 DIAGNOSIS — Z12.31 BREAST CANCER SCREENING BY MAMMOGRAM: ICD-10-CM

## 2023-02-13 DIAGNOSIS — M85.80 OSTEOPENIA, UNSPECIFIED LOCATION: Primary | ICD-10-CM

## 2023-02-13 DIAGNOSIS — J44.9 COPD, MILD (HCC): ICD-10-CM

## 2023-02-13 DIAGNOSIS — J96.11 CHRONIC RESPIRATORY FAILURE WITH HYPOXIA (HCC): ICD-10-CM

## 2023-02-13 DIAGNOSIS — E11.9 WELL CONTROLLED TYPE 2 DIABETES MELLITUS (HCC): Primary | ICD-10-CM

## 2023-02-13 PROCEDURE — 1123F ACP DISCUSS/DSCN MKR DOCD: CPT | Performed by: INTERNAL MEDICINE

## 2023-02-13 PROCEDURE — 99213 OFFICE O/P EST LOW 20 MIN: CPT | Performed by: INTERNAL MEDICINE

## 2023-02-13 RX ORDER — DENOSUMAB 60 MG/ML
60 INJECTION SUBCUTANEOUS ONCE
Qty: 1 ML | Refills: 0 | Status: SHIPPED | OUTPATIENT
Start: 2023-02-13 | End: 2023-02-13

## 2023-02-13 NOTE — PROGRESS NOTES
Patient ID: Danyelle Manrique is a 74 y.o. female. HPI     74 y.o. female with h.o   chronic back pain , copd, DM- 2,. HTN , tobacco use here for  regular f/w     Since last time , pt got electric scooter but not using much  Arthritis limiting her activity    Hx of copd , Recent PFT with mixed  obstructive and restrictive defect 8/22 -  severe reduction in diffusion capacity  Chronic dyspnea remains stable with no recent flare ups   Remains on brezetri and albuterol inhaler   Seen Dr. Grecia Pro       DM- 2- Taking metformin but not compliant with diet  Last A1c at 6.3  Activity remains low   Does not check sugars  Weight loss of 17 lbs with fluid loss from diuretics     Chronic back pain - s.p  surgeries x 4  S.p  VJ by  Dr Henok Lujan-   Not back on narcotics yet but considering pain pump   Remains on lyrica which is helping some       Using cane/walker  for ambulation . No recent falls  Reports chronic fatigue      Depression better , compliant with meds - cymbalta       notes she had her first prolia shot in December 2021 and did not continue any more   Had one fall     Parts of this note is copied from my previous notes and checked thoroughly and updated appropriately to reflect today's exam , findings and treatment plan       Allergies   Allergen Reactions    Ciprofloxacin Nausea And Vomiting           Current Outpatient Medications   Medication Sig Dispense Refill    buPROPion (WELLBUTRIN SR) 100 MG extended release tablet TAKE 1 TABLET BY MOUTH DAILY 30 tablet 0    Calcium Carb-Cholecalciferol (CALCIUM-VITAMIN D3) 250-3. 125 MG-MCG TABS TAKE ONE TABLET BY MOUTH 2 TIMES A  tablet 0    DULoxetine (CYMBALTA) 30 MG extended release capsule TAKE 1 CAPSULE BY MOUTH DAILY 90 capsule 0    furosemide (LASIX) 20 MG tablet Take 1 tablet by mouth in the morning.  30 tablet 0    losartan-hydroCHLOROthiazide (HYZAAR) 100-25 MG per tablet Take 1 tablet by mouth daily 30 tablet 0    meloxicam (MOBIC) 7.5 MG tablet TAKE 1 TABLET BY MOUTH DAILY 90 tablet 0    metFORMIN (GLUCOPHAGE) 500 MG tablet TAKE ONE TABLET BY MOUTH EVERY DAY 90 tablet 0    metoprolol tartrate (LOPRESSOR) 50 MG tablet TAKE 1 TABLET BY MOUTH TWICE DAILY 60 tablet 0    tiZANidine (ZANAFLEX) 2 MG tablet TAKE ONE TABLET BY MOUTH EVERY DAY 30 tablet 0    torsemide (DEMADEX) 20 MG tablet TAKE 1 TABLET BY MOUTH DAILY 30 tablet 0    pregabalin (LYRICA) 100 MG capsule TAKE ONE CAPSULE BY MOUTH 2 TIMES A  capsule 0    BREZTRI AEROSPHERE 160-9-4.8 MCG/ACT AERO Inhale 2 puffs into the lungs 2 times daily 10.7 g 5    INCRUSE ELLIPTA 62.5 MCG/INH AEPB INHALE 1 PUFF INTO THE LUNGS DAILY (Patient not taking: Reported on 12/7/2022) 1 each 2    albuterol sulfate HFA (VENTOLIN HFA) 108 (90 Base) MCG/ACT inhaler Inhale 2 puffs into the lungs every 6 hours as needed for Wheezing 54 g 0    dilTIAZem (CARTIA XT) 120 MG extended release capsule TAKE ONE CAPSULE BY MOUTH EVERY DAY 90 capsule 2    denosumab (PROLIA) 60 MG/ML SOSY SC injection Inject 1 mL into the skin once for 1 dose (Patient not taking: No sig reported) 1 mL 1    ipratropium-albuterol (DUONEB) 0.5-2.5 (3) MG/3ML SOLN nebulizer solution Inhale 3 mLs into the lungs every 4 hours (Patient not taking: No sig reported) 360 mL 0    blood glucose test strips (CONTOUR NEXT TEST) strip Test Daily. DX: E11.9 100 each 3    Lancets MISC Test once daily. DX: E11.9 (Patient not taking: Reported on 12/7/2022) 100 each 3     No current facility-administered medications for this visit. ROS as above    There are no changes to past medical history, family history, social history or review of systems(except as noted in the history section) since prior note (all reviewed with patient). Objective:   Physical Exam    There were no vitals filed for this visit. General: older appearing female,  Awake, alert and oriented.   Chronic  dyspnea noted  Mucous Membranes:  Pink , anicteric  Neck: No JVD, no carotid bruit, no thyromegaly  Chest:  resolved wheeze Clear shayy   Cardiovascular:  RRR S1S2 heard, no murmurs or gallops  Abdomen:  Soft,obese  undistended, non tender, no organomegaly, BS present  Extremities: significantly improved edema now 1+  edema to both LE  Distal pulses well felt  Neurological : grossly normal mood   Gait stuporous     Ct chest 8/22  Emphysema with stable tiny pulmonary nodules. Scattered calcified granuloma   are also seen       Moderate to severe coronary artery calcification         ECHO 4/21       Summary   Technically difficult examination. Left ventricular systolic function is normal with ejection fraction   estimated at 55-60 %. There is moderate concentric left ventricular hypertrophy. Grade I diastolic dysfunction with normal filling pressure. Mild mitral regurgitation. Mild-to-moderate aortic regurgitation is present. Mild tricuspid regurgitation. Systolic pulmonary artery pressure (SPAP) estimated at 40   mmHg (RA pressure 3 mmHg), consistent with mild pulmonary hypertension. Trivial pulmonic regurgitation present. Walk test   72-year-old female who underwent a 6-minute walk test.   The patient's baseline oxygen saturation was 84% at room air at rest  with a heart rate of 91, respiratory rate of 20, dyspnea-modified Sea  scale of 3, fatigue-modified Sea scale of 3. The patient had to be put  on supplemental oxygen, which had to be increased to 2 liters per  minute. On the study, the patient's 6-minute walk parameters were 91%  of oxygen saturation at 2 liters by minute. PFT     1. Spirometry: The FEV1 is 1.48 liters or 58% of predicted. The FVC  is 2.10 liters or 63% of predicted. The FEV1/FVC ratio is 71%. There  is no demonstrated response to inhaled bronchodilators. 2.  Plethysmography:  The total lung capacity is 4.00 liters or 75% of  predicted.   3.  Diffusion Capacity:  The diffusion capacity of carbon monoxide is  8.04 mL/min/mmHg which is 34% of predicted. 4.  Flow Volume Loops: The tracings show a normal pattern. IMPRESSION:  1. There is a mild obstructive lung defect without demonstrated  response to inhaled bronchodilators. 2.  There is a mild restrictive ventilatory defect. 3.  There is a severe reduction in diffusion capacity. 4.  Overall, the pulmonary function tests showing a combined restrictive  and obstructive defect pattern with impaired diffusion capacity, could  be seen in COPD with a concomitant restrictive defect or interstitial  lung disease. Clinical correlation is recommended. Ct shoulder     Acute and comminuted fracture of the scapula involving the scapular body   and scapular spine. Surrounding soft tissue and intramuscular edema. 2. Complete loss of the acromial humeral interval consistent with rotator   cuff insufficiency. Severe associated atrophy of the rotator cuff   musculature. 3. Moderate glenohumeral effusion with numerous large articular bodies   throughout the joint. 4. Moderate to severe glenohumeral and moderate acromioclavicular   osteoarthritis. Wt Readings from Last 3 Encounters:   02/13/23 192 lb (87.1 kg)   12/07/22 209 lb (94.8 kg)   08/09/22 209 lb (94.8 kg)                Assessment:      Diagnosis Orders   1. Well controlled type 2 diabetes mellitus ([address]        2. COPD, mild ([address]        3. Chronic diastolic CHF (congestive heart failure) ([address]        4. Major depressive disorder with single episode, in partial remission ([address]        5. Essential hypertension        6. Degeneration of lumbar or lumbosacral intervertebral disc        7. Chronic respiratory failure with hypoxia (HCC)        8. Breast cancer screening by mammogram               Plan:       COPD with chronic hypoxic respiratory failure   f/w Dr> Julio Olivares  .  Off advair and now on brezetri and  Albuterol prn  Remains off smoking for 3 yrs , on o2 at night 2 L   Chronic dyspnea remains with exertion     DM- 2, A1c  At 6.3- on metformin 500mg daily,  Already on ACEI  Had  eye exam this year  Already on ACEI and will consider statins    Anxiety and severe  depression - has used prozac and klonopin in the past   Weaned off klonopin 2015  - now on cymbalta 30 mg daily -better controlled  - no suicidal thoughts  - doing better since last year since her    returned from custodial    HTN - remains well controlled  Not compliant with meds  Resume ACEI, cardizem, metoprolol and prn demadex  Need home monitoring     Aortic regurgitation - no acute symptoms. Chronic diastolic CHF   Well controlled and improved Edema with demadex 20 mg daily   Elevate legs    F/w Dr. Rhonda Shankar abuse  - has quit smoking 3 yrs ago   pulmonary nodules- stable on recent Ct       Chronic back pain - previously seen Dr. Kelton Gray  Not seeing him any more    on zanaflex and lyrica.     Osteoporosis - need to resume prolia      Had pna, covid vaccines  Need shingrix  Need colonoscopy, mammo- pt not interested    Need living will and eye, dental exam     Mirna Corey MD,

## 2023-03-08 ENCOUNTER — HOSPITAL ENCOUNTER (OUTPATIENT)
Dept: NURSING | Age: 68
Setting detail: INFUSION SERIES
Discharge: HOME | End: 2023-03-08
Payer: MEDICARE

## 2023-03-08 VITALS
TEMPERATURE: 97.3 F | WEIGHT: 192 LBS | SYSTOLIC BLOOD PRESSURE: 141 MMHG | HEART RATE: 81 BPM | HEIGHT: 65 IN | DIASTOLIC BLOOD PRESSURE: 71 MMHG | BODY MASS INDEX: 31.99 KG/M2 | RESPIRATION RATE: 20 BRPM

## 2023-03-08 DIAGNOSIS — M85.80 OSTEOPENIA, UNSPECIFIED LOCATION: Primary | ICD-10-CM

## 2023-03-08 PROCEDURE — 6360000002 HC RX W HCPCS: Performed by: INTERNAL MEDICINE

## 2023-03-08 PROCEDURE — 99211 OFF/OP EST MAY X REQ PHY/QHP: CPT

## 2023-03-08 PROCEDURE — 96372 THER/PROPH/DIAG INJ SC/IM: CPT

## 2023-03-08 RX ADMIN — DENOSUMAB 60 MG: 60 INJECTION SUBCUTANEOUS at 12:45

## 2023-03-08 ASSESSMENT — PAIN SCALES - GENERAL: PAINLEVEL_OUTOF10: 0

## 2023-03-08 NOTE — DISCHARGE INSTRUCTIONS
COMMON BRAND NAME(S): Rangel Youssef    What is this medicine? DENOSUMAB slows bone breakdown. It is used to treat osteoporosis in women after menopause. This medicine is also used to prevent bone fractures and other bone problems caused by cancer bone metastases. This medicine may be used for other purposes; ask your health care provider or pharmacist if you have questions. What should I watch for while using this medicine? Visit your doctor or health care professional for regular checks on your progress. Your doctor or health care professional may order blood tests and other tests to see how you are doing. Call your doctor or health care professional if you get a cold or other infection while receiving this medicine. Do not treat yourself. This medicine may decrease your body's ability to fight infection. You should make sure you get enough calcium and vitamin D while you are taking this medicine, unless your doctor tells you not to. Discuss the foods you eat and the vitamins you take with your health care professional.  See your dentist regularly. Brush and floss your teeth as directed. Before you have any dental work done, tell your dentist you are receiving this medicine. What side effects may I notice from receiving this medicine?     Side effects that you should report to your doctor or health care professional as soon as possible:  -allergic reactions like skin rash, itching or hives, swelling of the face, lips, or tongue  -breathing problems  -chest pain  -fast, irregular heartbeat  -feeling faint or lightheaded, falls  -fever, chills, or any other sign of infection  -muscle spasms, tightening, or twitches  -numbness or tingling  -skin blisters or bumps, or is dry, peels, or red  -slow healing or unexplained pain in the mouth or jaw  -unusual bleeding or bruising  Side effects that usually do not require medical attention (Report these to your doctor or health care professional if they continue or are bothersome.):  -muscle pain  -stomach upset, gas    RETURN IN 6 MONTHS SEPT 12,2023 AT 1200 FOR PROLIA INJECTION    FOLLOW UP WITH DR. Pako Garrison AS NEEDED OR SCHEDULED    CALL US IF YOU NEED TO RESCHEDULE YOUR APPOINTMENT  [phone]

## 2023-03-08 NOTE — PROGRESS NOTES
Pt here via w/c per family for a prolia injection Pt without c/o's today  Pt reports she has had this injection before and didn't have any problems with it but stopped due to the cost  Pt reports she was told she needs to restart it now  Pt denies any questions or concerns regarding the injection today and she states that she is taking a daily calcium and vitamin D supplement   Prolia 60 mg given SC right arm  Injection site unremarkable  bandaid applied  Pt jose angel well  Discharge instructions reviewed with pt  Copy given  Understanding was verbalized  Pt was discharged via w/c with her family in stable condition

## 2023-03-22 RX ORDER — LOSARTAN POTASSIUM AND HYDROCHLOROTHIAZIDE 25; 100 MG/1; MG/1
1 TABLET ORAL DAILY
Qty: 30 TABLET | Refills: 0 | Status: SHIPPED | OUTPATIENT
Start: 2023-03-22

## 2023-03-23 RX ORDER — CALCIUM CARBONATE-CHOLECALCIFEROL TAB 250 MG-125 UNIT 250-125 MG-UNIT
TAB ORAL
Qty: 100 TABLET | Refills: 0 | Status: SHIPPED | OUTPATIENT
Start: 2023-03-23

## 2023-04-20 RX ORDER — LOSARTAN POTASSIUM AND HYDROCHLOROTHIAZIDE 25; 100 MG/1; MG/1
1 TABLET ORAL DAILY
Qty: 30 TABLET | Refills: 3 | Status: SHIPPED | OUTPATIENT
Start: 2023-04-20

## 2023-04-27 RX ORDER — TIZANIDINE 2 MG/1
TABLET ORAL
Qty: 30 TABLET | Refills: 3 | Status: SHIPPED | OUTPATIENT
Start: 2023-04-27

## 2023-04-27 RX ORDER — BUPROPION HYDROCHLORIDE 100 MG/1
100 TABLET, EXTENDED RELEASE ORAL DAILY
Qty: 30 TABLET | Refills: 3 | Status: SHIPPED | OUTPATIENT
Start: 2023-04-27

## 2023-05-04 DIAGNOSIS — M51.37 DEGENERATION OF LUMBAR OR LUMBOSACRAL INTERVERTEBRAL DISC: Chronic | ICD-10-CM

## 2023-05-04 RX ORDER — DULOXETIN HYDROCHLORIDE 30 MG/1
30 CAPSULE, DELAYED RELEASE ORAL DAILY
Qty: 90 CAPSULE | Refills: 0 | Status: SHIPPED | OUTPATIENT
Start: 2023-05-04

## 2023-05-04 RX ORDER — MELOXICAM 7.5 MG/1
7.5 TABLET ORAL DAILY
Qty: 90 TABLET | Refills: 0 | Status: SHIPPED | OUTPATIENT
Start: 2023-05-04

## 2023-05-08 RX ORDER — METOPROLOL TARTRATE 50 MG/1
TABLET, FILM COATED ORAL
Qty: 60 TABLET | Refills: 0 | Status: SHIPPED | OUTPATIENT
Start: 2023-05-08

## 2023-05-09 RX ORDER — BUDESONIDE, GLYCOPYRROLATE, AND FORMOTEROL FUMARATE 160; 9; 4.8 UG/1; UG/1; UG/1
2 AEROSOL, METERED RESPIRATORY (INHALATION) 2 TIMES DAILY
Qty: 10.7 G | Refills: 5 | Status: SHIPPED | OUTPATIENT
Start: 2023-05-09

## 2023-05-09 RX ORDER — CALCIUM CARBONATE-CHOLECALCIFEROL TAB 250 MG-125 UNIT 250-125 MG-UNIT
TAB ORAL
Qty: 100 TABLET | Refills: 0 | Status: SHIPPED | OUTPATIENT
Start: 2023-05-09

## 2023-05-17 ENCOUNTER — TELEPHONE (OUTPATIENT)
Dept: INTERNAL MEDICINE CLINIC | Age: 68
End: 2023-05-17

## 2023-05-17 NOTE — TELEPHONE ENCOUNTER
----- Message from Yevgeiny Patel MD sent at 5/17/2023 10:48 AM EDT -----  Contact: pt [phone]  Ok to do    ----- Message -----  From: Prieto Le  Sent: 5/17/2023  10:34 AM EDT  To: Yevgeniy Patel MD    Pt is requesting a renewal of handicap placard. Pt states herself or her spouse will pick it up.

## 2023-06-06 RX ORDER — METOPROLOL TARTRATE 50 MG/1
TABLET, FILM COATED ORAL
Qty: 60 TABLET | Refills: 0 | Status: SHIPPED | OUTPATIENT
Start: 2023-06-06

## 2023-06-07 ENCOUNTER — HOSPITAL ENCOUNTER (OUTPATIENT)
Age: 68
Discharge: HOME | End: 2023-06-07
Payer: MEDICARE

## 2023-06-07 ENCOUNTER — OFFICE VISIT (OUTPATIENT)
Dept: PULMONOLOGY | Age: 68
End: 2023-06-07
Payer: MEDICARE

## 2023-06-07 VITALS
RESPIRATION RATE: 18 BRPM | HEART RATE: 73 BPM | DIASTOLIC BLOOD PRESSURE: 80 MMHG | WEIGHT: 179 LBS | BODY MASS INDEX: 29.82 KG/M2 | SYSTOLIC BLOOD PRESSURE: 139 MMHG | HEIGHT: 65 IN | OXYGEN SATURATION: 97 %

## 2023-06-07 DIAGNOSIS — R91.1 LUNG NODULE: ICD-10-CM

## 2023-06-07 DIAGNOSIS — R91.1 LUNG NODULE: Primary | ICD-10-CM

## 2023-06-07 LAB
ANION GAP SERPL CALCULATED.3IONS-SCNC: 12 MMOL/L (ref 3–16)
BUN SERPL-MCNC: 7 MG/DL (ref 7–20)
CALCIUM SERPL-MCNC: 8.9 MG/DL (ref 8.3–10.6)
CHLORIDE SERPL-SCNC: 105 MMOL/L (ref 99–110)
CO2 SERPL-SCNC: 24 MMOL/L (ref 21–32)
CREAT SERPL-MCNC: 0.6 MG/DL (ref 0.6–1.2)
GFR SERPLBLD CREATININE-BSD FMLA CKD-EPI: >60 ML/MIN/{1.73_M2}
GLUCOSE SERPL-MCNC: 100 MG/DL (ref 70–99)
POTASSIUM SERPL-SCNC: 3.9 MMOL/L (ref 3.5–5.1)
SODIUM SERPL-SCNC: 141 MMOL/L (ref 136–145)

## 2023-06-07 PROCEDURE — 36415 COLL VENOUS BLD VENIPUNCTURE: CPT

## 2023-06-07 PROCEDURE — 3079F DIAST BP 80-89 MM HG: CPT | Performed by: INTERNAL MEDICINE

## 2023-06-07 PROCEDURE — 3075F SYST BP GE 130 - 139MM HG: CPT | Performed by: INTERNAL MEDICINE

## 2023-06-07 PROCEDURE — 99214 OFFICE O/P EST MOD 30 MIN: CPT | Performed by: INTERNAL MEDICINE

## 2023-06-07 PROCEDURE — 1123F ACP DISCUSS/DSCN MKR DOCD: CPT | Performed by: INTERNAL MEDICINE

## 2023-06-07 PROCEDURE — 80048 BASIC METABOLIC PNL TOTAL CA: CPT

## 2023-06-07 RX ORDER — FLUTICASONE FUROATE, UMECLIDINIUM BROMIDE AND VILANTEROL TRIFENATATE 200; 62.5; 25 UG/1; UG/1; UG/1
1 POWDER RESPIRATORY (INHALATION) DAILY
Qty: 1 EACH | Refills: 3 | Status: SHIPPED | OUTPATIENT
Start: 2023-06-07

## 2023-06-19 DIAGNOSIS — M85.80 OSTEOPENIA, UNSPECIFIED LOCATION: ICD-10-CM

## 2023-06-19 RX ORDER — DENOSUMAB 60 MG/ML
60 INJECTION SUBCUTANEOUS ONCE
Qty: 1 ML | Refills: 1 | Status: SHIPPED | OUTPATIENT
Start: 2023-06-19 | End: 2023-06-19

## 2023-07-13 DIAGNOSIS — M47.817 LUMBOSACRAL SPONDYLOSIS WITHOUT MYELOPATHY: Chronic | ICD-10-CM

## 2023-07-14 RX ORDER — PREGABALIN 100 MG/1
CAPSULE ORAL
Qty: 180 CAPSULE | Refills: 0 | Status: SHIPPED | OUTPATIENT
Start: 2023-07-14 | End: 2023-09-12

## 2023-07-24 DIAGNOSIS — R06.02 SOB (SHORTNESS OF BREATH): ICD-10-CM

## 2023-07-24 DIAGNOSIS — J44.9 COPD, MILD (HCC): ICD-10-CM

## 2023-07-24 RX ORDER — BUDESONIDE AND FORMOTEROL FUMARATE DIHYDRATE 160; 4.5 UG/1; UG/1
AEROSOL RESPIRATORY (INHALATION)
Qty: 10.2 EACH | Refills: 0 | Status: SHIPPED | OUTPATIENT
Start: 2023-07-24

## 2023-07-24 RX ORDER — FUROSEMIDE 20 MG/1
TABLET ORAL
Qty: 30 TABLET | Refills: 0 | Status: SHIPPED | OUTPATIENT
Start: 2023-07-24

## 2023-07-24 RX ORDER — TORSEMIDE 20 MG/1
TABLET ORAL
Qty: 30 TABLET | Refills: 0 | Status: SHIPPED | OUTPATIENT
Start: 2023-07-24

## 2023-08-02 DIAGNOSIS — M51.37 DEGENERATION OF LUMBAR OR LUMBOSACRAL INTERVERTEBRAL DISC: Chronic | ICD-10-CM

## 2023-08-02 RX ORDER — MELOXICAM 7.5 MG/1
TABLET ORAL
Qty: 90 TABLET | Refills: 0 | Status: SHIPPED | OUTPATIENT
Start: 2023-08-02

## 2023-08-02 RX ORDER — DULOXETIN HYDROCHLORIDE 30 MG/1
CAPSULE, DELAYED RELEASE ORAL
Qty: 90 CAPSULE | Refills: 0 | Status: SHIPPED | OUTPATIENT
Start: 2023-08-02

## 2023-08-15 ENCOUNTER — HOSPITAL ENCOUNTER (OUTPATIENT)
Dept: CT IMAGING | Age: 68
Discharge: HOME | End: 2023-08-15
Payer: MEDICARE

## 2023-08-15 DIAGNOSIS — Z87.891 PERSONAL HISTORY OF TOBACCO USE: ICD-10-CM

## 2023-08-15 PROCEDURE — 71271 CT THORAX LUNG CANCER SCR C-: CPT

## 2023-08-17 ENCOUNTER — OFFICE VISIT (OUTPATIENT)
Dept: INTERNAL MEDICINE CLINIC | Age: 68
End: 2023-08-17

## 2023-08-17 VITALS
BODY MASS INDEX: 28.66 KG/M2 | DIASTOLIC BLOOD PRESSURE: 68 MMHG | HEART RATE: 78 BPM | WEIGHT: 172 LBS | HEIGHT: 65 IN | RESPIRATION RATE: 18 BRPM | SYSTOLIC BLOOD PRESSURE: 145 MMHG

## 2023-08-17 DIAGNOSIS — Z00.00 MEDICARE ANNUAL WELLNESS VISIT, SUBSEQUENT: Primary | ICD-10-CM

## 2023-08-17 DIAGNOSIS — M51.37 DEGENERATION OF LUMBAR OR LUMBOSACRAL INTERVERTEBRAL DISC: ICD-10-CM

## 2023-08-17 DIAGNOSIS — Z23 NEED FOR PNEUMOCOCCAL VACCINATION: ICD-10-CM

## 2023-08-17 DIAGNOSIS — E11.9 WELL CONTROLLED TYPE 2 DIABETES MELLITUS (HCC): ICD-10-CM

## 2023-08-17 DIAGNOSIS — J44.9 COPD, MILD (HCC): ICD-10-CM

## 2023-08-17 DIAGNOSIS — I10 ESSENTIAL HYPERTENSION: ICD-10-CM

## 2023-08-17 DIAGNOSIS — E66.01 MORBIDLY OBESE (HCC): ICD-10-CM

## 2023-08-17 DIAGNOSIS — R06.02 SOB (SHORTNESS OF BREATH): ICD-10-CM

## 2023-08-17 DIAGNOSIS — F32.4 MAJOR DEPRESSIVE DISORDER WITH SINGLE EPISODE, IN PARTIAL REMISSION (HCC): ICD-10-CM

## 2023-08-17 DIAGNOSIS — I50.32 CHRONIC DIASTOLIC CHF (CONGESTIVE HEART FAILURE) (HCC): ICD-10-CM

## 2023-08-17 DIAGNOSIS — E55.9 VITAMIN D DEFICIENCY: ICD-10-CM

## 2023-08-17 DIAGNOSIS — J96.11 CHRONIC RESPIRATORY FAILURE WITH HYPOXIA (HCC): ICD-10-CM

## 2023-08-17 PROCEDURE — 3044F HG A1C LEVEL LT 7.0%: CPT | Performed by: INTERNAL MEDICINE

## 2023-08-17 PROCEDURE — 3077F SYST BP >= 140 MM HG: CPT | Performed by: INTERNAL MEDICINE

## 2023-08-17 PROCEDURE — 3078F DIAST BP <80 MM HG: CPT | Performed by: INTERNAL MEDICINE

## 2023-08-17 PROCEDURE — 81002 URINALYSIS NONAUTO W/O SCOPE: CPT | Performed by: INTERNAL MEDICINE

## 2023-08-17 PROCEDURE — 1123F ACP DISCUSS/DSCN MKR DOCD: CPT | Performed by: INTERNAL MEDICINE

## 2023-08-17 RX ORDER — DILTIAZEM HYDROCHLORIDE 120 MG/1
CAPSULE, COATED, EXTENDED RELEASE ORAL
Qty: 90 CAPSULE | Refills: 3 | Status: SHIPPED | OUTPATIENT
Start: 2023-08-17

## 2023-08-17 RX ORDER — METOPROLOL TARTRATE 50 MG/1
50 TABLET, FILM COATED ORAL 2 TIMES DAILY
Qty: 180 TABLET | Refills: 2 | Status: SHIPPED | OUTPATIENT
Start: 2023-08-17

## 2023-08-17 RX ORDER — TORSEMIDE 20 MG/1
TABLET ORAL
Qty: 30 TABLET | Refills: 0 | Status: SHIPPED | OUTPATIENT
Start: 2023-08-17 | End: 2023-09-18

## 2023-08-17 RX ORDER — TIZANIDINE 2 MG/1
TABLET ORAL
Qty: 30 TABLET | Refills: 3 | Status: SHIPPED | OUTPATIENT
Start: 2023-08-17

## 2023-08-17 RX ORDER — BUPROPION HYDROCHLORIDE 100 MG/1
TABLET, EXTENDED RELEASE ORAL
Qty: 30 TABLET | Refills: 3 | Status: SHIPPED | OUTPATIENT
Start: 2023-08-17

## 2023-08-17 RX ORDER — LOSARTAN POTASSIUM AND HYDROCHLOROTHIAZIDE 25; 100 MG/1; MG/1
TABLET ORAL
Qty: 30 TABLET | Refills: 3 | Status: SHIPPED | OUTPATIENT
Start: 2023-08-17

## 2023-08-17 ASSESSMENT — PATIENT HEALTH QUESTIONNAIRE - PHQ9
SUM OF ALL RESPONSES TO PHQ QUESTIONS 1-9: 1
SUM OF ALL RESPONSES TO PHQ9 QUESTIONS 1 & 2: 2
3. TROUBLE FALLING OR STAYING ASLEEP: 0
9. THOUGHTS THAT YOU WOULD BE BETTER OFF DEAD, OR OF HURTING YOURSELF: 0
SUM OF ALL RESPONSES TO PHQ QUESTIONS 1-9: 1
SUM OF ALL RESPONSES TO PHQ QUESTIONS 1-9: 3
7. TROUBLE CONCENTRATING ON THINGS, SUCH AS READING THE NEWSPAPER OR WATCHING TELEVISION: 0
SUM OF ALL RESPONSES TO PHQ QUESTIONS 1-9: 1
SUM OF ALL RESPONSES TO PHQ QUESTIONS 1-9: 3
SUM OF ALL RESPONSES TO PHQ QUESTIONS 1-9: 1
SUM OF ALL RESPONSES TO PHQ QUESTIONS 1-9: 3
5. POOR APPETITE OR OVEREATING: 0
8. MOVING OR SPEAKING SO SLOWLY THAT OTHER PEOPLE COULD HAVE NOTICED. OR THE OPPOSITE, BEING SO FIGETY OR RESTLESS THAT YOU HAVE BEEN MOVING AROUND A LOT MORE THAN USUAL: 0
4. FEELING TIRED OR HAVING LITTLE ENERGY: 1
2. FEELING DOWN, DEPRESSED OR HOPELESS: 1
1. LITTLE INTEREST OR PLEASURE IN DOING THINGS: 1
6. FEELING BAD ABOUT YOURSELF - OR THAT YOU ARE A FAILURE OR HAVE LET YOURSELF OR YOUR FAMILY DOWN: 0
SUM OF ALL RESPONSES TO PHQ9 QUESTIONS 1 & 2: 1
2. FEELING DOWN, DEPRESSED OR HOPELESS: 0
SUM OF ALL RESPONSES TO PHQ QUESTIONS 1-9: 3
1. LITTLE INTEREST OR PLEASURE IN DOING THINGS: 1

## 2023-08-17 ASSESSMENT — LIFESTYLE VARIABLES
HOW MANY STANDARD DRINKS CONTAINING ALCOHOL DO YOU HAVE ON A TYPICAL DAY: 1 OR 2
HOW OFTEN DO YOU HAVE A DRINK CONTAINING ALCOHOL: 2-4 TIMES A MONTH

## 2023-08-18 DIAGNOSIS — E11.9 TYPE 2 DIABETES MELLITUS WITHOUT COMPLICATION, WITHOUT LONG-TERM CURRENT USE OF INSULIN (HCC): ICD-10-CM

## 2023-08-18 DIAGNOSIS — E11.9 TYPE 2 DIABETES MELLITUS WITHOUT COMPLICATION, WITHOUT LONG-TERM CURRENT USE OF INSULIN (HCC): Primary | ICD-10-CM

## 2023-08-18 LAB
25(OH)D3 SERPL-MCNC: 33.4 NG/ML
ALBUMIN SERPL-MCNC: 3.8 G/DL (ref 3.4–5)
ALBUMIN/GLOB SERPL: 1.5 {RATIO} (ref 1.1–2.2)
ALP SERPL-CCNC: 83 U/L (ref 40–129)
ALT SERPL-CCNC: 12 U/L (ref 10–40)
ANION GAP SERPL CALCULATED.3IONS-SCNC: 15 MMOL/L (ref 3–16)
AST SERPL-CCNC: 12 U/L (ref 15–37)
BASOPHILS # BLD: 0.1 K/UL (ref 0–0.2)
BASOPHILS NFR BLD: 0.6 %
BILIRUB SERPL-MCNC: 0.3 MG/DL (ref 0–1)
BUN SERPL-MCNC: 10 MG/DL (ref 7–20)
CALCIUM SERPL-MCNC: 9.6 MG/DL (ref 8.3–10.6)
CHLORIDE SERPL-SCNC: 100 MMOL/L (ref 99–110)
CHOLEST SERPL-MCNC: 178 MG/DL (ref 0–199)
CO2 SERPL-SCNC: 24 MMOL/L (ref 21–32)
CREAT SERPL-MCNC: 0.9 MG/DL (ref 0.6–1.2)
CREAT UR-MCNC: 30.1 MG/DL (ref 28–259)
DEPRECATED RDW RBC AUTO: 16.7 % (ref 12.4–15.4)
EOSINOPHIL # BLD: 0.1 K/UL (ref 0–0.6)
EOSINOPHIL NFR BLD: 0.6 %
GFR SERPLBLD CREATININE-BSD FMLA CKD-EPI: >60 ML/MIN/{1.73_M2}
GLUCOSE SERPL-MCNC: 109 MG/DL (ref 70–99)
HCT VFR BLD AUTO: 41 % (ref 36–48)
HDLC SERPL-MCNC: 57 MG/DL (ref 40–60)
HGB BLD-MCNC: 13.2 G/DL (ref 12–16)
LDL CHOLESTEROL CALCULATED: 99 MG/DL
LYMPHOCYTES # BLD: 0.9 K/UL (ref 1–5.1)
LYMPHOCYTES NFR BLD: 8.4 %
MCH RBC QN AUTO: 29 PG (ref 26–34)
MCHC RBC AUTO-ENTMCNC: 32.2 G/DL (ref 31–36)
MCV RBC AUTO: 90.1 FL (ref 80–100)
MICROALBUMIN UR DL<=1MG/L-MCNC: <1.2 MG/DL
MICROALBUMIN/CREAT UR: NORMAL MG/G (ref 0–30)
MONOCYTES # BLD: 0.6 K/UL (ref 0–1.3)
MONOCYTES NFR BLD: 5.7 %
NEUTROPHILS # BLD: 9.5 K/UL (ref 1.7–7.7)
NEUTROPHILS NFR BLD: 84.7 %
PLATELET # BLD AUTO: 464 K/UL (ref 135–450)
PMV BLD AUTO: 8.8 FL (ref 5–10.5)
POTASSIUM SERPL-SCNC: 4.6 MMOL/L (ref 3.5–5.1)
PROT SERPL-MCNC: 6.4 G/DL (ref 6.4–8.2)
RBC # BLD AUTO: 4.55 M/UL (ref 4–5.2)
SODIUM SERPL-SCNC: 139 MMOL/L (ref 136–145)
TRIGL SERPL-MCNC: 112 MG/DL (ref 0–150)
TSH SERPL DL<=0.005 MIU/L-ACNC: 1.5 UIU/ML (ref 0.27–4.2)
URATE SERPL-MCNC: 5.5 MG/DL (ref 2.6–6)
VLDLC SERPL CALC-MCNC: 22 MG/DL
WBC # BLD AUTO: 11.2 K/UL (ref 4–11)

## 2023-08-18 RX ORDER — FUROSEMIDE 20 MG/1
TABLET ORAL
Qty: 30 TABLET | Refills: 0 | Status: SHIPPED | OUTPATIENT
Start: 2023-08-18

## 2023-08-19 LAB
EST. AVERAGE GLUCOSE BLD GHB EST-MCNC: 125.5 MG/DL
HBA1C MFR BLD: 6 %

## 2023-08-22 ENCOUNTER — OFFICE VISIT (OUTPATIENT)
Dept: PULMONOLOGY | Age: 68
End: 2023-08-22
Payer: MEDICARE

## 2023-08-22 VITALS
SYSTOLIC BLOOD PRESSURE: 130 MMHG | WEIGHT: 170 LBS | HEIGHT: 65 IN | OXYGEN SATURATION: 96 % | DIASTOLIC BLOOD PRESSURE: 74 MMHG | HEART RATE: 91 BPM | BODY MASS INDEX: 28.32 KG/M2 | RESPIRATION RATE: 16 BRPM

## 2023-08-22 DIAGNOSIS — J44.9 CHRONIC OBSTRUCTIVE PULMONARY DISEASE, UNSPECIFIED COPD TYPE (HCC): Primary | ICD-10-CM

## 2023-08-22 PROCEDURE — 3074F SYST BP LT 130 MM HG: CPT | Performed by: INTERNAL MEDICINE

## 2023-08-22 PROCEDURE — 3078F DIAST BP <80 MM HG: CPT | Performed by: INTERNAL MEDICINE

## 2023-08-22 PROCEDURE — 99214 OFFICE O/P EST MOD 30 MIN: CPT | Performed by: INTERNAL MEDICINE

## 2023-08-22 PROCEDURE — 1123F ACP DISCUSS/DSCN MKR DOCD: CPT | Performed by: INTERNAL MEDICINE

## 2023-08-22 NOTE — PROGRESS NOTES
multifactorial,has large edema ,on diuresis   -now on Toersmide 20 mg daily   -sleep study declined   _Trelegy 1 puff daily  Advise o2 and o2 at night as sat 97 at rest   CT emphysema and stable nodule ,continue screen ,will need CT   Ambulated few steps and she did well. advise  to get help,home health to start PT&OT  Flu and Pneumovax as per rpimary   See in 3 months   PHT seems type 3   Had multiple back surgery and imbalance so she canot walk well    Thank you for allowing me to participate in [facility].  I will keep following with you ,should you have any concerns ,please contact us at [facility]     Sincerely,        Deepak Wu MD  Pulmonary & Critical Care Medicine

## 2023-08-28 ENCOUNTER — TELEPHONE (OUTPATIENT)
Dept: WOMENS IMAGING | Age: 68
End: 2023-08-28

## 2023-09-12 NOTE — DISCHARGE INSTRUCTIONS
COMMON BRAND NAME(S): Alexa Welsh    What is this medicine? DENOSUMAB slows bone breakdown. It is used to treat osteoporosis in women after menopause. This medicine is also used to prevent bone fractures and other bone problems caused by cancer bone metastases. This medicine may be used for other purposes; ask your health care provider or pharmacist if you have questions. What should I watch for while using this medicine? Visit your doctor or health care professional for regular checks on your progress. Your doctor or health care professional may order blood tests and other tests to see how you are doing. Call your doctor or health care professional if you get a cold or other infection while receiving this medicine. Do not treat yourself. This medicine may decrease your body's ability to fight infection. You should make sure you get enough calcium and vitamin D while you are taking this medicine, unless your doctor tells you not to. Discuss the foods you eat and the vitamins you take with your health care professional.  See your dentist regularly. Brush and floss your teeth as directed. Before you have any dental work done, tell your dentist you are receiving this medicine. What side effects may I notice from receiving this medicine?     Side effects that you should report to your doctor or health care professional as soon as possible:  -allergic reactions like skin rash, itching or hives, swelling of the face, lips, or tongue  -breathing problems  -chest pain  -fast, irregular heartbeat  -feeling faint or lightheaded, falls  -fever, chills, or any other sign of infection  -muscle spasms, tightening, or twitches  -numbness or tingling  -skin blisters or bumps, or is dry, peels, or red  -slow healing or unexplained pain in the mouth or jaw  -unusual bleeding or bruising  Side effects that usually do not require medical attention (Report these to your doctor or health care professional if they

## 2023-09-13 ENCOUNTER — HOSPITAL ENCOUNTER (OUTPATIENT)
Dept: NURSING | Age: 68
Setting detail: INFUSION SERIES
Discharge: HOME | End: 2023-09-13
Payer: MEDICARE

## 2023-09-13 VITALS
WEIGHT: 170 LBS | RESPIRATION RATE: 16 BRPM | BODY MASS INDEX: 28.32 KG/M2 | HEIGHT: 65 IN | TEMPERATURE: 97.9 F | DIASTOLIC BLOOD PRESSURE: 68 MMHG | SYSTOLIC BLOOD PRESSURE: 139 MMHG | HEART RATE: 84 BPM

## 2023-09-13 DIAGNOSIS — M85.80 OSTEOPENIA, UNSPECIFIED LOCATION: Primary | ICD-10-CM

## 2023-09-13 PROCEDURE — 6360000002 HC RX W HCPCS: Performed by: INTERNAL MEDICINE

## 2023-09-13 PROCEDURE — 99211 OFF/OP EST MAY X REQ PHY/QHP: CPT

## 2023-09-13 PROCEDURE — 96372 THER/PROPH/DIAG INJ SC/IM: CPT

## 2023-09-13 RX ADMIN — DENOSUMAB 60 MG: 60 INJECTION SUBCUTANEOUS at 13:46

## 2023-09-13 ASSESSMENT — PAIN SCALES - GENERAL: PAINLEVEL_OUTOF10: 0

## 2023-09-13 NOTE — PROGRESS NOTES
Pt here via w/c per jaida for a prolia injection Pt without c/o's today  Pt reports she has never had any issues with this medication other than the cost of it   Pr denies any questions or concerns about the injection today Pt states that she is taking a daily calcium and vitamin D supplement daily Prolia 60 mg given SC right arm  Injection site unremarkable  bandaid applied  Pt jose angel well  Discharge instructions reviewed with pt  Copy given  Understanding was verbalized  Pt was discharged via w/c with her  in stable condition

## 2023-09-16 PROBLEM — Z00.00 MEDICARE ANNUAL WELLNESS VISIT, SUBSEQUENT: Status: RESOLVED | Noted: 2023-08-17 | Resolved: 2023-09-16

## 2023-09-18 RX ORDER — TORSEMIDE 20 MG/1
TABLET ORAL
Qty: 30 TABLET | Refills: 5 | Status: SHIPPED | OUTPATIENT
Start: 2023-09-18

## 2023-10-12 DIAGNOSIS — M47.817 LUMBOSACRAL SPONDYLOSIS WITHOUT MYELOPATHY: Chronic | ICD-10-CM

## 2023-10-12 RX ORDER — PREGABALIN 100 MG/1
CAPSULE ORAL
Qty: 180 CAPSULE | Refills: 0 | Status: SHIPPED | OUTPATIENT
Start: 2023-10-12 | End: 2023-12-11

## 2023-10-31 DIAGNOSIS — M51.37 DEGENERATION OF LUMBAR OR LUMBOSACRAL INTERVERTEBRAL DISC: Chronic | ICD-10-CM

## 2023-11-01 RX ORDER — MELOXICAM 7.5 MG/1
TABLET ORAL
Qty: 90 TABLET | Refills: 1 | Status: SHIPPED | OUTPATIENT
Start: 2023-11-01

## 2023-11-01 RX ORDER — DULOXETIN HYDROCHLORIDE 30 MG/1
CAPSULE, DELAYED RELEASE ORAL
Qty: 90 CAPSULE | Refills: 1 | Status: SHIPPED | OUTPATIENT
Start: 2023-11-01

## 2023-11-14 RX ORDER — FLUTICASONE FUROATE, UMECLIDINIUM BROMIDE AND VILANTEROL TRIFENATATE 200; 62.5; 25 UG/1; UG/1; UG/1
POWDER RESPIRATORY (INHALATION)
Qty: 60 EACH | Refills: 3 | Status: SHIPPED | OUTPATIENT
Start: 2023-11-14

## 2023-11-27 RX ORDER — LOSARTAN POTASSIUM AND HYDROCHLOROTHIAZIDE 25; 100 MG/1; MG/1
TABLET ORAL
Qty: 30 TABLET | Refills: 2 | Status: SHIPPED | OUTPATIENT
Start: 2023-11-27

## 2023-12-15 RX ORDER — BUPROPION HYDROCHLORIDE 100 MG/1
TABLET, EXTENDED RELEASE ORAL
Qty: 30 TABLET | Refills: 2 | Status: SHIPPED | OUTPATIENT
Start: 2023-12-15

## 2023-12-28 RX ORDER — TIZANIDINE 2 MG/1
TABLET ORAL
Qty: 30 TABLET | Refills: 3 | Status: SHIPPED | OUTPATIENT
Start: 2023-12-28

## 2024-01-10 DIAGNOSIS — M47.817 LUMBOSACRAL SPONDYLOSIS WITHOUT MYELOPATHY: Chronic | ICD-10-CM

## 2024-01-10 RX ORDER — PREGABALIN 100 MG/1
CAPSULE ORAL
Qty: 180 CAPSULE | Refills: 0 | Status: SHIPPED | OUTPATIENT
Start: 2024-01-10 | End: 2024-03-10

## 2024-01-26 RX ORDER — LOSARTAN POTASSIUM AND HYDROCHLOROTHIAZIDE 25; 100 MG/1; MG/1
TABLET ORAL
Qty: 90 TABLET | Refills: 2 | Status: SHIPPED | OUTPATIENT
Start: 2024-01-26

## 2024-01-26 RX ORDER — MELOXICAM 7.5 MG/1
TABLET ORAL
Qty: 90 TABLET | Refills: 0 | Status: SHIPPED | OUTPATIENT
Start: 2024-01-26

## 2024-03-12 ENCOUNTER — OFFICE VISIT (OUTPATIENT)
Dept: INTERNAL MEDICINE CLINIC | Age: 69
End: 2024-03-12

## 2024-03-12 VITALS
WEIGHT: 180 LBS | HEIGHT: 65 IN | DIASTOLIC BLOOD PRESSURE: 75 MMHG | BODY MASS INDEX: 29.99 KG/M2 | SYSTOLIC BLOOD PRESSURE: 150 MMHG | HEART RATE: 65 BPM | RESPIRATION RATE: 18 BRPM

## 2024-03-12 DIAGNOSIS — I50.32 CHRONIC DIASTOLIC CHF (CONGESTIVE HEART FAILURE) (HCC): ICD-10-CM

## 2024-03-12 DIAGNOSIS — J96.11 CHRONIC RESPIRATORY FAILURE WITH HYPOXIA (HCC): ICD-10-CM

## 2024-03-12 DIAGNOSIS — M51.37 DEGENERATION OF LUMBAR OR LUMBOSACRAL INTERVERTEBRAL DISC: ICD-10-CM

## 2024-03-12 DIAGNOSIS — J44.9 CHRONIC OBSTRUCTIVE PULMONARY DISEASE, UNSPECIFIED COPD TYPE (HCC): ICD-10-CM

## 2024-03-12 DIAGNOSIS — I10 ESSENTIAL HYPERTENSION: ICD-10-CM

## 2024-03-12 DIAGNOSIS — F32.4 MAJOR DEPRESSIVE DISORDER WITH SINGLE EPISODE, IN PARTIAL REMISSION (HCC): ICD-10-CM

## 2024-03-12 DIAGNOSIS — E66.01 MORBIDLY OBESE (HCC): ICD-10-CM

## 2024-03-12 DIAGNOSIS — E11.9 WELL CONTROLLED TYPE 2 DIABETES MELLITUS (HCC): Primary | ICD-10-CM

## 2024-03-12 PROCEDURE — 3078F DIAST BP <80 MM HG: CPT | Performed by: INTERNAL MEDICINE

## 2024-03-12 PROCEDURE — 3077F SYST BP >= 140 MM HG: CPT | Performed by: INTERNAL MEDICINE

## 2024-03-12 PROCEDURE — 1123F ACP DISCUSS/DSCN MKR DOCD: CPT | Performed by: INTERNAL MEDICINE

## 2024-03-12 PROCEDURE — 99213 OFFICE O/P EST LOW 20 MIN: CPT | Performed by: INTERNAL MEDICINE

## 2024-03-12 NOTE — PROGRESS NOTES
Volume Loops:  The tracings show a normal pattern.     IMPRESSION:  1.  There is a mild obstructive lung defect without demonstrated  response to inhaled bronchodilators.  2.  There is a mild restrictive ventilatory defect.  3.  There is a severe reduction in diffusion capacity.  4.  Overall, the pulmonary function tests showing a combined restrictive  and obstructive defect pattern with impaired diffusion capacity, could  be seen in COPD with a concomitant restrictive defect or interstitial  lung disease.  Clinical correlation is recommended.     Ct shoulder     Acute and comminuted fracture of the scapula involving the scapular body   and scapular spine.  Surrounding soft tissue and intramuscular edema.   2. Complete loss of the acromial humeral interval consistent with rotator   cuff insufficiency.  Severe associated atrophy of the rotator cuff   musculature.   3. Moderate glenohumeral effusion with numerous large articular bodies   throughout the joint.   4. Moderate to severe glenohumeral and moderate acromioclavicular   osteoarthritis.     Wt Readings from Last 3 Encounters:   03/12/24 81.6 kg (180 lb)   09/13/23 77.1 kg (170 lb)   08/22/23 77.1 kg (170 lb)         Hemoglobin A1C   Date Value Ref Range Status   08/17/2023 6.0 See comment % Final     Comment:     Comment:  Diagnosis of Diabetes: > or = 6.5%  Increased risk of diabetes (Prediabetes): 5.7-6.4%  Glycemic Control: Nonpregnant Adults: <7.0%                    Pregnant: <6.0%                Assessment:      Diagnosis Orders   1. Well controlled type 2 diabetes mellitus (HCC)        2. Chronic respiratory failure with hypoxia (HCC)        3. Chronic diastolic CHF (congestive heart failure) (HCC)        4. Chronic obstructive pulmonary disease, unspecified COPD type ([facility])        5. Major depressive disorder with single episode, in partial remission ([facility])        6. Morbidly obese ([facility])        7. Essential hypertension        8. Degeneration of lumbar or

## 2024-03-15 RX ORDER — BUPROPION HYDROCHLORIDE 100 MG/1
TABLET, EXTENDED RELEASE ORAL
Qty: 30 TABLET | Refills: 3 | Status: SHIPPED | OUTPATIENT
Start: 2024-03-15

## 2024-03-18 ENCOUNTER — HOSPITAL ENCOUNTER (OUTPATIENT)
Dept: NURSING | Age: 69
Setting detail: INFUSION SERIES
Discharge: HOME | End: 2024-03-18
Payer: MEDICARE

## 2024-03-18 VITALS
WEIGHT: 180 LBS | DIASTOLIC BLOOD PRESSURE: 69 MMHG | HEART RATE: 57 BPM | HEIGHT: 65 IN | RESPIRATION RATE: 18 BRPM | BODY MASS INDEX: 29.99 KG/M2 | SYSTOLIC BLOOD PRESSURE: 168 MMHG | TEMPERATURE: 97.9 F

## 2024-03-18 DIAGNOSIS — M85.80 OSTEOPENIA, UNSPECIFIED LOCATION: Primary | ICD-10-CM

## 2024-03-18 PROCEDURE — 99211 OFF/OP EST MAY X REQ PHY/QHP: CPT

## 2024-03-18 PROCEDURE — 96372 THER/PROPH/DIAG INJ SC/IM: CPT

## 2024-03-18 PROCEDURE — 6360000002 HC RX W HCPCS: Performed by: INTERNAL MEDICINE

## 2024-03-18 RX ADMIN — DENOSUMAB 60 MG: 60 INJECTION SUBCUTANEOUS at 12:18

## 2024-03-18 ASSESSMENT — PAIN SCALES - GENERAL: PAINLEVEL_OUTOF10: 0

## 2024-03-18 NOTE — DISCHARGE INSTRUCTIONS
COMMON BRAND NAME(S): Prolia, XGEVA    What is this medicine?  DENOSUMAB slows bone breakdown. It is used to treat osteoporosis in women after menopause. This medicine is also used to prevent bone fractures and other bone problems caused by cancer bone metastases.  This medicine may be used for other purposes; ask your health care provider or pharmacist if you have questions.    What should I watch for while using this medicine?    Visit your doctor or health care professional for regular checks on your progress. Your doctor or health care professional may order blood tests and other tests to see how you are doing.  Call your doctor or health care professional if you get a cold or other infection while receiving this medicine. Do not treat yourself. This medicine may decrease your body's ability to fight infection.  You should make sure you get enough calcium and vitamin D while you are taking this medicine, unless your doctor tells you not to. Discuss the foods you eat and the vitamins you take with your health care professional.  See your dentist regularly. Brush and floss your teeth as directed. Before you have any dental work done, tell your dentist you are receiving this medicine.     What side effects may I notice from receiving this medicine?    Side effects that you should report to your doctor or health care professional as soon as possible:  -allergic reactions like skin rash, itching or hives, swelling of the face, lips, or tongue  -breathing problems  -chest pain  -fast, irregular heartbeat  -feeling faint or lightheaded, falls  -fever, chills, or any other sign of infection  -muscle spasms, tightening, or twitches  -numbness or tingling  -skin blisters or bumps, or is dry, peels, or red  -slow healing or unexplained pain in the mouth or jaw  -unusual bleeding or bruising  Side effects that usually do not require medical attention (Report these to your doctor or health care professional if they continue  or are bothersome.):  -muscle pain  -stomach upset, gas    RETURN IN 6 MONTHS SEPT 24,2024 AT 1200 FOR PROLIA INJECTION    FOLLOW UP WITH DR. LEE AS NEEDED OR SCHEDULED    CALL US IF YOU NEED TO RESCHEDULE YOUR APPOINTMENT  [phone]

## 2024-03-18 NOTE — PROGRESS NOTES
Pt here via w/c per her  for a prolia injection Pt without c/o's today  Pt reports she has never had any side effects with this medication  Pr denies any questions or concerns about the injection today Pt states that she is taking a daily calcium and vitamin D supplement daily Prolia 60 mg given SC right arm  Injection site unremarkable  bandaid applied  Pt jose angel well  Discharge instructions reviewed with pt  Copy given  Understanding was verbalized  Pt was discharged via w/c with her  in stable condition

## 2024-04-08 DIAGNOSIS — M47.817 LUMBOSACRAL SPONDYLOSIS WITHOUT MYELOPATHY: Chronic | ICD-10-CM

## 2024-04-08 RX ORDER — PREGABALIN 100 MG/1
CAPSULE ORAL
Qty: 180 CAPSULE | Refills: 0 | Status: SHIPPED | OUTPATIENT
Start: 2024-04-09 | End: 2024-06-08

## 2024-04-24 RX ORDER — TIZANIDINE 2 MG/1
TABLET ORAL
Qty: 30 TABLET | Refills: 2 | Status: SHIPPED | OUTPATIENT
Start: 2024-04-24

## 2024-04-25 DIAGNOSIS — M51.37 DEGENERATION OF LUMBAR OR LUMBOSACRAL INTERVERTEBRAL DISC: Chronic | ICD-10-CM

## 2024-04-25 RX ORDER — DULOXETIN HYDROCHLORIDE 30 MG/1
CAPSULE, DELAYED RELEASE ORAL
Qty: 90 CAPSULE | Refills: 0 | Status: SHIPPED | OUTPATIENT
Start: 2024-04-25

## 2024-05-08 RX ORDER — METOPROLOL TARTRATE 50 MG/1
TABLET, FILM COATED ORAL
Qty: 180 TABLET | Refills: 0 | Status: SHIPPED | OUTPATIENT
Start: 2024-05-08

## 2024-06-13 DIAGNOSIS — M85.80 OSTEOPENIA, UNSPECIFIED LOCATION: ICD-10-CM

## 2024-06-13 RX ORDER — DENOSUMAB 60 MG/ML
60 INJECTION SUBCUTANEOUS ONCE
Qty: 1 ML | Refills: 1 | Status: SHIPPED | OUTPATIENT
Start: 2024-06-13 | End: 2024-06-13

## 2024-07-08 DIAGNOSIS — M47.817 LUMBOSACRAL SPONDYLOSIS WITHOUT MYELOPATHY: Chronic | ICD-10-CM

## 2024-07-08 RX ORDER — PREGABALIN 100 MG/1
CAPSULE ORAL
Qty: 180 CAPSULE | Refills: 0 | Status: SHIPPED | OUTPATIENT
Start: 2024-07-08 | End: 2024-09-06

## 2024-07-16 ENCOUNTER — OFFICE VISIT (OUTPATIENT)
Dept: INTERNAL MEDICINE CLINIC | Age: 69
End: 2024-07-16

## 2024-07-16 ENCOUNTER — TELEPHONE (OUTPATIENT)
Dept: PHARMACY | Facility: CLINIC | Age: 69
End: 2024-07-16

## 2024-07-16 VITALS
DIASTOLIC BLOOD PRESSURE: 70 MMHG | RESPIRATION RATE: 18 BRPM | WEIGHT: 178 LBS | HEIGHT: 65 IN | HEART RATE: 65 BPM | SYSTOLIC BLOOD PRESSURE: 140 MMHG | BODY MASS INDEX: 29.66 KG/M2

## 2024-07-16 DIAGNOSIS — E11.9 WELL CONTROLLED TYPE 2 DIABETES MELLITUS (HCC): ICD-10-CM

## 2024-07-16 DIAGNOSIS — I50.32 CHRONIC DIASTOLIC CHF (CONGESTIVE HEART FAILURE) (HCC): ICD-10-CM

## 2024-07-16 DIAGNOSIS — F32.4 MAJOR DEPRESSIVE DISORDER WITH SINGLE EPISODE, IN PARTIAL REMISSION (HCC): ICD-10-CM

## 2024-07-16 DIAGNOSIS — E66.01 MORBIDLY OBESE (HCC): ICD-10-CM

## 2024-07-16 DIAGNOSIS — M51.37 DEGENERATION OF LUMBAR OR LUMBOSACRAL INTERVERTEBRAL DISC: ICD-10-CM

## 2024-07-16 DIAGNOSIS — I10 ESSENTIAL HYPERTENSION: Primary | ICD-10-CM

## 2024-07-16 DIAGNOSIS — J96.11 CHRONIC RESPIRATORY FAILURE WITH HYPOXIA (HCC): ICD-10-CM

## 2024-07-16 DIAGNOSIS — J44.9 CHRONIC OBSTRUCTIVE PULMONARY DISEASE, UNSPECIFIED COPD TYPE (HCC): ICD-10-CM

## 2024-07-16 DIAGNOSIS — Z87.891 PERSONAL HISTORY OF TOBACCO USE: ICD-10-CM

## 2024-07-16 PROCEDURE — 1123F ACP DISCUSS/DSCN MKR DOCD: CPT | Performed by: INTERNAL MEDICINE

## 2024-07-16 PROCEDURE — G0296 VISIT TO DETERM LDCT ELIG: HCPCS | Performed by: INTERNAL MEDICINE

## 2024-07-16 PROCEDURE — 3077F SYST BP >= 140 MM HG: CPT | Performed by: INTERNAL MEDICINE

## 2024-07-16 PROCEDURE — 3078F DIAST BP <80 MM HG: CPT | Performed by: INTERNAL MEDICINE

## 2024-07-16 PROCEDURE — 99213 OFFICE O/P EST LOW 20 MIN: CPT | Performed by: INTERNAL MEDICINE

## 2024-07-16 ASSESSMENT — PATIENT HEALTH QUESTIONNAIRE - PHQ9
4. FEELING TIRED OR HAVING LITTLE ENERGY: SEVERAL DAYS
6. FEELING BAD ABOUT YOURSELF - OR THAT YOU ARE A FAILURE OR HAVE LET YOURSELF OR YOUR FAMILY DOWN: NOT AT ALL
SUM OF ALL RESPONSES TO PHQ9 QUESTIONS 1 & 2: 1
SUM OF ALL RESPONSES TO PHQ QUESTIONS 1-9: 2
8. MOVING OR SPEAKING SO SLOWLY THAT OTHER PEOPLE COULD HAVE NOTICED. OR THE OPPOSITE, BEING SO FIGETY OR RESTLESS THAT YOU HAVE BEEN MOVING AROUND A LOT MORE THAN USUAL: NOT AT ALL
7. TROUBLE CONCENTRATING ON THINGS, SUCH AS READING THE NEWSPAPER OR WATCHING TELEVISION: NOT AT ALL
10. IF YOU CHECKED OFF ANY PROBLEMS, HOW DIFFICULT HAVE THESE PROBLEMS MADE IT FOR YOU TO DO YOUR WORK, TAKE CARE OF THINGS AT HOME, OR GET ALONG WITH OTHER PEOPLE: NOT DIFFICULT AT ALL
3. TROUBLE FALLING OR STAYING ASLEEP: NOT AT ALL
2. FEELING DOWN, DEPRESSED OR HOPELESS: NOT AT ALL
9. THOUGHTS THAT YOU WOULD BE BETTER OFF DEAD, OR OF HURTING YOURSELF: NOT AT ALL
SUM OF ALL RESPONSES TO PHQ QUESTIONS 1-9: 2
SUM OF ALL RESPONSES TO PHQ QUESTIONS 1-9: 2
1. LITTLE INTEREST OR PLEASURE IN DOING THINGS: SEVERAL DAYS
SUM OF ALL RESPONSES TO PHQ QUESTIONS 1-9: 2
5. POOR APPETITE OR OVEREATING: NOT AT ALL

## 2024-07-16 NOTE — PROGRESS NOTES
Patient ID: Renay Lovett is a 69 y.o. female.    HPI       69 y.o. female with h.o   chronic back pain , copd, DM- 2,. HTN , tobacco use here for regular f.w     Since last time  pt has been doing about the same  Activity remains limited, using scooter and wheelchair as needed  No new admissions or copd flare ups        Hx of copd , Recent PFT with mixed  obstructive and restrictive defect 8/22 -  severe reduction in diffusion capacity  Chronic dyspnea remains stable with no recent flare ups   Remains off  brezetri and albuterol inhaler as no benefit  Not using o2 or nebs any more  Seen Dr. SEXTON       DM- 2- Taking metformin but not compliant with diet  Last A1c at 6.5 >6.0  Activity remains low   Does not check sugars      Chronic back pain - s.p  surgeries x 4  S.p  VJ  by  Dr Brewer- remotely   Not back on narcotics yet but considering pain pump   Recently stopped  lyrica with no changes     Using cane/walker  for ambulation . No recent falls  Reports chronic fatigue      Depression stable since  returned, off all meds       notes she had her first prolia shot in December 2021 and did not continue any more   Had one fall     Parts of this note is copied from my previous notes and checked thoroughly and updated appropriately to reflect today's exam , findings and treatment plan         Allergies   Allergen Reactions    Ciprofloxacin Nausea And Vomiting           Current Outpatient Medications   Medication Sig Dispense Refill    denosumab (PROLIA) 60 MG/ML SOSY SC injection Inject 1 mL into the skin once for 1 dose 1 mL 1    metoprolol tartrate (LOPRESSOR) 50 MG tablet TAKE ONE (1) TABLET BY MOUTH TWO (2) TIMES DAILY 180 tablet 0    losartan-hydroCHLOROthiazide (HYZAAR) 100-25 MG per tablet TAKE ONE (1) TABLET BY MOUTH DAILY 90 tablet 2    meloxicam (MOBIC) 7.5 MG tablet TAKE ONE (1) TABLET BY MOUTH DAILY 90 tablet 0    metFORMIN (GLUCOPHAGE) 500 MG tablet TAKE ONE TABLET BY MOUTH EVERY DAY

## 2024-07-17 LAB
ALBUMIN SERPL-MCNC: 4 G/DL (ref 3.4–5)
ALBUMIN/GLOB SERPL: 1.7 {RATIO} (ref 1.1–2.2)
ALP SERPL-CCNC: 77 U/L (ref 40–129)
ALT SERPL-CCNC: 11 U/L (ref 10–40)
ANION GAP SERPL CALCULATED.3IONS-SCNC: 11 MMOL/L (ref 3–16)
AST SERPL-CCNC: 12 U/L (ref 15–37)
BASOPHILS # BLD: 0.1 K/UL (ref 0–0.2)
BASOPHILS NFR BLD: 0.9 %
BILIRUB SERPL-MCNC: 0.3 MG/DL (ref 0–1)
BUN SERPL-MCNC: 10 MG/DL (ref 7–20)
CALCIUM SERPL-MCNC: 9.5 MG/DL (ref 8.3–10.6)
CHLORIDE SERPL-SCNC: 104 MMOL/L (ref 99–110)
CO2 SERPL-SCNC: 27 MMOL/L (ref 21–32)
CREAT SERPL-MCNC: 0.7 MG/DL (ref 0.6–1.2)
DEPRECATED RDW RBC AUTO: 17.1 % (ref 12.4–15.4)
EOSINOPHIL # BLD: 0.1 K/UL (ref 0–0.6)
EOSINOPHIL NFR BLD: 1.8 %
EST. AVERAGE GLUCOSE BLD GHB EST-MCNC: 122.6 MG/DL
GFR SERPLBLD CREATININE-BSD FMLA CKD-EPI: >90 ML/MIN/{1.73_M2}
GLUCOSE SERPL-MCNC: 97 MG/DL (ref 70–99)
HBA1C MFR BLD: 5.9 %
HCT VFR BLD AUTO: 42.6 % (ref 36–48)
HGB BLD-MCNC: 14.1 G/DL (ref 12–16)
LYMPHOCYTES # BLD: 0.9 K/UL (ref 1–5.1)
LYMPHOCYTES NFR BLD: 12.5 %
MCH RBC QN AUTO: 30.1 PG (ref 26–34)
MCHC RBC AUTO-ENTMCNC: 33 G/DL (ref 31–36)
MCV RBC AUTO: 91.2 FL (ref 80–100)
MONOCYTES # BLD: 0.6 K/UL (ref 0–1.3)
MONOCYTES NFR BLD: 8.6 %
NEUTROPHILS # BLD: 5.6 K/UL (ref 1.7–7.7)
NEUTROPHILS NFR BLD: 76.2 %
PLATELET # BLD AUTO: 293 K/UL (ref 135–450)
PMV BLD AUTO: 8.7 FL (ref 5–10.5)
POTASSIUM SERPL-SCNC: 5.1 MMOL/L (ref 3.5–5.1)
PROT SERPL-MCNC: 6.4 G/DL (ref 6.4–8.2)
RBC # BLD AUTO: 4.67 M/UL (ref 4–5.2)
SODIUM SERPL-SCNC: 142 MMOL/L (ref 136–145)
WBC # BLD AUTO: 7.3 K/UL (ref 4–11)

## 2024-07-22 NOTE — TELEPHONE ENCOUNTER
Melchor Gaitan MD - Statin Therapy Care Gap    Patient has an appointment with you on 7/16/24.  Chart reviewed due to insurer-identified care gap.  Renay Lovett is receiving treatment for diabetes and is not currently prescribed statin therapy. Moderate to high-intensity statin therapy is recommended by the 2024 ADA Guidelines for patients with diabetes who are age 40 to 75.        Per chart review, a statin is not currently prescribed for the patient:  Patient has not taken a statin in the past.   Patient is due for lipid panel soon - most recent labs are from 8/17/23.  ASCVD risk score is 26.2%.  Per 3/12/24 OV note:  Already on ACEI and will consider statins       Statin therapy is recommended by the 2024 ADA Guidelines:  Consider repeat of lipid panel for patient.  If appropriate for patient, could consider initiating a moderate-intensity statin (atorvastatin 10 or 20 mg daily or rosuvastatin 5 or 10 mg daily) and increasing the dose as necessary/as the patient tolerates.        Please also note that patient needs a new prescription for further refills of metformin.      Thank you,  Yazmin Minor, PharmD, [facility]  Population Health Pharmacist  [facility] Clinical Pharmacy  Department, toll free: [phone], option 1   ===============================================================================    [facility] CLINICAL PHARMACY: STATIN THERAPY REVIEW  Identified statin use in persons with diabetes care gap per Francine. Records dated: 7/9/24.    Last Office Visit: 3/12/24    Patient also appears to be prescribed the following medications in an adherence measure: metformin, losartan/hctz    Allergies   Allergen Reactions    Ciprofloxacin Nausea And Vomiting       ASSESSMENT    DIABETES ADHERENCE  Insurance Records claims through 7/9/24  YTD PDC = 100%; Potential Fail Date: 10/1/24:   Metformin 500 mg last filled for 90 days supply.  Next refill due: 7/24/24    Per [place] Portal:  Same as 
Patient had OV on 7/16/24 - statin was not started.    Per 7/16/24 OV note: Already on ACEI and will consider statins      Will continue to follow.    Yazmin Minor, PharmD, [facility]  Population Health Pharmacist  [facility] Clinical Pharmacy  Department, toll free: [phone], option 1      For Pharmacy Admin Tracking Only  Program: [employer]  CPA in place:  No  Recommendation Provided To: Provider: 1 via Note to Provider  Intervention Detail: New Rx: 1, reason: Needs Additional Therapy  Intervention Accepted By: Provider: 0  Gap Closed?: No   Time Spent (min): 30        
English

## 2024-07-24 RX ORDER — MELOXICAM 7.5 MG/1
TABLET ORAL
Qty: 90 TABLET | Refills: 0 | Status: SHIPPED | OUTPATIENT
Start: 2024-07-24

## 2024-08-06 RX ORDER — METOPROLOL TARTRATE 50 MG/1
TABLET, FILM COATED ORAL
Qty: 180 TABLET | Refills: 3 | Status: SHIPPED | OUTPATIENT
Start: 2024-08-06

## 2024-08-06 RX ORDER — DILTIAZEM HYDROCHLORIDE 120 MG/1
CAPSULE, COATED, EXTENDED RELEASE ORAL
Qty: 90 CAPSULE | Refills: 3 | Status: SHIPPED | OUTPATIENT
Start: 2024-08-06

## 2024-08-22 ENCOUNTER — OFFICE VISIT (OUTPATIENT)
Dept: INTERNAL MEDICINE CLINIC | Age: 69
End: 2024-08-22

## 2024-08-22 VITALS
WEIGHT: 180 LBS | DIASTOLIC BLOOD PRESSURE: 75 MMHG | SYSTOLIC BLOOD PRESSURE: 145 MMHG | HEART RATE: 65 BPM | HEIGHT: 65 IN | BODY MASS INDEX: 29.99 KG/M2 | RESPIRATION RATE: 18 BRPM

## 2024-08-22 DIAGNOSIS — Z87.891 PERSONAL HISTORY OF TOBACCO USE: ICD-10-CM

## 2024-08-22 DIAGNOSIS — E11.9 TYPE 2 DIABETES MELLITUS WITHOUT COMPLICATION, WITHOUT LONG-TERM CURRENT USE OF INSULIN (HCC): ICD-10-CM

## 2024-08-22 DIAGNOSIS — M48.061 SPINAL STENOSIS, LUMBAR REGION, WITHOUT NEUROGENIC CLAUDICATION: Chronic | ICD-10-CM

## 2024-08-22 DIAGNOSIS — I10 PRIMARY HYPERTENSION: ICD-10-CM

## 2024-08-22 DIAGNOSIS — E66.01 MORBIDLY OBESE (HCC): ICD-10-CM

## 2024-08-22 DIAGNOSIS — F32.4 MAJOR DEPRESSIVE DISORDER WITH SINGLE EPISODE, IN PARTIAL REMISSION (HCC): ICD-10-CM

## 2024-08-22 DIAGNOSIS — Z12.11 COLON CANCER SCREENING: ICD-10-CM

## 2024-08-22 DIAGNOSIS — J96.11 CHRONIC RESPIRATORY FAILURE WITH HYPOXIA (HCC): ICD-10-CM

## 2024-08-22 DIAGNOSIS — Z00.00 MEDICARE ANNUAL WELLNESS VISIT, SUBSEQUENT: Primary | ICD-10-CM

## 2024-08-22 DIAGNOSIS — Z12.31 SCREENING MAMMOGRAM FOR BREAST CANCER: ICD-10-CM

## 2024-08-22 PROCEDURE — 3077F SYST BP >= 140 MM HG: CPT | Performed by: INTERNAL MEDICINE

## 2024-08-22 PROCEDURE — 1123F ACP DISCUSS/DSCN MKR DOCD: CPT | Performed by: INTERNAL MEDICINE

## 2024-08-22 PROCEDURE — 3078F DIAST BP <80 MM HG: CPT | Performed by: INTERNAL MEDICINE

## 2024-08-22 PROCEDURE — 3044F HG A1C LEVEL LT 7.0%: CPT | Performed by: INTERNAL MEDICINE

## 2024-08-22 PROCEDURE — G0439 PPPS, SUBSEQ VISIT: HCPCS | Performed by: INTERNAL MEDICINE

## 2024-08-22 PROCEDURE — G0296 VISIT TO DETERM LDCT ELIG: HCPCS | Performed by: INTERNAL MEDICINE

## 2024-08-22 PROCEDURE — 81002 URINALYSIS NONAUTO W/O SCOPE: CPT | Performed by: INTERNAL MEDICINE

## 2024-08-22 ASSESSMENT — PATIENT HEALTH QUESTIONNAIRE - PHQ9
9. THOUGHTS THAT YOU WOULD BE BETTER OFF DEAD, OR OF HURTING YOURSELF: NOT AT ALL
SUM OF ALL RESPONSES TO PHQ QUESTIONS 1-9: 1
2. FEELING DOWN, DEPRESSED OR HOPELESS: SEVERAL DAYS
2. FEELING DOWN, DEPRESSED OR HOPELESS: NOT AT ALL
SUM OF ALL RESPONSES TO PHQ9 QUESTIONS 1 & 2: 4
SUM OF ALL RESPONSES TO PHQ QUESTIONS 1-9: 1
SUM OF ALL RESPONSES TO PHQ QUESTIONS 1-9: 4
6. FEELING BAD ABOUT YOURSELF - OR THAT YOU ARE A FAILURE OR HAVE LET YOURSELF OR YOUR FAMILY DOWN: NOT AT ALL
1. LITTLE INTEREST OR PLEASURE IN DOING THINGS: SEVERAL DAYS
1. LITTLE INTEREST OR PLEASURE IN DOING THINGS: NEARLY EVERY DAY
SUM OF ALL RESPONSES TO PHQ QUESTIONS 1-9: 1
4. FEELING TIRED OR HAVING LITTLE ENERGY: NOT AT ALL
SUM OF ALL RESPONSES TO PHQ QUESTIONS 1-9: 4
SUM OF ALL RESPONSES TO PHQ9 QUESTIONS 1 & 2: 1
SUM OF ALL RESPONSES TO PHQ QUESTIONS 1-9: 1
SUM OF ALL RESPONSES TO PHQ QUESTIONS 1-9: 4
8. MOVING OR SPEAKING SO SLOWLY THAT OTHER PEOPLE COULD HAVE NOTICED. OR THE OPPOSITE, BEING SO FIGETY OR RESTLESS THAT YOU HAVE BEEN MOVING AROUND A LOT MORE THAN USUAL: NOT AT ALL
3. TROUBLE FALLING OR STAYING ASLEEP: NOT AT ALL
7. TROUBLE CONCENTRATING ON THINGS, SUCH AS READING THE NEWSPAPER OR WATCHING TELEVISION: NOT AT ALL
SUM OF ALL RESPONSES TO PHQ QUESTIONS 1-9: 4
5. POOR APPETITE OR OVEREATING: NOT AT ALL

## 2024-08-22 ASSESSMENT — LIFESTYLE VARIABLES
HOW MANY STANDARD DRINKS CONTAINING ALCOHOL DO YOU HAVE ON A TYPICAL DAY: 3 OR 4
HOW OFTEN DO YOU HAVE A DRINK CONTAINING ALCOHOL: 2-3 TIMES A WEEK

## 2024-08-22 NOTE — PROGRESS NOTES
Medicare Annual Wellness Visit    Renay Lovett is here for No chief complaint on file.    Assessment & Plan   Spinal stenosis, lumbar region, without neurogenic claudication  Type 2 diabetes mellitus without complication, without long-term current use of insulin (HCC)  -     MICROALBUMIN / CREATININE URINE RATIO; Future  -     POCT Urinalysis no Micro  Primary hypertension  Morbidly obese (HCC)  Major depressive disorder with single episode, in partial remission (HCC)  Chronic respiratory failure with hypoxia (HCC)  Medicare annual wellness visit, subsequent  Screening mammogram for breast cancer  -     KYLIE DIGITAL SCREEN W OR WO CAD BILATERAL; Future    Recommendations for Preventive Services Due: see orders and patient instructions/AVS.  Recommended screening schedule for the next 5-10 years is provided to the patient in written form: see Patient Instructions/AVS.     Return in about 6 months (around 2/22/2025) for htn dm .     Subjective         69 y.o. female with h.o   chronic back pain , copd, DM- 2,. HTN , tobacco use here for medicare wellness visit     Since last time  pt has been doing about the same  Activity remains limited, using scooter and wheelchair as needed  Not ambulating at all per  and physical debility notd    No new admissions or copd flare ups  Not using o2       Hx of copd , Recent PFT with mixed  obstructive and restrictive defect 8/22 -  severe reduction in diffusion capacity  Chronic dyspnea remains stable with no recent flare ups   Remains off  brezetri and albuterol inhaler as no benefit  Not using o2 or nebs any more  Seen Dr. SEXTON       DM- 2- Taking metformin but not compliant with diet  Last A1c at 6.5 >5.9  Activity remains low   Does not check sugars      Chronic back pain - s.p  surgeries x 4  S.p  VJ  by  Dr Brewer- remotely   Not back on narcotics yet but considering pain pump   Recently stopped  lyrica with no changes     Using cane/walker  for ambulation . No

## 2024-08-22 NOTE — PATIENT INSTRUCTIONS
contact them outside of the usual boundaries.  Don't always complain or talk about yourself. Know when it's time to stop talking and listen or just enjoy your friend's company.  Know that good friends can be a bad influence. For example, if a friend encourages you to drink when you know it will harm you, you may want to end the friendship.  Where can you learn more?  Go to https://www.[employer].net/patientEd and enter G092 to learn more about \"Learning About Emotional Support.\"  Current as of: June 24, 2023  Content Version: 14.1  © 4405-2398 [employer].   Care instructions adapted under license by [employer]. If you have questions about a medical condition or this instruction, always ask your healthcare professional. [employer] disclaims any warranty or liability for your use of this information.           Learning About Managing Anger  What causes anger?  Many things can cause anger. Stress at home or at work can cause anger. Being in stressful social situations can also cause it.  Anger signals your body to prepare for a fight. This reaction is often called \"fight or flight.\" When you get angry, adrenaline and other hormones are released into your blood. Then your blood pressure goes up, your heart beats faster, and you breathe faster.  When you express anger in a healthy way, it can inspire change and make you productive. But if you don't have the skills to express anger in a healthy way, anger can build up. You may hurt others--and yourself--emotionally and even physically. Violent behavior often starts with verbal threats or fairly minor incidents. But over time, it can involve physical harm. It can include physical, verbal, or sexual abuse of an intimate partner (domestic violence), a child (child abuse), or an older adult (elder abuse).  It can also make you sick. Anger and constant hostility keep your blood pressure high. They raise your chances of having other health

## 2024-08-23 LAB
CREAT UR-MCNC: 61.3 MG/DL (ref 28–259)
MICROALBUMIN UR DL<=1MG/L-MCNC: <1.2 MG/DL
MICROALBUMIN/CREAT UR: NORMAL MG/G (ref 0–30)

## 2024-08-28 ENCOUNTER — TELEPHONE (OUTPATIENT)
Dept: INTERNAL MEDICINE CLINIC | Age: 69
End: 2024-08-28

## 2024-08-28 RX ORDER — TIZANIDINE 2 MG/1
TABLET ORAL
Qty: 30 TABLET | Refills: 2 | Status: SHIPPED | OUTPATIENT
Start: 2024-08-28

## 2024-08-28 NOTE — TELEPHONE ENCOUNTER
----- Message from EDUARDO Mcah IAIN sent at 8/28/2024  2:18 PM EDT -----  Contact: Levi [phone]    ----- Message -----  From: Melchor Gaitan MD  Sent: 8/28/2024   2:12 PM EDT  To: Roxanne Hall    Ok  ----- Message -----  From: Roxanne Hall  Sent: 8/28/2024  12:49 PM EDT  To: Melchor Gaitan MD    Spouse states that you stopped some of her medications, but is requesting that she goes back on Tizanidine. Ok to refill?  [facility]

## 2024-09-20 ENCOUNTER — TELEPHONE (OUTPATIENT)
Dept: TELEMETRY | Age: 69
End: 2024-09-20

## 2024-09-24 NOTE — DISCHARGE INSTRUCTIONS
COMMON BRAND NAME(S): Prolia, XGEVA    What is this medicine?  DENOSUMAB slows bone breakdown. It is used to treat osteoporosis in women after menopause. This medicine is also used to prevent bone fractures and other bone problems caused by cancer bone metastases.  This medicine may be used for other purposes; ask your health care provider or pharmacist if you have questions.    What should I watch for while using this medicine?    Visit your doctor or health care professional for regular checks on your progress. Your doctor or health care professional may order blood tests and other tests to see how you are doing.  Call your doctor or health care professional if you get a cold or other infection while receiving this medicine. Do not treat yourself. This medicine may decrease your body's ability to fight infection.  You should make sure you get enough calcium and vitamin D while you are taking this medicine, unless your doctor tells you not to. Discuss the foods you eat and the vitamins you take with your health care professional.  See your dentist regularly. Brush and floss your teeth as directed. Before you have any dental work done, tell your dentist you are receiving this medicine.     What side effects may I notice from receiving this medicine?    Side effects that you should report to your doctor or health care professional as soon as possible:  -allergic reactions like skin rash, itching or hives, swelling of the face, lips, or tongue  -breathing problems  -chest pain  -fast, irregular heartbeat  -feeling faint or lightheaded, falls  -fever, chills, or any other sign of infection  -muscle spasms, tightening, or twitches  -numbness or tingling  -skin blisters or bumps, or is dry, peels, or red  -slow healing or unexplained pain in the mouth or jaw  -unusual bleeding or bruising  Side effects that usually do not require medical attention (Report these to your doctor or health care professional if they continue

## 2024-09-25 ENCOUNTER — TELEPHONE (OUTPATIENT)
Dept: PHARMACY | Facility: CLINIC | Age: 69
End: 2024-09-25

## 2024-09-25 NOTE — TELEPHONE ENCOUNTER
[facility] CLINICAL PHARMACY: ADHERENCE REVIEW  Identified care gap per [place]: fills at [facility] : Diabetes adherence    Patient also appears to be prescribed: ACE/ARB (adherent)    SUPD?    ASSESSMENT  DIABETES ADHERENCE    Insurance Records claims through 24 (Prior Year PDC = not reported; YTD PDC = 78%; Potential Fail Date: 24):   METFORMIN  500MG Next refill due: 24    Prescribed si tablet/capsule daily    Per Insurer Portal: last filled on 24 for 90 day supply. July refill REVERSED    Per [facility]:  Will send refill auth    [id number]    Lab Results   Component Value Date    LABA1C 5.9 2024    LABA1C 6.0 2023    LABA1C 6.2 2023     NOTE: A1c <9%      The following are interventions that have been identified:   Patient OVERDUE refilling metformin 500mg and active on home medication list.     Left message on home VM as well as son, Jon to call back.    Last Visit: 24  Next Visit: none    Eboni Maguire CPhT.   Population Health Clinical   [facility] Clinical Pharm[facility]  Toll free: [phone] Option 1

## 2024-09-27 ENCOUNTER — HOSPITAL ENCOUNTER (OUTPATIENT)
Age: 69
Discharge: HOME | End: 2024-09-27

## 2024-09-27 ENCOUNTER — HOSPITAL ENCOUNTER (OUTPATIENT)
Dept: NURSING | Age: 69
Setting detail: INFUSION SERIES
Discharge: HOME | End: 2024-09-27
Payer: MEDICARE

## 2024-09-27 VITALS
HEART RATE: 98 BPM | DIASTOLIC BLOOD PRESSURE: 90 MMHG | BODY MASS INDEX: 29.97 KG/M2 | RESPIRATION RATE: 16 BRPM | SYSTOLIC BLOOD PRESSURE: 192 MMHG | TEMPERATURE: 98.1 F | WEIGHT: 179.9 LBS | HEIGHT: 65 IN

## 2024-09-27 DIAGNOSIS — M85.80 OSTEOPENIA, UNSPECIFIED LOCATION: Primary | ICD-10-CM

## 2024-09-27 PROCEDURE — 96372 THER/PROPH/DIAG INJ SC/IM: CPT

## 2024-09-27 PROCEDURE — 99211 OFF/OP EST MAY X REQ PHY/QHP: CPT

## 2024-09-27 PROCEDURE — 6360000002 HC RX W HCPCS: Performed by: INTERNAL MEDICINE

## 2024-09-27 RX ORDER — TORSEMIDE 20 MG/1
TABLET ORAL
Qty: 30 TABLET | Refills: 5 | Status: SHIPPED | OUTPATIENT
Start: 2024-09-27

## 2024-09-27 ASSESSMENT — PAIN SCALES - GENERAL: PAINLEVEL_OUTOF10: 0

## 2024-09-27 NOTE — PLAN OF CARE
Education / Communication  Ongoing As Appropriate      5. Keep Patients / Families Aware of Delays As Appropriate     6. Reinforce Discharge Teaching / Post  Procedure  Instructions (PRN)          PAIN MANAGEMENT  INTERDISCIPLINARY   Goal:Patient Return to Pre Procedure Comfort  Interventions     1.  Assess Baseline  Pain Level  and (PRN)     2.  Intra Procedure ;  Evaluation & Assessment Of  Pain  is Ongoing     3. Post procedure; Assess Pain Level Once Awake/ Prior  To Discharge     2.   Administer Analgesics as Ordered (PRN)      3.  Assess Effectiveness of Pain Management (PRN)       Re-Assess Patient   after all Interventions.   Assess Pain Level 30 - 60 Minutes After Pain Management Intervention.     4. Provide Discharge Teaching

## 2024-10-01 NOTE — TELEPHONE ENCOUNTER
9/25/24 claim in portal    For Pharmacy Admin Tracking Only    Program: [employer]  CPA in place:  No  Recommendation Provided To: Provider: 1 via Note to Provider and Pharmacy: 1  Intervention Detail: New Rx: 1, reason: Improve Adherence  Intervention Accepted By: Provider: 1 and Pharmacy: 1  Gap Closed?: Yes   Time Spent (min): 15

## 2024-10-24 ENCOUNTER — TELEPHONE (OUTPATIENT)
Dept: TELEMETRY | Age: 69
End: 2024-10-24

## 2024-10-24 NOTE — TELEPHONE ENCOUNTER
Patient due for annual CT Lung Screening. Reminder letter mailed.    Scan already ordered. Central Scheduling number provided.    Elisabet Dorado RN

## 2024-11-04 RX ORDER — LOSARTAN POTASSIUM AND HYDROCHLOROTHIAZIDE 25; 100 MG/1; MG/1
TABLET ORAL
Qty: 90 TABLET | Refills: 2 | Status: SHIPPED | OUTPATIENT
Start: 2024-11-04

## 2024-11-13 DIAGNOSIS — M47.817 LUMBOSACRAL SPONDYLOSIS WITHOUT MYELOPATHY: Chronic | ICD-10-CM

## 2024-11-13 RX ORDER — MELOXICAM 7.5 MG/1
7.5 TABLET ORAL DAILY
Qty: 90 TABLET | Refills: 0 | Status: SHIPPED | OUTPATIENT
Start: 2024-11-13

## 2024-11-13 RX ORDER — PREGABALIN 100 MG/1
100 CAPSULE ORAL 2 TIMES DAILY
Qty: 180 CAPSULE | Refills: 0 | Status: SHIPPED | OUTPATIENT
Start: 2024-11-13 | End: 2025-02-11

## 2024-11-13 NOTE — TELEPHONE ENCOUNTER
----- Message from Dr. Melchor Gaitan MD sent at 11/13/2024  8:44 AM EST -----  Contact: FELICIA [phone]  Is she taking mobic ? I believe she stopped all meds  Refill lyrica and mobic as before  ----- Message -----  From: Lino Peralta  Sent: 11/13/2024   8:24 AM EST  To: Melchor Gaitan MD    Caller states the patient has been unable to get up and out of bed due to being in so much pain, pt has not eaten for a couple days and caller is currently having to change pt in bed as she is unable to get up to use the restroom. Pt is requesting a pain medication to be sent to the below pharmacy to help manage the pain and allow her to be able to get up and out of bed for a few hours a day. Please advise     [facility] [address]  [address] - P [phone] - F [phone] [address], [facility] [address]  Phone: [phone]  Fax: [phone]

## 2024-11-18 ENCOUNTER — TELEPHONE (OUTPATIENT)
Dept: INTERNAL MEDICINE CLINIC | Age: 69
End: 2024-11-18

## 2024-11-18 DIAGNOSIS — M54.9 BACK PAIN, UNSPECIFIED BACK LOCATION, UNSPECIFIED BACK PAIN LATERALITY, UNSPECIFIED CHRONICITY: Primary | ICD-10-CM

## 2024-11-18 NOTE — TELEPHONE ENCOUNTER
----- Message from EDUARDO TIMMONS sent at 11/18/2024 10:12 AM EST -----  Contact: Levi [phone]    ----- Message -----  From: Melchor Gaitan MD  Sent: 11/17/2024   6:50 PM EST  To: Aleena Mcclain    Does he mean tylenol?  Obtain xray lumbar spine x 2 view   Call her  ----- Message -----  From: Elen Wilder MD  Sent: 11/14/2024   1:07 PM EST  To: Melchor Gaitan MD      ----- Message -----  From: Magdalena Perkins  Sent: 11/14/2024   1:06 PM EST  To: Elen Wilder MD    Caller Pt  states medication sent in does not help her with the pain from multiple back surgery. Caller states pt has not eaten in 5 days. Caller states he is trying his best not to have her admitted into the hospital. Caller states Vicodin 1000 mg 4 X a day is the only thing that works.     [facility]

## 2024-11-18 NOTE — TELEPHONE ENCOUNTER
----- Message from Dorothea RODRIGUEZ sent at 11/18/2024 10:47 AM EST -----  Contact: Levi  [phone]  Spouse requesting referral for patient to pain management for back pain at [facility]  #[phone] and fax #[phone]

## 2024-11-18 NOTE — TELEPHONE ENCOUNTER
Called pt regarding her pain issues that she recently complained about   She has lower back pain and unable to mobilize much     She has chronic DDD of lower spine and seen multiple pain mx in the past   Had Madeline, was on zanflex, lyrica and mobic in the past. She has planned for pain pump as well but she lost pain mx follow up appx 5 yrs ago and eventually stopped lyrica and zanfalex about a year ago as well as they did not help  Has been essentially wheelchair bound recently with minimal activity although not entirely from back issues    She was given mobic lyrica from our office last week which did not help and she went to ER at [facility] where imaging was reportedly neg and given prednisone and valium  and referred to pain mx     talks mostly on the phone rather than pt    Pts  is unhappy that we are not given narcotics to pt   I have noted that that is not the way to give narcotics for any pain   He was not happy about my discussion

## 2024-11-27 ENCOUNTER — TELEPHONE (OUTPATIENT)
Dept: PULMONOLOGY | Age: 69
End: 2024-11-27

## 2024-11-27 NOTE — TELEPHONE ENCOUNTER
Patient did not show for Follow up  with Dr. Garcia on 11/27/24.    Reason:  no show    This is patient's first no show.  Patient was ano show on: 11/27/24.      Patient did not reschedule.  Reschedule date:  n/a     LVM to R/S 11/27/24

## 2024-12-02 RX ORDER — TIZANIDINE 2 MG/1
TABLET ORAL
Qty: 30 TABLET | Refills: 2 | Status: SHIPPED | OUTPATIENT
Start: 2024-12-02

## 2024-12-19 ENCOUNTER — CARE COORDINATION (OUTPATIENT)
Dept: CASE MANAGEMENT | Age: 69
End: 2024-12-19

## 2024-12-19 ENCOUNTER — TELEPHONE (OUTPATIENT)
Dept: INTERNAL MEDICINE CLINIC | Age: 69
End: 2024-12-19

## 2024-12-19 NOTE — TELEPHONE ENCOUNTER
----- Message from Dr. Melchor Gaitan MD sent at 12/19/2024  1:33 PM EST -----  Contact: Muriel [phone]  Pt ot  ----- Message -----  From: Dorothea Delatorre  Sent: 12/19/2024  10:53 AM EST  To: Melchor Gaitan MD    [facility] wanting to know home care orders for patient.  Please advise

## 2024-12-19 NOTE — CARE COORDINATION
Care Transitions Note    Initial Call - Call within 2 business days of discharge: Yes    Attempted to reach patient for transitions of care follow up. Unable to reach patient.    Outreach Attempts:   HIPAA compliant voicemail left for patient. And spouse    Patient: Renay Lovett    Patient : 1955   MRN: [id number]    Reason for Admission: Generalized weakness and back pain.   Discharge Date: 21  RURS: No data recorded  Last Discharge Facility       Date Complaint Diagnosis Description Type Department Provider    21  Status post replacement of right shoulder joint Admission (Discharged) ELE OR Serge Kerns MD            Was this an external facility discharge? Yes. Discharge Date: . Facility Name:   [facility] with [facility].     Follow Up Appointment:   Patient has hospital follow up appointment scheduled greater than 14 days after discharge; HFU with PC.  Holidays affecting availability  Future Appointments         Provider Specialty Dept Phone    2025 3:40 PM Dalia Lloyd APRN - CNP Internal Medicine [phone]    2025 2:10 PM Melchor Gaitan MD Internal Medicine [phone]    2025 12:00 PM [facility] OP NURSING ROOM 1 IP Unit [phone]            Plan for follow-up call in 2-5 days     ALDEN Anaya, RN   [facility] Acute Care Coordinator  [phone]

## 2024-12-20 ENCOUNTER — CARE COORDINATION (OUTPATIENT)
Dept: CASE MANAGEMENT | Age: 69
End: 2024-12-20

## 2024-12-20 NOTE — CARE COORDINATION
Care Transitions Note    Initial Call - Call within 2 business days of discharge: Yes    Attempted to reach patient for transitions of care follow up. Unable to reach patient.    Outreach Attempts:   Multiple attempts to contact patient at phone numbers on file.     Patient: Renay Lovett    Patient : 1955   MRN: [id number]    Reason for Admission: generalized weakness  Discharge Date:   RURS: No data recorded  Last Discharge Facility       Date Complaint Diagnosis Description Type Department Provider    24  Generalized Weakness Admission (Discharged) [facility]             Was this an external facility discharge? Yes. Discharge Date: . Facility Name:   [facility] [place] [facility].     Follow Up Appointment:   Patient has hospital follow up appointment scheduled within 14 days of discharge.    Future Appointments         Provider Specialty Dept Phone    2025 3:40 PM Dalia Lloyd APRN - CNP Internal Medicine [phone]    2025 2:10 PM Melchor Gaitan MD Internal Medicine [phone]    2025 12:00 PM [facility] OP NURSING ROOM 1 IP Unit [phone]            Manager to determine      ALDEN Anaya, RN   [facility] Acute Care Coordinator  [phone]

## 2024-12-27 ENCOUNTER — TELEPHONE (OUTPATIENT)
Dept: INTERNAL MEDICINE CLINIC | Age: 69
End: 2024-12-27

## 2024-12-27 NOTE — TELEPHONE ENCOUNTER
----- Message from EDUARDO Mcah IAIN sent at 12/26/2024  8:24 AM EST -----  Contact: Dunia [phone]  Waiting for script to be signed.  ----- Message -----  From: Keagan March MD  Sent: 12/24/2024  11:37 AM EST  To: Aleena Mcclain    Ok  ----- Message -----  From: Dorothea Delatorre  Sent: 12/24/2024   9:04 AM EST  To: Keagan March MD    [facility] seen patient on Sunday 12/22/24 for PT eval, and requesting order for hospital bed with trapeze bar.  Patient is total assist, and very weak.  Please advise

## 2025-01-02 ENCOUNTER — TELEPHONE (OUTPATIENT)
Dept: INTERNAL MEDICINE CLINIC | Age: 70
End: 2025-01-02

## 2025-01-02 RX ORDER — DULOXETIN HYDROCHLORIDE 30 MG/1
30 CAPSULE, DELAYED RELEASE ORAL DAILY
Qty: 30 CAPSULE | Refills: 2 | Status: SHIPPED | OUTPATIENT
Start: 2025-01-02

## 2025-01-02 NOTE — TELEPHONE ENCOUNTER
----- Message from Dr. Elen Wilder MD sent at 1/2/2025 12:15 PM EST -----  No   That's too much  Stick with 100 bid  Ok on the other  ----- Message -----  From: Mandy Winchester MA  Sent: 1/2/2025  11:39 AM EST  To: MD Dr. JOHN Galindo had patient taking Lyrica 100 mg BID. Spouse states [facility] had patient taking Lyrica 200 MG BID and duloxetine 30 mg daily. Spouse is requesting a refill of both. Okay to refill the Lyrica 200 MG? Please advise.    [facility]

## 2025-01-03 ENCOUNTER — CARE COORDINATION (OUTPATIENT)
Dept: CARE COORDINATION | Age: 70
End: 2025-01-03

## 2025-01-03 NOTE — CARE COORDINATION
Ambulatory Care Coordination Note     1/3/2025 10:41 AM     Patient outreach attempt by this ACM today to offer care management services. ACM was unable to reach the patient by telephone today;   left voice message requesting a return phone call to this ACM. DC home from [facility] 12/18/24 with [facility]     ACM: Niki Velez RN         PCP/Specialist follow up:   Future Appointments         Provider Specialty Dept Phone    1/9/2025 3:40 PM Dalia Lloyd APRN - CNP Internal Medicine [phone]    2/25/2025 2:10 PM Melchor Gaitan MD Internal Medicine [phone]    4/2/2025 12:00 PM [facility] OP NURSING ROOM 1 IP Unit [phone]            Follow Up:   Plan for next AC outreach in approximately 1-2 days  to complete:  2nd follow up call needed . Post Acute-CTN referral to assess for CC needs

## 2025-01-06 ENCOUNTER — CARE COORDINATION (OUTPATIENT)
Dept: CARE COORDINATION | Age: 70
End: 2025-01-06

## 2025-01-06 DIAGNOSIS — J44.1 COPD WITH ACUTE EXACERBATION (HCC): Primary | ICD-10-CM

## 2025-01-06 PROCEDURE — 1111F DSCHRG MED/CURRENT MED MERGE: CPT | Performed by: INTERNAL MEDICINE

## 2025-01-06 SDOH — SOCIAL STABILITY: SOCIAL NETWORK: ARE YOU MARRIED, WIDOWED, DIVORCED, SEPARATED, NEVER MARRIED, OR LIVING WITH A PARTNER?: MARRIED

## 2025-01-06 SDOH — HEALTH STABILITY: MENTAL HEALTH
STRESS IS WHEN SOMEONE FEELS TENSE, NERVOUS, ANXIOUS, OR CAN'T SLEEP AT NIGHT BECAUSE THEIR MIND IS TROUBLED. HOW STRESSED ARE YOU?: TO SOME EXTENT

## 2025-01-06 SDOH — ECONOMIC STABILITY: FOOD INSECURITY: WITHIN THE PAST 12 MONTHS, THE FOOD YOU BOUGHT JUST DIDN'T LAST AND YOU DIDN'T HAVE MONEY TO GET MORE.: NEVER TRUE

## 2025-01-06 SDOH — ECONOMIC STABILITY: INCOME INSECURITY: HOW HARD IS IT FOR YOU TO PAY FOR THE VERY BASICS LIKE FOOD, HOUSING, MEDICAL CARE, AND HEATING?: NOT HARD AT ALL

## 2025-01-06 SDOH — HEALTH STABILITY: PHYSICAL HEALTH: ON AVERAGE, HOW MANY DAYS PER WEEK DO YOU ENGAGE IN MODERATE TO STRENUOUS EXERCISE (LIKE A BRISK WALK)?: 0 DAYS

## 2025-01-06 SDOH — HEALTH STABILITY: PHYSICAL HEALTH: ON AVERAGE, HOW MANY MINUTES DO YOU ENGAGE IN EXERCISE AT THIS LEVEL?: 0 MIN

## 2025-01-06 SDOH — ECONOMIC STABILITY: FOOD INSECURITY: WITHIN THE PAST 12 MONTHS, YOU WORRIED THAT YOUR FOOD WOULD RUN OUT BEFORE YOU GOT MONEY TO BUY MORE.: NEVER TRUE

## 2025-01-06 NOTE — CARE COORDINATION
Ambulatory Care Coordination Note     2025 3:10 PM     Patient Current Location:  [place]     This patient was received as a referral from Ambulatory Care Manager .    ACM contacted the spouse/partner by telephone. Verified name and  with spouse/partner as identifiers. Provided introduction to self, and explanation of the ACM role.   Spouse/Partner accepted care management services at this time.          ACM: Niki Velez RN     Challenges to be reviewed by the provider   Additional needs identified to be addressed with provider Yes  Patient is refusing to take several meds. Spouse is having increased difficulty with care at home. She is argumentative and continues to weaken, Not eating well also. She does not want to allow him to help clean her up. Follow up appt planned with you on 24. Spouse is open to palliative care referral. We also discussed hospice and what they can offer if needed. Getting her out to appoints is going to be difficult.                Method of communication with provider: chart routing.    Utilization: N/A - Initial Call     Care Summary Note: New referral for care coordination. I spoke with spouse Levi. There are several issues that have been identified for care coordination needs. Dede has has a decline in her health and seems to be progressive with her dementia per spouse. See above.     SW referral made- assist with aligning additional home supports. Spouse has not spoke with anyone at [facility] to assess for more community supports.   Assess for palliative/hospice referrals. Spouse is already open to palliative care. They may be interested in visiting physicians due to difficulty with transportation.   Pain and discomfort seems to be an issues  Argumentative/combative with spouse when he delivers care. Not taking meds/refuses.   Poor eating. Does not want cleaned up after accidents. Incontinent more.   [facility] active for SN. Not participating in PT per spouse.

## 2025-01-07 ENCOUNTER — CARE COORDINATION (OUTPATIENT)
Dept: CARE COORDINATION | Age: 70
End: 2025-01-07

## 2025-01-07 NOTE — CARE COORDINATION
Call to spouse to initiate SW referral for caregiver support. He reported that patient has not been eating, taking meds, refusing baths and continues to weaken. [facility] indicated that she was total care. He is unable to transport her to appointments. He will contact [facility] today for assistance in getting her in the car for appt on 1/9. He stated he will call 911 if she continues to refuse to eat. Patient requesting referral for palliative/hospice care.    ACP forms requested to be sent.

## 2025-01-08 ENCOUNTER — CARE COORDINATION (OUTPATIENT)
Dept: CARE COORDINATION | Age: 70
End: 2025-01-08

## 2025-01-08 NOTE — CARE COORDINATION
Follow up call with Levi. He reported that his son is there now with patient. He reported that patient has still not ate, drank or take meds. SW inquired if he would try to take patient to ER while son is there. He stated that he would rather take her to her appointment tomorrow. [facility] will be out tomorrow to assist with getting patient in the car.     Referral was requested by PCP for palliative care/hospice, whichever recommended. LORAINE will follow up.

## 2025-01-09 ENCOUNTER — APPOINTMENT (OUTPATIENT)
Dept: CT IMAGING | Age: 70
DRG: 065 | End: 2025-01-09
Payer: MEDICARE

## 2025-01-09 ENCOUNTER — APPOINTMENT (OUTPATIENT)
Dept: GENERAL RADIOLOGY | Age: 70
DRG: 065 | End: 2025-01-09
Payer: MEDICARE

## 2025-01-09 ENCOUNTER — HOSPITAL ENCOUNTER (INPATIENT)
Age: 70
LOS: 2 days | Discharge: ANOTHER HOSPITAL | DRG: 065 | End: 2025-01-11
Attending: STUDENT IN AN ORGANIZED HEALTH CARE EDUCATION/TRAINING PROGRAM | Admitting: INTERNAL MEDICINE
Payer: MEDICARE

## 2025-01-09 DIAGNOSIS — A41.9 SEPSIS WITHOUT ACUTE ORGAN DYSFUNCTION, DUE TO UNSPECIFIED ORGANISM (HCC): ICD-10-CM

## 2025-01-09 DIAGNOSIS — E87.6 HYPOKALEMIA: ICD-10-CM

## 2025-01-09 DIAGNOSIS — R41.0 DELIRIUM: ICD-10-CM

## 2025-01-09 DIAGNOSIS — E83.42 HYPOMAGNESEMIA: ICD-10-CM

## 2025-01-09 DIAGNOSIS — I63.9 STROKE (HCC): Primary | ICD-10-CM

## 2025-01-09 DIAGNOSIS — N39.0 URINARY TRACT INFECTION WITHOUT HEMATURIA, SITE UNSPECIFIED: ICD-10-CM

## 2025-01-09 PROBLEM — R41.82 AMS (ALTERED MENTAL STATUS): Status: ACTIVE | Noted: 2025-01-09

## 2025-01-09 LAB
ALBUMIN SERPL-MCNC: 2.7 G/DL (ref 3.4–5)
ALBUMIN/GLOB SERPL: 1 {RATIO} (ref 1.1–2.2)
ALP SERPL-CCNC: 63 U/L (ref 40–129)
ALT SERPL-CCNC: 6 U/L (ref 10–40)
AMORPH SED URNS QL MICRO: ABNORMAL /HPF
ANION GAP SERPL CALCULATED.3IONS-SCNC: 13 MMOL/L (ref 3–16)
ANISOCYTOSIS BLD QL SMEAR: ABNORMAL
AST SERPL-CCNC: 11 U/L (ref 15–37)
BACTERIA URNS QL MICRO: ABNORMAL /HPF
BASE EXCESS BLDV CALC-SCNC: 1.7 MMOL/L (ref -3–3)
BASOPHILS # BLD: 0 K/UL (ref 0–0.2)
BASOPHILS NFR BLD: 0 %
BILIRUB SERPL-MCNC: 0.8 MG/DL (ref 0–1)
BILIRUB UR QL STRIP.AUTO: ABNORMAL
BUN SERPL-MCNC: 10 MG/DL (ref 7–20)
BURR CELLS BLD QL SMEAR: ABNORMAL
CALCIUM SERPL-MCNC: 7.9 MG/DL (ref 8.3–10.6)
CHLORIDE SERPL-SCNC: 108 MMOL/L (ref 99–110)
CLARITY UR: CLEAR
CO2 BLDV-SCNC: 26 MMOL/L
CO2 SERPL-SCNC: 27 MMOL/L (ref 21–32)
COHGB MFR BLDV: 1.6 % (ref 0–1.5)
COLOR UR: YELLOW
CREAT SERPL-MCNC: 0.5 MG/DL (ref 0.6–1.2)
DEPRECATED RDW RBC AUTO: 17.4 % (ref 12.4–15.4)
EKG ATRIAL RATE: 123 BPM
EKG DIAGNOSIS: NORMAL
EKG P-R INTERVAL: 112 MS
EKG Q-T INTERVAL: 420 MS
EKG QRS DURATION: 76 MS
EKG QTC CALCULATION (BAZETT): 601 MS
EKG R AXIS: 59 DEGREES
EKG T AXIS: 18 DEGREES
EKG VENTRICULAR RATE: 123 BPM
EOSINOPHIL # BLD: 0 K/UL (ref 0–0.6)
EOSINOPHIL NFR BLD: 0 %
EPI CELLS #/AREA URNS HPF: ABNORMAL /HPF (ref 0–5)
FLUAV RNA RESP QL NAA+PROBE: NOT DETECTED
FLUBV RNA RESP QL NAA+PROBE: NOT DETECTED
GFR SERPLBLD CREATININE-BSD FMLA CKD-EPI: >90 ML/MIN/{1.73_M2}
GLUCOSE SERPL-MCNC: 110 MG/DL (ref 70–99)
GLUCOSE UR STRIP.AUTO-MCNC: NEGATIVE MG/DL
HCO3 BLDV-SCNC: 25 MMOL/L (ref 23–29)
HCT VFR BLD AUTO: 38.8 % (ref 36–48)
HGB BLD-MCNC: 12.6 G/DL (ref 12–16)
HGB UR QL STRIP.AUTO: NEGATIVE
HYALINE CASTS #/AREA URNS LPF: ABNORMAL /LPF (ref 0–2)
HYPOCHROMIA BLD QL SMEAR: ABNORMAL
KETONES UR STRIP.AUTO-MCNC: >=80 MG/DL
LACTATE BLDV-SCNC: 1.5 MMOL/L (ref 0.4–1.9)
LEUKOCYTE ESTERASE UR QL STRIP.AUTO: NEGATIVE
LIPASE SERPL-CCNC: 7 U/L (ref 13–60)
LYMPHOCYTES # BLD: 1 K/UL (ref 1–5.1)
LYMPHOCYTES NFR BLD: 8 %
MAGNESIUM SERPL-MCNC: 1.67 MG/DL (ref 1.8–2.4)
MCH RBC QN AUTO: 28.5 PG (ref 26–34)
MCHC RBC AUTO-ENTMCNC: 32.4 G/DL (ref 31–36)
MCV RBC AUTO: 88 FL (ref 80–100)
METHGB MFR BLDV: 0 %
MONOCYTES # BLD: 1.7 K/UL (ref 0–1.3)
MONOCYTES NFR BLD: 13 %
MUCOUS THREADS #/AREA URNS LPF: ABNORMAL /LPF
NEUTROPHILS # BLD: 10.3 K/UL (ref 1.7–7.7)
NEUTROPHILS NFR BLD: 79 %
NITRITE UR QL STRIP.AUTO: POSITIVE
O2 CT VFR BLDV CALC: 17 VOL %
O2 THERAPY: ABNORMAL
OVALOCYTES BLD QL SMEAR: ABNORMAL
PATH INTERP BLD-IMP: YES
PCO2 BLDV: 35.3 MMHG (ref 40–50)
PH BLDV: 7.47 [PH] (ref 7.35–7.45)
PH UR STRIP.AUTO: 6 [PH] (ref 5–8)
PLATELET # BLD AUTO: 420 K/UL (ref 135–450)
PLATELET BLD QL SMEAR: ABNORMAL
PMV BLD AUTO: 8.9 FL (ref 5–10.5)
PO2 BLDV: 58.2 MMHG (ref 25–40)
POIKILOCYTOSIS BLD QL SMEAR: ABNORMAL
POLYCHROMASIA BLD QL SMEAR: ABNORMAL
POTASSIUM SERPL-SCNC: 2.4 MMOL/L (ref 3.5–5.1)
PROT SERPL-MCNC: 5.3 G/DL (ref 6.4–8.2)
PROT UR STRIP.AUTO-MCNC: 100 MG/DL
RBC # BLD AUTO: 4.41 M/UL (ref 4–5.2)
RBC #/AREA URNS HPF: ABNORMAL /HPF (ref 0–4)
SAO2 % BLDV: 92 %
SARS-COV-2 RNA RESP QL NAA+PROBE: NOT DETECTED
SLIDE REVIEW: ABNORMAL
SODIUM SERPL-SCNC: 148 MMOL/L (ref 136–145)
SP GR UR STRIP.AUTO: 1.02 (ref 1–1.03)
TSH SERPL DL<=0.005 MIU/L-ACNC: 1.47 UIU/ML (ref 0.27–4.2)
UA COMPLETE W REFLEX CULTURE PNL UR: ABNORMAL
UA DIPSTICK W REFLEX MICRO PNL UR: YES
URN SPEC COLLECT METH UR: ABNORMAL
UROBILINOGEN UR STRIP-ACNC: 2 E.U./DL
WBC # BLD AUTO: 13 K/UL (ref 4–11)
WBC #/AREA URNS HPF: ABNORMAL /HPF (ref 0–5)

## 2025-01-09 PROCEDURE — 83690 ASSAY OF LIPASE: CPT

## 2025-01-09 PROCEDURE — 83605 ASSAY OF LACTIC ACID: CPT

## 2025-01-09 PROCEDURE — 6360000004 HC RX CONTRAST MEDICATION: Performed by: STUDENT IN AN ORGANIZED HEALTH CARE EDUCATION/TRAINING PROGRAM

## 2025-01-09 PROCEDURE — 93005 ELECTROCARDIOGRAM TRACING: CPT | Performed by: STUDENT IN AN ORGANIZED HEALTH CARE EDUCATION/TRAINING PROGRAM

## 2025-01-09 PROCEDURE — 96365 THER/PROPH/DIAG IV INF INIT: CPT

## 2025-01-09 PROCEDURE — 96366 THER/PROPH/DIAG IV INF ADDON: CPT

## 2025-01-09 PROCEDURE — 87086 URINE CULTURE/COLONY COUNT: CPT

## 2025-01-09 PROCEDURE — 82803 BLOOD GASES ANY COMBINATION: CPT

## 2025-01-09 PROCEDURE — 83735 ASSAY OF MAGNESIUM: CPT

## 2025-01-09 PROCEDURE — 87040 BLOOD CULTURE FOR BACTERIA: CPT

## 2025-01-09 PROCEDURE — 99285 EMERGENCY DEPT VISIT HI MDM: CPT

## 2025-01-09 PROCEDURE — 36415 COLL VENOUS BLD VENIPUNCTURE: CPT

## 2025-01-09 PROCEDURE — 87636 SARSCOV2 & INF A&B AMP PRB: CPT

## 2025-01-09 PROCEDURE — 93010 ELECTROCARDIOGRAM REPORT: CPT | Performed by: INTERNAL MEDICINE

## 2025-01-09 PROCEDURE — 6370000000 HC RX 637 (ALT 250 FOR IP): Performed by: STUDENT IN AN ORGANIZED HEALTH CARE EDUCATION/TRAINING PROGRAM

## 2025-01-09 PROCEDURE — 80053 COMPREHEN METABOLIC PANEL: CPT

## 2025-01-09 PROCEDURE — 96376 TX/PRO/DX INJ SAME DRUG ADON: CPT

## 2025-01-09 PROCEDURE — 1200000000 HC SEMI PRIVATE

## 2025-01-09 PROCEDURE — 96367 TX/PROPH/DG ADDL SEQ IV INF: CPT

## 2025-01-09 PROCEDURE — 74177 CT ABD & PELVIS W/CONTRAST: CPT

## 2025-01-09 PROCEDURE — 96361 HYDRATE IV INFUSION ADD-ON: CPT

## 2025-01-09 PROCEDURE — 2580000003 HC RX 258: Performed by: STUDENT IN AN ORGANIZED HEALTH CARE EDUCATION/TRAINING PROGRAM

## 2025-01-09 PROCEDURE — 84443 ASSAY THYROID STIM HORMONE: CPT

## 2025-01-09 PROCEDURE — 81001 URINALYSIS AUTO W/SCOPE: CPT

## 2025-01-09 PROCEDURE — 71260 CT THORAX DX C+: CPT

## 2025-01-09 PROCEDURE — 6360000002 HC RX W HCPCS: Performed by: STUDENT IN AN ORGANIZED HEALTH CARE EDUCATION/TRAINING PROGRAM

## 2025-01-09 PROCEDURE — 70450 CT HEAD/BRAIN W/O DYE: CPT

## 2025-01-09 PROCEDURE — 71045 X-RAY EXAM CHEST 1 VIEW: CPT

## 2025-01-09 PROCEDURE — 96375 TX/PRO/DX INJ NEW DRUG ADDON: CPT

## 2025-01-09 PROCEDURE — 85025 COMPLETE CBC W/AUTO DIFF WBC: CPT

## 2025-01-09 RX ORDER — ONDANSETRON 4 MG/1
4 TABLET, ORALLY DISINTEGRATING ORAL EVERY 8 HOURS PRN
Status: DISCONTINUED | OUTPATIENT
Start: 2025-01-09 | End: 2025-01-09

## 2025-01-09 RX ORDER — POTASSIUM CHLORIDE 7.45 MG/ML
10 INJECTION INTRAVENOUS
Status: COMPLETED | OUTPATIENT
Start: 2025-01-09 | End: 2025-01-09

## 2025-01-09 RX ORDER — SODIUM CHLORIDE 0.9 % (FLUSH) 0.9 %
5-40 SYRINGE (ML) INJECTION EVERY 12 HOURS SCHEDULED
Status: DISCONTINUED | OUTPATIENT
Start: 2025-01-09 | End: 2025-01-11 | Stop reason: HOSPADM

## 2025-01-09 RX ORDER — SODIUM CHLORIDE, SODIUM LACTATE, POTASSIUM CHLORIDE, AND CALCIUM CHLORIDE .6; .31; .03; .02 G/100ML; G/100ML; G/100ML; G/100ML
1000 INJECTION, SOLUTION INTRAVENOUS ONCE
Status: COMPLETED | OUTPATIENT
Start: 2025-01-09 | End: 2025-01-09

## 2025-01-09 RX ORDER — VANCOMYCIN 2 G/400ML
2000 INJECTION, SOLUTION INTRAVENOUS ONCE
Status: COMPLETED | OUTPATIENT
Start: 2025-01-09 | End: 2025-01-09

## 2025-01-09 RX ORDER — POLYETHYLENE GLYCOL 3350 17 G/17G
17 POWDER, FOR SOLUTION ORAL DAILY PRN
Status: DISCONTINUED | OUTPATIENT
Start: 2025-01-09 | End: 2025-01-11 | Stop reason: HOSPADM

## 2025-01-09 RX ORDER — ACETAMINOPHEN 650 MG/1
650 SUPPOSITORY RECTAL ONCE
Status: COMPLETED | OUTPATIENT
Start: 2025-01-09 | End: 2025-01-09

## 2025-01-09 RX ORDER — SODIUM CHLORIDE 9 MG/ML
INJECTION, SOLUTION INTRAVENOUS PRN
Status: DISCONTINUED | OUTPATIENT
Start: 2025-01-09 | End: 2025-01-11 | Stop reason: HOSPADM

## 2025-01-09 RX ORDER — PROCHLORPERAZINE MALEATE 10 MG
10 TABLET ORAL EVERY 8 HOURS PRN
Status: DISCONTINUED | OUTPATIENT
Start: 2025-01-09 | End: 2025-01-11 | Stop reason: HOSPADM

## 2025-01-09 RX ORDER — ACETAMINOPHEN 650 MG/1
650 SUPPOSITORY RECTAL EVERY 6 HOURS PRN
Status: DISCONTINUED | OUTPATIENT
Start: 2025-01-09 | End: 2025-01-11 | Stop reason: HOSPADM

## 2025-01-09 RX ORDER — IOPAMIDOL 755 MG/ML
75 INJECTION, SOLUTION INTRAVASCULAR
Status: COMPLETED | OUTPATIENT
Start: 2025-01-09 | End: 2025-01-09

## 2025-01-09 RX ORDER — PROCHLORPERAZINE EDISYLATE 5 MG/ML
10 INJECTION INTRAMUSCULAR; INTRAVENOUS EVERY 6 HOURS PRN
Status: DISCONTINUED | OUTPATIENT
Start: 2025-01-09 | End: 2025-01-11 | Stop reason: HOSPADM

## 2025-01-09 RX ORDER — POTASSIUM CHLORIDE 1500 MG/1
40 TABLET, EXTENDED RELEASE ORAL PRN
Status: DISCONTINUED | OUTPATIENT
Start: 2025-01-09 | End: 2025-01-11 | Stop reason: HOSPADM

## 2025-01-09 RX ORDER — MAGNESIUM SULFATE IN WATER 40 MG/ML
2000 INJECTION, SOLUTION INTRAVENOUS ONCE
Status: COMPLETED | OUTPATIENT
Start: 2025-01-09 | End: 2025-01-09

## 2025-01-09 RX ORDER — POTASSIUM CHLORIDE 7.45 MG/ML
10 INJECTION INTRAVENOUS PRN
Status: DISCONTINUED | OUTPATIENT
Start: 2025-01-09 | End: 2025-01-11 | Stop reason: HOSPADM

## 2025-01-09 RX ORDER — SODIUM CHLORIDE 0.9 % (FLUSH) 0.9 %
5-40 SYRINGE (ML) INJECTION PRN
Status: DISCONTINUED | OUTPATIENT
Start: 2025-01-09 | End: 2025-01-11 | Stop reason: HOSPADM

## 2025-01-09 RX ORDER — ENOXAPARIN SODIUM 100 MG/ML
40 INJECTION SUBCUTANEOUS DAILY
Status: DISCONTINUED | OUTPATIENT
Start: 2025-01-10 | End: 2025-01-11 | Stop reason: HOSPADM

## 2025-01-09 RX ORDER — MAGNESIUM SULFATE IN WATER 40 MG/ML
2000 INJECTION, SOLUTION INTRAVENOUS PRN
Status: DISCONTINUED | OUTPATIENT
Start: 2025-01-09 | End: 2025-01-11 | Stop reason: HOSPADM

## 2025-01-09 RX ORDER — ONDANSETRON 2 MG/ML
4 INJECTION INTRAMUSCULAR; INTRAVENOUS EVERY 6 HOURS PRN
Status: DISCONTINUED | OUTPATIENT
Start: 2025-01-09 | End: 2025-01-09

## 2025-01-09 RX ORDER — ACETAMINOPHEN 325 MG/1
650 TABLET ORAL EVERY 6 HOURS PRN
Status: DISCONTINUED | OUTPATIENT
Start: 2025-01-09 | End: 2025-01-11 | Stop reason: HOSPADM

## 2025-01-09 RX ADMIN — SODIUM CHLORIDE, POTASSIUM CHLORIDE, SODIUM LACTATE AND CALCIUM CHLORIDE 1000 ML: 600; 310; 30; 20 INJECTION, SOLUTION INTRAVENOUS at 14:52

## 2025-01-09 RX ADMIN — MAGNESIUM SULFATE HEPTAHYDRATE 2000 MG: 40 INJECTION, SOLUTION INTRAVENOUS at 16:56

## 2025-01-09 RX ADMIN — POTASSIUM CHLORIDE 10 MEQ: 7.46 INJECTION, SOLUTION INTRAVENOUS at 16:07

## 2025-01-09 RX ADMIN — CEFEPIME 2000 MG: 2 INJECTION, POWDER, FOR SOLUTION INTRAVENOUS at 14:52

## 2025-01-09 RX ADMIN — POTASSIUM CHLORIDE 10 MEQ: 7.46 INJECTION, SOLUTION INTRAVENOUS at 18:03

## 2025-01-09 RX ADMIN — POTASSIUM CHLORIDE 10 MEQ: 7.46 INJECTION, SOLUTION INTRAVENOUS at 19:12

## 2025-01-09 RX ADMIN — VANCOMYCIN 2000 MG: 2 INJECTION, SOLUTION INTRAVENOUS at 16:06

## 2025-01-09 RX ADMIN — POTASSIUM CHLORIDE 10 MEQ: 7.46 INJECTION, SOLUTION INTRAVENOUS at 16:57

## 2025-01-09 RX ADMIN — ACETAMINOPHEN 650 MG: 650 SUPPOSITORY RECTAL at 14:52

## 2025-01-09 RX ADMIN — IOPAMIDOL 75 ML: 755 INJECTION, SOLUTION INTRAVENOUS at 15:26

## 2025-01-09 ASSESSMENT — LIFESTYLE VARIABLES
HOW MANY STANDARD DRINKS CONTAINING ALCOHOL DO YOU HAVE ON A TYPICAL DAY: PATIENT UNABLE TO ANSWER
HOW OFTEN DO YOU HAVE A DRINK CONTAINING ALCOHOL: PATIENT UNABLE TO ANSWER

## 2025-01-09 ASSESSMENT — PAIN - FUNCTIONAL ASSESSMENT: PAIN_FUNCTIONAL_ASSESSMENT: ADULT NONVERBAL PAIN SCALE (NPVS)

## 2025-01-09 NOTE — ED NOTES
Pts  reports pt hasn't had meds since last Thursday. DC from  on Thanksgiving. DC from [facility] 12/18.  reports pt has been the way she is presenting for a week but has declining for the past 3 years

## 2025-01-09 NOTE — ED PROVIDER NOTES
[facility] EMERGENCY DEPARTMENT  EMERGENCY DEPARTMENT ENCOUNTER      Pt Name: Renay Lovett  MRN: [id number]  Birthdate 1955  Date of evaluation: 1/9/2025  Provider: YULISA ZHAO MD     CHIEF COMPLAINT       Chief Complaint   Patient presents with    Altered Mental Status     Pt arrives via EMS for AMS. EMS reports  called and states pt hasn't ate/ drank in a week and husnad thinks its psych related. EMS states she answered yes/ mo questions but had concerns for sepsis          HISTORY OF PRESENT ILLNESS   (Location/Symptom, Timing/Onset, Context/Setting, Quality, Duration, Modifying Factors, Severity) Note limiting factors.   I wore appropriate PPE for the entirety of this encounter.      HPI    Renay Lovett is a 69 y.o. female who presents to the emergency department for altered mental status.  HPI limited by patient's delirium and supported by  and EMS.  Per report patient has been altered for a week with little to no oral intake.   suspected this was due to psych/\"mental issues\" and so did not come to the hospital until she was post to come for a appointment and he could not convince her to get up.  Patient is able to answer yes or no to some questions and says no to chest pain but otherwise unable to provide history.    Nursing Notes were reviewed.  Limitations to history: As above  Outside historians:    REVIEW OF SYSTEMS     Review of Systems as documented in HPI above.     PAST MEDICAL HISTORY     Past Medical History:   Diagnosis Date    Abnormal CT scan, chest     COPD (chronic obstructive pulmonary disease) (HCC)     Decreased diffusion capacity of lung     Depression     Dyspnea     Hypertension     Lumbosacral spondylosis without myelopathy 5/9/2016    Osteopenia 12/31/2020    Oxygen dependent     2 L/PRN (usually uses O2)    Pneumonia     Restrictive lung disease     Tobacco abuse     Well controlled type 2 diabetes mellitus (HCC) 10/26/2018       SURGICAL HISTORY   causing a delirium and associated hypokalemia and hypomagnesemia, not consistent with thyroid dysfunction, pancreatitis, imaging with CT head is negative for acute intracranial process though does note areas that are consistent with chronic changes.  CT abdomen/pelvis with no acute process, noted trace right-sided pleural effusion CT chest for PE negative for PE but with right pleural effusion is a slight chest x-ray the with no acute process but suspected mild vascular congestion on radiologist read.  Patient received IV fluids, cefepime and vancomycin for antibiotic coverage, potassium and magnesium replacement IV due to altered mental status and rectal Tylenol.  Patient will be admitted for further management of sepsis [DG]   1633 On reevaluation about 10 minutes ago patient's physical exam was consistent with presentation and she is laying supine with no acute distress, even unlabored respirations, not oriented but moving all extremities.   at bedside updated on patient presentation, labs, imaging and plan for admission. [DG]      ED Course User Index  [DG] Gasper Vasquez MD     Total critical care time today provided was 39 minutes. This excludes seperately billable procedure. Critical care time provided for sepsis with delirium that required close evaluation and/or intervention with concern for patient decompensation.      ED Medications managed:  Medications   vancomycin (VANCOCIN) 2000 mg in 400 mL IVPB (2,000 mg IntraVENous New Bag 1/9/25 1606)   potassium chloride 10 mEq/100 mL IVPB (Peripheral Line) (10 mEq IntraVENous New Bag 1/9/25 1607)   magnesium sulfate 2000 mg in 50 mL IVPB premix (has no administration in time range)   lactated ringers bolus 1,000 mL (0 mLs IntraVENous Stopped 1/9/25 1537)   ceFEPIme (MAXIPIME) 2,000 mg in sodium chloride 0.9 % 100 mL IVPB (mini-bag) ( IntraVENous Stopped 1/9/25 1521)   acetaminophen (TYLENOL) suppository 650 mg (650 mg Rectal Given 1/9/25 1452)   iopamidol

## 2025-01-10 ENCOUNTER — APPOINTMENT (OUTPATIENT)
Dept: MRI IMAGING | Age: 70
DRG: 065 | End: 2025-01-10
Payer: MEDICARE

## 2025-01-10 PROBLEM — E83.42 HYPOMAGNESEMIA: Status: ACTIVE | Noted: 2025-01-10

## 2025-01-10 PROBLEM — I63.9 STROKE (HCC): Status: ACTIVE | Noted: 2025-01-10

## 2025-01-10 PROBLEM — G93.40 ACUTE ENCEPHALOPATHY: Status: ACTIVE | Noted: 2025-01-10

## 2025-01-10 PROBLEM — E87.6 HYPOKALEMIA: Status: ACTIVE | Noted: 2025-01-10

## 2025-01-10 PROBLEM — R47.01 APHASIA: Status: ACTIVE | Noted: 2025-01-10

## 2025-01-10 PROBLEM — F03.90 DEMENTIA (HCC): Status: ACTIVE | Noted: 2025-01-10

## 2025-01-10 PROBLEM — N39.0 URINARY TRACT INFECTION WITHOUT HEMATURIA: Status: ACTIVE | Noted: 2025-01-10

## 2025-01-10 LAB
ALBUMIN SERPL-MCNC: 2.5 G/DL (ref 3.4–5)
ALBUMIN/GLOB SERPL: 1 {RATIO} (ref 1.1–2.2)
ALP SERPL-CCNC: 57 U/L (ref 40–129)
ALT SERPL-CCNC: <5 U/L (ref 10–40)
AMMONIA PLAS-SCNC: 33 UMOL/L (ref 11–51)
ANION GAP SERPL CALCULATED.3IONS-SCNC: 11 MMOL/L (ref 3–16)
AST SERPL-CCNC: 15 U/L (ref 15–37)
BACTERIA BLD CULT ORG #2: NORMAL
BACTERIA BLD CULT: NORMAL
BASE EXCESS BLDA CALC-SCNC: -0.7 MMOL/L (ref -3–3)
BASOPHILS # BLD: 0 K/UL (ref 0–0.2)
BASOPHILS NFR BLD: 0.4 %
BILIRUB SERPL-MCNC: 0.6 MG/DL (ref 0–1)
BUN SERPL-MCNC: 10 MG/DL (ref 7–20)
CALCIUM SERPL-MCNC: 7.7 MG/DL (ref 8.3–10.6)
CHLORIDE SERPL-SCNC: 111 MMOL/L (ref 99–110)
CHOLEST SERPL-MCNC: 99 MG/DL (ref 0–199)
CO2 BLDA-SCNC: 24.2 MMOL/L
CO2 SERPL-SCNC: 24 MMOL/L (ref 21–32)
COHGB MFR BLDA: 0.5 % (ref 0–1.5)
CREAT SERPL-MCNC: 0.4 MG/DL (ref 0.6–1.2)
DEPRECATED RDW RBC AUTO: 17.4 % (ref 12.4–15.4)
EOSINOPHIL # BLD: 0.1 K/UL (ref 0–0.6)
EOSINOPHIL NFR BLD: 0.9 %
GFR SERPLBLD CREATININE-BSD FMLA CKD-EPI: >90 ML/MIN/{1.73_M2}
GLUCOSE BLD-MCNC: 86 MG/DL (ref 70–99)
GLUCOSE SERPL-MCNC: 83 MG/DL (ref 70–99)
HCO3 BLDA-SCNC: 23.1 MMOL/L (ref 21–29)
HCT VFR BLD AUTO: 36.4 % (ref 36–48)
HDLC SERPL-MCNC: 30 MG/DL (ref 40–60)
HGB BLD-MCNC: 12 G/DL (ref 12–16)
HGB BLDA-MCNC: 12.6 G/DL (ref 12–16)
LACTATE BLDV-SCNC: 1.6 MMOL/L (ref 0.4–1.9)
LDLC SERPL CALC-MCNC: 50 MG/DL
LYMPHOCYTES # BLD: 0.8 K/UL (ref 1–5.1)
LYMPHOCYTES NFR BLD: 7.2 %
MAGNESIUM SERPL-MCNC: 2.41 MG/DL (ref 1.8–2.4)
MCH RBC QN AUTO: 29.1 PG (ref 26–34)
MCHC RBC AUTO-ENTMCNC: 33 G/DL (ref 31–36)
MCV RBC AUTO: 88.3 FL (ref 80–100)
METHGB MFR BLDA: 0.1 %
MONOCYTES # BLD: 1.5 K/UL (ref 0–1.3)
MONOCYTES NFR BLD: 13.4 %
NEUTROPHILS # BLD: 8.6 K/UL (ref 1.7–7.7)
NEUTROPHILS NFR BLD: 78.1 %
O2 THERAPY: ABNORMAL
PATH INTERP BLD-IMP: NORMAL
PCO2 BLDA: 35.4 MMHG (ref 35–45)
PERFORMED ON: NORMAL
PH BLDA: 7.43 [PH] (ref 7.35–7.45)
PLATELET # BLD AUTO: 397 K/UL (ref 135–450)
PMV BLD AUTO: 8.8 FL (ref 5–10.5)
PO2 BLDA: 52.8 MMHG (ref 75–108)
POTASSIUM SERPL-SCNC: 2.8 MMOL/L (ref 3.5–5.1)
POTASSIUM SERPL-SCNC: 4 MMOL/L (ref 3.5–5.1)
PROT SERPL-MCNC: 5.1 G/DL (ref 6.4–8.2)
RBC # BLD AUTO: 4.12 M/UL (ref 4–5.2)
SAO2 % BLDA: 88.6 %
SODIUM SERPL-SCNC: 146 MMOL/L (ref 136–145)
TRIGL SERPL-MCNC: 97 MG/DL (ref 0–150)
TSH SERPL DL<=0.005 MIU/L-ACNC: 1.87 UIU/ML (ref 0.27–4.2)
VLDLC SERPL CALC-MCNC: 19 MG/DL
WBC # BLD AUTO: 11 K/UL (ref 4–11)

## 2025-01-10 PROCEDURE — 97162 PT EVAL MOD COMPLEX 30 MIN: CPT

## 2025-01-10 PROCEDURE — 97530 THERAPEUTIC ACTIVITIES: CPT

## 2025-01-10 PROCEDURE — 36600 WITHDRAWAL OF ARTERIAL BLOOD: CPT

## 2025-01-10 PROCEDURE — 85025 COMPLETE CBC W/AUTO DIFF WBC: CPT

## 2025-01-10 PROCEDURE — 70544 MR ANGIOGRAPHY HEAD W/O DYE: CPT

## 2025-01-10 PROCEDURE — 70549 MR ANGIOGRAPH NECK W/O&W/DYE: CPT

## 2025-01-10 PROCEDURE — 83735 ASSAY OF MAGNESIUM: CPT

## 2025-01-10 PROCEDURE — 2500000003 HC RX 250 WO HCPCS: Performed by: INTERNAL MEDICINE

## 2025-01-10 PROCEDURE — 1200000000 HC SEMI PRIVATE

## 2025-01-10 PROCEDURE — 82803 BLOOD GASES ANY COMBINATION: CPT

## 2025-01-10 PROCEDURE — 83036 HEMOGLOBIN GLYCOSYLATED A1C: CPT

## 2025-01-10 PROCEDURE — 6360000004 HC RX CONTRAST MEDICATION: Performed by: STUDENT IN AN ORGANIZED HEALTH CARE EDUCATION/TRAINING PROGRAM

## 2025-01-10 PROCEDURE — 82746 ASSAY OF FOLIC ACID SERUM: CPT

## 2025-01-10 PROCEDURE — 84132 ASSAY OF SERUM POTASSIUM: CPT

## 2025-01-10 PROCEDURE — 70553 MRI BRAIN STEM W/O & W/DYE: CPT

## 2025-01-10 PROCEDURE — 94640 AIRWAY INHALATION TREATMENT: CPT

## 2025-01-10 PROCEDURE — 80053 COMPREHEN METABOLIC PANEL: CPT

## 2025-01-10 PROCEDURE — 97166 OT EVAL MOD COMPLEX 45 MIN: CPT

## 2025-01-10 PROCEDURE — 36415 COLL VENOUS BLD VENIPUNCTURE: CPT

## 2025-01-10 PROCEDURE — 6370000000 HC RX 637 (ALT 250 FOR IP)

## 2025-01-10 PROCEDURE — A9579 GAD-BASE MR CONTRAST NOS,1ML: HCPCS | Performed by: STUDENT IN AN ORGANIZED HEALTH CARE EDUCATION/TRAINING PROGRAM

## 2025-01-10 PROCEDURE — 83605 ASSAY OF LACTIC ACID: CPT

## 2025-01-10 PROCEDURE — 99233 SBSQ HOSP IP/OBS HIGH 50: CPT | Performed by: INTERNAL MEDICINE

## 2025-01-10 PROCEDURE — 84425 ASSAY OF VITAMIN B-1: CPT

## 2025-01-10 PROCEDURE — 99222 1ST HOSP IP/OBS MODERATE 55: CPT | Performed by: STUDENT IN AN ORGANIZED HEALTH CARE EDUCATION/TRAINING PROGRAM

## 2025-01-10 PROCEDURE — 80061 LIPID PANEL: CPT

## 2025-01-10 PROCEDURE — 6360000002 HC RX W HCPCS: Performed by: STUDENT IN AN ORGANIZED HEALTH CARE EDUCATION/TRAINING PROGRAM

## 2025-01-10 PROCEDURE — 84443 ASSAY THYROID STIM HORMONE: CPT

## 2025-01-10 PROCEDURE — 82140 ASSAY OF AMMONIA: CPT

## 2025-01-10 PROCEDURE — 6370000000 HC RX 637 (ALT 250 FOR IP): Performed by: INTERNAL MEDICINE

## 2025-01-10 PROCEDURE — 2700000000 HC OXYGEN THERAPY PER DAY

## 2025-01-10 PROCEDURE — 82607 VITAMIN B-12: CPT

## 2025-01-10 PROCEDURE — 6360000002 HC RX W HCPCS: Performed by: INTERNAL MEDICINE

## 2025-01-10 PROCEDURE — 94761 N-INVAS EAR/PLS OXIMETRY MLT: CPT

## 2025-01-10 RX ORDER — THIAMINE HYDROCHLORIDE 100 MG/ML
500 INJECTION, SOLUTION INTRAMUSCULAR; INTRAVENOUS 3 TIMES DAILY
Status: DISCONTINUED | OUTPATIENT
Start: 2025-01-10 | End: 2025-01-11 | Stop reason: HOSPADM

## 2025-01-10 RX ORDER — ASPIRIN 81 MG/1
324 TABLET, CHEWABLE ORAL DAILY
Status: DISCONTINUED | OUTPATIENT
Start: 2025-01-10 | End: 2025-01-10

## 2025-01-10 RX ORDER — ASPIRIN 81 MG/1
81 TABLET ORAL DAILY
Status: DISCONTINUED | OUTPATIENT
Start: 2025-01-11 | End: 2025-01-11 | Stop reason: HOSPADM

## 2025-01-10 RX ORDER — PREGABALIN 100 MG/1
100 CAPSULE ORAL 2 TIMES DAILY
Status: DISCONTINUED | OUTPATIENT
Start: 2025-01-10 | End: 2025-01-11 | Stop reason: HOSPADM

## 2025-01-10 RX ORDER — HYDROCHLOROTHIAZIDE 25 MG/1
25 TABLET ORAL DAILY
Status: DISCONTINUED | OUTPATIENT
Start: 2025-01-10 | End: 2025-01-11 | Stop reason: HOSPADM

## 2025-01-10 RX ORDER — METOPROLOL TARTRATE 50 MG
50 TABLET ORAL 2 TIMES DAILY
Status: DISCONTINUED | OUTPATIENT
Start: 2025-01-10 | End: 2025-01-11 | Stop reason: HOSPADM

## 2025-01-10 RX ORDER — DULOXETIN HYDROCHLORIDE 30 MG/1
30 CAPSULE, DELAYED RELEASE ORAL DAILY
Status: DISCONTINUED | OUTPATIENT
Start: 2025-01-10 | End: 2025-01-11 | Stop reason: HOSPADM

## 2025-01-10 RX ORDER — IPRATROPIUM BROMIDE AND ALBUTEROL SULFATE 2.5; .5 MG/3ML; MG/3ML
1 SOLUTION RESPIRATORY (INHALATION)
Status: DISCONTINUED | OUTPATIENT
Start: 2025-01-10 | End: 2025-01-11 | Stop reason: HOSPADM

## 2025-01-10 RX ORDER — LOSARTAN POTASSIUM AND HYDROCHLOROTHIAZIDE 25; 100 MG/1; MG/1
1 TABLET ORAL DAILY
Status: DISCONTINUED | OUTPATIENT
Start: 2025-01-10 | End: 2025-01-10 | Stop reason: SDUPTHER

## 2025-01-10 RX ORDER — LOSARTAN POTASSIUM 100 MG/1
100 TABLET ORAL DAILY
Status: DISCONTINUED | OUTPATIENT
Start: 2025-01-10 | End: 2025-01-11 | Stop reason: HOSPADM

## 2025-01-10 RX ORDER — LORAZEPAM 2 MG/ML
0.5 INJECTION INTRAMUSCULAR EVERY 6 HOURS PRN
Status: DISCONTINUED | OUTPATIENT
Start: 2025-01-10 | End: 2025-01-11 | Stop reason: HOSPADM

## 2025-01-10 RX ORDER — DILTIAZEM HYDROCHLORIDE 120 MG/1
120 CAPSULE, COATED, EXTENDED RELEASE ORAL DAILY
Status: DISCONTINUED | OUTPATIENT
Start: 2025-01-10 | End: 2025-01-11 | Stop reason: HOSPADM

## 2025-01-10 RX ORDER — ATORVASTATIN CALCIUM 40 MG/1
40 TABLET, FILM COATED ORAL NIGHTLY
Status: DISCONTINUED | OUTPATIENT
Start: 2025-01-10 | End: 2025-01-11 | Stop reason: HOSPADM

## 2025-01-10 RX ORDER — IPRATROPIUM BROMIDE AND ALBUTEROL SULFATE 2.5; .5 MG/3ML; MG/3ML
1 SOLUTION RESPIRATORY (INHALATION) EVERY 4 HOURS
Status: DISCONTINUED | OUTPATIENT
Start: 2025-01-10 | End: 2025-01-10

## 2025-01-10 RX ORDER — ASPIRIN 81 MG/1
324 TABLET, CHEWABLE ORAL ONCE
Status: DISCONTINUED | OUTPATIENT
Start: 2025-01-10 | End: 2025-01-11 | Stop reason: HOSPADM

## 2025-01-10 RX ADMIN — THIAMINE HYDROCHLORIDE 500 MG: 100 INJECTION, SOLUTION INTRAMUSCULAR; INTRAVENOUS at 21:53

## 2025-01-10 RX ADMIN — Medication 10 ML: at 09:51

## 2025-01-10 RX ADMIN — GADOTERIDOL 15 ML: 279.3 INJECTION, SOLUTION INTRAVENOUS at 20:03

## 2025-01-10 RX ADMIN — Medication 10 ML: at 21:53

## 2025-01-10 RX ADMIN — DILTIAZEM HYDROCHLORIDE 120 MG: 120 CAPSULE, COATED, EXTENDED RELEASE ORAL at 12:48

## 2025-01-10 RX ADMIN — METOPROLOL TARTRATE 50 MG: 50 TABLET, FILM COATED ORAL at 12:48

## 2025-01-10 RX ADMIN — Medication 1 TABLET: at 12:48

## 2025-01-10 RX ADMIN — POTASSIUM CHLORIDE 10 MEQ: 7.46 INJECTION, SOLUTION INTRAVENOUS at 09:51

## 2025-01-10 RX ADMIN — ENOXAPARIN SODIUM 40 MG: 100 INJECTION SUBCUTANEOUS at 09:51

## 2025-01-10 RX ADMIN — POTASSIUM CHLORIDE 10 MEQ: 7.46 INJECTION, SOLUTION INTRAVENOUS at 13:00

## 2025-01-10 RX ADMIN — IPRATROPIUM BROMIDE AND ALBUTEROL SULFATE 1 DOSE: 2.5; .5 SOLUTION RESPIRATORY (INHALATION) at 21:53

## 2025-01-10 RX ADMIN — Medication 1 TABLET: at 21:52

## 2025-01-10 RX ADMIN — METOPROLOL TARTRATE 50 MG: 50 TABLET, FILM COATED ORAL at 21:52

## 2025-01-10 RX ADMIN — POTASSIUM CHLORIDE 10 MEQ: 7.46 INJECTION, SOLUTION INTRAVENOUS at 10:52

## 2025-01-10 RX ADMIN — POTASSIUM CHLORIDE 10 MEQ: 7.46 INJECTION, SOLUTION INTRAVENOUS at 15:20

## 2025-01-10 RX ADMIN — IPRATROPIUM BROMIDE AND ALBUTEROL SULFATE 1 DOSE: 2.5; .5 SOLUTION RESPIRATORY (INHALATION) at 12:21

## 2025-01-10 RX ADMIN — LOSARTAN POTASSIUM 100 MG: 100 TABLET, FILM COATED ORAL at 12:48

## 2025-01-10 RX ADMIN — POTASSIUM CHLORIDE 10 MEQ: 7.46 INJECTION, SOLUTION INTRAVENOUS at 12:01

## 2025-01-10 RX ADMIN — POTASSIUM CHLORIDE 10 MEQ: 7.46 INJECTION, SOLUTION INTRAVENOUS at 14:24

## 2025-01-10 RX ADMIN — HYDROCHLOROTHIAZIDE 25 MG: 25 TABLET ORAL at 12:48

## 2025-01-10 NOTE — CONSULTS
Inpatient Neurology Consult  [facility] Neurology  Miguel Lombardi MD    Renay Lovett  1955    Date of Service: 1/10/2025    Referring Physician: Denise Pappas DO    Reason for the consult and CC: altered mental status    HPI:   Renay Lovett is a 69 y.o. female who  has a past medical history of Abnormal CT scan, chest, COPD (chronic obstructive pulmonary disease) (HCC), Decreased diffusion capacity of lung, Depression, Dyspnea, Hypertension, Lumbosacral spondylosis without myelopathy, Osteopenia, Oxygen dependent, Pneumonia, Restrictive lung disease, Tobacco abuse, and Well controlled type 2 diabetes mellitus (HCC). Admitted for altered mental status, electrolyte imbalance. She was recently admitted to  with HOLLI, acute on chronic back pain with questionable enhancing foci on CT lumbar spine, mild leukocytosis in the setting of steroid use, concern for atrial fibrillation, hyponatremia.      She is unable to provide history. She does not know that she is in a hospital nor why she is in a hospital. I called her  who reports that she developed gradual progressive memory loss starting 2 years ago. Sometimes she mentions that she sees animals in the yard that are not really there. This did not appear to bother her. About 9 months ago she lost the ability to manage her own medications so  took over medication management.  brought her to the hospital because she was refusing to eat and drink. She stopped communicating as she did before. Her responses for the past 1 week were single words and not always sensible. No known history of stroke or seizure.     Her roommate in the hospital reports that she does not hear her speak at all when no one is in the room.     I personally reviewed and updated social history, past medical history, medications, allergy, surgical history, and family history as documented in the patient's electronic health records.     ROS: 10-14 ROS reviewed with the  patient/nurse/family which were unremarkable except mentioned in H&P.    Physical Examination  Mental Status:   Alert. Completely disoriented. Inattentive. Oppositional behavior (responds no to many requests to follows commands). Follows simple command. Fluency markedly reduced speaking with single words. Repetition relatively preserved. Unable to name simple objects. Food placed in front of her is untouched. Regards examiner on both sides.     Cranial Nerves:  II: Visual fields were normal to confrontation and visual acuity was good for face to face and TV distance. PERRL.   III, IV, VI: Ocular motility was full and there was no nystagmus.    V: Facial sensation was normal. Jaw opening was symmetric.    VII: Facial strength symmetrically intact and the speech was clear.   VIII: Hard of hearing  IX, X: Palate normal.  XI: Shoulder shrug and head turning strength were normal.    XII: Tongue protrusion normal. No atrophy or fasciculations.     Motor: Normal muscle bulk. +paratonia. Moving BUE and BLE at least greater than antigravity.     Sensation: responsive to light touch in bilateral upper and lower extremities.     Deep tendon reflexes: 2/4 symmetric biceps and patella. Toes down going bilateral.      Coordination: No tremor, myoclonus observed. Finger nose finger testing accurate.     Data Reviewed:   I independently interpreted 1/9 NCHCT = confluent white matter disease, cerebral atrophy. No acute findings.   1/9 CT C/A/P trace pleural effusion right, colonic diverticulosis, hepatic steatosis  I reviewed the lab results. Low K. Mildly high Na. Low mag now high after replacement. Low albumin. Mild leukocytosis resolved. UA +nitrite and negative LE. TSH nl. Ammonia nl. ABG nl CO2, low O2. Bcx ngtd.     Assessment:   Acute encephalopathy and aphasia duration ~ 1 week overlying chronic progressive dementia requiring assistance for at least some iADLs for at least the past 9 months. Ddx includes stroke, seizure,

## 2025-01-10 NOTE — PROGRESS NOTES
Consult has been called to Dr. lino on 1/10/25. Spoke with dr lino via perfect serve. 3:37 PM    Magalis Esteban  1/10/2025

## 2025-01-10 NOTE — PROGRESS NOTES
Progress Note    Admit Date:  1/9/2025    Subjective:  Ms. Lovett was admitted for altered mental status. She is somewhat sleepy this am but answers questions.  She has known COPD. She is not on home oxygen. She has DM and HTN.    This am per nursing she has been sleepy off an on. I ordered an ABG  that did not show any hypercarbia.     Objective:   BP (!) 172/82   Pulse 55   Temp 97.2 °F (36.2 °C) (Oral)   Resp 16   Ht 1.753 m (5' 9\")   Wt 75.4 kg (166 lb 3.2 oz)   SpO2 100%   BMI 24.54 kg/m²        Intake/Output Summary (Last 24 hours) at 1/10/2025 1155  Last data filed at 1/10/2025 0621  Gross per 24 hour   Intake 1651.62 ml   Output 200 ml   Net 1451.62 ml       Physical Exam:    General appearance: sleepy but wakes up and answers some questions   Head: Normocephalic, without obvious abnormality, atraumatic  Eyes: conjunctivae/corneas clear. PERRL, EOM's intact.  Neck: no adenopathy, no carotid bruit, no JVD, supple, symmetrical, trachea midline and thyroid not enlarged, symmetric, no tenderness/mass/nodules  Lungs: clear to auscultation bilaterally  Heart: Tachycardia and  regular rhythm rhythm, S1, S2 normal, no murmur, click, rub or gallop  Abdomen: soft, non-tender; bowel sounds normal; no masses,  no organomegaly  Extremities: extremities normal, atraumatic, no cyanosis or edema  Pulses: 2+ and symmetric  Skin: Skin color, texture, turgor normal. No rashes or lesions  Neurologic: Grossly normal. Oriented  x 1.     Scheduled Meds:   calcium carb-cholecalciferol  1 tablet Oral BID    dilTIAZem  120 mg Oral Daily    DULoxetine  30 mg Oral Daily    ipratropium 0.5 mg-albuterol 2.5 mg  1 Dose Inhalation Q4H    metFORMIN  500 mg Oral Daily    metoprolol tartrate  50 mg Oral BID    pregabalin  100 mg Oral BID    tiZANidine  2 mg Oral Once    denosumab  60 mg SubCUTAneous Once    losartan  100 mg Oral Daily    And    hydroCHLOROthiazide  25 mg Oral Daily    sodium chloride flush  5-40 mL IntraVENous 2 times        Assessment:  Principal Problem:    AMS (altered mental status)  Active Problems:    Chronic systolic (congestive) heart failure    HTN (hypertension)    Type 2 diabetes mellitus without complication, without long-term current use of insulin (HCC)    Major depressive disorder with single episode, in partial remission (HCC)    Chronic respiratory failure with hypoxia    COPD, mild (HCC)  Resolved Problems:    * No resolved hospital problems. *      Plan:      # Altered mental status.  Patient came to ER with some alteration of mental status.  She is a very poor historian.  She was only oriented x 1.  She was sleepy but woke up to answer a few questions.  I was concerned about hypercarbia so ordered a blood gas.  ABG is unimpressive.  She does not have issues with hypercarbia.  No clear etiology for alteration mental status.  Neuroexam appears to be nonfocal.  Hard to do detailed neuroexam.  I have ordered a neurology consultation.  Her head CT was nonacute.  Hold all sedating medications including Cymbalta, Lyrica and tizanidine.  Check an ammonia level.    # COPD.  On oxygen at home.  She has chronic respiratory failure.  Stable.  On breathing treatments.    # Primary hypertension.  Systolic blood pressure is mildly elevated.  Continue to monitor.      # Imaging studies including chest CT and abdominal CT did not show any acute processes.    # Sepsis ruled out.  I clinically doubt UTI.  Await urine culture.  Hold off antibiotics.    # Electrolyte imbalance.  Both potassium and mag was low and this has been corrected.    # Lovenox for DVT prophylaxis.    #DM type 2 monitor sugars.      PT OT consultations have been ordered.  She was not very cooperative with PT OT      Note above makes patient higher risk for morbidity and mortality requiring testing and treatment.     CHEMO CUEVA MD 1/10/2025 11:55 AM

## 2025-01-10 NOTE — PROGRESS NOTES
Inpatient Physical Therapy Evaluation & Treatment    Unit: [facility]  Date:  1/10/2025  Patient Name:    Renay Lovett  Admitting diagnosis:  Hypokalemia [E87.6]  Hypomagnesemia [E83.42]  Delirium [R41.0]  Urinary tract infection without hematuria, site unspecified [N39.0]  AMS (altered mental status) [R41.82]  Sepsis without acute organ dysfunction, due to unspecified organism (HCC) [A41.9]  Admit Date:  1/9/2025  Precautions/Restrictions/WB Status/ Lines/ Wounds/ Oxygen: Fall risk, Bed/chair alarm, and Lines (IV, Supplemental O2 (3L), and external catheter)      Pt seen for cotreatment this date due to patient safety, acute illness/injury, and limited functional status information    Treatment Time:  [id number]  Treatment Number:  1   Timed Code Treatment Minutes: 15 minutes  Total Treatment Minutes:  25  minutes    Patient Stated Goals for Therapy: \" No goals stated          Discharge Recommendations: SNF  DME needs for discharge: Defer to facility       Therapy recommendation for EMS Transport: requires transport by cot due to pt needs lift equipment for safe transfers, pt needs A x 2 for safe transfers, and pt with poor sitting balance/tolerance    Therapy recommendations for staff:   Assist of 2 for transfers with use of MAXI-Move to/from chair  Assist of 2 for sitting EOB    History of Present Illness:   Per admission H&P from Jonathan Juarez MD on 1/9  \"Renay Lovett is a 69 y.o. female with a PMH of hypertension, COPD, diabetes, who presented to ED with complaint of altered mental status     Patient presents to the ED due to altered mental status which has been ongoing for the last few days.  Of note  endorses that poor oral intake.  Patient does have a known history of dementia patient thought that this was due to worsening of her underlying condition.  Due to and ability to go for her appointment, patient was brought to the ED for further evaluation.     Vital signs shows blood pressure 150/82  None    AM-PAC Mobility Score    AM-PAC Inpatient Mobility Raw Score : 6       Subjective  Patient lying reclined in bed with no family present.  Pt agreeable to this PT session. RN cleared pt for therapy session.    Cognition    A&O Person   Able to follow 1 step commands approximately 50% of the time  Patient with difficulty with command following and answering questions. Questions best answered when patient is able to give a yes/no response.     Pain   No    Activity Tolerance   During therapy session noted pt with  resistive to movement.    Pt Position BP (mmHg) HR (bpm) SpO2 (%) on 3L  Comments   Supine at rest 172/82 110 100    Seated at EOB       Standing       End of session         Objective  Does this pt have an acute or acute on chronic diagnosis of CHF? No    Upper Extremity ROM/Strength  Please see OT evaluation.      Lower Extremity ROM / Strength   AROM WFL:  decreased ankle DF  ROM limitations: PROM WFL, AROM of remaining jts WFL    BLE strength impaired, but not formally assessed with MMT. Pt with difficulty following MMT directions.      Lower Extremity Sensation    NT    Coordination  Diminished    Tone  Not Tested    Balance  Static Sitting:  Total A  Dynamic Sitting:  Total A   Comments: EOB    Static Standing: Not Tested  Dynamic Standing:  Not Tested  Comments:     Posture  Seated: Posterior lean  Standing: Not Tested    Bed Mobility   Supine to Sit:    Total A and 2 person  Sit to Supine:   Total A and 2 person  Rolling:   Max A  and 2 person  Scooting in sitting: Not Tested  Scooting in supine:  Total A and 2 person with marcello pad  Bridging:  Not Tested  Comments: Pt with resistive posterior lean with EOB sitting attempt. Unable to maintain sitting EOB due to posterior push/lean. Possibly related to behaviors/confusion.    Transfer Training     Sit to stand:   Not Tested  Stand to sit:   Not Tested  Bed to/from Chair:  Not Tested   Comments:     Gait gait deferred due to difficulty with

## 2025-01-10 NOTE — CARE COORDINATION
Case Management Assessment  Initial Evaluation    Date/Time of Evaluation: 1/10/2025 2:52 PM  Assessment Completed by: Janae Kapoor RN    If patient is discharged prior to next notation, then this note serves as note for discharge by case management.    Patient Name: Renay Lovett                   YOB: 1955  Diagnosis: Hypokalemia [E87.6]  Hypomagnesemia [E83.42]  Delirium [R41.0]  Urinary tract infection without hematuria, site unspecified [N39.0]  AMS (altered mental status) [R41.82]  Sepsis without acute organ dysfunction, due to unspecified organism (HCC) [A41.9]                   Date / Time: 1/9/2025  1:57 PM    Patient Admission Status: Inpatient   Readmission Risk (Low < 19, Mod (19-27), High > 27): Readmission Risk Score: 14    Current PCP: Melchor Gaitan MD  PCP verified by CM? Yes ([place])    Chart Reviewed: Yes      History Provided by: Spouse  Patient Orientation: Unable to Assess    Patient Cognition: Dementia / Early Alzheimer's    Hospitalization in the last 30 days (Readmission):  No    If yes, Readmission Assessment in CM Navigator will be completed.    Advance Directives:      Code Status: Full Code   Patient's Primary Decision Maker is: Legal Next of Kin    Primary Decision Maker: Levi Lovett - Spouse - [phone]    Discharge Planning:    Patient lives with: Spouse/Significant Other Type of Home: House (1 story no steps)  Primary Care Giver: Spouse  Patient Support Systems include: Spouse/Significant Other   Current Financial resources: Other (Comment) ([place] [employer] Medicare)  Current community resources: None  Current services prior to admission: Home Care, Durable Medical Equipment ([facility])            Current DME: Hospital Bed, Walker, Shower Chair, Other (Comment) (RTS. Active Leading Resp for homeO2)            Type of Home Care services:  Aide Services, Nursing Services, OT, PT    ADLS  Prior functional level: Bathing, Cooking, Housework,

## 2025-01-10 NOTE — H&P
[facility] Medicine History & Physical      Patient Name: Renay Lovett    : 1955    PCP: Melchor Gaitan MD    Date of Service:  Patient seen and examined on 25     Chief Complaint: Altered mental status    History Of Present Illness:    Renay Lovett is a 69 y.o. female with a PMH of hypertension, COPD, diabetes, who presented to ED with complaint of altered mental status    Patient presents to the ED due to altered mental status which has been ongoing for the last few days.  Of note  endorses that poor oral intake.  Patient does have a known history of dementia patient thought that this was due to worsening of her underlying condition.  Due to and ability to go for her appointment, patient was brought to the ED for further evaluation.    Vital signs shows blood pressure 150/82 pulse of 104 temperature 97.6 respiration 24 saturating 96%.  Sodium of 148 potassium 2.4 magnesium of 1.67 glucose of 110 TSH of 1.47 WBC 13.0, hemoglobin 12.6.  Urinalysis not indicative of a UTI.  VBG shows pH of 7.46, pCO2 of 35.3 pO2 of 58.2.  Chest x-ray shows no acute findings mild congestion CT head shows no acute intracranial abnormality CT chest shows no evidence of PE small right pleural effusion.  CT abdomen and pelvis shows no acute intra abdominal or pelvic process, colonic diverticulosis no acute diverticulitis.  In the ED patient received magnesium 2 g, 40 mill equivalent of potassium chloride, cefepime 2 g vancomycin 2 g Tylenol, 1 L bolus LR.    Past Medical History:    Patient has a past medical history of Abnormal CT scan, chest, COPD (chronic obstructive pulmonary disease) (HCC), Decreased diffusion capacity of lung, Depression, Dyspnea, Hypertension, Lumbosacral spondylosis without myelopathy, Osteopenia, Oxygen dependent, Pneumonia, Restrictive lung disease, Tobacco abuse, and Well controlled type 2 diabetes mellitus (HCC).    Past Surgical History:    Patient has a past    AST 11*   ALT 6*   BILITOT 0.8   ALKPHOS 63       COAG  No results for input(s): \"INR\" in the last 72 hours.    CARDIAC ENZYMES  No results for input(s): \"TROPONINI\" in the last 72 hours.    LIPIDS  Cholesterol, Total   Date/Time Value Ref Range Status   04/10/2014 12:51  0 - 199 mg/dL Final     Triglycerides   Date/Time Value Ref Range Status   04/10/2014 12:51 PM 74 0 - 150 mg/dL Final     HDL   Date/Time Value Ref Range Status   08/17/2023 03:21 PM 57 40 - 60 mg/dL Final         Radiology:     CT ABDOMEN PELVIS W IV CONTRAST Additional Contrast? None   Final Result   1. No acute intra-abdominal or pelvic process.   2. Colonic diverticulosis without acute diverticulitis.   3. Hepatic steatosis.   4. Trace right-sided pleural effusion.         CT CHEST PULMONARY EMBOLISM W CONTRAST   Final Result   No evidence of pulmonary embolism      Small right pleural effusion         CT Head W/O Contrast   Final Result   1. No acute intracranial abnormality is identified.   2. Confluent white matter areas of hypoattenuation, likely representing   chronic microvascular ischemic changes.  Other white matter disease cannot be   excluded in the correct clinical background.         XR CHEST PORTABLE   Final Result   No acute finding in the chest. Mild vascular congestion is suspected.             ASSESSMENT/PLAN:  (There is no height or weight on file to calculate BMI.)      69 y.o. female with a PMH of hypertension, COPD, diabetes, who presented to ED with complaint of altered mental status    #Acute encephalopathy   Presents to the ED with altered mental status  Concerns for delirium underlying dementia  Rule out possible metabolic versus toxic etiology  Treatment of underlying conditions as below  Monitor for improvement of symptoms    #Sepsis   concerns for UTI  Urinalysis shows presence of WBC  Patient received cefepime and vancomycin in the ED    #Electrolyte imbalance  #Hypomagnesemia  #Hypokalemia  Replete both IV

## 2025-01-10 NOTE — PROGRESS NOTES
4 Eyes Skin Assessment     NAME:  Renay Lovett  YOB: 1955  MEDICAL RECORD NUMBER:  [id number]    The patient is being assessed for  Admission    I agree that at least one RN has performed a thorough Head to Toe Skin Assessment on the patient. ALL assessment sites listed below have been assessed.      Areas assessed by both nurses:    Head, Face, Ears, Shoulders, Back, Chest, Arms, Elbows, Hands, Sacrum. Buttock, Coccyx, Ischium, and Legs. Feet and Heels     Patient has scattered bruising, no non healing wounds        Does the Patient have a Wound? No noted wound(s)       Peter Prevention initiated by RN: No  Wound Care Orders initiated by RN: No    Pressure Injury (Stage 3,4, Unstageable, DTI, NWPT, and Complex wounds) if present, place Wound referral order by RN under : No    New Ostomies, if present place, Ostomy referral order under : No     Nurse 1 eSignature: Electronically signed by Danni Carranza RN on 1/10/25 at 8:03 AM EST    **SHARE this note so that the co-signing nurse can place an eSignature**    Nurse 2 eSignature: {Esignature:[id number]}

## 2025-01-10 NOTE — FLOWSHEET NOTE
01/10/25 0023   Vital Signs   Temp 98.1 °F (36.7 °C)   Temp Source Oral   Pulse (!) 110   Heart Rate Source Monitor   Respirations 18   BP (!) 164/76   MAP (Calculated) 105   BP Location Right upper arm   BP Method Automatic   Patient Position Supine   Oxygen Therapy   SpO2 91 %   O2 Device Nasal cannula   O2 Flow Rate (L/min) 3 L/min   Height and Weight   Height 1.753 m (5' 9\")   Weight - Scale 75.4 kg (166 lb 3.2 oz)   Weight Method Bed scale   BSA (Calculated - sq m) 1.92 sq meters   BMI (Calculated) 24.6     Patient arrived to floor from ED. Patient Alert, vitals and assessments done at this time. Bed in lowest position, call light within reach.

## 2025-01-10 NOTE — PROGRESS NOTES
Inpatient Occupational Therapy Evaluation & Treatment    Unit: [address] [facility]  Date:  1/10/2025  Patient Name:    Renay Lovett  Admitting diagnosis:  Hypokalemia [E87.6]  Hypomagnesemia [E83.42]  Delirium [R41.0]  Urinary tract infection without hematuria, site unspecified [N39.0]  AMS (altered mental status) [R41.82]  Sepsis without acute organ dysfunction, due to unspecified organism (HCC) [A41.9]  Admit Date:  1/9/2025  Precautions/Restrictions/WB Status/ Lines/ Wounds/ Oxygen:  Fall risk, Bed/chair alarm, and Lines (IV, Supplemental O2 (3L), and external catheter)     Pt seen for cotreatment this date due to patient safety, patient endurance, complexity of condition, and limited functional status information    Treatment Time: 9:50-10:15  Treatment Number:  1  Timed Code Treatment Minutes: 15 minutes  Total Treatment Minutes: 25 minutes    Patient Goals for Therapy: none stated         Discharge Recommendations: SNF  DME needs for discharge: Defer to facility       Therapy recommendations for staff:   Assist of 2 for sitting EOB    History of Present Illness: Per admission H&P from Jonathan Juarez MD on 1/9  \"Renay Lovett is a 69 y.o. female with a PMH of hypertension, COPD, diabetes, who presented to ED with complaint of altered mental status     Patient presents to the ED due to altered mental status which has been ongoing for the last few days.  Of note  endorses that poor oral intake.  Patient does have a known history of dementia patient thought that this was due to worsening of her underlying condition.  Due to and ability to go for her appointment, patient was brought to the ED for further evaluation.     Vital signs shows blood pressure 150/82 pulse of 104 temperature 97.6 respiration 24 saturating 96%.  Sodium of 148 potassium 2.4 magnesium of 1.67 glucose of 110 TSH of 1.47 WBC 13.0, hemoglobin 12.6.  Urinalysis not indicative of a UTI.  VBG shows pH of 7.46, pCO2 of 35.3 pO2 of 58.2.  Chest    Standing       End of session         Objective:  Does this pt have an acute or acute on chronic diagnosis of CHF? No    Upper Extremity ROM:    AROM WFL: No  UE ROM limitations: limited bilateral shoulder flexion    Upper Extremity Strength:    BUE strength impaired but not formally assessed w/ MMT    Upper Extremity Sensation:    NT    Coordination:  Diminished    Tone:  WNL    Balance:  Sitting:    Total A for static and dynamic sitting  Standing:    Not Tested    Bed Mobility:   Supine to Sit:    Total A x 2  Sit to Supine:   Total A x 2  Rolling:   Max A  x 2  Scooting in sitting: Total A x 2 with the use of a marcello pad  Scooting in supine:  Not Tested    Transfers:    Sit to stand:    Not Tested  Stand to sit:    Not Tested  Bed to chair:     Not Tested  Bed/ chair to standard toilet:  Not Tested  Bed/chair to BSC:   Not Tested  Functional Mobility:   Not Tested    Comment: OOB activity deferred on this date as patient resistive to movement and exhibited a heavy posterior push/lean while seated EOB and unable to maintain sitting balance.    ADLs:  Dressing:      UE:   Not Tested  LE:    Not Tested    Bathing:    UE:  Not Tested  LE:  Not Tested    Eating:   Not Tested    Toileting:  Not Tested    Grooming/hygiene: Not Tested    Ther Ex / Activities Initiated:   N/A    Positioning Needs:   Pt in bed and alarm set    Patient/Family Education:   Pt educated on role of inpatient OT, plan of care, importance of continued activity, DC recommendations, functional transfer/mobility safety, transfer techniques, and calling for assist with mobility    CHF Education  N/A    Assessment:  Pt seen for occupational therapy evaluation in the acute care setting this date.  Pt demonstrated decreased Activity tolerance, ADLs, Balance , Bathing, Bed mobility, Dressing, ROM, Safety Awareness, Strength, Transfers, and Cognition. Pt functioning below baseline and will likely benefit from skilled occupational therapy services to

## 2025-01-10 NOTE — PROGRESS NOTES
RT Inhaler-Nebulizer Bronchodilator Protocol Note    There is a bronchodilator order in the chart from a provider indicating to follow the RT Bronchodilator Protocol and there is an “Initiate RT Inhaler-Nebulizer Bronchodilator Protocol” order as well (see protocol at bottom of note).    CXR Findings:  XR CHEST PORTABLE    Result Date: 1/9/2025  No acute finding in the chest. Mild vascular congestion is suspected.       The findings from the last RT Protocol Assessment were as follows:   History Pulmonary Disease: (P) Chronic pulmonary disease  Respiratory Pattern: (P) Regular pattern and RR 12-20 bpm  Breath Sounds: (P) Slightly diminished and/or crackles  Cough: (P) Strong, spontaneous, non-productive  Indication for Bronchodilator Therapy: (P) Decreased or absent breath sounds  Bronchodilator Assessment Score: (P) 4    Aerosolized bronchodilator medication orders have been revised according to the RT Inhaler-Nebulizer Bronchodilator Protocol below.    Respiratory Therapist to perform RT Therapy Protocol Assessment initially then follow the protocol.  Repeat RT Therapy Protocol Assessment PRN for score 0-3 or on second treatment, BID, and PRN for scores above 3.    No Indications - adjust the frequency to every 6 hours PRN wheezing or bronchospasm, if no treatments needed after 48 hours then discontinue using Per Protocol order mode.     If indication present, adjust the RT bronchodilator orders based on the Bronchodilator Assessment Score as indicated below.  Use Inhaler orders unless patient has one or more of the following: on home nebulizer, not able to hold breath for 10 seconds, is not alert and oriented, cannot activate and use MDI correctly, or respiratory rate 25 breaths per minute or more, then use the equivalent nebulizer order(s) with same Frequency and PRN reasons based on the score.  If a patient is on this medication at home then do not decrease Frequency below that used at home.    0-3 - enter or  revise RT bronchodilator order(s) to equivalent RT Bronchodilator order with Frequency of every 4 hours PRN for wheezing or increased work of breathing using Per Protocol order mode.        4-6 - enter or revise RT Bronchodilator order(s) to two equivalent RT bronchodilator orders with one order with BID Frequency and one order with Frequency of every 4 hours PRN wheezing or increased work of breathing using Per Protocol order mode.        7-10 - enter or revise RT Bronchodilator order(s) to two equivalent RT bronchodilator orders with one order with TID Frequency and one order with Frequency of every 4 hours PRN wheezing or increased work of breathing using Per Protocol order mode.       11-13 - enter or revise RT Bronchodilator order(s) to one equivalent RT bronchodilator order with QID Frequency and an Albuterol order with Frequency of every 4 hours PRN wheezing or increased work of breathing using Per Protocol order mode.      Greater than 13 - enter or revise RT Bronchodilator order(s) to one equivalent RT bronchodilator order with every 4 hours Frequency and an Albuterol order with Frequency of every 2 hours PRN wheezing or increased work of breathing using Per Protocol order mode.     RT to enter RT Home Evaluation for COPD & MDI Assessment order using Per Protocol order mode.    Electronically signed by Jaguar [place] RCP on 1/10/2025 at 12:27 PM

## 2025-01-10 NOTE — ED NOTES
Received patient lying on bed on semi-fowlers position with oxygen inhalation via nasal cannula at 2 LPM, with purewick on connected to continuous suctioning. With an ongoing KCL drip regulated via alaris, with 0.9NaCl 250 ml along with the KCL drip. Patient able to obey commands. No complaints of pain at this time

## 2025-01-11 ENCOUNTER — APPOINTMENT (OUTPATIENT)
Dept: CT IMAGING | Age: 70
End: 2025-01-11
Attending: INTERNAL MEDICINE
Payer: MEDICARE

## 2025-01-11 ENCOUNTER — HOSPITAL ENCOUNTER (INPATIENT)
Age: 70
LOS: 6 days | Discharge: HOSPICE | End: 2025-01-17
Attending: INTERNAL MEDICINE | Admitting: INTERNAL MEDICINE
Payer: MEDICARE

## 2025-01-11 ENCOUNTER — APPOINTMENT (OUTPATIENT)
Age: 70
DRG: 065 | End: 2025-01-11
Attending: STUDENT IN AN ORGANIZED HEALTH CARE EDUCATION/TRAINING PROGRAM
Payer: MEDICARE

## 2025-01-11 VITALS
RESPIRATION RATE: 16 BRPM | SYSTOLIC BLOOD PRESSURE: 122 MMHG | HEART RATE: 84 BPM | BODY MASS INDEX: 24.59 KG/M2 | TEMPERATURE: 99.5 F | DIASTOLIC BLOOD PRESSURE: 54 MMHG | HEIGHT: 69 IN | OXYGEN SATURATION: 95 % | WEIGHT: 166 LBS

## 2025-01-11 PROBLEM — G93.40 ENCEPHALOPATHY: Status: ACTIVE | Noted: 2025-01-11

## 2025-01-11 LAB
ECHO AO ASC DIAM: 3.1 CM
ECHO AO ASCENDING AORTA INDEX: 1.62 CM/M2
ECHO AO ROOT DIAM: 3.4 CM
ECHO AO ROOT INDEX: 1.78 CM/M2
ECHO AR MAX VEL PISA: 3.4 M/S
ECHO AV MEAN GRADIENT: 5 MMHG
ECHO AV MEAN VELOCITY: 1 M/S
ECHO AV PEAK GRADIENT: 8 MMHG
ECHO AV PEAK VELOCITY: 1.4 M/S
ECHO AV REGURGITANT PHT: 397 MS
ECHO AV VELOCITY RATIO: 0.79
ECHO AV VTI: 29.5 CM
ECHO BSA: 1.91 M2
ECHO EST RA PRESSURE: 3 MMHG
ECHO IVC EXP: 1.5 CM
ECHO IVC INSP: 0.7 CM
ECHO LA AREA 2C: 13.6 CM2
ECHO LA AREA 4C: 13.6 CM2
ECHO LA MAJOR AXIS: 6.3 CM
ECHO LA MINOR AXIS: 5.6 CM
ECHO LA VOL BP: 28 ML (ref 22–52)
ECHO LA VOL MOD A2C: 28 ML (ref 22–52)
ECHO LA VOL MOD A4C: 44 ML (ref 22–52)
ECHO LA VOL/BSA BIPLANE: 15 ML/M2 (ref 16–34)
ECHO LA VOLUME INDEX MOD A2C: 15 ML/M2 (ref 16–34)
ECHO LA VOLUME INDEX MOD A4C: 23 ML/M2 (ref 16–34)
ECHO LV E' LATERAL VELOCITY: 8.05 CM/S
ECHO LV E' SEPTAL VELOCITY: 6.64 CM/S
ECHO LV EF PHYSICIAN: 58 %
ECHO LV FRACTIONAL SHORTENING: 33 % (ref 28–44)
ECHO LV INTERNAL DIMENSION DIASTOLE INDEX: 1.88 CM/M2
ECHO LV INTERNAL DIMENSION DIASTOLIC: 3.6 CM (ref 3.9–5.3)
ECHO LV INTERNAL DIMENSION SYSTOLIC INDEX: 1.26 CM/M2
ECHO LV INTERNAL DIMENSION SYSTOLIC: 2.4 CM
ECHO LV ISOVOLUMETRIC RELAXATION TIME (IVRT): 114 MS
ECHO LV IVSD: 1.1 CM (ref 0.6–0.9)
ECHO LV MASS 2D: 124.1 G (ref 67–162)
ECHO LV MASS INDEX 2D: 65 G/M2 (ref 43–95)
ECHO LV POSTERIOR WALL DIASTOLIC: 1.1 CM (ref 0.6–0.9)
ECHO LV RELATIVE WALL THICKNESS RATIO: 0.61
ECHO LVOT AV VTI INDEX: 0.84
ECHO LVOT MEAN GRADIENT: 3 MMHG
ECHO LVOT PEAK GRADIENT: 5 MMHG
ECHO LVOT PEAK VELOCITY: 1.1 M/S
ECHO LVOT VTI: 24.8 CM
ECHO MV A VELOCITY: 1.1 M/S
ECHO MV E DECELERATION TIME (DT): 172 MS
ECHO MV E VELOCITY: 0.8 M/S
ECHO MV E/A RATIO: 0.73
ECHO MV E/E' LATERAL: 9.94
ECHO MV E/E' RATIO (AVERAGED): 10.99
ECHO MV E/E' SEPTAL: 12.05
ECHO MV LVOT VTI INDEX: 1.35
ECHO MV MAX VELOCITY: 1.3 M/S
ECHO MV MEAN GRADIENT: 6 MMHG
ECHO MV MEAN VELOCITY: 0.8 M/S
ECHO MV PEAK GRADIENT: 6 MMHG
ECHO MV VTI: 33.5 CM
ECHO PV MAX VELOCITY: 1.1 M/S
ECHO PV MEAN GRADIENT: 3 MMHG
ECHO PV MEAN VELOCITY: 0.8 M/S
ECHO PV PEAK GRADIENT: 5 MMHG
ECHO PV VTI: 24.7 CM
ECHO RA AREA 4C: 14.1 CM2
ECHO RA END SYSTOLIC VOLUME APICAL 4 CHAMBER INDEX BSA: 14 ML/M2
ECHO RA VOLUME: 26 ML
ECHO RIGHT VENTRICULAR SYSTOLIC PRESSURE (RVSP): 26 MMHG
ECHO RV BASAL DIMENSION: 3.3 CM
ECHO RV FREE WALL PEAK S': 12.2 CM/S
ECHO RV LONGITUDINAL DIMENSION: 8.8 CM
ECHO RV MID DIMENSION: 2.1 CM
ECHO RV TAPSE: 1.9 CM (ref 1.7–?)
ECHO TV REGURGITANT MAX VELOCITY: 2.4 M/S
ECHO TV REGURGITANT PEAK GRADIENT: 22 MMHG
FOLATE SERPL-MCNC: 27.7 NG/ML (ref 4.78–24.2)
VIT B12 SERPL-MCNC: 1438 PG/ML (ref 211–911)

## 2025-01-11 PROCEDURE — 6360000002 HC RX W HCPCS: Performed by: NURSE PRACTITIONER

## 2025-01-11 PROCEDURE — 94640 AIRWAY INHALATION TREATMENT: CPT

## 2025-01-11 PROCEDURE — 2700000000 HC OXYGEN THERAPY PER DAY

## 2025-01-11 PROCEDURE — 6360000004 HC RX CONTRAST MEDICATION: Performed by: NURSE PRACTITIONER

## 2025-01-11 PROCEDURE — 99223 1ST HOSP IP/OBS HIGH 75: CPT | Performed by: NURSE PRACTITIONER

## 2025-01-11 PROCEDURE — 99239 HOSP IP/OBS DSCHRG MGMT >30: CPT | Performed by: INTERNAL MEDICINE

## 2025-01-11 PROCEDURE — 94761 N-INVAS EAR/PLS OXIMETRY MLT: CPT

## 2025-01-11 PROCEDURE — 70450 CT HEAD/BRAIN W/O DYE: CPT

## 2025-01-11 PROCEDURE — 6370000000 HC RX 637 (ALT 250 FOR IP): Performed by: STUDENT IN AN ORGANIZED HEALTH CARE EDUCATION/TRAINING PROGRAM

## 2025-01-11 PROCEDURE — APPSS30 APP SPLIT SHARED TIME 16-30 MINUTES

## 2025-01-11 PROCEDURE — 6370000000 HC RX 637 (ALT 250 FOR IP)

## 2025-01-11 PROCEDURE — 93306 TTE W/DOPPLER COMPLETE: CPT

## 2025-01-11 PROCEDURE — 6360000002 HC RX W HCPCS: Performed by: STUDENT IN AN ORGANIZED HEALTH CARE EDUCATION/TRAINING PROGRAM

## 2025-01-11 PROCEDURE — 70498 CT ANGIOGRAPHY NECK: CPT

## 2025-01-11 PROCEDURE — 2500000003 HC RX 250 WO HCPCS: Performed by: INTERNAL MEDICINE

## 2025-01-11 PROCEDURE — 2580000003 HC RX 258: Performed by: STUDENT IN AN ORGANIZED HEALTH CARE EDUCATION/TRAINING PROGRAM

## 2025-01-11 PROCEDURE — 6360000002 HC RX W HCPCS: Performed by: INTERNAL MEDICINE

## 2025-01-11 PROCEDURE — 6370000000 HC RX 637 (ALT 250 FOR IP): Performed by: INTERNAL MEDICINE

## 2025-01-11 PROCEDURE — 93306 TTE W/DOPPLER COMPLETE: CPT | Performed by: INTERNAL MEDICINE

## 2025-01-11 PROCEDURE — 2060000000 HC ICU INTERMEDIATE R&B

## 2025-01-11 RX ORDER — HYDROCHLOROTHIAZIDE 25 MG/1
25 TABLET ORAL DAILY
Status: DISCONTINUED | OUTPATIENT
Start: 2025-01-12 | End: 2025-01-17 | Stop reason: HOSPADM

## 2025-01-11 RX ORDER — ATORVASTATIN CALCIUM 40 MG/1
40 TABLET, FILM COATED ORAL NIGHTLY
Status: DISCONTINUED | OUTPATIENT
Start: 2025-01-12 | End: 2025-01-17 | Stop reason: HOSPADM

## 2025-01-11 RX ORDER — METOPROLOL TARTRATE 50 MG
50 TABLET ORAL 2 TIMES DAILY
Status: DISCONTINUED | OUTPATIENT
Start: 2025-01-12 | End: 2025-01-12

## 2025-01-11 RX ORDER — DILTIAZEM HYDROCHLORIDE 120 MG/1
120 CAPSULE, COATED, EXTENDED RELEASE ORAL DAILY
Status: DISCONTINUED | OUTPATIENT
Start: 2025-01-12 | End: 2025-01-17 | Stop reason: HOSPADM

## 2025-01-11 RX ORDER — POLYETHYLENE GLYCOL 3350 17 G/17G
17 POWDER, FOR SOLUTION ORAL DAILY PRN
Status: DISCONTINUED | OUTPATIENT
Start: 2025-01-11 | End: 2025-01-17 | Stop reason: HOSPADM

## 2025-01-11 RX ORDER — LOSARTAN POTASSIUM 100 MG/1
100 TABLET ORAL DAILY
Status: DISCONTINUED | OUTPATIENT
Start: 2025-01-12 | End: 2025-01-17 | Stop reason: HOSPADM

## 2025-01-11 RX ORDER — POTASSIUM CHLORIDE 7.45 MG/ML
10 INJECTION INTRAVENOUS PRN
Status: DISCONTINUED | OUTPATIENT
Start: 2025-01-11 | End: 2025-01-17 | Stop reason: HOSPADM

## 2025-01-11 RX ORDER — SODIUM CHLORIDE 0.9 % (FLUSH) 0.9 %
5-40 SYRINGE (ML) INJECTION EVERY 12 HOURS SCHEDULED
Status: DISCONTINUED | OUTPATIENT
Start: 2025-01-12 | End: 2025-01-17 | Stop reason: HOSPADM

## 2025-01-11 RX ORDER — PROCHLORPERAZINE MALEATE 10 MG
10 TABLET ORAL EVERY 8 HOURS PRN
Status: DISCONTINUED | OUTPATIENT
Start: 2025-01-11 | End: 2025-01-17 | Stop reason: HOSPADM

## 2025-01-11 RX ORDER — ACETAMINOPHEN 325 MG/1
650 TABLET ORAL EVERY 6 HOURS PRN
Status: DISCONTINUED | OUTPATIENT
Start: 2025-01-11 | End: 2025-01-17 | Stop reason: HOSPADM

## 2025-01-11 RX ORDER — SODIUM CHLORIDE 0.9 % (FLUSH) 0.9 %
5-40 SYRINGE (ML) INJECTION PRN
Status: DISCONTINUED | OUTPATIENT
Start: 2025-01-11 | End: 2025-01-17 | Stop reason: HOSPADM

## 2025-01-11 RX ORDER — LEVETIRACETAM 500 MG/5ML
1000 INJECTION, SOLUTION, CONCENTRATE INTRAVENOUS 2 TIMES DAILY
Status: DISCONTINUED | OUTPATIENT
Start: 2025-01-11 | End: 2025-01-11 | Stop reason: HOSPADM

## 2025-01-11 RX ORDER — DIPHENHYDRAMINE HYDROCHLORIDE 50 MG/ML
25 INJECTION INTRAMUSCULAR; INTRAVENOUS
Status: COMPLETED | OUTPATIENT
Start: 2025-01-11 | End: 2025-01-11

## 2025-01-11 RX ORDER — DULOXETIN HYDROCHLORIDE 30 MG/1
30 CAPSULE, DELAYED RELEASE ORAL DAILY
Status: DISCONTINUED | OUTPATIENT
Start: 2025-01-12 | End: 2025-01-17 | Stop reason: HOSPADM

## 2025-01-11 RX ORDER — ACETAMINOPHEN 650 MG/1
650 SUPPOSITORY RECTAL EVERY 6 HOURS PRN
Status: DISCONTINUED | OUTPATIENT
Start: 2025-01-11 | End: 2025-01-17 | Stop reason: HOSPADM

## 2025-01-11 RX ORDER — MAGNESIUM SULFATE IN WATER 40 MG/ML
2000 INJECTION, SOLUTION INTRAVENOUS PRN
Status: DISCONTINUED | OUTPATIENT
Start: 2025-01-11 | End: 2025-01-17 | Stop reason: HOSPADM

## 2025-01-11 RX ORDER — IOPAMIDOL 755 MG/ML
75 INJECTION, SOLUTION INTRAVASCULAR
Status: COMPLETED | OUTPATIENT
Start: 2025-01-11 | End: 2025-01-11

## 2025-01-11 RX ORDER — LORAZEPAM 2 MG/ML
0.5 INJECTION INTRAMUSCULAR EVERY 6 HOURS PRN
Status: DISCONTINUED | OUTPATIENT
Start: 2025-01-11 | End: 2025-01-17 | Stop reason: HOSPADM

## 2025-01-11 RX ORDER — ASPIRIN 81 MG/1
81 TABLET ORAL DAILY
Status: DISCONTINUED | OUTPATIENT
Start: 2025-01-12 | End: 2025-01-17 | Stop reason: HOSPADM

## 2025-01-11 RX ORDER — THIAMINE HYDROCHLORIDE 100 MG/ML
500 INJECTION, SOLUTION INTRAMUSCULAR; INTRAVENOUS 3 TIMES DAILY
Status: DISPENSED | OUTPATIENT
Start: 2025-01-12 | End: 2025-01-14

## 2025-01-11 RX ORDER — LORAZEPAM 2 MG/ML
2 INJECTION INTRAMUSCULAR EVERY 5 MIN PRN
Status: DISCONTINUED | OUTPATIENT
Start: 2025-01-11 | End: 2025-01-17 | Stop reason: HOSPADM

## 2025-01-11 RX ORDER — PREGABALIN 50 MG/1
100 CAPSULE ORAL 2 TIMES DAILY
Status: DISCONTINUED | OUTPATIENT
Start: 2025-01-12 | End: 2025-01-17 | Stop reason: HOSPADM

## 2025-01-11 RX ORDER — PROCHLORPERAZINE EDISYLATE 5 MG/ML
10 INJECTION INTRAMUSCULAR; INTRAVENOUS EVERY 6 HOURS PRN
Status: DISCONTINUED | OUTPATIENT
Start: 2025-01-11 | End: 2025-01-17 | Stop reason: HOSPADM

## 2025-01-11 RX ORDER — ENOXAPARIN SODIUM 100 MG/ML
40 INJECTION SUBCUTANEOUS DAILY
Status: DISCONTINUED | OUTPATIENT
Start: 2025-01-12 | End: 2025-01-17 | Stop reason: HOSPADM

## 2025-01-11 RX ORDER — POTASSIUM CHLORIDE 1500 MG/1
40 TABLET, EXTENDED RELEASE ORAL PRN
Status: DISCONTINUED | OUTPATIENT
Start: 2025-01-11 | End: 2025-01-17 | Stop reason: HOSPADM

## 2025-01-11 RX ORDER — LEVETIRACETAM 500 MG/5ML
1000 INJECTION, SOLUTION, CONCENTRATE INTRAVENOUS 2 TIMES DAILY
Status: DISCONTINUED | OUTPATIENT
Start: 2025-01-12 | End: 2025-01-17 | Stop reason: HOSPADM

## 2025-01-11 RX ORDER — IPRATROPIUM BROMIDE AND ALBUTEROL SULFATE 2.5; .5 MG/3ML; MG/3ML
1 SOLUTION RESPIRATORY (INHALATION)
Status: DISCONTINUED | OUTPATIENT
Start: 2025-01-12 | End: 2025-01-15

## 2025-01-11 RX ORDER — SODIUM CHLORIDE 9 MG/ML
INJECTION, SOLUTION INTRAVENOUS PRN
Status: DISCONTINUED | OUTPATIENT
Start: 2025-01-11 | End: 2025-01-17 | Stop reason: HOSPADM

## 2025-01-11 RX ADMIN — METFORMIN HYDROCHLORIDE 500 MG: 500 TABLET ORAL at 10:14

## 2025-01-11 RX ADMIN — LEVETIRACETAM 4500 MG: 100 INJECTION INTRAVENOUS at 14:33

## 2025-01-11 RX ADMIN — METOPROLOL TARTRATE 50 MG: 50 TABLET, FILM COATED ORAL at 10:14

## 2025-01-11 RX ADMIN — LOSARTAN POTASSIUM 100 MG: 100 TABLET, FILM COATED ORAL at 10:14

## 2025-01-11 RX ADMIN — DILTIAZEM HYDROCHLORIDE 120 MG: 120 CAPSULE, COATED, EXTENDED RELEASE ORAL at 10:14

## 2025-01-11 RX ADMIN — DIPHENHYDRAMINE HYDROCHLORIDE 25 MG: 50 INJECTION INTRAMUSCULAR; INTRAVENOUS at 23:35

## 2025-01-11 RX ADMIN — Medication 10 ML: at 10:11

## 2025-01-11 RX ADMIN — IOPAMIDOL 75 ML: 755 INJECTION, SOLUTION INTRAVENOUS at 22:42

## 2025-01-11 RX ADMIN — THIAMINE HYDROCHLORIDE 500 MG: 100 INJECTION, SOLUTION INTRAMUSCULAR; INTRAVENOUS at 15:25

## 2025-01-11 RX ADMIN — ASPIRIN 81 MG: 81 TABLET, COATED ORAL at 10:14

## 2025-01-11 RX ADMIN — IPRATROPIUM BROMIDE AND ALBUTEROL SULFATE 1 DOSE: 2.5; .5 SOLUTION RESPIRATORY (INHALATION) at 18:33

## 2025-01-11 RX ADMIN — THIAMINE HYDROCHLORIDE 500 MG: 100 INJECTION, SOLUTION INTRAMUSCULAR; INTRAVENOUS at 10:11

## 2025-01-11 RX ADMIN — ENOXAPARIN SODIUM 40 MG: 100 INJECTION SUBCUTANEOUS at 10:14

## 2025-01-11 RX ADMIN — HYDROCHLOROTHIAZIDE 25 MG: 25 TABLET ORAL at 10:14

## 2025-01-11 RX ADMIN — IPRATROPIUM BROMIDE AND ALBUTEROL SULFATE 1 DOSE: 2.5; .5 SOLUTION RESPIRATORY (INHALATION) at 07:39

## 2025-01-11 RX ADMIN — Medication 1 TABLET: at 10:14

## 2025-01-11 ASSESSMENT — PAIN SCALES - PAIN ASSESSMENT IN ADVANCED DEMENTIA (PAINAD)
TOTALSCORE: 1
FACIALEXPRESSION: SMILING OR INEXPRESSIVE
CONSOLABILITY: NO NEED TO CONSOLE
BODYLANGUAGE: RELAXED
BREATHING: NORMAL
NEGVOCALIZATION: OCCASIONAL MOAN/GROAN, LOW SPEECH, NEGATIVE/DISAPPROVING QUALITY

## 2025-01-11 ASSESSMENT — PAIN SCALES - GENERAL: PAINLEVEL_OUTOF10: 2

## 2025-01-11 NOTE — PLAN OF CARE
[facility] [place] accept note  Transfer center [facility] received  Case reviewed with ER physician  Reason for Transfer:  Renay Lovett 69 y.o. female  - p/w 68 yo F admitted 01/09/25 for altered mental status, electrolyte imbalance.  She was recently admitted to  with HOLLI, acute on chronic back pain with questionable enhancing foci on CT lumbar spine, mild leukocytosis in the setting of steroid use, concern for atrial fibrillation, hyponatremia.     On arrival to the hospital patient has a fever of 102.7 WBC count of 13,000. Did get a dose of cefepime and vancomycin.    Since then not on antibiotics  WBC did come down to 11  So far infectious workup including a COVID and flu swab as well as blood cultures have been negative.  UA showed moderate bilirubin greater than 80 ketones 100 proteins positive nitrite 2+ mucus 6-9 WBCs 1+ bacteria.  Patient currently not on antibiotics    Neurology following the patient  I got the transfer call from Dr. Lombardi  We discussed with patient has had progressive memory loss for the last 2 years  For the last 9 months has lost ability to manage her own medications her  took over the medication  Patient got progressively worse about 1 week back stopped eating stopped talking was not participating in conversations.  And for the past 1 week only giving one-word answers which are not always sensible  There is no history of stroke or seizure  With concern being exam aphasia going on for 1 week MRI with and without contrast was done    MRI Brain w wo contrast --  IMPRESSION:  1. 6 mm focus of mild restricted diffusion involving the right splenium  corpus callosum, as well as the left paramedian crystal.  Punctate additional  areas of restricted diffusion involving the left occipital lobe. Findings  suggest acute/subacute infarcts within the posterior circulation.  2. Severe chronic microvascular ischemic changes.  3. No definitive evidence of abnormal contrast enhancement

## 2025-01-11 NOTE — PLAN OF CARE
Problem: Safety - Adult  Goal: Free from fall injury  Outcome: Progressing     Problem: Chronic Conditions and Co-morbidities  Goal: Patient's chronic conditions and co-morbidity symptoms are monitored and maintained or improved  Outcome: Progressing     Problem: Discharge Planning  Goal: Discharge to home or other facility with appropriate resources  Outcome: Progressing     Problem: Pain  Goal: Verbalizes/displays adequate comfort level or baseline comfort level  Outcome: Progressing     Problem: ABCDS Injury Assessment  Goal: Absence of physical injury  Outcome: Progressing     Problem: Confusion  Goal: Confusion, delirium, dementia, or psychosis is improved or at baseline  Description: INTERVENTIONS:  1. Assess for possible contributors to thought disturbance, including medications, impaired vision or hearing, underlying metabolic abnormalities, dehydration, psychiatric diagnoses, and notify attending LIP  2. Docena high risk fall precautions, as indicated  3. Provide frequent short contacts to provide reality reorientation, refocusing and direction  4. Decrease environmental stimuli, including noise as appropriate  5. Monitor and intervene to maintain adequate nutrition, hydration, elimination, sleep and activity  6. If unable to ensure safety without constant attention obtain sitter and review sitter guidelines with assigned personnel  7. Initiate Psychosocial CNS and Spiritual Care consult, as indicated  Outcome: Progressing

## 2025-01-11 NOTE — PROGRESS NOTES
On assessment patient was withdrawn. Still refused to eat breakfast. Patient would answer yes or no answers and not interact with the conversation. Redness on coccyx, minor bruising.

## 2025-01-11 NOTE — DISCHARGE SUMMARY
Name:  Renay Lovtet  Room:  0210/0210-01  MRN:    [id number]    Discharge Summary Transfer to [facility]      This discharge summary is in conjunction with a complete physical exam done on the day of discharge.      Discharging Physician: CHEMO CUEVA MD      Admit: 1/9/2025  Discharge:  1/11/2025     Diagnoses this Admission    Principal Problem:    AMS (altered mental status)  Active Problems:    Chronic systolic (congestive) heart failure    HTN (hypertension)    Type 2 diabetes mellitus without complication, without long-term current use of insulin (HCC)    Major depressive disorder with single episode, in partial remission (HCC)    Chronic respiratory failure with hypoxia    COPD, mild (HCC)    Hypokalemia    Hypomagnesemia    Urinary tract infection without hematuria    Acute encephalopathy    Aphasia    Dementia (HCC)    Acute CVA (cerebrovascular accident) (HCC)  Resolved Problems:    * No resolved hospital problems. *      Procedures (Please Review Full Report for Details)  none    Consults    IP CONSULT TO NEUROLOGY      HPI:    Renay Lovett is a 69 y.o. female with a PMH of hypertension, COPD, diabetes, who presented to ED with complaint of altered mental status     Patient presents to the ED due to altered mental status which has been ongoing for the last few days.  Of note  endorses that poor oral intake.  Patient does have a known history of dementia patient thought that this was due to worsening of her underlying condition.  Due to and ability to go for her appointment, patient was brought to the ED for further evaluation.     Vital signs shows blood pressure 150/82 pulse of 104 temperature 97.6 respiration 24 saturating 96%.  Sodium of 148 potassium 2.4 magnesium of 1.67 glucose of 110 TSH of 1.47 WBC 13.0, hemoglobin 12.6.  Urinalysis not indicative of a UTI.  VBG shows pH of 7.46, pCO2 of 35.3 pO2 of 58.2.  Chest x-ray shows no acute findings mild congestion CT head  shows no acute intracranial abnormality CT chest shows no evidence of PE small right pleural effusion.  CT abdomen and pelvis shows no acute intra abdominal or pelvic process, colonic diverticulosis no acute diverticulitis.  In the ED patient received magnesium 2 g, 40 mill equivalent of potassium chloride, cefepime 2 g vancomycin 2 g Tylenol, 1 L bolus LR.    Physical Exam at Discharge:  BP (!) 134/51   Pulse 65   Temp 98.2 °F (36.8 °C) (Oral)   Resp 16   Ht 1.753 m (5' 9\")   Wt 75.3 kg (166 lb)   SpO2 99%   BMI 24.51 kg/m²     General appearance:more awake and alert  Head: Normocephalic, without obvious abnormality, atraumatic  Eyes: conjunctivae/corneas clear. PERRL, EOM's intact.  Neck: no adenopathy, no carotid bruit, no JVD, supple, symmetrical, trachea midline and thyroid not enlarged, symmetric, no tenderness/mass/nodules  Lungs: clear to auscultation bilaterally  Heart: Tachycardia and  regular rhythm rhythm, S1, S2 normal, no murmur, click, rub or gallop  Abdomen: soft, non-tender; bowel sounds normal; no masses,  no organomegaly  Extremities: extremities normal, atraumatic, no cyanosis or edema  Pulses: 2+ and symmetric  Skin: Skin color, texture, turgor normal. No rashes or lesions  Neurologic: Grossly normal. Oriented  x 1.    Hospital Course    # Altered mental status likely due to CVA .concern for subclinical seizures.. will go to [facility] for continuous EEG. Had fever at time of admission with no clear source. I doubt UTI. Urine culture not sent initially as UA did not meet criteria for culture I called the lab and they will run one. I will start Rocephin for now. No further fevers.  BC negstive  Patient came to ER with some alteration of mental status.  She is a very poor historian.  She was only oriented x 1.  She was sleepy but woke up to answer a few questions.  I was concerned about hypercarbia so ordered a blood gas.  ABG is unimpressive.  She does not have issues with hypercarbia.    Single

## 2025-01-11 NOTE — PROGRESS NOTES
It has been decided to transfer patient to [facility] for continuous EEG.  time is scheduled for 1900. Patient will be transported with iv access. Vital signs are stable.   Vitals:    01/11/25 1232   BP: (!) 134/51   Pulse: 65   Resp: 16   Temp: 98.2 °F (36.8 °C)   SpO2: 99%

## 2025-01-11 NOTE — PROGRESS NOTES
Writer went in to check on the patient. Patient was actively eating lunch. When writer asked how the patient was feeling she spoke in full sentences and asked some questions of her own.

## 2025-01-11 NOTE — PROGRESS NOTES
Neuroimaging was independently reviewed by me and discussed results with the patient  I reviewed blood testing and other test results and discussed results with the patient    MRA Head without contrast/MRI Brain with and without contrast/MRA neck with and without contrast:  1. 6 mm focus of mild restricted diffusion involving the right splenium  corpus callosum, as well as the left paramedian crystal.  Punctate additional  areas of restricted diffusion involving the left occipital lobe. Findings  suggest acute/subacute infarcts within the posterior circulation.  2. Severe chronic microvascular ischemic changes.  3. No definitive evidence of abnormal contrast enhancement within the brain,  although slightly motion degraded.  4. Extremely limited MR angiography of the neck due to motion degradation.  Flow voids suggesting patency within limitations, although can not assess  caliber changes.  5. No proximal large vessel occlusion of the intracranial circulation within  limitations of study.    Impression:  Acute encephalopathy and aphasia for approximately 1 week complicated by underlying chronic progressive dementia   Imaging studies suggestive of acute/subacute infarct within the posterior circulation likely embolic in nature.  Will continue aspirin 81 mg daily and statin therapy.  Will obtain EEG on Monday to evaluate for underlying seizure disorder.     Recommendation  Continue aspirin 81 mg daily  Continue atorvastatin 40 mg nightly  EEG on Monday  Continue IV thiamine 500 mg IV 3 times daily x 3 days   Neurologist to review imaging and to follow with further recommendations  Neurology will continue to follow     RITA Valente NP

## 2025-01-11 NOTE — PROGRESS NOTES
01/10/25 2200   RT Protocol   History Pulmonary Disease 2   Respiratory pattern 0   Breath sounds 4   Cough 0   Indications for Bronchodilator Therapy Wheezing associated with pulm disorder   Bronchodilator Assessment Score 6     RT Inhaler-Nebulizer Bronchodilator Protocol Note    There is a bronchodilator order in the chart from a provider indicating to follow the RT Bronchodilator Protocol and there is an “Initiate RT Inhaler-Nebulizer Bronchodilator Protocol” order as well (see protocol at bottom of note).    CXR Findings:  XR CHEST PORTABLE    Result Date: 1/9/2025  No acute finding in the chest. Mild vascular congestion is suspected.       The findings from the last RT Protocol Assessment were as follows:   History Pulmonary Disease: Chronic pulmonary disease  Respiratory Pattern: Regular pattern and RR 12-20 bpm  Breath Sounds: Intermittent or unilateral wheezes  Cough: Strong, spontaneous, non-productive  Indication for Bronchodilator Therapy: Wheezing associated with pulm disorder  Bronchodilator Assessment Score: 6    Aerosolized bronchodilator medication orders have been revised according to the RT Inhaler-Nebulizer Bronchodilator Protocol below.    Respiratory Therapist to perform RT Therapy Protocol Assessment initially then follow the protocol.  Repeat RT Therapy Protocol Assessment PRN for score 0-3 or on second treatment, BID, and PRN for scores above 3.    No Indications - adjust the frequency to every 6 hours PRN wheezing or bronchospasm, if no treatments needed after 48 hours then discontinue using Per Protocol order mode.     If indication present, adjust the RT bronchodilator orders based on the Bronchodilator Assessment Score as indicated below.  Use Inhaler orders unless patient has one or more of the following: on home nebulizer, not able to hold breath for 10 seconds, is not alert and oriented, cannot activate and use MDI correctly, or respiratory rate 25 breaths per minute or more, then

## 2025-01-12 ENCOUNTER — APPOINTMENT (OUTPATIENT)
Dept: GENERAL RADIOLOGY | Age: 70
End: 2025-01-12
Attending: INTERNAL MEDICINE
Payer: MEDICARE

## 2025-01-12 PROBLEM — I63.432 CEREBROVASCULAR ACCIDENT (CVA) DUE TO EMBOLISM OF LEFT POSTERIOR CEREBRAL ARTERY (HCC): Status: ACTIVE | Noted: 2025-01-10

## 2025-01-12 LAB
ALBUMIN SERPL-MCNC: 2.3 G/DL (ref 3.4–5)
ALBUMIN/GLOB SERPL: 0.9 {RATIO} (ref 1.1–2.2)
ALP SERPL-CCNC: 48 U/L (ref 40–129)
ALT SERPL-CCNC: <5 U/L (ref 10–40)
AMMONIA PLAS-SCNC: 13 UMOL/L (ref 11–51)
ANION GAP SERPL CALCULATED.3IONS-SCNC: 9 MMOL/L (ref 3–16)
AST SERPL-CCNC: 17 U/L (ref 15–37)
BACTERIA UR CULT: NORMAL
BASOPHILS # BLD: 0.1 K/UL (ref 0–0.2)
BASOPHILS NFR BLD: 1.1 %
BILIRUB SERPL-MCNC: 0.4 MG/DL (ref 0–1)
BUN SERPL-MCNC: 20 MG/DL (ref 7–20)
CALCIUM SERPL-MCNC: 8.2 MG/DL (ref 8.3–10.6)
CHLORIDE SERPL-SCNC: 108 MMOL/L (ref 99–110)
CO2 SERPL-SCNC: 24 MMOL/L (ref 21–32)
CREAT SERPL-MCNC: 0.8 MG/DL (ref 0.6–1.2)
DEPRECATED RDW RBC AUTO: 17.6 % (ref 12.4–15.4)
EKG ATRIAL RATE: 70 BPM
EKG DIAGNOSIS: NORMAL
EKG P AXIS: 50 DEGREES
EKG P-R INTERVAL: 140 MS
EKG Q-T INTERVAL: 406 MS
EKG QRS DURATION: 82 MS
EKG QTC CALCULATION (BAZETT): 438 MS
EKG R AXIS: 53 DEGREES
EKG T AXIS: 56 DEGREES
EKG VENTRICULAR RATE: 70 BPM
EOSINOPHIL # BLD: 0.2 K/UL (ref 0–0.6)
EOSINOPHIL NFR BLD: 1.9 %
EST. AVERAGE GLUCOSE BLD GHB EST-MCNC: 116.9 MG/DL
GFR SERPLBLD CREATININE-BSD FMLA CKD-EPI: 80 ML/MIN/{1.73_M2}
GLUCOSE BLD-MCNC: 60 MG/DL (ref 70–99)
GLUCOSE BLD-MCNC: 84 MG/DL (ref 70–99)
GLUCOSE BLD-MCNC: 86 MG/DL (ref 70–99)
GLUCOSE BLD-MCNC: 91 MG/DL (ref 70–99)
GLUCOSE BLD-MCNC: 92 MG/DL (ref 70–99)
GLUCOSE BLD-MCNC: 96 MG/DL (ref 70–99)
GLUCOSE SERPL-MCNC: 84 MG/DL (ref 70–99)
HBA1C MFR BLD: 5.7 %
HCT VFR BLD AUTO: 31.5 % (ref 36–48)
HGB BLD-MCNC: 10 G/DL (ref 12–16)
LYMPHOCYTES # BLD: 1 K/UL (ref 1–5.1)
LYMPHOCYTES NFR BLD: 10.7 %
MAGNESIUM SERPL-MCNC: 2.04 MG/DL (ref 1.8–2.4)
MCH RBC QN AUTO: 28.4 PG (ref 26–34)
MCHC RBC AUTO-ENTMCNC: 31.7 G/DL (ref 31–36)
MCV RBC AUTO: 89.6 FL (ref 80–100)
MONOCYTES # BLD: 1.2 K/UL (ref 0–1.3)
MONOCYTES NFR BLD: 12.9 %
NEUTROPHILS # BLD: 6.5 K/UL (ref 1.7–7.7)
NEUTROPHILS NFR BLD: 73.4 %
PERFORMED ON: ABNORMAL
PERFORMED ON: NORMAL
PLATELET # BLD AUTO: 428 K/UL (ref 135–450)
PMV BLD AUTO: 9.3 FL (ref 5–10.5)
POTASSIUM SERPL-SCNC: 3.3 MMOL/L (ref 3.5–5.1)
PROT SERPL-MCNC: 4.9 G/DL (ref 6.4–8.2)
RBC # BLD AUTO: 3.51 M/UL (ref 4–5.2)
SODIUM SERPL-SCNC: 141 MMOL/L (ref 136–145)
TSH SERPL DL<=0.005 MIU/L-ACNC: 2.47 UIU/ML (ref 0.27–4.2)
WBC # BLD AUTO: 8.9 K/UL (ref 4–11)

## 2025-01-12 PROCEDURE — 2500000003 HC RX 250 WO HCPCS: Performed by: NURSE PRACTITIONER

## 2025-01-12 PROCEDURE — 94761 N-INVAS EAR/PLS OXIMETRY MLT: CPT

## 2025-01-12 PROCEDURE — 95713 VEEG 2-12 HR CONT MNTR: CPT

## 2025-01-12 PROCEDURE — 94640 AIRWAY INHALATION TREATMENT: CPT

## 2025-01-12 PROCEDURE — 71045 X-RAY EXAM CHEST 1 VIEW: CPT

## 2025-01-12 PROCEDURE — 92610 EVALUATE SWALLOWING FUNCTION: CPT

## 2025-01-12 PROCEDURE — 36415 COLL VENOUS BLD VENIPUNCTURE: CPT

## 2025-01-12 PROCEDURE — 2580000003 HC RX 258: Performed by: INTERNAL MEDICINE

## 2025-01-12 PROCEDURE — 80053 COMPREHEN METABOLIC PANEL: CPT

## 2025-01-12 PROCEDURE — 83036 HEMOGLOBIN GLYCOSYLATED A1C: CPT

## 2025-01-12 PROCEDURE — 2580000003 HC RX 258: Performed by: NURSE PRACTITIONER

## 2025-01-12 PROCEDURE — 2500000003 HC RX 250 WO HCPCS: Performed by: INTERNAL MEDICINE

## 2025-01-12 PROCEDURE — 93005 ELECTROCARDIOGRAM TRACING: CPT | Performed by: STUDENT IN AN ORGANIZED HEALTH CARE EDUCATION/TRAINING PROGRAM

## 2025-01-12 PROCEDURE — 95720 EEG PHY/QHP EA INCR W/VEEG: CPT | Performed by: PSYCHIATRY & NEUROLOGY

## 2025-01-12 PROCEDURE — 95700 EEG CONT REC W/VID EEG TECH: CPT

## 2025-01-12 PROCEDURE — 6360000002 HC RX W HCPCS: Performed by: INTERNAL MEDICINE

## 2025-01-12 PROCEDURE — 2700000000 HC OXYGEN THERAPY PER DAY

## 2025-01-12 PROCEDURE — 99233 SBSQ HOSP IP/OBS HIGH 50: CPT | Performed by: PSYCHIATRY & NEUROLOGY

## 2025-01-12 PROCEDURE — 6370000000 HC RX 637 (ALT 250 FOR IP): Performed by: INTERNAL MEDICINE

## 2025-01-12 PROCEDURE — 84443 ASSAY THYROID STIM HORMONE: CPT

## 2025-01-12 PROCEDURE — 82140 ASSAY OF AMMONIA: CPT

## 2025-01-12 PROCEDURE — 2060000000 HC ICU INTERMEDIATE R&B

## 2025-01-12 PROCEDURE — 85025 COMPLETE CBC W/AUTO DIFF WBC: CPT

## 2025-01-12 PROCEDURE — 51798 US URINE CAPACITY MEASURE: CPT

## 2025-01-12 PROCEDURE — 83735 ASSAY OF MAGNESIUM: CPT

## 2025-01-12 PROCEDURE — 93010 ELECTROCARDIOGRAM REPORT: CPT | Performed by: INTERNAL MEDICINE

## 2025-01-12 RX ORDER — INSULIN GLARGINE 100 [IU]/ML
8 INJECTION, SOLUTION SUBCUTANEOUS NIGHTLY
Status: DISCONTINUED | OUTPATIENT
Start: 2025-01-12 | End: 2025-01-17 | Stop reason: HOSPADM

## 2025-01-12 RX ORDER — GLUCAGON 1 MG/ML
1 KIT INJECTION PRN
Status: DISCONTINUED | OUTPATIENT
Start: 2025-01-12 | End: 2025-01-17 | Stop reason: HOSPADM

## 2025-01-12 RX ORDER — SODIUM CHLORIDE 9 MG/ML
INJECTION, SOLUTION INTRAVENOUS CONTINUOUS
Status: ACTIVE | OUTPATIENT
Start: 2025-01-12 | End: 2025-01-12

## 2025-01-12 RX ORDER — METOPROLOL TARTRATE 1 MG/ML
2.5 INJECTION, SOLUTION INTRAVENOUS EVERY 6 HOURS
Status: DISCONTINUED | OUTPATIENT
Start: 2025-01-12 | End: 2025-01-17 | Stop reason: HOSPADM

## 2025-01-12 RX ORDER — DEXTROSE MONOHYDRATE AND SODIUM CHLORIDE 5; .45 G/100ML; G/100ML
INJECTION, SOLUTION INTRAVENOUS CONTINUOUS
Status: ACTIVE | OUTPATIENT
Start: 2025-01-13 | End: 2025-01-13

## 2025-01-12 RX ORDER — DEXTROSE MONOHYDRATE 100 MG/ML
INJECTION, SOLUTION INTRAVENOUS CONTINUOUS PRN
Status: DISCONTINUED | OUTPATIENT
Start: 2025-01-12 | End: 2025-01-17 | Stop reason: HOSPADM

## 2025-01-12 RX ORDER — INSULIN LISPRO 100 [IU]/ML
0-4 INJECTION, SOLUTION INTRAVENOUS; SUBCUTANEOUS
Status: DISCONTINUED | OUTPATIENT
Start: 2025-01-12 | End: 2025-01-17 | Stop reason: HOSPADM

## 2025-01-12 RX ADMIN — ENOXAPARIN SODIUM 40 MG: 100 INJECTION SUBCUTANEOUS at 09:22

## 2025-01-12 RX ADMIN — THIAMINE HYDROCHLORIDE 500 MG: 100 INJECTION, SOLUTION INTRAMUSCULAR; INTRAVENOUS at 18:59

## 2025-01-12 RX ADMIN — SODIUM CHLORIDE: 9 INJECTION, SOLUTION INTRAVENOUS at 03:47

## 2025-01-12 RX ADMIN — LEVETIRACETAM 1000 MG: 100 INJECTION INTRAVENOUS at 09:21

## 2025-01-12 RX ADMIN — ATORVASTATIN CALCIUM 40 MG: 40 TABLET, FILM COATED ORAL at 00:02

## 2025-01-12 RX ADMIN — METOPROLOL TARTRATE 2.5 MG: 1 INJECTION, SOLUTION INTRAVENOUS at 21:10

## 2025-01-12 RX ADMIN — SODIUM CHLORIDE, PRESERVATIVE FREE 10 ML: 5 INJECTION INTRAVENOUS at 21:10

## 2025-01-12 RX ADMIN — DEXTROSE MONOHYDRATE 125 ML: 100 INJECTION, SOLUTION INTRAVENOUS at 23:13

## 2025-01-12 RX ADMIN — LEVETIRACETAM 1000 MG: 100 INJECTION INTRAVENOUS at 00:04

## 2025-01-12 RX ADMIN — ASPIRIN 81 MG: 81 TABLET, COATED ORAL at 09:23

## 2025-01-12 RX ADMIN — SODIUM CHLORIDE: 9 INJECTION, SOLUTION INTRAVENOUS at 08:13

## 2025-01-12 RX ADMIN — METOPROLOL TARTRATE 50 MG: 50 TABLET, FILM COATED ORAL at 00:02

## 2025-01-12 RX ADMIN — WATER 1000 MG: 1 INJECTION INTRAMUSCULAR; INTRAVENOUS; SUBCUTANEOUS at 18:58

## 2025-01-12 RX ADMIN — IPRATROPIUM BROMIDE AND ALBUTEROL SULFATE 1 DOSE: 2.5; .5 SOLUTION RESPIRATORY (INHALATION) at 21:04

## 2025-01-12 RX ADMIN — LEVETIRACETAM 1000 MG: 100 INJECTION INTRAVENOUS at 21:10

## 2025-01-12 RX ADMIN — DEXTROSE MONOHYDRATE 125 ML: 100 INJECTION, SOLUTION INTRAVENOUS at 16:31

## 2025-01-12 RX ADMIN — DEXTROSE AND SODIUM CHLORIDE: 5; 450 INJECTION, SOLUTION INTRAVENOUS at 23:49

## 2025-01-12 RX ADMIN — PREGABALIN 100 MG: 50 CAPSULE ORAL at 00:02

## 2025-01-12 RX ADMIN — THIAMINE HYDROCHLORIDE 500 MG: 100 INJECTION, SOLUTION INTRAMUSCULAR; INTRAVENOUS at 09:15

## 2025-01-12 NOTE — PROGRESS NOTES
LONG-TERM EEG-VIDEO MONITORING   CLINICAL NEUROPHYSIOLOGY LABORATORY  DEPARTMENT OF NEUROLOGY  [facility]    Patient: Renay Lovett  Age: 69 y.o.  MRN: [id number]    Referring Physician: Lv Lombardi MD  History: The patient is a 69 y.o. female who presented breakthrough seizure/encephalopathy. This long-term video-EEG monitoring study was performed to determine the nature of the patient's clinical events. The patient is on neuroactive medications.   Renay Lovett   Current Facility-Administered Medications   Medication Dose Route Frequency Provider Last Rate Last Admin    glucose chewable tablet 16 g  4 tablet Oral PRN Sulma Tyler MD        dextrose bolus 10% 125 mL  125 mL IntraVENous PRN Sulma Tyler MD   Stopped at 01/12/25 1648    Or    dextrose bolus 10% 250 mL  250 mL IntraVENous PRN Sulma Tyler MD        glucagon injection 1 mg  1 mg SubCUTAneous PRN Sulma Tyler MD        dextrose 10 % infusion   IntraVENous Continuous PRN Sulma Tyler MD        insulin glargine (LANTUS) injection vial 8 Units  8 Units SubCUTAneous Nightly Sulma Tyler MD        insulin lispro (HUMALOG,ADMELOG) injection vial 0-4 Units  0-4 Units SubCUTAneous 4x Daily AC & HS Sulma Tyler MD        LORazepam (ATIVAN) injection 2 mg  2 mg IntraVENous Q5 Min PRN Sulma Tyler MD        sodium chloride flush 0.9 % injection 5-40 mL  5-40 mL IntraVENous 2 times per day Sulma Tyler MD        sodium chloride flush 0.9 % injection 5-40 mL  5-40 mL IntraVENous PRN Sulma Tyler MD        0.9 % sodium chloride infusion   IntraVENous PRN Sulma Tyler MD        potassium chloride (KLOR-CON M) extended release tablet 40 mEq  40 mEq Oral PRN Sulma Tyler MD        Or    potassium bicarb-citric acid (EFFER-K) effervescent tablet 40 mEq  40 mEq Oral PRN Sulma Tyelr MD        Or    potassium chloride 10 mEq/100 mL IVPB (Peripheral Line)  10 mEq

## 2025-01-12 NOTE — PROGRESS NOTES
Patient admitted to room 5578. Report received from RN on phone. VSS on room air. Patient oriented to room and call light. Rights and responsibilities provided to patient, and welcome packet provided to patient. 4 eyes skin assessment completed with 2nd RN.

## 2025-01-12 NOTE — PROGRESS NOTES
CONTINUOUS EEG    Name:  Renay Lovett  Medical Record Number:  [id number]  Age: 69 y.o.   Gender: female  : 1955  Today's Date:  2025  Room:  5524/5524-01  Vital Signs   /71   Pulse 72   Temp 98.9 °F (37.2 °C) (Axillary)   Resp 22   Ht 1.753 m (5' 9.02\")   Wt 75.3 kg (166 lb)   SpO2 93%   BMI 24.50 kg/m²           Continuous EEG Testing Start Time:  1435.      Comments: Rose at [facility] [id number] for monitoring. Dr Jacobson at [facility] contacted [phone] for reading. Impedence on all leads are within normal limits. Today is the initial set up day for cvEEG. Patient head is NOT wrapped.Shana COLE  is aware that this patients cvEEG will be repositioned on 25 at 1435. Shana COLE was notified at 1440 by this EEG technician. Patient's head was placed on pillow upon completion of cvEEG placement.      Plan of Care: Begin monitoring.    Electronically signed by BEHNE,SAMANTHA on 2025 at 3:10 PM

## 2025-01-12 NOTE — PROGRESS NOTES
Speech Language Pathology  Facility/Department:[facility] 5T ORTHO/NEURO  Bedside Swallowing  Evaluation  Name: Renay Lovett  : 1955  MRN: [id number]                                                       Patient Diagnosis(es):   Patient Active Problem List    Diagnosis Date Noted    Chronic systolic (congestive) heart failure 2022    Pulmonary nodules 2013    Tobacco abuse 2013    Encephalopathy 2025    Hypokalemia 01/10/2025    Hypomagnesemia 01/10/2025    Urinary tract infection without hematuria 01/10/2025    Acute encephalopathy 01/10/2025    Aphasia 01/10/2025    Dementia (HCC) 01/10/2025    Acute CVA (cerebrovascular accident) ([facility]) 01/10/2025    AMS (altered mental status) 2025    Chronic respiratory failure with hypoxia 08/10/2021    COPD, mild ([facility]) 08/10/2021    Major depressive disorder with single episode, in partial remission ([facility]) 2021    Primary osteoarthritis of right shoulder 2021    Displaced fracture of acromial process, right shoulder, initial encounter for closed fracture 2021    Acute pain of right shoulder 2021    Rotator cuff arthropathy of right shoulder 2021    Osteopenia 2020    Morbidly obese 2020    Type 2 diabetes mellitus without complication, without long-term current use of insulin ([facility]) 10/26/2018    Lumbosacral spondylosis without myelopathy 2016    Displacement of lumbar intervertebral disc without myelopathy 2016    Degeneration of lumbar or lumbosacral intervertebral disc 2016    Spinal stenosis, lumbar region, without neurogenic claudication 2016    COPD with acute exacerbation (HCC) 2014    Aortic regurgitation 2014    HTN (hypertension) 2014    Abnormal chest CT 2014    Decreased diffusion capacity 2014    Restrictive lung disease 2014       Past Medical History:   Diagnosis Date    Abnormal CT scan, chest     COPD (chronic obstructive  take pill then made no attempt to take a drink from the straw when presented, despite max verbal and tactile cues. Pt provided with several sips of water by tsp. Swallow movement noted with all and no s/s aspiration observed. Oral cavity inspected for pill with no pill observed. Did not present pt with further PO trials due to concern for Pt safety. Recommend NPO with oral care with suction with small amounts of ice chips/sips of water to encourage swallow function. RN may attempt to give pills one at a time IF pt becomes more alert and able to participate. Pt would benefit from  having oral suction set up at the bedside for safety. RN present and will address.     Instrumental assessment results   Not indicated at this time     Speech, Language, Cognitive Screen 25  Pt too lethargic to participate at this time. Pt was oriented to  but did not attempt to respond to any other questions or commands. Will attempt to complete full assessment when appropriate    Prognosis:  Prognosis for improvement: fair  Barriers to reach goals: history of cognitive decline x3 years per chart     Treatment:  Dysphagia Goals:  The pt will be seen  2-4 x to address the following goals:     Goal 1: Patient will participate in repeat bedside swallowing assessment.          Education  Provided education to patient re: role of SLP, purpose of visit, and recommendations. Patient with no evidence of  comprehension.    Pt goal: unable to state   Pt discharge goal: unable to state    Total treatment time: 15 bedside swallowing evaluation     Plan: Continue per POC  Recommended Diet: NPO with oral care with suction with small amounts of ice chips/sips of water  Medication administration: via alternate route unless is adequately alert, then RN can attempt to administer with sips of water     Strategies: (for ice chips/sips of water only)  Upright  Oral care with suction  Present one ice chip at a time/small sip of water     Discharge

## 2025-01-12 NOTE — PROGRESS NOTES
Patient is alert and oriented x1. VSS on room air. Medications given per MAR, no side effects noted. Patient yet to ambulate. No complaints of pain, continuing to monitor and manage per MAR. Patient voided x1 this shift, bladder scan showed 58 mL, fluid started, no bm this shift.      Patient is currently resting in bed with bed alarm on for safety. Call light within reach and all fall precautions in place. Plan of care continues.    RN screened patient's swallowing by administering the [place] Swallow Protocol. The patient consumed 3 ounces of water by straw in sequential swallows without signs/symptoms of aspiration.   None

## 2025-01-12 NOTE — PLAN OF CARE
Problem: Safety - Adult  Goal: Free from fall injury  Outcome: Progressing   All fall precautions in place. Bed locked and in lowest position with alarm on. Overbed table and personal belonings within reach. Call light within reach and patient instructed to use call light for assistance. Non-skid socks on.  Problem: Pain  Goal: Verbalizes/displays adequate comfort level or baseline comfort level  Outcome: Progressing   Pt groaning in pain. Being treated with PRN pain medication, rest, and frequent repositioning with pillow support for comfort and pressure relief. Pt reports some relief from pain with above interventions.

## 2025-01-12 NOTE — PROGRESS NOTES
Current NIHSS 6    Nursing Core Measures for Stroke:   [x]   Education template documentation (STROKE/TIA). Please select only risk factors that are applicable to patient when selecting risk factors.  [x]   Care Plan template documentation (Physiologic Instability - Neurosensory). Selecting this will add care plan rows to the flowsheet under the Neuro section of Head to Toe.  [x]   Verified Swallow Screen completed prior to PO intake of food, drink, medications>          Please verify correct medication route prior to administration for intubated patients, patients who can not swallow or have alternative routes of intake (NG, OG, AZ), etc  [x]   VTE Prophylaxis: SCDs ordered/addressed; SCDs: N/A Lovenox           (As a reminder, ASA, Plavix, and TPA/TNK are not VTE prophylaxis.)    Reviewed the Following Education with Patient and/or Family:   - Personalized risk factors for patient, along with changes, modifications that will help prevent stroke.  - Signs and Symptoms of Stroke: (Facial droop, weakness/numbness especially on one side, speech difficulty, sudden confusion, sudden loss of vision, sudden severe headache, sudden loss of balance or having difficulty walking, syncope, or seizure)  - How to activate EMS (911)   - Importance of Follow Up Appointments at Discharge   - Importance of Compliance with Medications Prescribed at Discharge  - Available community resources and stroke advocacy groups if needed    Patient and/or family member: education needs reinforcement.     Stroke Education booklet given to patient/family (or verified, if given already), which reviews above information. yes         Electronically signed by Ashlie Pineda RN on 1/12/2025 at 4:38 AM

## 2025-01-12 NOTE — PROGRESS NOTES
4 Eyes Skin Assessment     NAME:  Renay Lovett  YOB: 1955  MEDICAL RECORD NUMBER:  [id number]    The patient is being assessed for  Admission    I agree that at least one RN has performed a thorough Head to Toe Skin Assessment on the patient. ALL assessment sites listed below have been assessed.      Areas assessed by both nurses:    Head, Face, Ears, Shoulders, Back, Chest, Arms, Elbows, Hands, Sacrum. Buttock, Coccyx, Ischium, and Legs. Feet and Heels        Does the Patient have a Wound? No noted wound(s)       Peter Prevention initiated by RN: No  Wound Care Orders initiated by RN: No  Redness on bottom and bilat heels, both blanchable  Scattered redness and ecchymosis  Redness under breast folds and abd folds  Skin tear on L arm  Pressure Injury (Stage 3,4, Unstageable, DTI, NWPT, and Complex wounds) if present, place Wound referral order by RN under : No    New Ostomies, if present place, Ostomy referral order under : No     Nurse 1 eSignature: Electronically signed by Ashlie Pineda RN on 1/11/25 at 9:33 PM EST    **SHARE this note so that the co-signing nurse can place an eSignature**    Nurse 2 eSignature: Electronically signed by Ashley Washington RN on 1/12/25 at 2:52 AM EST

## 2025-01-12 NOTE — PROGRESS NOTES
Squad here to  pt to be transferred to [facility]. Report given to ambulance staff. Maria A Avelar RN

## 2025-01-12 NOTE — CONSULTS
[place] person, tells me we are in [facility] (recently transferred from there), Tells me its 2025. Does not offer the month    Attention: fair; improved since last examined by neurology earlier today.    Language: answers questions but does not make conversation   Comprehension intact; follows simple commands  Cranial nerves:   CN2: Visual fields not tested due to inattention   CN 3,4,6: Pupils equal and reactive to light, extraocular muscles intact  CN5: Facial sensation symmetric   CN7: Face symmetric without dysarthria  CN8: Hearing symmetric to spoken voice  CN9: Palate elevated symmetrically  CN11: Traps full strength on shoulder shrug  CN12: Tongue midline with protrusion  Motor Exam:  Good strength throughout, at least antigravity or better x 4. No asymmetry  Sensory: light touch intact and symmetric in all 4 extremities.    Cerebellar/coordination: finger nose finger normal without ataxia  Tone: normal in all 4 extremities  Gait: deferred for safety     Diagnostic Testing Results   IMAGES:  Imaging reviewed    MRI brain w/wo yola, MR-A head and neck  1/10/25  1. 6 mm focus of mild restricted diffusion involving the right splenium  corpus callosum, as well as the left paramedian crystal.  Punctate additional  areas of restricted diffusion involving the left occipital lobe. Findings  suggest acute/subacute infarcts within the posterior circulation.  2. Severe chronic microvascular ischemic changes.  3. No definitive evidence of abnormal contrast enhancement within the brain,  although slightly motion degraded.  4. Extremely limited MR angiography of the neck due to motion degradation.  Flow voids suggesting patency within limitations, although can not assess  caliber changes.  5. No proximal large vessel occlusion of the intracranial circulation within  limitations of study.        TTE    Left Ventricle: Normal left ventricular systolic function with a visually estimated EF of 55 - 60%. Left ventricle is smaller than

## 2025-01-12 NOTE — PROGRESS NOTES
SYSTEM PROVIDED HISTORY: hypoxic, tachycardic, AMS TECHNOLOGIST PROVIDED HISTORY: Reason for exam:->hypoxic, tachycardic, AMS Additional Contrast?->1 Reason for Exam: hypoxic, tachycardic, AMS FINDINGS: Pulmonary Arteries: Pulmonary arteries are adequately opacified for evaluation.  No evidence of intraluminal filling defect to suggest pulmonary embolism.  Main pulmonary artery is normal in caliber. Mediastinum: No evidence of mediastinal lymphadenopathy.  The heart and pericardium demonstrate no acute abnormality.  There is no acute abnormality of the thoracic aorta. Lungs/pleura: Small right pleural effusion is identified.  The lungs are unremarkable Upper Abdomen: Limited images of the upper abdomen are unremarkable. Soft Tissues/Bones: No acute bone or soft tissue abnormality.     No evidence of pulmonary embolism Small right pleural effusion     XR CHEST PORTABLE    Result Date: 1/9/2025  EXAMINATION: ONE XRAY VIEW OF THE CHEST 1/9/2025 2:35 pm COMPARISON: 04/20/2021 radiograph HISTORY: ORDERING SYSTEM PROVIDED HISTORY: Shortness of Breath TECHNOLOGIST PROVIDED HISTORY: Reason for exam:->Shortness of Breath Reason for Exam: Shortness of breath FINDINGS: Heart and mediastinum are normal.  Slight vascular congestion centrally that appears stable from remote imaging.  No acute airspace abnormality.  Prior right shoulder arthroplasty.  No significant skeletal finding.     No acute finding in the chest. Mild vascular congestion is suspected.       CBC:   Recent Labs     01/09/25  1447 01/10/25  0642   WBC 13.0* 11.0   HGB 12.6 12.0    397     BMP:    Recent Labs     01/09/25  1447 01/10/25  0642 01/10/25  1733   * 146*  --    K 2.4* 2.8* 4.0    111*  --    CO2 27 24  --    BUN 10 10  --    CREATININE 0.5* 0.4*  --    GLUCOSE 110* 83  --      Hepatic:   Recent Labs     01/09/25  1447 01/10/25  0642   AST 11* 15   ALT 6* <5*   BILITOT 0.8 0.6   ALKPHOS 63 57     Lipids:   Lab Results   Component  Value Date/Time    CHOL 99 01/10/2025 05:33 PM    HDL 30 01/10/2025 05:33 PM    TRIG 97 01/10/2025 05:33 PM     Hemoglobin A1C:   Lab Results   Component Value Date/Time    LABA1C 5.7 01/10/2025 05:33 PM     TSH:   Lab Results   Component Value Date/Time    TSH 1.87 01/10/2025 12:20 PM     Troponin: No results found for: \"TROPONINT\"  Lactic Acid: No results for input(s): \"LACTA\" in the last 72 hours.  BNP: No results for input(s): \"PROBNP\" in the last 72 hours.  UA:  Lab Results   Component Value Date/Time    NITRU POSITIVE 01/09/2025 02:42 PM    COLORU Yellow 01/09/2025 02:42 PM    PHUR 6.0 01/09/2025 02:42 PM    PHUR 6.5 07/14/2021 12:50 PM    WBCUA 6-9 01/09/2025 02:42 PM    RBCUA 0-2 01/09/2025 02:42 PM    MUCUS 2+ 01/09/2025 02:42 PM    BACTERIA 1+ 01/09/2025 02:42 PM    CLARITYU Clear 01/09/2025 02:42 PM    LEUKOCYTESUR Negative 01/09/2025 02:42 PM    UROBILINOGEN 2.0 01/09/2025 02:42 PM    BILIRUBINUR MODERATE 01/09/2025 02:42 PM    BLOODU Negative 01/09/2025 02:42 PM    GLUCOSEU Negative 01/09/2025 02:42 PM    KETUA >=80 01/09/2025 02:42 PM    AMORPHOUS Rare 01/09/2025 02:42 PM     Urine Cultures:   Lab Results   Component Value Date/Time    LABURIN No growth at 18 to 36 hours 01/09/2025 02:42 PM     Blood Cultures:   Lab Results   Component Value Date/Time    BC  01/09/2025 02:47 PM     No Growth to date.  Any change in status will be called.     Lab Results   Component Value Date/Time    BLOODCULT2  01/09/2025 02:46 PM     No Growth to date.  Any change in status will be called.     Organism: No results found for: \"ORG\"      Electronically signed by Kwadwo Centeno MD on 1/12/2025 at 1:00 PM

## 2025-01-12 NOTE — PROGRESS NOTES
Neurology Progress Note    Patient: Renay Lovett MRN: [id number]    YOB: 1955  Age: 69 y.o.  Sex: female   Unit: [facility] ORTHO/NEURO Room/Bed: 5524/5524-01 Location: [facility]    Today's Date: 1/12/2025  Date of Admission: 1/11/2025  9:08 PM  Admitting Physician: CHUCKY HONG    Primary Care Physician: Melchor Gaitan MD          LOS: 1 day      ASSESSMENT & RECOMMENDATIONS     Assessment  70yo woman with reported progressive cognitive decline over many months, presented to OSH with several days of decreased po intake and minimal speech with MRI that demonstrates subacute and very faint ischemic stroke in left crystal, left corpus callosum, and punctate left occipital lobe, none of which explain her encephalopathy, transferred here over concerns for subclinical seizures  It seems that her exam is mildly improved from what is described at OSH  Needless to say, she remains fairly densely encephalopathic  MRI demonstrates strokes that are subacute, at most, and do not explain this degree of decreased level of consciousness, despite involvement of brainstem and corpus callosum (see images pasted below)  MRA images at OSH were of very poor quality, but CTA done here does not demonstrate sig pathology, certainly nothing to explain this presentation  Although there are some metabolic derangements, none seems sig enough to explain this presentation either  Regardless of cause of encephalopathy, needs investigation for what appear to be embolic posterior circulation strokes    Recommendations  cvEEG, as ordered  Continue empiric Keppra 1000mg q12, as ordered  If EEG unremarkable and no clear metabolic/infectious cause found, may need to consider CSF studies despite lack of fever, leukocytosis (although WBC up from 11 to 13 today), or meningismus  Maintain good secondary prevention of stroke measures including SBP < 130 mmHg AND DBP < 90 mmHg at ALL times; HbA1c <7%; and LDL < 70  Lateral 9.94     E/E' Septal 12.05     LA Volume Index BP 15 (A) 16 - 34 ml/m2    LA Volume Index MOD A2C 15 (A) 16 - 34 ml/m2    LA Volume Index MOD A4C 23 16 - 34 ml/m2    RA Volume Index A4C 14 mL/m2    Ao Root Index 1.78 cm/m2    Ascending Aorta Index 1.62 cm/m2    AV Velocity Ratio 0.79     LVOT:AV VTI Index 0.84     MV:LVOT VTI Index 1.35     Est. RA Pressure 3 mmHg    RVSP 26 mmHg    EF Physician 58 %   POCT Glucose    Collection Time: 01/12/25  4:09 AM   Result Value Ref Range    POC Glucose 86 70 - 99 mg/dl    Performed on ACCU-CHEK    POCT Glucose    Collection Time: 01/12/25  8:07 AM   Result Value Ref Range    POC Glucose 84 70 - 99 mg/dl    Performed on ACCU-CHEK    EKG 12 Lead    Collection Time: 01/12/25  9:01 AM   Result Value Ref Range    Ventricular Rate 70 BPM    Atrial Rate 70 BPM    P-R Interval 140 ms    QRS Duration 82 ms    Q-T Interval 406 ms    QTc Calculation (Bazett) 438 ms    P Axis 50 degrees    R Axis 53 degrees    T Axis 56 degrees    Diagnosis       Sinus rhythm with occasional Premature ventricular complexesLow voltage QRSBorderline ECG   POCT Glucose    Collection Time: 01/12/25 11:29 AM   Result Value Ref Range    POC Glucose 92 70 - 99 mg/dl    Performed on ACCU-CHEK        Scheduled Meds:   insulin glargine  8 Units SubCUTAneous Nightly    insulin lispro  0-4 Units SubCUTAneous 4x Daily AC & HS    sodium chloride flush  5-40 mL IntraVENous 2 times per day    enoxaparin  40 mg SubCUTAneous Daily    calcium carb-cholecalciferol  1 tablet Oral BID    dilTIAZem  120 mg Oral Daily    DULoxetine  30 mg Oral Daily    metoprolol tartrate  50 mg Oral BID    pregabalin  100 mg Oral BID    losartan  100 mg Oral Daily    And    hydroCHLOROthiazide  25 mg Oral Daily    ipratropium 0.5 mg-albuterol 2.5 mg  1 Dose Inhalation BID RT    thiamine  500 mg IntraVENous TID    aspirin  81 mg Oral Daily    atorvastatin  40 mg Oral Nightly    levETIRAcetam  1,000 mg IntraVENous BID    cefTRIAXone

## 2025-01-13 ENCOUNTER — APPOINTMENT (OUTPATIENT)
Dept: GENERAL RADIOLOGY | Age: 70
End: 2025-01-13
Attending: INTERNAL MEDICINE
Payer: MEDICARE

## 2025-01-13 PROBLEM — Z51.5 ENCOUNTER FOR PALLIATIVE CARE: Status: ACTIVE | Noted: 2025-01-13

## 2025-01-13 PROBLEM — Z71.89 ADVANCE CARE PLANNING: Status: ACTIVE | Noted: 2025-01-13

## 2025-01-13 PROBLEM — E44.0 MODERATE MALNUTRITION (HCC): Chronic | Status: ACTIVE | Noted: 2025-01-13

## 2025-01-13 LAB
ALBUMIN SERPL-MCNC: 2.6 G/DL (ref 3.4–5)
ALBUMIN/GLOB SERPL: 0.9 {RATIO} (ref 1.1–2.2)
ALP SERPL-CCNC: 52 U/L (ref 40–129)
ALT SERPL-CCNC: <5 U/L (ref 10–40)
ANION GAP SERPL CALCULATED.3IONS-SCNC: 11 MMOL/L (ref 3–16)
AST SERPL-CCNC: 17 U/L (ref 15–37)
BACTERIA BLD CULT ORG #2: NORMAL
BACTERIA BLD CULT: NORMAL
BILIRUB SERPL-MCNC: 0.4 MG/DL (ref 0–1)
BUN SERPL-MCNC: 13 MG/DL (ref 7–20)
CALCIUM SERPL-MCNC: 8.4 MG/DL (ref 8.3–10.6)
CHLORIDE SERPL-SCNC: 108 MMOL/L (ref 99–110)
CO2 SERPL-SCNC: 24 MMOL/L (ref 21–32)
CREAT SERPL-MCNC: 0.5 MG/DL (ref 0.6–1.2)
EST. AVERAGE GLUCOSE BLD GHB EST-MCNC: 105.4 MG/DL
EST. AVERAGE GLUCOSE BLD GHB EST-MCNC: 116.9 MG/DL
GFR SERPLBLD CREATININE-BSD FMLA CKD-EPI: >90 ML/MIN/{1.73_M2}
GLUCOSE BLD-MCNC: 117 MG/DL (ref 70–99)
GLUCOSE BLD-MCNC: 119 MG/DL (ref 70–99)
GLUCOSE BLD-MCNC: 122 MG/DL (ref 70–99)
GLUCOSE BLD-MCNC: 69 MG/DL (ref 70–99)
GLUCOSE BLD-MCNC: 81 MG/DL (ref 70–99)
GLUCOSE BLD-MCNC: 82 MG/DL (ref 70–99)
GLUCOSE BLD-MCNC: 87 MG/DL (ref 70–99)
GLUCOSE BLD-MCNC: 88 MG/DL (ref 70–99)
GLUCOSE BLD-MCNC: 98 MG/DL (ref 70–99)
GLUCOSE SERPL-MCNC: 134 MG/DL (ref 70–99)
HBA1C MFR BLD: 5.3 %
HBA1C MFR BLD: 5.7 %
INR PPP: 1.13 (ref 0.85–1.15)
MAGNESIUM SERPL-MCNC: 1.86 MG/DL (ref 1.8–2.4)
MAGNESIUM SERPL-MCNC: 1.91 MG/DL (ref 1.8–2.4)
PERFORMED ON: ABNORMAL
PERFORMED ON: NORMAL
PHOSPHATE SERPL-MCNC: 2.1 MG/DL (ref 2.5–4.9)
PHOSPHATE SERPL-MCNC: 2.2 MG/DL (ref 2.5–4.9)
POTASSIUM SERPL-SCNC: 2.8 MMOL/L (ref 3.5–5.1)
POTASSIUM SERPL-SCNC: 3.9 MMOL/L (ref 3.5–5.1)
PROT SERPL-MCNC: 5.4 G/DL (ref 6.4–8.2)
PROTHROMBIN TIME: 14.7 SEC (ref 11.9–14.9)
SODIUM SERPL-SCNC: 143 MMOL/L (ref 136–145)

## 2025-01-13 PROCEDURE — 95716 VEEG EA 12-26HR CONT MNTR: CPT

## 2025-01-13 PROCEDURE — 84132 ASSAY OF SERUM POTASSIUM: CPT

## 2025-01-13 PROCEDURE — 82042 OTHER SOURCE ALBUMIN QUAN EA: CPT

## 2025-01-13 PROCEDURE — 36415 COLL VENOUS BLD VENIPUNCTURE: CPT

## 2025-01-13 PROCEDURE — 99221 1ST HOSP IP/OBS SF/LOW 40: CPT | Performed by: NURSE PRACTITIONER

## 2025-01-13 PROCEDURE — 94640 AIRWAY INHALATION TREATMENT: CPT

## 2025-01-13 PROCEDURE — 2060000000 HC ICU INTERMEDIATE R&B

## 2025-01-13 PROCEDURE — 84157 ASSAY OF PROTEIN OTHER: CPT

## 2025-01-13 PROCEDURE — 83036 HEMOGLOBIN GLYCOSYLATED A1C: CPT

## 2025-01-13 PROCEDURE — 2580000003 HC RX 258

## 2025-01-13 PROCEDURE — 82040 ASSAY OF SERUM ALBUMIN: CPT

## 2025-01-13 PROCEDURE — 93005 ELECTROCARDIOGRAM TRACING: CPT | Performed by: STUDENT IN AN ORGANIZED HEALTH CARE EDUCATION/TRAINING PROGRAM

## 2025-01-13 PROCEDURE — 84100 ASSAY OF PHOSPHORUS: CPT

## 2025-01-13 PROCEDURE — 80053 COMPREHEN METABOLIC PANEL: CPT

## 2025-01-13 PROCEDURE — 2500000003 HC RX 250 WO HCPCS: Performed by: INTERNAL MEDICINE

## 2025-01-13 PROCEDURE — 83735 ASSAY OF MAGNESIUM: CPT

## 2025-01-13 PROCEDURE — 2500000003 HC RX 250 WO HCPCS: Performed by: NURSE PRACTITIONER

## 2025-01-13 PROCEDURE — 6360000002 HC RX W HCPCS: Performed by: NURSE PRACTITIONER

## 2025-01-13 PROCEDURE — 95720 EEG PHY/QHP EA INCR W/VEEG: CPT | Performed by: PSYCHIATRY & NEUROLOGY

## 2025-01-13 PROCEDURE — 2580000003 HC RX 258: Performed by: STUDENT IN AN ORGANIZED HEALTH CARE EDUCATION/TRAINING PROGRAM

## 2025-01-13 PROCEDURE — 82784 ASSAY IGA/IGD/IGG/IGM EACH: CPT

## 2025-01-13 PROCEDURE — 89050 BODY FLUID CELL COUNT: CPT

## 2025-01-13 PROCEDURE — 2580000003 HC RX 258: Performed by: NURSE PRACTITIONER

## 2025-01-13 PROCEDURE — 85610 PROTHROMBIN TIME: CPT

## 2025-01-13 PROCEDURE — 6370000000 HC RX 637 (ALT 250 FOR IP): Performed by: INTERNAL MEDICINE

## 2025-01-13 PROCEDURE — 92526 ORAL FUNCTION THERAPY: CPT

## 2025-01-13 PROCEDURE — 6360000002 HC RX W HCPCS: Performed by: INTERNAL MEDICINE

## 2025-01-13 PROCEDURE — 2700000000 HC OXYGEN THERAPY PER DAY

## 2025-01-13 PROCEDURE — 6360000002 HC RX W HCPCS

## 2025-01-13 PROCEDURE — 83916 OLIGOCLONAL BANDS: CPT

## 2025-01-13 PROCEDURE — 6360000002 HC RX W HCPCS: Performed by: STUDENT IN AN ORGANIZED HEALTH CARE EDUCATION/TRAINING PROGRAM

## 2025-01-13 PROCEDURE — 94761 N-INVAS EAR/PLS OXIMETRY MLT: CPT

## 2025-01-13 RX ORDER — VANCOMYCIN 1.25 G/250ML
1750 INJECTION, SOLUTION INTRAVENOUS ONCE
Status: COMPLETED | OUTPATIENT
Start: 2025-01-13 | End: 2025-01-13

## 2025-01-13 RX ORDER — POTASSIUM CHLORIDE 7.45 MG/ML
10 INJECTION INTRAVENOUS
Status: DISPENSED | OUTPATIENT
Start: 2025-01-13 | End: 2025-01-13

## 2025-01-13 RX ORDER — HYDROXYZINE HYDROCHLORIDE 50 MG/ML
25 INJECTION, SOLUTION INTRAMUSCULAR
Status: COMPLETED | OUTPATIENT
Start: 2025-01-13 | End: 2025-01-13

## 2025-01-13 RX ORDER — DEXTROSE MONOHYDRATE AND SODIUM CHLORIDE 5; .45 G/100ML; G/100ML
INJECTION, SOLUTION INTRAVENOUS CONTINUOUS
Status: DISCONTINUED | OUTPATIENT
Start: 2025-01-13 | End: 2025-01-14

## 2025-01-13 RX ORDER — DEXTROSE MONOHYDRATE AND SODIUM CHLORIDE 5; .45 G/100ML; G/100ML
INJECTION, SOLUTION INTRAVENOUS CONTINUOUS
Status: ACTIVE | OUTPATIENT
Start: 2025-01-13 | End: 2025-01-14

## 2025-01-13 RX ORDER — VANCOMYCIN HCL IN 5 % DEXTROSE 1.25 G/25
1250 PLASTIC BAG, INJECTION (ML) INTRAVENOUS EVERY 12 HOURS
Status: DISCONTINUED | OUTPATIENT
Start: 2025-01-14 | End: 2025-01-15

## 2025-01-13 RX ADMIN — METOPROLOL TARTRATE 2.5 MG: 1 INJECTION, SOLUTION INTRAVENOUS at 14:44

## 2025-01-13 RX ADMIN — METOPROLOL TARTRATE 2.5 MG: 1 INJECTION, SOLUTION INTRAVENOUS at 08:18

## 2025-01-13 RX ADMIN — LEVETIRACETAM 1000 MG: 100 INJECTION INTRAVENOUS at 08:18

## 2025-01-13 RX ADMIN — ACYCLOVIR SODIUM 750 MG: 50 INJECTION, SOLUTION INTRAVENOUS at 23:44

## 2025-01-13 RX ADMIN — DEXTROSE AND SODIUM CHLORIDE: 5; 450 INJECTION, SOLUTION INTRAVENOUS at 23:24

## 2025-01-13 RX ADMIN — METOPROLOL TARTRATE 2.5 MG: 1 INJECTION, SOLUTION INTRAVENOUS at 20:06

## 2025-01-13 RX ADMIN — POTASSIUM CHLORIDE 10 MEQ: 7.45 INJECTION INTRAVENOUS at 13:31

## 2025-01-13 RX ADMIN — DEXTROSE AND SODIUM CHLORIDE: 5; 450 INJECTION, SOLUTION INTRAVENOUS at 14:44

## 2025-01-13 RX ADMIN — ZIPRASIDONE MESYLATE 20 MG: 20 INJECTION, POWDER, LYOPHILIZED, FOR SOLUTION INTRAMUSCULAR at 06:59

## 2025-01-13 RX ADMIN — ACYCLOVIR SODIUM 750 MG: 50 INJECTION, SOLUTION INTRAVENOUS at 16:47

## 2025-01-13 RX ADMIN — LEVETIRACETAM 1000 MG: 100 INJECTION INTRAVENOUS at 20:07

## 2025-01-13 RX ADMIN — POTASSIUM CHLORIDE 10 MEQ: 7.45 INJECTION INTRAVENOUS at 16:11

## 2025-01-13 RX ADMIN — VANCOMYCIN 1750 MG: 1.25 INJECTION, SOLUTION INTRAVENOUS at 19:35

## 2025-01-13 RX ADMIN — IPRATROPIUM BROMIDE AND ALBUTEROL SULFATE 1 DOSE: 2.5; .5 SOLUTION RESPIRATORY (INHALATION) at 08:53

## 2025-01-13 RX ADMIN — WATER 1000 MG: 1 INJECTION INTRAMUSCULAR; INTRAVENOUS; SUBCUTANEOUS at 18:41

## 2025-01-13 RX ADMIN — METOPROLOL TARTRATE 2.5 MG: 1 INJECTION, SOLUTION INTRAVENOUS at 03:20

## 2025-01-13 RX ADMIN — SODIUM CHLORIDE, PRESERVATIVE FREE 10 ML: 5 INJECTION INTRAVENOUS at 08:19

## 2025-01-13 RX ADMIN — POTASSIUM CHLORIDE 10 MEQ: 7.45 INJECTION INTRAVENOUS at 14:43

## 2025-01-13 RX ADMIN — POTASSIUM CHLORIDE 10 MEQ: 7.45 INJECTION INTRAVENOUS at 18:20

## 2025-01-13 RX ADMIN — HYDROXYZINE HYDROCHLORIDE 25 MG: 50 INJECTION, SOLUTION INTRAMUSCULAR at 03:54

## 2025-01-13 RX ADMIN — POTASSIUM CHLORIDE 10 MEQ: 7.45 INJECTION INTRAVENOUS at 17:16

## 2025-01-13 RX ADMIN — SODIUM CHLORIDE, PRESERVATIVE FREE 10 ML: 5 INJECTION INTRAVENOUS at 20:07

## 2025-01-13 RX ADMIN — DEXTROSE AND SODIUM CHLORIDE: 5; 450 INJECTION, SOLUTION INTRAVENOUS at 23:25

## 2025-01-13 RX ADMIN — POTASSIUM CHLORIDE 10 MEQ: 7.45 INJECTION INTRAVENOUS at 11:15

## 2025-01-13 NOTE — CONSULTS
[facility]  Palliative Medicine Consultation Note      Date Of Admission:1/11/2025  Date of consult: 01/13/25  Seen by PC in the past:  No    Recommendations:      The pt quietly speaks a couple words, but keeps her eyes closed and is unable to carry on a conversation. I  met with her  Levi at the bedside this afternoon. Introduced palliative care and had a voluntary discussion about advance care planning. Levi says his wife will not want to or be able to work with therapy, and he'd rather take her home with hospice. We discussed her  nutrition and he would probably want a feeding tube place. We discussed potential risks/burdens including risk of aspiration and quality of life implications. He said she would probably \"rather die than having a feeding tube\" but he thinks her son and himself would probably opt for it, in hopes of giving her a little more time at home to be around her pets and family. Explained the hospice philosophy and services, and offered choice of hospice. He has no preference of agency. D/w Dr. Dennis Centeno and left message for Josse ARREOLA.    1. Goals of Care/Advanced Care planning/Code status: changed to DNR/DNI (Limited- no to all interventions) per 's wishes. He is leaning toward placing a PEG tube. He would like to continuing caring for the pt at home, and would like a hospice referral.  2. Pain: pt denies any pain  3. Acute encephalopathy:  reports she had memory loss at baseline but has never been formally evaluated or diagnosed with dementia. She was able to talk more before this admission, but says the last few months have been a big decline. She is very lethargic now and minimally verbal. She was found with a subacute stroke and is currently on cvEEG for concern for seizures.  4. Disposition: d/c home with hospice when medically ready. Family likely will opt for a PEG.    Reason for Consult:         [x]  Goals of Care  []  Code Status  regarding palliative care and in goals of care for the patient.    Thank you to Dr. Dennis Centeno for this consultation. We will continue to follow Ms. Lovett's care as needed.    Velvet Guo NP  Palliative Care  [phone]

## 2025-01-13 NOTE — PLAN OF CARE
Problem: Chronic Conditions and Co-morbidities  Goal: Patient's chronic conditions and co-morbidity symptoms are monitored and maintained or improved  1/13/2025 0925 by Marsha Olivas RN  Outcome: Progressing  Flowsheets (Taken 1/13/2025 0925)  Care Plan - Patient's Chronic Conditions and Co-Morbidity Symptoms are Monitored and Maintained or Improved: Monitor and assess patient's chronic conditions and comorbid symptoms for stability, deterioration, or improvement     Problem: Discharge Planning  Goal: Discharge to home or other facility with appropriate resources  1/13/2025 0925 by Marsha Olivas RN  Outcome: Progressing  Flowsheets (Taken 1/13/2025 0925)  Discharge to home or other facility with appropriate resources: Identify barriers to discharge with patient and caregiver  Note: Patient updated and educated on barriers to discharge and plan of care.      Problem: Safety - Adult  Goal: Free from fall injury  1/13/2025 0925 by Marsha Olivas RN  Outcome: Progressing  Flowsheets (Taken 1/13/2025 0925)  Free From Fall Injury: Instruct family/caregiver on patient safety  Note: Patient has remained free of fall injury this shift.      Problem: Skin/Tissue Integrity  Goal: Absence of new skin breakdown  Description: 1.  Monitor for areas of redness and/or skin breakdown  2.  Assess vascular access sites hourly  3.  Every 4-6 hours minimum:  Change oxygen saturation probe site  4.  Every 4-6 hours:  If on nasal continuous positive airway pressure, respiratory therapy assess nares and determine need for appliance change or resting period.  1/13/2025 0925 by Marsha Olivas RN  Outcome: Progressing  Note: Skin assessed this shift. Skin is CDI. Kelsi care completed as necessary. Turning patient q2h to reduce pressure and prevent injury.      Problem: ABCDS Injury Assessment  Goal: Absence of physical injury  1/13/2025 0925 by Marsha Olivas, RN  Outcome: Progressing  Flowsheets (Taken 1/13/2025  0925)  Absence of Physical Injury: Implement safety measures based on patient assessment     Problem: Pain  Goal: Verbalizes/displays adequate comfort level or baseline comfort level  1/13/2025 0925 by Marsha Olivas RN  Outcome: Progressing  Flowsheets (Taken 1/13/2025 0925)  Verbalizes/displays adequate comfort level or baseline comfort level:   Encourage patient to monitor pain and request assistance   Assess pain using appropriate pain scale   Implement non-pharmacological measures as appropriate and evaluate response   Administer analgesics based on type and severity of pain and evaluate response  Note: Pain currently managed per MAR and non-pharm measures such as rest and repositioning to reduce pain and promote comfort.

## 2025-01-13 NOTE — PROGRESS NOTES
Patient is Oriented to self only. VSS on 3 L O2 via NC.  Patient is NPO with Ice chips allowed.Pt is on cEEG. Voiding via external catheter. Patient updated on plan of care. Fall and safety precautions in place, call light within reach.

## 2025-01-13 NOTE — PROGRESS NOTES
NEUROLOGY PROGRESS NOTE       Patient Name: Renay Lovett YOB: 1955   Sex: Female Age: 69 yrs     Presenting CC: No chief complaint on file.    Reason for Consult: concern for NCSE     Changes over last 24 hours:     cvEEG with no seizures overnight  Lethargic this morning    ROS: ALYCIA    ASSESSMENT & RECOMMENDATIONS   Assessment:  Renay Lovett is a 68 y/o woman with a history of dementia, HTN, DM who presented with altered mental status at [facility] found to have multifocal ischemic stroke (corpus callosum, left occipital and left pontine) as well as UTI. Her exam was felt to not be related to these and thus she was transferred here for cEEG.     This morning she is Lethargic, intermittently following simple commands and answering yes/no questions.  Given her persistent encephalopathy, Negative urine culture and EEG without seizure, will order LP to rule out infectious meningoencephalitis .    Plan:  - Continue cvEEG  - Continue Keppra 1000mg   - Seizure precuations  - Q4h neuro checks  -  IR consult for LP  - Hold off on antiplatelets and anticoagulants. May resume 1 hour after LP completed  - TTE completed, negative bubble study  - Will likely need a loop recorder at discharge  - BP goal less than 130/80 mmHg  - Stroke Education at Discharge  - Follow up w/ Neurology in 3 months     RITA Knox - CNP   Neurology  1/13/2025 7:44 AM  [employer]: [facility] Neurology    HISTORY   Presented on 1/9 with altered mental status  Initial HPI:   Renay Lovett is a 69 y.o. y/o female with history significant for dementia, hypertension, diabetes, COPD, who presented to [facility] 1/9 with altered mental status for the past week. MRI/MR-A showed multifocal ischemic stroke (left occipital, right splenium of CC, and left crystal) but no LVO. She was also found to have UTI.      At the time of neurologic exam earlier today at [facility], she was giving single-word responses and not following

## 2025-01-13 NOTE — PROGRESS NOTES
This RN attempted to call patient's spouse, Levi, to obtain consent for Lumbar Puncture. No response, voicemail left.

## 2025-01-13 NOTE — DISCHARGE INSTRUCTIONS
[facility] Stroke Program Survey  The [facility] Neuroscience [place] values your feedback related to your recent hospital visit and admission. We strive to improve our Neuroscience program to promote better outcomes and recoveries for all our patients.  The anonymous survey below consists of a few questions that are related to your stay and around your Stroke diagnosis, treatment, and recovery. It is anonymous and has only a few questions.  The estimated length of time needed to complete this survey is 3 minutes or less. Thank you for completing this survey!

## 2025-01-13 NOTE — PROGRESS NOTES
Patient is alert and oriented x2. VSS on 5L nasal cannula. Medications given per MAR, no side effects noted. Patient on bedrest at this time with cEEG connected. No complaints of pain, continuing to monitor and manage per MAR. Voiding well via purewick, no bm this shift. Continuous blood sugar checks throughout shift due to low blood sugars. BG went down to 69, D10 bolus given. Patient currently on D5 and 0.45 NS continuous fluids with BG checks every two hours.      Patient is currently resting in bed with bed alarm on for safety. Call light within reach and all fall precautions in place. Plan of care continues.

## 2025-01-13 NOTE — PLAN OF CARE
Problem: Safety - Adult  Goal: Free from fall injury  Outcome: Progressing   All fall precautions in place. Bed locked and in lowest position with alarm on. Overbed table and personal belonings within reach. Call light within reach and patient instructed to use call light for assistance. Non-skid socks on.  Problem: Pain  Goal: Verbalizes/displays adequate comfort level or baseline comfort level  Outcome: Progressing   Patient not complaining of any pain at this time, will continue to monitor.

## 2025-01-13 NOTE — NURSE NAVIGATOR
Patient's chart reviewed for Stroke Core Measures and additional needs:    [x]   VTE prophylaxis - Lovenox administered, now HELD; SCDs    [x]   Antithrombotic (if applicable) - Aspirin administered, now HELD   [x]   Swallow screen prior to PO intake   [x]   Lipids / A1C ordered or resulted - LDL result below; A1C ordered    [x]   Therapy ordered - Patient evaluated by SLP; PT/OT ordered    [x]   Care plan and Education template - Added      Latest Reference Range & Units 01/10/25 17:33   LDL Cholesterol <100 mg/dL 50     Verified educational Stroke booklet in room for patient and/or family to review. Patient's personal risk factors specific to stroke/TIA include: hypertension; diabetes     Navigator to continue to follow patient while admitted, to assist with follow up and discharge planning as needed.     Nurse eSignature: Electronically signed by Maeve Pedroza RN on 1/13/25 at 9:47 AM EST - Neuroscience Navigator

## 2025-01-13 NOTE — PROGRESS NOTES
Missing leads were replaced, [facility] was contacted via team viewer, when this EEG tech finished.

## 2025-01-13 NOTE — PROGRESS NOTES
Physical Therapy/Occupational Therapy      PT/OT evaluations ordered for this patient.  She is currently on cEEG.  Will initiate therapies when cEEG is completed.    Martine Sherman PT  [phone]  [address]ers, OTR/L

## 2025-01-13 NOTE — PROGRESS NOTES
Comprehensive Nutrition Assessment    RECOMMENDATIONS:  PO Diet: NPO  Nutrition Education: Education/Counseling not appropriate   Nutrition Support:  Start: Jevity 1.5 (Standard Formula with Fiber) @ 10 mL/hr  Advance: As tolerated, increase by 10 mL/hr q 8 hours  Goal: 55 mL/hr  Water Flushes: 30ml q4 (Maintenance)    NUTRITION ASSESSMENT:   Nutritional summary & status: Positive screen for poor intake and wt loss. Presented to ED w/ AMS and little to no PO intake x1 week per ED notes. Pt has been having progressive memory loss over past 2 years, and over past 9 months, having loss of ability to care for herself and manage meds; MRI Brain found acute/subacute CVA per H&P. Per wt hx in EMR, pt has had 7% wt loss over 3.5 months which is just below what's considered clinically significant. Hypokalemic since admit; being replaced per med MAR. Yesterday 1/12, SLP rec NPO, upgraded to full liquid diet today 1/13 per SLP note. Diet order still NPO; messaged MD who states pt aspirated yesterday. Since admit, documented PO intake per EMR shows most meals at 0% consumed. Pt sleeping upon visit. Mild body fat and muscle mass loss noted upon visual NFPE; meets criteria for moderate malnutrition. Recommend placing corpak or NG and initiating TF r/t malnutrition status including wt loss and little to no PO intake x10 days; messaged MD. High refeeding risk so will ensure labs for lytes ordered q12 hours and ask MD to order thiamin, folic acid, and multivitamin. Will continue to follow.   Admission // PMH: Acute encephalopathy // HTN, COPD, T2DM, osteopenia, lumbosacral spondylosis, oxygen dependent 2L PRN, depression, dementia    MALNUTRITION ASSESSMENT  Context of Malnutrition: Acute Illness   Malnutrition Status: Moderate malnutrition  Findings of the 6 clinical characteristics of malnutrition (Minimum of 2 out of 6 clinical characteristics is required to make the diagnosis of moderate or severe Protein Calorie Malnutrition

## 2025-01-13 NOTE — PROGRESS NOTES
Spoke to patient's  by phone to begin ACP document conversation. We agreed to continue the conversation tomorrow afternoon (01/14) in person.      01/13/25 1609   Encounter Summary   Encounter Overview/Reason Initial Encounter   Service Provided For Family   Referral/Consult From Nurse;Family   Support System Spouse   Last Encounter  01/13/25  (GRD Called )   Complexity of Encounter Low   Begin Time 1550   End Time  1610   Total Time Calculated 20 min   Advance Care Planning   Type ACP conversation   Assessment/Intervention/Outcome   Assessment Anxious;Sad;Stress overload   Intervention Active listening   Outcome Receptive     Spiritual Health History and Assessment/Progress Note  [facility]    (P) Initial Encounter,  ,  ,      Name: Renay Lovett MRN: [id number]    Age: 69 y.o.     Sex: female   Language: English   Gnosticist: Bahai   Encephalopathy acute     Date: 1/13/2025            Total Time Calculated: (P) 20 min              Spiritual Assessment began in [facility] 5T ORTHO/NEURO        Referral/Consult From: (P) Nurse, Family   Encounter Overview/Reason: (P) Initial Encounter  Service Provided For: (P) Family    Radha, Belief, Meaning:   Patient unable to assess at this time  Family/Friends Other: ACP Conversation      Importance and Influence:  Patient unable to assess at this time  Family/Friends Other: ACP Conversation    Community:  Patient Other: unable to assess at this time  Family/Friends Other: ACP Converation    Assessment and Plan of Care:     Patient Interventions include: Other: Speaking to   Family/Friends Interventions include: Facilitated expression of thoughts and feelings    Patient Plan of Care: No spiritual needs identified for follow-up  Family/Friends Plan of Care: Spiritual Care available upon further referral and Other: Follow up tomorrow    Electronically signed by Chaplain Armando on 1/13/2025 at 4:11 PM

## 2025-01-13 NOTE — CONSULTS
[facility]  GI Consultation                                                                 Patient: Renay Lovett  : 1955       Date:  2025           History of Present Illness:    Patient is a 69 y.o. female with PMHx of HLD, HTN, COPD, T2DM, and recent cognitive decline.    Patient noted to have progressive cognitive decline in the past 2 years and dependent on  for IADLs. A week prior to discussion patient had acute decline resulting in poor PO intake and less verbal. Underwent MRI of brain which revealed multifocal acute/subacute CVA unlikely to explain current AMS, per neurology team. Currently undergoing neuro-infectious workup.     Patient minimally verbal and responsive today.      Past Medical History:   Diagnosis Date    Abnormal CT scan, chest     COPD (chronic obstructive pulmonary disease) (HCC)     Decreased diffusion capacity of lung     Dementia (HCC)     Depression     Dyspnea     Hypertension     Lumbosacral spondylosis without myelopathy 2016    Osteopenia 2020    Oxygen dependent     2 L/PRN (usually uses O2)    Pneumonia     Restrictive lung disease     Tobacco abuse     Well controlled type 2 diabetes mellitus (HCC) 10/26/2018      Past Surgical History:   Procedure Laterality Date    BACK SURGERY      CARPAL TUNNEL RELEASE Left 14    HAND SURGERY Right 14    SHOULDER SURGERY Right 2021    RIGHT SHOULDER ARTHROTMY, OPEN BICEPS TENODESIS, REMOVAL OF LOOSE BODIES, REVERSE TOTAL SHOULDER REPLACEMENT performed by Serge Kerns MD at [facility] OR    SKIN GRAFT Right         MEDICATION:  Admission Meds  No current facility-administered medications on file prior to encounter.     Current Outpatient Medications on File Prior to Encounter   Medication Sig Dispense Refill    DULoxetine (CYMBALTA) 30 MG extended release capsule Take 1 capsule by mouth daily 30 capsule 2    tiZANidine (ZANAFLEX) 2 MG tablet TAKE ONE TABLET BY MOUTH EVERY DAY    Substance Use Topics    Alcohol use: Yes     Comment: occasionaly        Family  Family History   Problem Relation Age of Onset    Cancer Sister     Asthma Neg Hx     Emphysema Neg Hx     Diabetes Neg Hx     Hypertension Neg Hx     Heart Failure Neg Hx        Review of Systems:  Pertinent items are noted in HPI.         PHYSICAL EXAM:    /60   Pulse (!) 107   Temp 97.6 °F (36.4 °C) (Oral)   Resp 16   Ht 1.753 m (5' 9.02\")   Wt 75.3 kg (166 lb)   SpO2 94%   BMI 24.50 kg/m²   CONST: No acute distress. Altered and minimally verbal.   EYES: No icterus noted.   HENT: Atraumatic, normocephalic;  NECK: Trachea midline, neck supple.  CV: Regular rate and rhythm  RESP: Lungs clear to auscultation bilaterally, no rales wheezes or ronchi, no retractions. Respirations unlabored.   GI: Soft, nontender, nondistended.   EXT: No significant edema appreciated to BLE    LABS:    Recent Labs     01/12/25  1607   WBC 8.9   HGB 10.0*   HCT 31.5*   MCV 89.6        Recent Labs     01/10/25  1733 01/12/25  1607 01/13/25  0833   NA  --  141 143   K 4.0 3.3* 2.8*   CL  --  108 108   CO2  --  24 24   PHOS  --   --  2.2*   BUN  --  20 13   CREATININE  --  0.8 0.5*     Recent Labs     01/12/25  1607 01/13/25  0833   AST 17 17   ALT <5* <5*   BILITOT 0.4 0.4   ALKPHOS 48 52     No results for input(s): \"LIPASE\", \"AMYLASE\" in the last 72 hours.  Recent Labs     01/13/25  0833   PROTIME 14.7   INR 1.13     Invalid input(s): \"PTT\"  No results for input(s): \"OCCULTBLD\" in the last 72 hours.      IMAGING:  CTAP 1/09/2025  IMPRESSION:  1. No acute intra-abdominal or pelvic process.  2. Colonic diverticulosis without acute diverticulitis.  3. Hepatic steatosis.  4. Trace right-sided pleural effusion.                        Assessment:    Consulted for possible PEG tube placement in the setting of acute on chronic cognitive decline of unknown etiology. Does have subacute/acute CVA on imaging but unlikely to explain current AMS per

## 2025-01-13 NOTE — PROGRESS NOTES
[facility] -  Clinical Pharmacy Note    Vancomycin and Acyclovir  - Management by Pharmacy    Consult Date(s): 01/13/25  Consulting Provider(s): Maria A Lombardo    Assessment / Plan  CNS Infection - Vancomycin  Concurrent Antimicrobials: ceftriaxone, acyclovir  Day of Vanc Therapy / Ordered Duration: unspecified - day 1  Current Dosing Method: Bayesian-Guided AUC Dosing  Therapeutic Goal: -600 mg/L*hr  Current Dose / Plan:   Vancomycin 1750 mg loading dose followed by 1250 mg q12h  Predicted  mg/L*hr and trough 15.5 mg/L  Will continue to monitor clinical condition and make adjustments to regimen as appropriate.    Thank you for consulting pharmacy,    Naa Butcher, PharmD, [facility] 1/13/2025 3:54 PM        Interval update:  therapy initiation     Subjective/Objective:   Renay Lovett is a 69 y.o. female with a PMHx significant for ementia, hypertension, diabetes, COPD who is admitted with acute encephalopathy.     Pharmacy is consulted to dose vancomycin and acyclovir.    Ht Readings from Last 1 Encounters:   01/12/25 1.753 m (5' 9.02\")     Wt Readings from Last 1 Encounters:   01/12/25 75.3 kg (166 lb)     Current & Prior Antimicrobial Regimen(s):  Acyclovir (1/13 - current)  Ceftraxone (1/13 - current)  Vancomycin (1/13 - current)    Vancomycin Level(s) / Doses:    Date Time Dose Type of Level / Level Interpretation                 Note: Serum levels collected for AUC-based dosing may be high if collected in close proximity to the dose administered. This is not necessarily indicative of toxicity.    Cultures & Sensitivities:    Date Site Micro Susceptibility / Result                 Recent Labs     01/12/25  1607 01/13/25  0833   CREATININE 0.8 0.5*   BUN 20 13   WBC 8.9  --        Estimated Creatinine Clearance: 114 mL/min (A) (based on SCr of 0.5 mg/dL (L)).    Additional Lab Values / Findings of Note:    No results for input(s): \"PROCAL\" in the last 72 hours.

## 2025-01-13 NOTE — PROCEDURES
PROCEDURE NOTE  Date: 1/13/2025   Name: Renay Lovett  YOB: 1955    Procedures              Referring physician: Dr. Collins  Date: 1/14/2025  Start Time: 1/13/2025 @ 0730  End Time: 1/14/2025 @ 0730    Indication  Patient with encephalopathy, EEG done to rule out subclinical seizures.       Introduction  This continuous video-EEG was acquired using a [employer] workstation at 256 samples/s. Electrodes were placed according to the International 10-20 system. Automated spike and seizure detection algorithms were applied. Video was recorded during this study.    Description  During the maximal alert state, a poorly-regulated 6-7 Hz posterior dominant rhythm was seen which was symmetrical and attenuated to eye opening. No consistent focal slowing or interhemispheric asymmetry was noted. Normal sleep structures were observed. There were  very rare left temporal sharps.     Events  No events reported.           Impression  Abnormal continuous vEEG recording, the slowing mentioned above suggests mild non specific encephalopathy.        EKG lead did not show clear arrhythmia, if still in concern consider formal EKG or correlation with telemetry.        The presence of rare left temporal sharps increases patient risk for focal seizures. May suggest underlying lesion.     John Mak MD  Epilepsy Board Certified.  Neurology Board Certified.    Electronically Signed

## 2025-01-13 NOTE — PROGRESS NOTES
LONG-TERM EEG-VIDEO MONITORING   CLINICAL NEUROPHYSIOLOGY LABORATORY  DEPARTMENT OF NEUROLOGY  [facility]    Patient: Renay Lovett  Age: 69 y.o.  MRN: [id number]    Referring Physician: Lv Lombardi MD  History: The patient is a 69 y.o. female who presented breakthrough seizure/encephalopathy. This long-term video-EEG monitoring study was performed to determine the nature of the patient's clinical events. The patient is on neuroactive medications.   Renay Lovett   Current Facility-Administered Medications   Medication Dose Route Frequency Provider Last Rate Last Admin    [Held by provider] glucose chewable tablet 16 g  4 tablet Oral PRN Sulma Tyler MD        dextrose bolus 10% 125 mL  125 mL IntraVENous PRN Sulma Tyler MD   Stopped at 01/12/25 2324    Or    dextrose bolus 10% 250 mL  250 mL IntraVENous PRN Sulma Tyler MD        glucagon injection 1 mg  1 mg SubCUTAneous PRN Sulma Tyler MD        dextrose 10 % infusion   IntraVENous Continuous PRN Sulma Tyler MD        insulin glargine (LANTUS) injection vial 8 Units  8 Units SubCUTAneous Nightly Sulma Tyler MD        insulin lispro (HUMALOG,ADMELOG) injection vial 0-4 Units  0-4 Units SubCUTAneous 4x Daily AC & HS Sulma Tyler MD        metoprolol (LOPRESSOR) injection 2.5 mg  2.5 mg IntraVENous Q6H Elke-Marker, Magalis, APRN - CNP   2.5 mg at 01/13/25 0818    dextrose 5 % and 0.45 % sodium chloride infusion   IntraVENous Continuous Elke-Marker, Magalis, APRN - CNP 75 mL/hr at 01/12/25 2349 New Bag at 01/12/25 2349    LORazepam (ATIVAN) injection 2 mg  2 mg IntraVENous Q5 Min PRN Sulma Tyler MD        sodium chloride flush 0.9 % injection 5-40 mL  5-40 mL IntraVENous 2 times per day Sulma Tyler MD   10 mL at 01/13/25 0819    sodium chloride flush 0.9 % injection 5-40 mL  5-40 mL IntraVENous PRN Sulma Tyler MD        0.9 % sodium chloride infusion   IntraVENous PRN  Sulma Tyler MD        [Held by provider] potassium chloride (KLOR-CON M) extended release tablet 40 mEq  40 mEq Oral PRN Sulma Tyler MD        Or    [Held by provider] potassium bicarb-citric acid (EFFER-K) effervescent tablet 40 mEq  40 mEq Oral PRN Sulma Tyler MD        Or    [Held by provider] potassium chloride 10 mEq/100 mL IVPB (Peripheral Line)  10 mEq IntraVENous PRN Sulma Tyler MD        magnesium sulfate 2000 mg in 50 mL IVPB premix  2,000 mg IntraVENous PRN Sulma Tyler MD        [Held by provider] enoxaparin (LOVENOX) injection 40 mg  40 mg SubCUTAneous Daily Sulma Tyler MD   40 mg at 01/12/25 0922    [Held by provider] polyethylene glycol (GLYCOLAX) packet 17 g  17 g Oral Daily PRN Sulma Tyler MD        [Held by provider] acetaminophen (TYLENOL) tablet 650 mg  650 mg Oral Q6H PRN Sulma Tyler MD        Or    [Held by provider] acetaminophen (TYLENOL) suppository 650 mg  650 mg Rectal Q6H PRN Sulma Tyler MD        [Held by provider] prochlorperazine (COMPAZINE) tablet 10 mg  10 mg Oral Q8H PRN Sulma Tyler MD        Or    [Held by provider] prochlorperazine (COMPAZINE) injection 10 mg  10 mg IntraVENous Q6H PRN Sulma Tyler MD        [Held by provider] calcium carb-cholecalciferol 250-3.125 MG-MCG per tab 1 tablet  1 tablet Oral BID Sulma Tyler MD        [Held by provider] dilTIAZem (CARDIZEM CD) extended release capsule 120 mg  120 mg Oral Daily Sulma Tyler MD        [Held by provider] DULoxetine (CYMBALTA) extended release capsule 30 mg  30 mg Oral Daily Sulma Tyler MD        [Held by provider] pregabalin (LYRICA) capsule 100 mg  100 mg Oral BID Sulma Tyler MD   100 mg at 01/12/25 0002    [Held by provider] losartan (COZAAR) tablet 100 mg  100 mg Oral Daily Sulma Tyler MD        And    [Held by provider] hydroCHLOROthiazide (HYDRODIURIL) tablet 25 mg  25 mg Oral Daily Sulma Tyler MD        ipratropium 0.5

## 2025-01-13 NOTE — PROGRESS NOTES
Patient is alert to self only. VSS this shift with exception of elevated heart rate, MD aware. Patient maintaining oxygen saturation of 90% on 5L Nasal cannula, patient frequently removes nasal cannula. Patient has endorsed pain to general back area managed fairly well per MAR and non-pharm measures. Patient is NPO due to risk of aspiration. Tolerating Q2 turns. Voiding via incontinent pads to prevent skin breakdown. Patient and  updated on plan of care. Fall and safety precautions in place, call light within reach.   Vitals:    01/13/25 1600   BP: 119/60   Pulse: (!) 107   Resp: 16   Temp: 97.6 °F (36.4 °C)   SpO2: 94%

## 2025-01-13 NOTE — PROGRESS NOTES
Speech Language Pathology  Facility/Department:[facility] 5T ORTHO/NEURO  Dysphagia treatment   Name: Renay Lovett  : 1955  MRN: [id number]                                                       Patient Diagnosis(es):   Patient Active Problem List    Diagnosis Date Noted    Chronic systolic (congestive) heart failure 2022    Pulmonary nodules 2013    Tobacco abuse 2013    Encephalopathy acute 2025    Hypokalemia 01/10/2025    Hypomagnesemia 01/10/2025    Urinary tract infection without hematuria 01/10/2025    Acute encephalopathy 01/10/2025    Aphasia 01/10/2025    Dementia (HCC) 01/10/2025    Cerebrovascular accident (CVA) due to embolism of left posterior cerebral artery (HCC) 01/10/2025    AMS (altered mental status) 2025    Chronic respiratory failure with hypoxia 08/10/2021    COPD, mild (HCC) 08/10/2021    Major depressive disorder with single episode, in partial remission ([facility]) 2021    Primary osteoarthritis of right shoulder 2021    Displaced fracture of acromial process, right shoulder, initial encounter for closed fracture 2021    Acute pain of right shoulder 2021    Rotator cuff arthropathy of right shoulder 2021    Osteopenia 2020    Morbidly obese 2020    Type 2 diabetes mellitus without complication, without long-term current use of insulin ([facility]) 10/26/2018    Lumbosacral spondylosis without myelopathy 2016    Displacement of lumbar intervertebral disc without myelopathy 2016    Degeneration of lumbar or lumbosacral intervertebral disc 2016    Spinal stenosis, lumbar region, without neurogenic claudication 2016    COPD with acute exacerbation (HCC) 2014    Aortic regurgitation 2014    HTN (hypertension) 2014    Abnormal chest CT 2014    Decreased diffusion capacity 2014    Restrictive lung disease 2014       Past Medical History:   Diagnosis Date    Abnormal CT

## 2025-01-13 NOTE — PROGRESS NOTES
within limitations of study. The findings were sent to the Radiology Results Communication Center at 9:37 pm on 1/10/2025 to be communicated to a licensed caregiver.     MRA NECK W WO CONTRAST    Result Date: 1/10/2025  EXAMINATION: MRI OF THE BRAIN WITHOUT AND WITH CONTRAST; MRA OF THE HEAD WITHOUT CONTRAST; MRA OF THE NECK WITH AND WITHOUT CONTRAST 1/10/2025 7:30 pm; 1/10/2025 7:29 pm: TECHNIQUE: Multiplanar multisequence MRI of the head/brain was performed without and with the administration of intravenous contrast.; MRA of the head was performed utilizing time-of-flight imaging with MIP images. No intravenous contrast was administered.; Multiplanar multisequence MRA of the neck was performed with and without the administration of intravenous contrast. Stenosis of the internal carotid arteries measured using NASCET criteria. Maximum intensity projection images were reviewed. COMPARISON: None. HISTORY: ORDERING SYSTEM PROVIDED HISTORY: concern for stroke, acute on chronic encephalopathy FINDINGS: MRI BRAIN: INTRACRANIAL STRUCTURES/VENTRICLES: 6 mm focus of mild restricted diffusion involving the right splenium corpus callosum, as well as the left paramedian crystal.  Punctate additional areas of restricted diffusion involving the left occipital lobe.  No evidence of acute intracranial hemorrhage.  Single focus of susceptibility within the left basal ganglia suggesting sequelae of prior microhemorrhage.  There is no evidence of an intracranial mass or extraaxial fluid collection. No significant mass effect or midline shift. Extensive confluent foci of T2/FLAIR hyperintensity are present within the supratentorial white matter which is a nonspecific finding but likely represents severe chronic microvascular ischemia.  Remote lacunar infarcts throughout the bilateral basal ganglia.  There is mild generalized volume loss. Ventricular enlargement concordant with the degree of parenchymal volume loss.  The  enhancement within the brain, although slightly motion degraded. ORBITS: The visualized portion of the orbits demonstrate no acute abnormality. SINUSES: The visualized paranasal sinuses and mastoid air cells are well aerated. BONES/SOFT TISSUES: The bone marrow signal intensity appears normal. The soft tissues demonstrate no acute abnormality. MRA NECK: AORTIC ARCH/ARCH VESSELS: Extremely limited due to motion degradation. CAROTID ARTERIES: Extremely limited due to motion degradation.  Flow voids suggesting patency within limitations, although can not assess caliber changes. VERTEBRAL ARTERIES: Extremely limited due to motion degradation.Flow voids suggesting patency within limitations, although can not assess caliber changes. MRA HEAD: ANTERIOR CIRCULATION: Suboptimal due to motion.  No proximal large vessel occlusion. POSTERIOR CIRCULATION: Suboptimal due to motion. No proximal large vessel occlusion.     1. 6 mm focus of mild restricted diffusion involving the right splenium corpus callosum, as well as the left paramedian crystal.  Punctate additional areas of restricted diffusion involving the left occipital lobe. Findings suggest acute/subacute infarcts within the posterior circulation. 2. Severe chronic microvascular ischemic changes. 3. No definitive evidence of abnormal contrast enhancement within the brain, although slightly motion degraded. 4. Extremely limited MR angiography of the neck due to motion degradation. Flow voids suggesting patency within limitations, although can not assess caliber changes. 5. No proximal large vessel occlusion of the intracranial circulation within limitations of study. The findings were sent to the Radiology Results Communication Center at 9:37 pm on 1/10/2025 to be communicated to a licensed caregiver.     CT Head W/O Contrast    Result Date: 1/9/2025  EXAMINATION: CT OF THE HEAD WITHOUT CONTRAST  1/9/2025 3:32 pm TECHNIQUE: CT of the head was performed without the

## 2025-01-13 NOTE — CARE COORDINATION
CM following: CM received word from [facility] palliative care, requesting a referral to [facility]. Velvet reports pt's spouse would like for pt to return home at discharge with support from [facility]. CM spoke with Leticia at [facility] [phone] and faxed referral documents to her at [phone]. Leticia to reach out to pt's spouse and will f/u with the CM on outcome. CM will continue to follow for discharge planning.  Electronically signed by MICHELLE Heredia on 1/13/2025 at 3:08 PM  [phone]

## 2025-01-14 ENCOUNTER — APPOINTMENT (OUTPATIENT)
Dept: GENERAL RADIOLOGY | Age: 70
End: 2025-01-14
Attending: INTERNAL MEDICINE
Payer: MEDICARE

## 2025-01-14 LAB
ALBUMIN SERPL-MCNC: 2.4 G/DL (ref 3.4–5)
ALBUMIN/GLOB SERPL: 0.9 {RATIO} (ref 1.1–2.2)
ALP SERPL-CCNC: 49 U/L (ref 40–129)
ALT SERPL-CCNC: <5 U/L (ref 10–40)
ANION GAP SERPL CALCULATED.3IONS-SCNC: 7 MMOL/L (ref 3–16)
ANISOCYTOSIS BLD QL SMEAR: ABNORMAL
APPEARANCE CSF: CLEAR
AST SERPL-CCNC: 16 U/L (ref 15–37)
BASOPHILS # BLD: 0 K/UL (ref 0–0.2)
BASOPHILS NFR BLD: 0 %
BILIRUB SERPL-MCNC: 0.3 MG/DL (ref 0–1)
BUN SERPL-MCNC: 6 MG/DL (ref 7–20)
CALCIUM SERPL-MCNC: 8.1 MG/DL (ref 8.3–10.6)
CHLORIDE SERPL-SCNC: 108 MMOL/L (ref 99–110)
CLOT EVALUATION CSF: ABNORMAL
CO2 SERPL-SCNC: 26 MMOL/L (ref 21–32)
COLOR CSF: COLORLESS
CREAT SERPL-MCNC: 0.5 MG/DL (ref 0.6–1.2)
DACRYOCYTES BLD QL SMEAR: ABNORMAL
DEPRECATED RDW RBC AUTO: 17.5 % (ref 12.4–15.4)
EKG ATRIAL RATE: 106 BPM
EKG DIAGNOSIS: NORMAL
EKG P AXIS: 41 DEGREES
EKG P-R INTERVAL: 140 MS
EKG Q-T INTERVAL: 320 MS
EKG QRS DURATION: 78 MS
EKG QTC CALCULATION (BAZETT): 425 MS
EKG R AXIS: 59 DEGREES
EKG T AXIS: -25 DEGREES
EKG VENTRICULAR RATE: 106 BPM
EOSINOPHIL # BLD: 0.2 K/UL (ref 0–0.6)
EOSINOPHIL NFR BLD: 2 %
GFR SERPLBLD CREATININE-BSD FMLA CKD-EPI: >90 ML/MIN/{1.73_M2}
GLUCOSE BLD-MCNC: 122 MG/DL (ref 70–99)
GLUCOSE BLD-MCNC: 124 MG/DL (ref 70–99)
GLUCOSE BLD-MCNC: 90 MG/DL (ref 70–99)
GLUCOSE BLD-MCNC: 98 MG/DL (ref 70–99)
GLUCOSE CSF-MCNC: 64 MG/DL (ref 40–80)
GLUCOSE SERPL-MCNC: 119 MG/DL (ref 70–99)
HCT VFR BLD AUTO: 29.9 % (ref 36–48)
HGB BLD-MCNC: 9.8 G/DL (ref 12–16)
LYMPHOCYTES # BLD: 1.6 K/UL (ref 1–5.1)
LYMPHOCYTES NFR BLD: 16 %
MAGNESIUM SERPL-MCNC: 1.72 MG/DL (ref 1.8–2.4)
MAGNESIUM SERPL-MCNC: 1.72 MG/DL (ref 1.8–2.4)
MAGNESIUM SERPL-MCNC: 2.04 MG/DL (ref 1.8–2.4)
MANUAL DIF COMMENT CSF-IMP: ABNORMAL
MCH RBC QN AUTO: 28.9 PG (ref 26–34)
MCHC RBC AUTO-ENTMCNC: 32.6 G/DL (ref 31–36)
MCV RBC AUTO: 88.5 FL (ref 80–100)
MENING+ENC PNL CSF NAA+NON-PROBE: NORMAL
MONOCYTES # BLD: 1 K/UL (ref 0–1.3)
MONOCYTES NFR BLD: 10 %
NEUTROPHILS # BLD: 7.3 K/UL (ref 1.7–7.7)
NEUTROPHILS NFR BLD: 72 %
NUC CELL # FLD MANUAL: 3 /CUMM (ref 0–5)
OVALOCYTES BLD QL SMEAR: ABNORMAL
PERFORMED ON: ABNORMAL
PERFORMED ON: ABNORMAL
PERFORMED ON: NORMAL
PERFORMED ON: NORMAL
PHOSPHATE SERPL-MCNC: 2.3 MG/DL (ref 2.5–4.9)
PHOSPHATE SERPL-MCNC: 2.9 MG/DL (ref 2.5–4.9)
PLATELET # BLD AUTO: 426 K/UL (ref 135–450)
PMV BLD AUTO: 8.7 FL (ref 5–10.5)
POTASSIUM SERPL-SCNC: 3.2 MMOL/L (ref 3.5–5.1)
POTASSIUM SERPL-SCNC: 3.3 MMOL/L (ref 3.5–5.1)
POTASSIUM SERPL-SCNC: 3.3 MMOL/L (ref 3.5–5.1)
PROT CSF-MCNC: 62 MG/DL (ref 15–45)
PROT SERPL-MCNC: 5 G/DL (ref 6.4–8.2)
RBC # BLD AUTO: 3.38 M/UL (ref 4–5.2)
RBC # FLD MANUAL: 949 /CUMM
REPORT: NORMAL
SCHISTOCYTES BLD QL SMEAR: ABNORMAL
SODIUM SERPL-SCNC: 141 MMOL/L (ref 136–145)
TUBE # CSF: ABNORMAL
WBC # BLD AUTO: 10.1 K/UL (ref 4–11)

## 2025-01-14 PROCEDURE — 80053 COMPREHEN METABOLIC PANEL: CPT

## 2025-01-14 PROCEDURE — 94761 N-INVAS EAR/PLS OXIMETRY MLT: CPT

## 2025-01-14 PROCEDURE — 87483 CNS DNA AMP PROBE TYPE 12-25: CPT

## 2025-01-14 PROCEDURE — 83735 ASSAY OF MAGNESIUM: CPT

## 2025-01-14 PROCEDURE — 2580000003 HC RX 258: Performed by: SURGERY

## 2025-01-14 PROCEDURE — 84100 ASSAY OF PHOSPHORUS: CPT

## 2025-01-14 PROCEDURE — 62328 DX LMBR SPI PNXR W/FLUOR/CT: CPT

## 2025-01-14 PROCEDURE — 36415 COLL VENOUS BLD VENIPUNCTURE: CPT

## 2025-01-14 PROCEDURE — 6360000002 HC RX W HCPCS: Performed by: RADIOLOGY

## 2025-01-14 PROCEDURE — 6370000000 HC RX 637 (ALT 250 FOR IP): Performed by: INTERNAL MEDICINE

## 2025-01-14 PROCEDURE — 84157 ASSAY OF PROTEIN OTHER: CPT

## 2025-01-14 PROCEDURE — 2500000003 HC RX 250 WO HCPCS: Performed by: NURSE PRACTITIONER

## 2025-01-14 PROCEDURE — 99232 SBSQ HOSP IP/OBS MODERATE 35: CPT | Performed by: NURSE PRACTITIONER

## 2025-01-14 PROCEDURE — 95720 EEG PHY/QHP EA INCR W/VEEG: CPT | Performed by: PSYCHIATRY & NEUROLOGY

## 2025-01-14 PROCEDURE — 95718 EEG PHYS/QHP 2-12 HR W/VEEG: CPT | Performed by: PSYCHIATRY & NEUROLOGY

## 2025-01-14 PROCEDURE — 6360000002 HC RX W HCPCS: Performed by: SURGERY

## 2025-01-14 PROCEDURE — 95716 VEEG EA 12-26HR CONT MNTR: CPT

## 2025-01-14 PROCEDURE — 2580000003 HC RX 258

## 2025-01-14 PROCEDURE — 6360000002 HC RX W HCPCS: Performed by: NURSE PRACTITIONER

## 2025-01-14 PROCEDURE — 6360000002 HC RX W HCPCS: Performed by: INTERNAL MEDICINE

## 2025-01-14 PROCEDURE — 2060000000 HC ICU INTERMEDIATE R&B

## 2025-01-14 PROCEDURE — 2500000003 HC RX 250 WO HCPCS: Performed by: SURGERY

## 2025-01-14 PROCEDURE — 2500000003 HC RX 250 WO HCPCS: Performed by: INTERNAL MEDICINE

## 2025-01-14 PROCEDURE — 009U3ZX DRAINAGE OF SPINAL CANAL, PERCUTANEOUS APPROACH, DIAGNOSTIC: ICD-10-PCS | Performed by: RADIOLOGY

## 2025-01-14 PROCEDURE — 85025 COMPLETE CBC W/AUTO DIFF WBC: CPT

## 2025-01-14 PROCEDURE — 84132 ASSAY OF SERUM POTASSIUM: CPT

## 2025-01-14 PROCEDURE — 2700000000 HC OXYGEN THERAPY PER DAY

## 2025-01-14 PROCEDURE — 2580000003 HC RX 258: Performed by: NURSE PRACTITIONER

## 2025-01-14 PROCEDURE — 6360000002 HC RX W HCPCS

## 2025-01-14 PROCEDURE — 82945 GLUCOSE OTHER FLUID: CPT

## 2025-01-14 PROCEDURE — 93010 ELECTROCARDIOGRAM REPORT: CPT | Performed by: INTERNAL MEDICINE

## 2025-01-14 PROCEDURE — 94640 AIRWAY INHALATION TREATMENT: CPT

## 2025-01-14 RX ORDER — LIDOCAINE HYDROCHLORIDE 10 MG/ML
5 INJECTION, SOLUTION INFILTRATION; PERINEURAL ONCE
Status: DISCONTINUED | OUTPATIENT
Start: 2025-01-14 | End: 2025-01-14 | Stop reason: ALTCHOICE

## 2025-01-14 RX ORDER — MORPHINE SULFATE 2 MG/ML
2 INJECTION, SOLUTION INTRAMUSCULAR; INTRAVENOUS EVERY 6 HOURS PRN
Status: DISCONTINUED | OUTPATIENT
Start: 2025-01-14 | End: 2025-01-15

## 2025-01-14 RX ORDER — MORPHINE SULFATE 2 MG/ML
1 INJECTION, SOLUTION INTRAMUSCULAR; INTRAVENOUS
Status: COMPLETED | OUTPATIENT
Start: 2025-01-14 | End: 2025-01-14

## 2025-01-14 RX ORDER — MAGNESIUM SULFATE IN WATER 40 MG/ML
2000 INJECTION, SOLUTION INTRAVENOUS ONCE
Status: COMPLETED | OUTPATIENT
Start: 2025-01-14 | End: 2025-01-14

## 2025-01-14 RX ADMIN — METOPROLOL TARTRATE 2.5 MG: 1 INJECTION, SOLUTION INTRAVENOUS at 21:36

## 2025-01-14 RX ADMIN — MAGNESIUM SULFATE HEPTAHYDRATE 2000 MG: 40 INJECTION, SOLUTION INTRAVENOUS at 09:54

## 2025-01-14 RX ADMIN — SODIUM CHLORIDE, PRESERVATIVE FREE 10 ML: 5 INJECTION INTRAVENOUS at 21:37

## 2025-01-14 RX ADMIN — AMPICILLIN SODIUM 2000 MG: 2 INJECTION, POWDER, FOR SOLUTION INTRAMUSCULAR; INTRAVENOUS at 22:00

## 2025-01-14 RX ADMIN — WATER 2000 MG: 1 INJECTION INTRAMUSCULAR; INTRAVENOUS; SUBCUTANEOUS at 09:47

## 2025-01-14 RX ADMIN — METOPROLOL TARTRATE 2.5 MG: 1 INJECTION, SOLUTION INTRAVENOUS at 16:13

## 2025-01-14 RX ADMIN — WATER 2000 MG: 1 INJECTION INTRAMUSCULAR; INTRAVENOUS; SUBCUTANEOUS at 21:37

## 2025-01-14 RX ADMIN — POTASSIUM PHOSPHATE, MONOBASIC AND POTASSIUM PHOSPHATE, DIBASIC 15 MMOL: 224; 236 INJECTION, SOLUTION, CONCENTRATE INTRAVENOUS at 12:15

## 2025-01-14 RX ADMIN — ACYCLOVIR SODIUM 750 MG: 50 INJECTION, SOLUTION INTRAVENOUS at 08:44

## 2025-01-14 RX ADMIN — AMPICILLIN SODIUM 2000 MG: 2 INJECTION, POWDER, FOR SOLUTION INTRAMUSCULAR; INTRAVENOUS at 23:50

## 2025-01-14 RX ADMIN — MORPHINE SULFATE 2 MG: 2 INJECTION, SOLUTION INTRAMUSCULAR; INTRAVENOUS at 16:13

## 2025-01-14 RX ADMIN — ACYCLOVIR SODIUM 750 MG: 50 INJECTION, SOLUTION INTRAVENOUS at 17:52

## 2025-01-14 RX ADMIN — AMPICILLIN SODIUM 2000 MG: 2 INJECTION, POWDER, FOR SOLUTION INTRAMUSCULAR; INTRAVENOUS at 16:42

## 2025-01-14 RX ADMIN — IPRATROPIUM BROMIDE AND ALBUTEROL SULFATE 1 DOSE: 2.5; .5 SOLUTION RESPIRATORY (INHALATION) at 21:09

## 2025-01-14 RX ADMIN — LIDOCAINE HYDROCHLORIDE 5 ML: 10 INJECTION, SOLUTION EPIDURAL; INFILTRATION; INTRACAUDAL; PERINEURAL at 15:01

## 2025-01-14 RX ADMIN — VANCOMYCIN HYDROCHLORIDE 1250 MG: 1.25 INJECTION, SOLUTION INTRAVITREAL at 18:55

## 2025-01-14 RX ADMIN — MORPHINE SULFATE 2 MG: 2 INJECTION, SOLUTION INTRAMUSCULAR; INTRAVENOUS at 22:11

## 2025-01-14 RX ADMIN — MORPHINE SULFATE 1 MG: 2 INJECTION, SOLUTION INTRAMUSCULAR; INTRAVENOUS at 01:12

## 2025-01-14 RX ADMIN — METOPROLOL TARTRATE 2.5 MG: 1 INJECTION, SOLUTION INTRAVENOUS at 08:33

## 2025-01-14 RX ADMIN — LEVETIRACETAM 1000 MG: 100 INJECTION INTRAVENOUS at 08:33

## 2025-01-14 RX ADMIN — LEVETIRACETAM 1000 MG: 100 INJECTION INTRAVENOUS at 21:36

## 2025-01-14 RX ADMIN — VANCOMYCIN HYDROCHLORIDE 1250 MG: 1.25 INJECTION, SOLUTION INTRAVITREAL at 03:47

## 2025-01-14 RX ADMIN — IPRATROPIUM BROMIDE AND ALBUTEROL SULFATE 1 DOSE: 2.5; .5 SOLUTION RESPIRATORY (INHALATION) at 08:46

## 2025-01-14 RX ADMIN — METOPROLOL TARTRATE 2.5 MG: 1 INJECTION, SOLUTION INTRAVENOUS at 01:58

## 2025-01-14 ASSESSMENT — PAIN SCALES - GENERAL
PAINLEVEL_OUTOF10: 2
PAINLEVEL_OUTOF10: 5
PAINLEVEL_OUTOF10: 7

## 2025-01-14 ASSESSMENT — PAIN DESCRIPTION - ONSET: ONSET: UNABLE TO COMMUNICATE

## 2025-01-14 ASSESSMENT — PAIN DESCRIPTION - PAIN TYPE: TYPE: ACUTE PAIN

## 2025-01-14 ASSESSMENT — PAIN - FUNCTIONAL ASSESSMENT: PAIN_FUNCTIONAL_ASSESSMENT: ACTIVITIES ARE NOT PREVENTED

## 2025-01-14 NOTE — PROGRESS NOTES
[facility] -  Clinical Pharmacy Note    Vancomycin and Acyclovir  - Management by Pharmacy    Consult Date(s): 01/13/25  Consulting Provider(s): Maria A Lombardo    Assessment / Plan  CNS Infection - Vancomycin  Concurrent Antimicrobials: ceftriaxone, acyclovir  Day of Vanc Therapy / Ordered Duration: 2  Current Dosing Method: Bayesian-Guided AUC Dosing  Therapeutic Goal: -600 mg/L*hr  Current Dose / Plan:   Renal function stable; SCr 0.5  Received loading dose of 1750mg IV x1 last night.   Now on 1250mg IV q12h  Regimen predicts AUC = 527 / trough = 15.6  Will continue current dose  Level ordered for tomorrow AM, Wed 1/15 to confirm kinetic estimates   Will continue to monitor clinical condition and make adjustments to regimen as appropriate.    Please call with questions--  Poonam Akers, PharmD, [facility]  Wireless: [phone]   1/14/2025 9:33 AM          Interval update:  Family discussing hospice.    Subjective/Objective:   Renay Lovett is a 69 y.o. female with a PMHx significant for HTN, DM, COPD who was originally admitted to [facility] with AMS, acute/chronic back pain; transferred to [facility] on 1/11 for neuro consult/EEG monitoring. Current medical problems include AMS, possible seizures, w/u for stroke. Neuro consulted; added broad-spectrum ABx for CNS coverage 1/13.    Pharmacy is consulted to dose vancomycin and acyclovir.    Ht Readings from Last 1 Encounters:   01/12/25 1.753 m (5' 9.02\")     Wt Readings from Last 1 Encounters:   01/12/25 75.3 kg (166 lb)     Current & Prior Antimicrobial Regimen(s):  Acyclovir (1/13 - current)  Ceftraxone (1/13 - current)  Vancomycin - Pharmacy to dose  1750mg IV x1 1/13  1250mg IV q12h (1/14-current)    Vancomycin Level(s) / Doses:  Date Time Dose Type of Level / Level Interpretation                 Note: Serum levels collected for AUC-based dosing may be high if collected in close proximity to the dose administered. This is not necessarily indicative of

## 2025-01-14 NOTE — PROGRESS NOTES
Physical/Occupational Therapy    Order received, chart reviewed. Pt plans to dc home with hospice and under 's care. PT/OT to sign off from acute services. Please reorder if new needs arise.     Shelby Comer, PT, DPT  Terrie Adame OTR/L

## 2025-01-14 NOTE — PROGRESS NOTES
[facility]  GI Progress Note          Renay Lovett is a 69 y.o. female patient.  No diagnosis found.    Admit Date: 1/11/2025    Interval History:    No acute overnight events. Patient hemodynamically stable and on supplemental O2. Mentation appears mildly improved from yesterday and able to provide verbal response. AOx 1-2     Undergoing neurology work-up with LP and EEG today.         MEDICATION:    Scheduled Meds:   potassium phosphate IVPB (PERIPHERAL LINE)  15 mmol IntraVENous Once    cefTRIAXone (ROCEPHIN) IV  2,000 mg IntraVENous Q12H    ampicillin IV  2,000 mg IntraVENous 6 times per day    acyclovir  10 mg/kg IntraVENous Q8H    vancomycin HCl in Dextrose  1,250 mg IntraVENous Q12H    [Held by provider] insulin glargine  8 Units SubCUTAneous Nightly    insulin lispro  0-4 Units SubCUTAneous 4x Daily AC & HS    metoprolol  2.5 mg IntraVENous Q6H    sodium chloride flush  5-40 mL IntraVENous 2 times per day    [Held by provider] enoxaparin  40 mg SubCUTAneous Daily    [Held by provider] calcium carb-cholecalciferol  1 tablet Oral BID    [Held by provider] dilTIAZem  120 mg Oral Daily    [Held by provider] DULoxetine  30 mg Oral Daily    [Held by provider] pregabalin  100 mg Oral BID    [Held by provider] losartan  100 mg Oral Daily    And    [Held by provider] hydroCHLOROthiazide  25 mg Oral Daily    ipratropium 0.5 mg-albuterol 2.5 mg  1 Dose Inhalation BID RT    [Held by provider] aspirin  81 mg Oral Daily    [Held by provider] atorvastatin  40 mg Oral Nightly    levETIRAcetam  1,000 mg IntraVENous BID       Continuous Infusions:   dextrose      sodium chloride         PRN Meds:  morphine, [Held by provider] glucose, dextrose bolus **OR** dextrose bolus, glucagon (rDNA), dextrose, LORazepam, sodium chloride flush, sodium chloride, potassium chloride **OR** potassium alternative oral replacement **OR** potassium chloride, magnesium sulfate, [Held by provider] polyethylene glycol,

## 2025-01-14 NOTE — CARE COORDINATION
DISCHARGE PLANNIN  Chart reviewed.  Patient is from home with her spouse. She is active with [facility] and oxygen via [facility].    Current discharge plan is home with hospice.  CM team sent a referral to [facility] yesterday (p: [phone]; f: [phone]).    I was made aware that they will be here at 1pm today to evaluate the patient and meet the spouse.    UPDATE: 1006  Patient is still on cEEG. Plan for LP today and PEG placement tomorrow.    UPDATE: 9031  I spoke with Miladis with [facility] ([phone]) who stated she met with patient and spouse at bedside. Their plan is to re-evaluate patient again at the end of the week (tube feeds to be started on Thursday) to see if patient would discharge home or to their IPU. Hospice will order a hospital bed and bedside commode if patient discharges home.    CM team will continue to follow.  Zahra Gold RN Case Manager  [phone]

## 2025-01-14 NOTE — PROGRESS NOTES
V2.0    [facility] Progress Note      Name:  Renay Lovett /Age/Sex: 1955  (69 y.o. female)   MRN & CSN:  [id number] & [id number] Encounter Date/Time: 2025 1:00 PM EST   Location:  5524/5524-01 PCP: Melchor Gaitan MD     Attending:Adonis Nova MD       Hospital Day: 4    Assessment and Recommendations     69 y.o. female with PMHx significant for essential hypertension, diabetes mellitus, COPD who was admitted initially to [facility] with altered mental status and acute on chronic back pain as well as progressive memory loss and inability to take care of herself. Had fever, leukocytosis. Was started on broad spectrum antibiotics.  Infectious workup unremarkable. Was found with severe ischemic microvascular disease per brain imaging and possible acute/subacute stroke. Was transferred to [facility] for cEEG and possible lumbar tap.     Altered mental status  Progressive memory loss  Possible seizure-like activity  Acute/subacute stroke  Cognitive decline  -Continue Keppra for seizure prophylaxis.  - Appreciate neurology.  Continuous EEG as ordered. LP as ordered.   - Possible tap if no improvement.  Will continue to monitor clinically.  -Patient to remain n.p.o. as per speech recommendations.  Continue IV fluids as ordered.  - Continue Ceftriaxone with follow-up cultures.  If negative by tomorrow, will discontinue antibiotic treatment.  -Palliative care consulted. Patient's  interested in Hospice. Will consult hospice.   -PEG tube     Hypertension  -Controlled.  Continue Cardizem, losartan, metoprolol as tolerated.    Dyslipidemia  -Lipitor as ordered    Acute hypoxic respiratory failure  -Wean oxygen as able.  Aim for 88 to 92% as per her history of COPD.  Check chest x-ray.    Hypokalemia  -Replete aggressively. Recheck levels after infusion ends.     Type II DM  -Insulin regimen as ordered    MDD  BARRY  -Cymbalta.    Chronic back pain  Spinal stenosis  -Lyrica as  abnormality.  Prior right shoulder arthroplasty.  No significant skeletal finding.     No acute finding in the chest. Mild vascular congestion is suspected.       CBC:   Recent Labs     01/12/25  1607 01/14/25  0641   WBC 8.9 10.1   HGB 10.0* 9.8*    426     BMP:    Recent Labs     01/12/25  1607 01/13/25  0833 01/13/25  1923 01/14/25  0641    143  --  141   K 3.3* 2.8* 3.9 3.3*  3.3*    108  --  108   CO2 24 24  --  26   BUN 20 13  --  6*   CREATININE 0.8 0.5*  --  0.5*   GLUCOSE 84 134*  --  119*     Hepatic:   Recent Labs     01/12/25  1607 01/13/25  0833 01/14/25  0641   AST 17 17 16   ALT <5* <5* <5*   BILITOT 0.4 0.4 0.3   ALKPHOS 48 52 49     Lipids:   Lab Results   Component Value Date/Time    CHOL 99 01/10/2025 05:33 PM    HDL 30 01/10/2025 05:33 PM    TRIG 97 01/10/2025 05:33 PM     Hemoglobin A1C:   Lab Results   Component Value Date/Time    LABA1C 5.3 01/13/2025 08:33 AM     TSH:   Lab Results   Component Value Date/Time    TSH 1.87 01/10/2025 12:20 PM     Troponin: No results found for: \"TROPONINT\"  Lactic Acid: No results for input(s): \"LACTA\" in the last 72 hours.  BNP: No results for input(s): \"PROBNP\" in the last 72 hours.  UA:  Lab Results   Component Value Date/Time    NITRU POSITIVE 01/09/2025 02:42 PM    COLORU Yellow 01/09/2025 02:42 PM    PHUR 6.0 01/09/2025 02:42 PM    PHUR 6.5 07/14/2021 12:50 PM    WBCUA 6-9 01/09/2025 02:42 PM    RBCUA 0-2 01/09/2025 02:42 PM    MUCUS 2+ 01/09/2025 02:42 PM    BACTERIA 1+ 01/09/2025 02:42 PM    CLARITYU Clear 01/09/2025 02:42 PM    LEUKOCYTESUR Negative 01/09/2025 02:42 PM    UROBILINOGEN 2.0 01/09/2025 02:42 PM    BILIRUBINUR MODERATE 01/09/2025 02:42 PM    BLOODU Negative 01/09/2025 02:42 PM    GLUCOSEU Negative 01/09/2025 02:42 PM    KETUA >=80 01/09/2025 02:42 PM    AMORPHOUS Rare 01/09/2025 02:42 PM     Urine Cultures:   Lab Results   Component Value Date/Time    LABURIN No growth at 18 to 36 hours 01/09/2025 02:42 PM     Blood

## 2025-01-14 NOTE — PROCEDURES
PROCEDURE NOTE  Date: 1/14/2025   Name: Renay Lovett  YOB: 1955    Procedures              Referring physician: Dr. Collins  Date: 1/14/2025  Start Time: 1/14/2025 @ 0730  End Time: 1/14/2025 @ 1130    Indication  Patient with encephalopathy, EEG done to rule out subclinical seizures.       Introduction  This continuous video-EEG was acquired using a [employer] workstation at 256 samples/s. Electrodes were placed according to the International 10-20 system. Automated spike and seizure detection algorithms were applied. Video was recorded during this study.    Description  During the maximal alert state, a poorly-regulated 6-7 Hz posterior dominant rhythm was seen which was symmetrical and attenuated to eye opening. No consistent focal slowing or interhemispheric asymmetry was noted. Normal sleep structures were observed. There were  very rare left temporal sharps.     Events  No events reported.           Impression  Abnormal continuous vEEG recording, the slowing mentioned above suggests mild non specific encephalopathy.        EKG lead did not show clear arrhythmia, if still in concern consider formal EKG or correlation with telemetry.        The presence of rare left temporal sharps increases patient risk for focal seizures. May suggest underlying lesion.     John Mak MD  Epilepsy Board Certified.  Neurology Board Certified.    Electronically Signed

## 2025-01-14 NOTE — PROGRESS NOTES
Patient is alert and oriented x1. VSS on 5L nasal cannula. Patient is experiencing tachycardia at times. Medications given per MAR, no side effects noted. Patient on bedrest. Patient groaning in pain, continuing to monitor and manage per MAR. Voiding well via incontinence pads and purewick, no bm this shift. Q2 turns completed this shift. Patient on continuous fluids due to NPO status. NIH remains 12.      Patient is currently resting in bed with bed alarm on for safety. Call light within reach and all fall precautions in place. Plan of care continues.

## 2025-01-14 NOTE — PROGRESS NOTES
Patient is alert to self only. VSS this shift with exception of elevated heart rate, MD aware. Patient has endorsed pain to general body aches managed fairly well per MAR and non-pharm measures. Patient is NPO.  Tolerating Q2 turns. Voiding via incontinent pads. Patient and family updated on plan of care. Fall and safety precautions in place, call light within reach.   Patient has new bruising and redness on her left buttocks, patient seems to be consistently itching that area, brief not utilized to prevent skin breakdown, charge RN aware.

## 2025-01-14 NOTE — PROGRESS NOTES
Findings  suggest acute/subacute infarcts within the posterior circulation.  2. Severe chronic microvascular ischemic changes.  3. No definitive evidence of abnormal contrast enhancement within the brain,  although slightly motion degraded.  4. Extremely limited MR angiography of the neck due to motion degradation.  Flow voids suggesting patency within limitations, although can not assess  caliber changes.  5. No proximal large vessel occlusion of the intracranial circulation within  limitations of study.  The findings were sent to the Radiology Results Communication Center at 9:37  pm on 1/10/2025 to be communicated to a licensed caregiver.       CVEEG:  Day 1 - 1/12/25, starting at 14:50     Interictal EEG Samples: The background activity consisted of 5 to 6 Hz of polymorphic theta activity of 30 to 35 µV.  There was poor anterior-posterior amplitude gradient.  Background showed state change and reactivity.  The background was continuous.  During behavioral sleep, rudimentary sleep spindles were seen over both hemispheres.  No abnormalities were activated by sleep. The EKG channel revealed no abnormalities.     Ictal EEG Recording / Patient Events: During this period the patient had no events or seizures.     Summary: During this day of recording no events were recorded. The interictal EEG was abnormal due to diffuse polymorphic theta slowing suggesting mild encephalopathy.  No abnormal epileptiform activity was seen. Monitoring was continued in order to record the patient's typical events. The EKG channel revealed no abnormalities.      Echo (TTE) 1/11/25:     Left Ventricle: Normal left ventricular systolic function with a visually estimated EF of 55 - 60%. Left ventricle is smaller than normal. Mildly increased wall thickness. Findings consistent with mild concentric hypertrophy. Normal wall motion. Grade I diastolic dysfunction with normal LAP.    Aortic Valve: Trileaflet valve. Mild to moderate regurgitation.  No stenosis.    Tricuspid Valve: Normal RVSP. Est RA pressure is 3 mmHg. The estimated RVSP is 25 mmHg.    Interatrial Septum: No interatrial shunt visualized with color Doppler. Grade 0 Absence of bubbles. Agitated saline study was negative with and without provocation.    Pericardium: Small posterior pericardial effusion. No echo signs of tamponade.    Image quality is adequate.    PHYSICAL EXAMINATION     PHYSICAL EXAM:  Vitals:    01/14/25 0200 01/14/25 0328 01/14/25 0800 01/14/25 0846   BP:  135/64 134/88    Pulse:  (!) 101 89 84   Resp:  20 16 18   Temp:  97.8 °F (36.6 °C) 98.2 °F (36.8 °C)    TempSrc:  Oral Axillary    SpO2: 94% 92% 91% 94%   Weight:       Height:             General: Alert, no distress, well-nourished  Neurologic  Mental status:   Eyes closed, forces eyes closed with passive eye opening,   Orientation: to person, Tells me she is in the hospital, states the year is 1927,would not answer further orientation questions.   Attention: Poor, requires repeated verbal cues to continue to participate in exam.             Language: Answers yes/no questions appropriately, no aphasia or dysarthria  Comprehension intact; follows simple commands with repeated verbal cues    Cranial nerves:   CN2: Blinks to threat bilaterally  CN 3,4,6: Pupils equal and reactive to light, extraocular muscles intact, will attend to me on each side of the bed  CN5: Facial sensation symmetric   CN7: Face symmetric  CN8: Hearing symmetric to spoken voice  CN9: Palate elevated symmetrically  CN11: Traps full strength on shoulder shrug  CN12: Tongue midline with protrusion    Motor Exam:  Good strength in all 4 extremities, moving symmetrically    Sensory: light touch intact and symmetric in all 4 extremities.  No sensory extinction on bilateral simultaneous stimulation  Cerebellar/coordination: finger nose finger normal without ataxia  Tone: normal in all 4 extremities  Gait: deferred for safety    OTHER

## 2025-01-14 NOTE — PROGRESS NOTES
Speech Therapy   attempt    Patient was attempted to be seen by Speech Therapy Department this date for dysphagia. Patient was unable to be seen due to being NPO for PEG placement. Will attempt to see again tomorrow as able.    Ava Carpio MA, CCC-SLP [id number]  Speech-Language Pathologist  Pager  [phone]

## 2025-01-14 NOTE — PROGRESS NOTES
Current NIHSS 12      Nursing Core Measures for Stroke:   [x]   Education template documentation (STROKE/TIA). Please select only risk factors that are applicable to patient when selecting risk factors.  [x]   Care Plan template documentation (Physiologic Instability - Neurosensory). Selecting this will add care plan rows to the flowsheet under the Neuro section of Head to Toe.  [x]   Verified Swallow Screen completed prior to PO intake of food, drink, medications>          Please verify correct medication route prior to administration for intubated patients, patients who can not swallow or have alternative routes of intake (NG, OG, NJ), etc  [x]   VTE Prophylaxis: SCDs ordered/addressed; SCDs: N/A Lovenox           (As a reminder, ASA, Plavix, and TPA/TNK are not VTE prophylaxis.)    Reviewed the Following Education with Patient and/or Family:   - Personalized risk factors for patient, along with changes, modifications that will help prevent stroke.  - Signs and Symptoms of Stroke: (Facial droop, weakness/numbness especially on one side, speech difficulty, sudden confusion, sudden loss of vision, sudden severe headache, sudden loss of balance or having difficulty walking, syncope, or seizure)  - How to activate EMS (911)   - Importance of Follow Up Appointments at Discharge   - Importance of Compliance with Medications Prescribed at Discharge  - Available community resources and stroke advocacy groups if needed    Patient and/or family member: education needs reinforcement.     Stroke Education booklet given to patient/family (or verified, if given already), which reviews above information. yes         Electronically signed by Ashlie Pineda RN on 1/14/2025 at 2:45 AM

## 2025-01-14 NOTE — PLAN OF CARE
Problem: Chronic Conditions and Co-morbidities  Goal: Patient's chronic conditions and co-morbidity symptoms are monitored and maintained or improved  Outcome: Progressing  Flowsheets (Taken 1/14/2025 1646)  Care Plan - Patient's Chronic Conditions and Co-Morbidity Symptoms are Monitored and Maintained or Improved: Monitor and assess patient's chronic conditions and comorbid symptoms for stability, deterioration, or improvement     Problem: Discharge Planning  Goal: Discharge to home or other facility with appropriate resources  Outcome: Progressing  Flowsheets (Taken 1/14/2025 1646)  Discharge to home or other facility with appropriate resources:   Identify barriers to discharge with patient and caregiver   Identify discharge learning needs (meds, wound care, etc)   Refer to discharge planning if patient needs post-hospital services based on physician order or complex needs related to functional status, cognitive ability or social support system   Arrange for needed discharge resources and transportation as appropriate   Arrange for interpreters to assist at discharge as needed  Note: Patient updated and educated on barriers to discharge and plan of care.      Problem: Safety - Adult  Goal: Free from fall injury  Outcome: Progressing  Flowsheets (Taken 1/14/2025 1646)  Free From Fall Injury:   Based on caregiver fall risk screen, instruct family/caregiver to ask for assistance with transferring infant if caregiver noted to have fall risk factors   Instruct family/caregiver on patient safety  Note: Patient has remained free of fall injury this shift.      Problem: Skin/Tissue Integrity  Goal: Absence of new skin breakdown  Description: 1.  Monitor for areas of redness and/or skin breakdown  2.  Assess vascular access sites hourly  3.  Every 4-6 hours minimum:  Change oxygen saturation probe site  4.  Every 4-6 hours:  If on nasal continuous positive airway pressure, respiratory therapy assess nares and determine

## 2025-01-14 NOTE — PLAN OF CARE
Problem: Safety - Adult  Goal: Free from fall injury  1/13/2025 2306 by Ashlie Pineda, RN  Outcome: Progressing   All fall precautions in place. Bed locked and in lowest position with alarm on. Overbed table and personal belonings within reach. Call light within reach and patient instructed to use call light for assistance. Non-skid socks on.  Problem: Neurosensory - Adult  Goal: Achieves stable or improved neurological status  Outcome: Progressing   Patient neuro status remains unchanged, NIH is 12, continuing to monitor.

## 2025-01-15 ENCOUNTER — ANESTHESIA EVENT (OUTPATIENT)
Dept: ENDOSCOPY | Age: 70
End: 2025-01-15
Payer: MEDICARE

## 2025-01-15 ENCOUNTER — APPOINTMENT (OUTPATIENT)
Dept: GENERAL RADIOLOGY | Age: 70
End: 2025-01-15
Attending: INTERNAL MEDICINE
Payer: MEDICARE

## 2025-01-15 ENCOUNTER — ANESTHESIA (OUTPATIENT)
Dept: ENDOSCOPY | Age: 70
End: 2025-01-15
Payer: MEDICARE

## 2025-01-15 ENCOUNTER — APPOINTMENT (OUTPATIENT)
Dept: ENDOSCOPY | Age: 70
End: 2025-01-15
Attending: INTERNAL MEDICINE
Payer: MEDICARE

## 2025-01-15 LAB
ANISOCYTOSIS BLD QL SMEAR: ABNORMAL
BASOPHILS # BLD: 0 K/UL (ref 0–0.2)
BASOPHILS NFR BLD: 0 %
DEPRECATED RDW RBC AUTO: 17.6 % (ref 12.4–15.4)
EOSINOPHIL # BLD: 0.1 K/UL (ref 0–0.6)
EOSINOPHIL NFR BLD: 1 %
GLUCOSE BLD-MCNC: 82 MG/DL (ref 70–99)
GLUCOSE BLD-MCNC: 84 MG/DL (ref 70–99)
GLUCOSE BLD-MCNC: 87 MG/DL (ref 70–99)
GLUCOSE BLD-MCNC: 93 MG/DL (ref 70–99)
GLUCOSE BLD-MCNC: 95 MG/DL (ref 70–99)
HCT VFR BLD AUTO: 31.8 % (ref 36–48)
HGB BLD-MCNC: 10.5 G/DL (ref 12–16)
LYMPHOCYTES # BLD: 0.8 K/UL (ref 1–5.1)
LYMPHOCYTES NFR BLD: 7 %
MAGNESIUM SERPL-MCNC: 1.99 MG/DL (ref 1.8–2.4)
MCH RBC QN AUTO: 29.2 PG (ref 26–34)
MCHC RBC AUTO-ENTMCNC: 32.9 G/DL (ref 31–36)
MCV RBC AUTO: 88.7 FL (ref 80–100)
METAMYELOCYTES NFR BLD MANUAL: 1 %
MONOCYTES # BLD: 1.4 K/UL (ref 0–1.3)
MONOCYTES NFR BLD: 12 %
NEUTROPHILS # BLD: 9 K/UL (ref 1.7–7.7)
NEUTROPHILS NFR BLD: 79 %
OVALOCYTES BLD QL SMEAR: ABNORMAL
PERFORMED ON: NORMAL
PHOSPHATE SERPL-MCNC: 3 MG/DL (ref 2.5–4.9)
PLATELET # BLD AUTO: 439 K/UL (ref 135–450)
PMV BLD AUTO: 8.6 FL (ref 5–10.5)
POTASSIUM SERPL-SCNC: 3.7 MMOL/L (ref 3.5–5.1)
RBC # BLD AUTO: 3.58 M/UL (ref 4–5.2)
VANCOMYCIN SERPL-MCNC: 20.8 UG/ML
VIT B1 SERPL-MCNC: <2 NMOL/L (ref 4–15)
WBC # BLD AUTO: 11.3 K/UL (ref 4–11)

## 2025-01-15 PROCEDURE — 6360000002 HC RX W HCPCS: Performed by: NURSE PRACTITIONER

## 2025-01-15 PROCEDURE — 6360000002 HC RX W HCPCS

## 2025-01-15 PROCEDURE — 6370000000 HC RX 637 (ALT 250 FOR IP): Performed by: INTERNAL MEDICINE

## 2025-01-15 PROCEDURE — 94761 N-INVAS EAR/PLS OXIMETRY MLT: CPT

## 2025-01-15 PROCEDURE — 36410 VNPNXR 3YR/> PHY/QHP DX/THER: CPT

## 2025-01-15 PROCEDURE — 6360000002 HC RX W HCPCS: Performed by: SURGERY

## 2025-01-15 PROCEDURE — 3609013300 HC EGD TUBE PLACEMENT: Performed by: INTERNAL MEDICINE

## 2025-01-15 PROCEDURE — 2700000000 HC OXYGEN THERAPY PER DAY

## 2025-01-15 PROCEDURE — C1751 CATH, INF, PER/CENT/MIDLINE: HCPCS

## 2025-01-15 PROCEDURE — 2060000000 HC ICU INTERMEDIATE R&B

## 2025-01-15 PROCEDURE — 3700000001 HC ADD 15 MINUTES (ANESTHESIA): Performed by: INTERNAL MEDICINE

## 2025-01-15 PROCEDURE — 7100000011 HC PHASE II RECOVERY - ADDTL 15 MIN: Performed by: INTERNAL MEDICINE

## 2025-01-15 PROCEDURE — 3700000000 HC ANESTHESIA ATTENDED CARE: Performed by: INTERNAL MEDICINE

## 2025-01-15 PROCEDURE — 83735 ASSAY OF MAGNESIUM: CPT

## 2025-01-15 PROCEDURE — 71045 X-RAY EXAM CHEST 1 VIEW: CPT

## 2025-01-15 PROCEDURE — 85025 COMPLETE CBC W/AUTO DIFF WBC: CPT

## 2025-01-15 PROCEDURE — 2709999900 HC NON-CHARGEABLE SUPPLY: Performed by: INTERNAL MEDICINE

## 2025-01-15 PROCEDURE — 2580000003 HC RX 258: Performed by: INTERNAL MEDICINE

## 2025-01-15 PROCEDURE — 36415 COLL VENOUS BLD VENIPUNCTURE: CPT

## 2025-01-15 PROCEDURE — 2500000003 HC RX 250 WO HCPCS: Performed by: NURSE PRACTITIONER

## 2025-01-15 PROCEDURE — 0DH63UZ INSERTION OF FEEDING DEVICE INTO STOMACH, PERCUTANEOUS APPROACH: ICD-10-PCS | Performed by: INTERNAL MEDICINE

## 2025-01-15 PROCEDURE — 2500000003 HC RX 250 WO HCPCS: Performed by: INTERNAL MEDICINE

## 2025-01-15 PROCEDURE — 80202 ASSAY OF VANCOMYCIN: CPT

## 2025-01-15 PROCEDURE — 84100 ASSAY OF PHOSPHORUS: CPT

## 2025-01-15 PROCEDURE — 6360000002 HC RX W HCPCS: Performed by: INTERNAL MEDICINE

## 2025-01-15 PROCEDURE — 7100000010 HC PHASE II RECOVERY - FIRST 15 MIN: Performed by: INTERNAL MEDICINE

## 2025-01-15 PROCEDURE — 6370000000 HC RX 637 (ALT 250 FOR IP): Performed by: SURGERY

## 2025-01-15 PROCEDURE — 94640 AIRWAY INHALATION TREATMENT: CPT

## 2025-01-15 PROCEDURE — 2580000003 HC RX 258

## 2025-01-15 PROCEDURE — 99232 SBSQ HOSP IP/OBS MODERATE 35: CPT | Performed by: NURSE PRACTITIONER

## 2025-01-15 PROCEDURE — 05HF33Z INSERTION OF INFUSION DEVICE INTO LEFT CEPHALIC VEIN, PERCUTANEOUS APPROACH: ICD-10-PCS | Performed by: SURGERY

## 2025-01-15 PROCEDURE — 84132 ASSAY OF SERUM POTASSIUM: CPT

## 2025-01-15 PROCEDURE — 2580000003 HC RX 258: Performed by: NURSE PRACTITIONER

## 2025-01-15 RX ORDER — HYDROMORPHONE HYDROCHLORIDE 1 MG/ML
0.25 INJECTION, SOLUTION INTRAMUSCULAR; INTRAVENOUS; SUBCUTANEOUS
Status: DISCONTINUED | OUTPATIENT
Start: 2025-01-15 | End: 2025-01-17 | Stop reason: HOSPADM

## 2025-01-15 RX ORDER — ASPIRIN 81 MG/1
81 TABLET ORAL DAILY
Qty: 30 TABLET | Refills: 3 | Status: SHIPPED | OUTPATIENT
Start: 2025-01-15

## 2025-01-15 RX ORDER — LEVETIRACETAM 100 MG/ML
1000 SOLUTION ORAL 2 TIMES DAILY
Qty: 1 EACH | Refills: 0 | Status: SHIPPED | OUTPATIENT
Start: 2025-01-15 | End: 2025-02-14

## 2025-01-15 RX ORDER — HYDROMORPHONE HYDROCHLORIDE 1 MG/ML
0.5 INJECTION, SOLUTION INTRAMUSCULAR; INTRAVENOUS; SUBCUTANEOUS
Status: DISCONTINUED | OUTPATIENT
Start: 2025-01-15 | End: 2025-01-17 | Stop reason: HOSPADM

## 2025-01-15 RX ORDER — VANCOMYCIN 1 G/200ML
1000 INJECTION, SOLUTION INTRAVENOUS EVERY 12 HOURS
Status: DISCONTINUED | OUTPATIENT
Start: 2025-01-15 | End: 2025-01-15

## 2025-01-15 RX ORDER — LIDOCAINE HYDROCHLORIDE 20 MG/ML
INJECTION, SOLUTION INFILTRATION; PERINEURAL
Status: DISCONTINUED | OUTPATIENT
Start: 2025-01-15 | End: 2025-01-15 | Stop reason: SDUPTHER

## 2025-01-15 RX ORDER — IPRATROPIUM BROMIDE AND ALBUTEROL SULFATE 2.5; .5 MG/3ML; MG/3ML
1 SOLUTION RESPIRATORY (INHALATION)
Status: DISCONTINUED | OUTPATIENT
Start: 2025-01-16 | End: 2025-01-17

## 2025-01-15 RX ORDER — POLYVINYL ALCOHOL 14 MG/ML
2 SOLUTION/ DROPS OPHTHALMIC EVERY 4 HOURS PRN
Status: DISCONTINUED | OUTPATIENT
Start: 2025-01-15 | End: 2025-01-17 | Stop reason: HOSPADM

## 2025-01-15 RX ORDER — SODIUM CHLORIDE, SODIUM LACTATE, POTASSIUM CHLORIDE, CALCIUM CHLORIDE 600; 310; 30; 20 MG/100ML; MG/100ML; MG/100ML; MG/100ML
INJECTION, SOLUTION INTRAVENOUS ONCE
Status: COMPLETED | OUTPATIENT
Start: 2025-01-15 | End: 2025-01-15

## 2025-01-15 RX ORDER — PROPOFOL 10 MG/ML
INJECTION, EMULSION INTRAVENOUS
Status: DISCONTINUED | OUTPATIENT
Start: 2025-01-15 | End: 2025-01-15 | Stop reason: SDUPTHER

## 2025-01-15 RX ORDER — IPRATROPIUM BROMIDE AND ALBUTEROL SULFATE 2.5; .5 MG/3ML; MG/3ML
1 SOLUTION RESPIRATORY (INHALATION) EVERY 4 HOURS PRN
Status: DISCONTINUED | OUTPATIENT
Start: 2025-01-15 | End: 2025-01-17

## 2025-01-15 RX ORDER — HYDROMORPHONE HYDROCHLORIDE 1 MG/ML
0.5 INJECTION, SOLUTION INTRAMUSCULAR; INTRAVENOUS; SUBCUTANEOUS EVERY 6 HOURS PRN
Status: DISCONTINUED | OUTPATIENT
Start: 2025-01-15 | End: 2025-01-15

## 2025-01-15 RX ADMIN — PROPOFOL 30 MG: 10 INJECTION, EMULSION INTRAVENOUS at 15:06

## 2025-01-15 RX ADMIN — MORPHINE SULFATE 2 MG: 2 INJECTION, SOLUTION INTRAMUSCULAR; INTRAVENOUS at 04:25

## 2025-01-15 RX ADMIN — LEVETIRACETAM 1000 MG: 100 INJECTION INTRAVENOUS at 20:42

## 2025-01-15 RX ADMIN — METOPROLOL TARTRATE 2.5 MG: 1 INJECTION, SOLUTION INTRAVENOUS at 13:33

## 2025-01-15 RX ADMIN — PROPOFOL 30 MG: 10 INJECTION, EMULSION INTRAVENOUS at 15:03

## 2025-01-15 RX ADMIN — HYDROMORPHONE HYDROCHLORIDE 0.5 MG: 1 INJECTION, SOLUTION INTRAMUSCULAR; INTRAVENOUS; SUBCUTANEOUS at 06:46

## 2025-01-15 RX ADMIN — SODIUM CHLORIDE, PRESERVATIVE FREE 10 ML: 5 INJECTION INTRAVENOUS at 20:43

## 2025-01-15 RX ADMIN — SODIUM CHLORIDE, PRESERVATIVE FREE 10 ML: 5 INJECTION INTRAVENOUS at 08:20

## 2025-01-15 RX ADMIN — AMPICILLIN SODIUM 2000 MG: 2 INJECTION, POWDER, FOR SOLUTION INTRAMUSCULAR; INTRAVENOUS at 05:28

## 2025-01-15 RX ADMIN — HYDROMORPHONE HYDROCHLORIDE 0.5 MG: 1 INJECTION, SOLUTION INTRAMUSCULAR; INTRAVENOUS; SUBCUTANEOUS at 12:07

## 2025-01-15 RX ADMIN — HYDROMORPHONE HYDROCHLORIDE 0.5 MG: 1 INJECTION, SOLUTION INTRAMUSCULAR; INTRAVENOUS; SUBCUTANEOUS at 20:42

## 2025-01-15 RX ADMIN — LIDOCAINE HYDROCHLORIDE 60 MG: 20 INJECTION, SOLUTION INFILTRATION; PERINEURAL at 15:03

## 2025-01-15 RX ADMIN — METOPROLOL TARTRATE 2.5 MG: 1 INJECTION, SOLUTION INTRAVENOUS at 04:25

## 2025-01-15 RX ADMIN — LEVETIRACETAM 1000 MG: 100 INJECTION INTRAVENOUS at 11:00

## 2025-01-15 RX ADMIN — SODIUM CHLORIDE: 9 INJECTION, SOLUTION INTRAVENOUS at 14:56

## 2025-01-15 RX ADMIN — METOPROLOL TARTRATE 2.5 MG: 1 INJECTION, SOLUTION INTRAVENOUS at 20:42

## 2025-01-15 RX ADMIN — WATER 2000 MG: 1 INJECTION INTRAMUSCULAR; INTRAVENOUS; SUBCUTANEOUS at 08:04

## 2025-01-15 RX ADMIN — METOPROLOL TARTRATE 2.5 MG: 1 INJECTION, SOLUTION INTRAVENOUS at 08:04

## 2025-01-15 RX ADMIN — POLYVINYL ALCOHOL 2 DROP: 1.4 SOLUTION/ DROPS OPHTHALMIC at 13:53

## 2025-01-15 RX ADMIN — ACYCLOVIR SODIUM 750 MG: 50 INJECTION, SOLUTION INTRAVENOUS at 01:01

## 2025-01-15 RX ADMIN — ACYCLOVIR SODIUM 750 MG: 50 INJECTION, SOLUTION INTRAVENOUS at 08:20

## 2025-01-15 RX ADMIN — PROPOFOL 100 MCG/KG/MIN: 10 INJECTION, EMULSION INTRAVENOUS at 15:04

## 2025-01-15 RX ADMIN — IPRATROPIUM BROMIDE AND ALBUTEROL SULFATE 1 DOSE: 2.5; .5 SOLUTION RESPIRATORY (INHALATION) at 20:37

## 2025-01-15 RX ADMIN — VANCOMYCIN HYDROCHLORIDE 1250 MG: 1.25 INJECTION, SOLUTION INTRAVITREAL at 03:42

## 2025-01-15 RX ADMIN — SODIUM CHLORIDE, POTASSIUM CHLORIDE, SODIUM LACTATE AND CALCIUM CHLORIDE: 600; 310; 30; 20 INJECTION, SOLUTION INTRAVENOUS at 14:55

## 2025-01-15 ASSESSMENT — PAIN DESCRIPTION - PAIN TYPE
TYPE: ACUTE PAIN
TYPE: ACUTE PAIN

## 2025-01-15 ASSESSMENT — PAIN SCALES - PAIN ASSESSMENT IN ADVANCED DEMENTIA (PAINAD)
BREATHING: NORMAL
FACIALEXPRESSION: SMILING OR INEXPRESSIVE
BODYLANGUAGE: TENSE, DISTRESSED PACING, FIDGETING
FACIALEXPRESSION: SAD, FRIGHTENED, FROWN
TOTALSCORE: 1
BREATHING: OCCASIONAL LABORED BREATHING, SHORT PERIOD OF HYPERVENTILATION
CONSOLABILITY: NO NEED TO CONSOLE
CONSOLABILITY: NO NEED TO CONSOLE
NEGVOCALIZATION: OCCASIONAL MOAN/GROAN, LOW SPEECH, NEGATIVE/DISAPPROVING QUALITY
TOTALSCORE: 4
NEGVOCALIZATION: OCCASIONAL MOAN/GROAN, LOW SPEECH, NEGATIVE/DISAPPROVING QUALITY
BODYLANGUAGE: RELAXED

## 2025-01-15 ASSESSMENT — PAIN SCALES - WONG BAKER
WONGBAKER_NUMERICALRESPONSE: NO HURT
WONGBAKER_NUMERICALRESPONSE: NO HURT
WONGBAKER_NUMERICALRESPONSE: HURTS LITTLE MORE
WONGBAKER_NUMERICALRESPONSE: NO HURT
WONGBAKER_NUMERICALRESPONSE: NO HURT

## 2025-01-15 ASSESSMENT — PAIN SCALES - GENERAL
PAINLEVEL_OUTOF10: 2
PAINLEVEL_OUTOF10: 9
PAINLEVEL_OUTOF10: 7
PAINLEVEL_OUTOF10: 7
PAINLEVEL_OUTOF10: 2
PAINLEVEL_OUTOF10: 2
PAINLEVEL_OUTOF10: 8

## 2025-01-15 ASSESSMENT — PAIN DESCRIPTION - ONSET
ONSET: UNABLE TO COMMUNICATE
ONSET: UNABLE TO COMMUNICATE

## 2025-01-15 ASSESSMENT — PAIN - FUNCTIONAL ASSESSMENT
PAIN_FUNCTIONAL_ASSESSMENT: ACTIVITIES ARE NOT PREVENTED

## 2025-01-15 ASSESSMENT — ENCOUNTER SYMPTOMS: SHORTNESS OF BREATH: 1

## 2025-01-15 NOTE — ANESTHESIA PRE PROCEDURE
Department of Anesthesiology  Preprocedure Note       Name:  Renay Lovett   Age:  69 y.o.  :  1955                                          MRN:  [id number]         Date:  1/15/2025      Surgeon: Surgeon(s):  Bell Oliva MD    Procedure: Procedure(s):  ESOPHAGOGASTRODUODENOSCOPY PERCUTANEOUS ENDOSCOPIC GASTROSTOMY TUBE INSERTION    Medications prior to admission:   Prior to Admission medications    Medication Sig Start Date End Date Taking? Authorizing Provider   aspirin 81 MG EC tablet Take 1 tablet by mouth daily 1/15/25  Yes Adonis Nova MD   levETIRAcetam (KEPPRA) 100 MG/ML oral solution 10 mLs by PEG Tube route 2 times daily 1/15/25 2/14/25 Yes Adonis Nova MD   DULoxetine (CYMBALTA) 30 MG extended release capsule Take 1 capsule by mouth daily 25  Yes Elen Wilder MD   tiZANidine (ZANAFLEX) 2 MG tablet TAKE ONE TABLET BY MOUTH EVERY DAY 24  Yes Melchor Gaitan MD   pregabalin (LYRICA) 100 MG capsule Take 1 capsule by mouth 2 times daily for 90 days. Max Daily Amount: 200 mg 24 Yes Melchor Gaitan MD   losartan-hydroCHLOROthiazide (HYZAAR) 100-25 MG per tablet TAKE ONE (1) TABLET BY MOUTH DAILY 24  Yes Melchor Gaitan MD   metFORMIN (GLUCOPHAGE) 500 MG tablet TAKE ONE TABLET BY MOUTH EVERY DAY 24  Yes Melchor Gaitan MD   metoprolol tartrate (LOPRESSOR) 50 MG tablet TAKE ONE (1) TABLET BY MOUTH TWO (2) TIMES DAILY 24  Yes Melchor Gaitan MD   dilTIAZem (CARDIZEM CD) 120 MG extended release capsule TAKE ONE CAPSULE BY MOUTH EVERY DAY 24  Yes Melchor Gaitan MD   TRELEGY ELLIPTA 200-62.5-25 MCG/ACT AEPB inhaler INHALE ONE (1) PUFF INTO THE LUNGS DAILY 23  Yes Gilberto Marquez MD   Handicap Placard MISC by Does not apply route Duration: 5 Years 23  Yes Melchor Gaitan MD   Calcium Carb-Cholecalciferol (CALCIUM-VITAMIN D3) 250-3.125 MG-MCG TABS TAKE ONE TABLET BY MOUTH 2 TIMES A DAY

## 2025-01-15 NOTE — PROCEDURES
PROCEDURE NOTE  Date: 1/15/2025   Name: Renay Lovett  YOB: 1955    Procedures        PowerGlide Midline/Extended Dwell Catheter Procedure Note:  Chart reviewed for allergies, diagnosis, labs, known contraindications, reason for line placement and planned length of treatment.  Insertion procedure discussed with patient/family/staff member.  Informed consent not required for PowerGlide Midline/Extended Dwell placement.  Three patient identifiers - Patient name,   and MRN -  completed &  confirmed verbally.   Hat, mask and eye shield donned.  Site scrubbed with Chloraprep  One-Step applicator for 30 seconds x 1.  Hand Hygiene performed prior to sterile gloves.  Patient draped. Vein located by [place].  Midline/Extended Dwell Catheter inserted.  Positive brisk blood return obtained.  Valve applied to lumen and flushed with 10 mls  0.9% Sterile Sodium Chloride. Catheter flushes easily with no resistance.  Skin prep applied to site.  Catheter secured with non-sutured locking device per hospital protocol. Bio-patch/CHG impregnated sterile tegaderm dressing applied.  Alcohol Swab Cap applied to valve.  Sterile field maintained during procedure.   Guide wire accounted for post procedure. Pt. Response to procedure, tolerated well. Appearance of site: Clean dry and intact without bleeding or edema. All edges of Tegaderm occlusive.   Site marked with date and initials of RN placing line.Top 2 side rails in up position, call button in reach, RN notified of all of the above.

## 2025-01-15 NOTE — PLAN OF CARE
Problem: Chronic Conditions and Co-morbidities  Goal: Patient's chronic conditions and co-morbidity symptoms are monitored and maintained or improved  1/15/2025 0214 by Lucille Mitchell RN  Outcome: Progressing  Note: Pt chronic conditions managed via MAR  1/14/2025 1646 by Marsha Olivas RN  Outcome: Progressing  Flowsheets (Taken 1/14/2025 1646)  Care Plan - Patient's Chronic Conditions and Co-Morbidity Symptoms are Monitored and Maintained or Improved: Monitor and assess patient's chronic conditions and comorbid symptoms for stability, deterioration, or improvement     Problem: Discharge Planning  Goal: Discharge to home or other facility with appropriate resources  1/15/2025 0214 by Lucille Mitchell RN  Outcome: Progressing  1/14/2025 1646 by Marsha Olivas RN  Outcome: Progressing  Flowsheets (Taken 1/14/2025 1646)  Discharge to home or other facility with appropriate resources:   Identify barriers to discharge with patient and caregiver   Identify discharge learning needs (meds, wound care, etc)   Refer to discharge planning if patient needs post-hospital services based on physician order or complex needs related to functional status, cognitive ability or social support system   Arrange for needed discharge resources and transportation as appropriate   Arrange for interpreters to assist at discharge as needed  Note: Patient updated and educated on barriers to discharge and plan of care.      Problem: Safety - Adult  Goal: Free from fall injury  1/15/2025 0214 by Lucille Mitchell RN  Outcome: Progressing  Note: [place] safety precautions in place, bed locked and in lowest position, bed/chair alarm on, bedside table/call light within reach, gripper socks applied, pt oriented to fall prevention measures.    1/14/2025 1646 by Marsha Olivas RN  Outcome: Progressing  Flowsheets (Taken 1/14/2025 1646)  Free From Fall Injury:   Based on caregiver fall risk screen, instruct

## 2025-01-15 NOTE — PROCEDURES
PROCEDURE NOTE  Date: 1/15/2025   Name: Renay Lovett  YOB: 1955    Procedures        Endoscopic Gastroduodenoscopy with PEG Procedure Note    Procedure Date: 1/15/2025    Pre-operative Diagnosis: Oropharyngeal dysphagia in a patient with significantly altered mental status    Referring Provider: Adonis Nova MD    GI Provider: Bell Oliva MD    Procedural Medications:  Iv Propofo  Patient received IV ceftriaxone this am    Indications:   Oropharyngeal dysphagia in a patient with significantly altered mentation .      Description of Operation/Procedure:   The risks, benefits, indications, potential complications, and alternatives were explained to the patient's DPOA and informed consent obtained.    Endoscopic Gastroduodenoscopy --diagnostic was performed.    The patient was placed in the left lateral decubitus position.  Based on the pre-procedure assessment, including review of the patient's medical history, medications, allergies, and review of systems, she had been deemed to be an appropriate candidate for conscious sedation; she was therefore sedated with the medications listed below.  She was monitored continuously with ECG tracing, pulse oximetry, blood pressure monitoring, and direct observation.      The gastroscope was inserted into the mouth and advanced under direct vision to second portion of the duodenum.   A careful inspection was made as the gastroscope was withdrawn, including a retroflexed view of the proximal stomach.     An appropriate location for PEG placement was found by transillumination and palpation of the anterior abdominal wall.  The skin was prepped and draped in a sterile fashion, then the skin and underlying soft tissues were anesthetized with lidocaine.  A needle was passed through the abdominal wall and grasped using a snare placed through the endoscope.  A wire was then passed through the needle, grasped with the snare, and brought out through the patient's

## 2025-01-15 NOTE — PROGRESS NOTES
NEUROLOGY PROGRESS NOTE       Patient Name: Renay Lovett YOB: 1955   Sex: Female Age: 69 yrs     Presenting CC: No chief complaint on file.    Reason for Consult: concern for NCSE     Changes over last 24 hours:     No significant changes in mental status   S/p LP 1/14/25 - was only able to obtain 1ml of CSF  Protein 62, Glucose 64, WBC 3, , M/E panel negative    ROS: ALYCIA    ASSESSMENT & RECOMMENDATIONS   Assessment:  Renay Lovett is a 68 y/o woman with a history of cognitive decline over last few years, HTN, DM who presented with altered mental status at [facility] found to have multifocal ischemic stroke (corpus callosum, left occipital and left pontine) as well as UTI. Her acute encephalopathy seemed out of proportion to findings on MRI and she was transferred for cEEG to r/o seizures. She has had no seizures on EEG, but does have L temporal sharps suggesting increased risk for seizure.     Started on broad spectrum antibiotics 1/13 d/t persistent encephalopathy, deescalated on 1/15 after LP results  Patient's family planning to go home w/ hospice  If all else negative multiple ischemic strokes may be cause of change in mental status  TTE completed, negative bubble study  LP 1/14/25 - Protein 62, Glucose 64, WBC 3, , M/E panel negative    Plan:  - Discontinue broad spectrum antimicrobials    - Continue Keppra 1000mg (continue d/t rare L temporal sharps)   - Seizure precuations  - Q4H Neuro Checks  - Okay to resume any antiplt / anticoagulant today   - BP goal less than 130/80 mmHg  - A1C <7.0  - LDL <70  - Defer cardiac monitor at discharge - going home w/ hospice  - Stroke Education at Discharge as able w/ mental status  - Follow up w/ neurology  - Will continue to follow for CSF results.     Denise Avila, RITA - CNP   Neurology  1/15/2025 8:47 AM  [employer]: [facility]    HISTORY   Presented on 1/9 with altered mental status  Initial HPI:   Renay Lovett  increased wall thickness. Findings consistent with mild concentric hypertrophy. Normal wall motion. Grade I diastolic dysfunction with normal LAP.    Aortic Valve: Trileaflet valve. Mild to moderate regurgitation. No stenosis.    Tricuspid Valve: Normal RVSP. Est RA pressure is 3 mmHg. The estimated RVSP is 25 mmHg.    Interatrial Septum: No interatrial shunt visualized with color Doppler. Grade 0 Absence of bubbles. Agitated saline study was negative with and without provocation.    Pericardium: Small posterior pericardial effusion. No echo signs of tamponade.    Image quality is adequate.    PHYSICAL EXAMINATION     PHYSICAL EXAM:  Vitals:    01/15/25 0417 01/15/25 0455 01/15/25 0646 01/15/25 0750   BP: 127/60   129/69   Pulse: (!) 106   94   Resp: 16 15 17 16   Temp: 98.9 °F (37.2 °C)   97.3 °F (36.3 °C)   TempSrc: Oral   Oral   SpO2: 92%   94%   Weight:       Height:             General: Sleeping, wakes easily, no distress, well-nourished, appears chronically ill  Neurologic  Mental status:   Eyes closed, opens spontaneously, regards   Orientation: to person, does not answer other orientation questions  Attention: Poor, requires repeated verbal cues to continue to participate in exam.               Language: Answers yes/no questions, answers are not always appropriate, no aphasia or dysarthria  Comprehension seems to be intact; follows simple commands with repeated verbal cues    Cranial nerves:   CN2: Blinks to threat bilaterally  CN 3,4,6: Pupils equal and reactive to light, extraocular muscles intact, will attend to me on each side of the bed  CN5: Facial sensation symmetric   CN7: Face symmetric  CN8: Hearing symmetric to spoken voice  CN9: Palate elevated symmetrically  CN11: Traps full strength on shoulder shrug  CN12: Tongue midline with protrusion    Motor Exam:  Good strength in all 4 extremities, moving symmetrically    Sensory: light touch intact and symmetric in all 4 extremities  Tone: normal in all

## 2025-01-15 NOTE — DISCHARGE SUMMARY
V2.0  Discharge Summary    Name:  Renay Lovett /Age/Sex: 1955 (69 y.o. female)   Admit Date: 2025  Discharge Date: 1/15/25    MRN & CSN:  [id number] & [id number] Encounter Date and Time 1/15/25 11:18 AM EST    Attending:  Adonis Nova MD Discharging Provider: Adonis Nova MD       Discharge Diagnosess:     Altered mental status  Progressive memory loss  Possible seizure-like activity  Acute/subacute stroke  Cognitive decline      Admission HPI, hospital course, and consultants:     Admission HPI:  \"69 y.o. female with PMHx significant for essential hypertension, diabetes mellitus, COPD who was admitted initially to [facility] with altered mental status and acute on chronic back pain.  Patient was noted to have questionable enhancing foci on lumbar spine CT.  Patient was initially noted to be febrile with temperature 102.7 and mild leukocytosis 13K.  Patient received IV cefepime and vancomycin in ED.  WBC count improved to 11K infectious workup was unremarkable with negative flu and COVID swabs UA did not show evidence of UTI.  Neurology was consulted.  It was found that the patient has been having progressive memory loss over the last 2 years and over the last 9 months she has been having loss of the ability to take care of herself and manage her medication.  Patient got progressively worse over the last week and stopped eating and participating in conversation.  Patient has been giving one-word answer.  Brain MRI shows 6 mm focus of mild restricted diffusion involving the right splenium corpus callosum as well as a left paramedian crystal suggestive of acute/subacute infarct within the posterior circulation with severe chronic microvascular ischemic changes.  It was felt that the patient might have seizure activity complicating her acute//subacute infarct causing her decline.  Patient was recommended to be transferred to the [facility] for EEG evaluation as this service is not

## 2025-01-15 NOTE — H&P
Gastroenterology Note             Pre-operative History and Physical    Patient: Renay Lovett  : 1955  CSN: [id number]    History Obtained From:  patient and/or guardian.     HISTORY OF PRESENT ILLNESS:    The patient is a 69 y.o. female  here for oropharyngeal dysphagia.      Past Medical History:    Past Medical History:   Diagnosis Date    Abnormal CT scan, chest     COPD (chronic obstructive pulmonary disease) (HCC)     Decreased diffusion capacity of lung     Dementia (HCC)     Depression     Dyspnea     Hypertension     Lumbosacral spondylosis without myelopathy 2016    Osteopenia 2020    Oxygen dependent     2 L/PRN (usually uses O2)    Pneumonia     Restrictive lung disease     Tobacco abuse     Well controlled type 2 diabetes mellitus (HCC) 10/26/2018     Past Surgical History:    Past Surgical History:   Procedure Laterality Date    BACK SURGERY      CARPAL TUNNEL RELEASE Left 14    HAND SURGERY Right 14    SHOULDER SURGERY Right 2021    RIGHT SHOULDER ARTHROTMY, OPEN BICEPS TENODESIS, REMOVAL OF LOOSE BODIES, REVERSE TOTAL SHOULDER REPLACEMENT performed by Serge Kerns MD at [facility] OR    SKIN GRAFT Right      Medications Prior to Admission:   No current facility-administered medications on file prior to encounter.     Current Outpatient Medications on File Prior to Encounter   Medication Sig Dispense Refill    DULoxetine (CYMBALTA) 30 MG extended release capsule Take 1 capsule by mouth daily 30 capsule 2    tiZANidine (ZANAFLEX) 2 MG tablet TAKE ONE TABLET BY MOUTH EVERY DAY 30 tablet 2    pregabalin (LYRICA) 100 MG capsule Take 1 capsule by mouth 2 times daily for 90 days. Max Daily Amount: 200 mg 180 capsule 0    losartan-hydroCHLOROthiazide (HYZAAR) 100-25 MG per tablet TAKE ONE (1) TABLET BY MOUTH DAILY 90 tablet 2    metFORMIN (GLUCOPHAGE) 500 MG tablet TAKE ONE TABLET BY MOUTH EVERY DAY 90 tablet 1    metoprolol tartrate (LOPRESSOR) 50 MG tablet TAKE ONE 
for Exam: Shortness of breath FINDINGS: Heart and mediastinum are normal.  Slight vascular congestion centrally that appears stable from remote imaging.  No acute airspace abnormality.  Prior right shoulder arthroplasty.  No significant skeletal finding.     No acute finding in the chest. Mild vascular congestion is suspected.       PCP: Melchor Gaitan MD    Past Medical History:        Diagnosis Date    Abnormal CT scan, chest     COPD (chronic obstructive pulmonary disease) (HCC)     Decreased diffusion capacity of lung     Dementia (HCC)     Depression     Dyspnea     Hypertension     Lumbosacral spondylosis without myelopathy 05/09/2016    Osteopenia 12/31/2020    Oxygen dependent     2 L/PRN (usually uses O2)    Pneumonia     Restrictive lung disease     Tobacco abuse     Well controlled type 2 diabetes mellitus (HCC) 10/26/2018       Past Surgical History:        Procedure Laterality Date    BACK SURGERY      CARPAL TUNNEL RELEASE Left 12/12/14    HAND SURGERY Right 9/26/14    SHOULDER SURGERY Right 7/20/2021    RIGHT SHOULDER ARTHROTMY, OPEN BICEPS TENODESIS, REMOVAL OF LOOSE BODIES, REVERSE TOTAL SHOULDER REPLACEMENT performed by Serge [facility]erns MD at [facility] OR    SKIN GRAFT Right        Medications Prior to Admission:   Prior to Admission medications    Medication Sig Start Date End Date Taking? Authorizing Provider   DULoxetine (CYMBALTA) 30 MG extended release capsule Take 1 capsule by mouth daily 1/2/25   Elen Wilder MD   tiZANidine (ZANAFLEX) 2 MG tablet TAKE ONE TABLET BY MOUTH EVERY DAY 12/2/24   Melchor Gaitan MD   meloxicam (MOBIC) 7.5 MG tablet Take 1 tablet by mouth daily 11/13/24   Melchor Gaitan MD   pregabalin (LYRICA) 100 MG capsule Take 1 capsule by mouth 2 times daily for 90 days. Max Daily Amount: 200 mg 11/13/24 2/11/25  Melchor Gaitan MD   losartan-hydroCHLOROthiazide (HYZAAR) 100-25 MG per tablet TAKE ONE (1) TABLET BY MOUTH DAILY 11/4/24

## 2025-01-15 NOTE — CARE COORDINATION
UPDATE: ELBA received a VM from Miladis with [facility] who reports that the pt will need to have successful PEG placement and demonstrate ability to tolerate tube feeds prior to discharge. Miladis to follow up with pt and pt's spouse tomorrow to further evaluate for discharge to home vs. IPU. ELBA will continue to follow for discharge planning.  Electronically signed by MICHELLE Heredia on 1/15/2025 at 4:40 PM  [phone]    ELBA following: ELBA left VM for Miladis [phone] with [facility] to follow up on discharge planning. ELBA requested a call back to further discuss today's plan of care and the discharge order for pt. ELBA will continue to follow for discharge planning.  Electronically signed by MICHELLE Heredia on 1/15/2025 at 11:36 AM  [phone]

## 2025-01-15 NOTE — PROGRESS NOTES
Pt awake in bed, alert and oriented to self only at times. VSS with exception to tachycardia and HTN at times on 5L NC, pt exhibiting no s/s acute distress at this time. Pt tolerating NPO diet well. Pt bedrest. Pt incontinent, check and change to prevent skin breakdown. Pt pain managed via MAR. Standard safety precautions in place, bed locked and in lowest position, bed/chair alarm on, gripper socks applied, bedside table/call light within reach, pt oriented to fall prevention measures. Pt gives no indications she has other needs at this time. Plan of care to continue.    Electronically signed by SYLWIA MARS RN on 1/15/2025 at 2:21 AM

## 2025-01-15 NOTE — PLAN OF CARE
Problem: Chronic Conditions and Co-morbidities  Goal: Patient's chronic conditions and co-morbidity symptoms are monitored and maintained or improved  1/15/2025 1550 by Marsha Olivas RN  Outcome: Progressing  Flowsheets (Taken 1/15/2025 1550)  Care Plan - Patient's Chronic Conditions and Co-Morbidity Symptoms are Monitored and Maintained or Improved:   Monitor and assess patient's chronic conditions and comorbid symptoms for stability, deterioration, or improvement   Collaborate with multidisciplinary team to address chronic and comorbid conditions and prevent exacerbation or deterioration     Problem: Discharge Planning  Goal: Discharge to home or other facility with appropriate resources  1/15/2025 1550 by Marsha Olivas, RN  Outcome: Progressing  Flowsheets (Taken 1/15/2025 1550)  Discharge to home or other facility with appropriate resources:   Identify barriers to discharge with patient and caregiver   Identify discharge learning needs (meds, wound care, etc)   Refer to discharge planning if patient needs post-hospital services based on physician order or complex needs related to functional status, cognitive ability or social support system   Arrange for needed discharge resources and transportation as appropriate   Arrange for interpreters to assist at discharge as needed  Note: Patient updated and educated on barriers to discharge and plan of care.      Problem: Safety - Adult  Goal: Free from fall injury  1/15/2025 1550 by Marsha Olivas RN  Outcome: Progressing  Flowsheets (Taken 1/15/2025 1550)  Free From Fall Injury:   Instruct family/caregiver on patient safety   Based on caregiver fall risk screen, instruct family/caregiver to ask for assistance with transferring infant if caregiver noted to have fall risk factors  Note: Patient has remained free of fall injury this shift.      Problem: Skin/Tissue Integrity  Goal: Absence of new skin breakdown  Description: 1.  Monitor for areas of redness

## 2025-01-15 NOTE — PROGRESS NOTES
Speech Language Pathology  Hold Note    Per chart review pt remains NPO for PEG placement this date. SLP will hold and re-attempt tomorrow as able/appropriate.     Electronically signed by:  Cordelia Washington M.A., CCC-SLP  [id number]  Speech-Language Pathologist  Pg #: [phone]

## 2025-01-15 NOTE — CONSULTS
Clinical Pharmacy Progress Note    Vancomycin has been discontinued - Pharmacy will sign off on dosing  If resumed, please re-consult Pharmacy     Please call with questions:  [phone] (Main Pharmacy)    Ava Lorenz  Pharm.D. BCPS    1/15/2025 9:03 AM

## 2025-01-15 NOTE — DISCHARGE INSTR - COC
Continuity of Care Form    Patient Name: Renay Lovett   :  1955  MRN:  [id number]    Admit date:  2025  Discharge date:  2025    Code Status Order: Limited   Advance Directives:   Advance Care Flowsheet Documentation             Admitting Physician:  Neena Rm MD  PCP: Melchor Gaitan MD    Discharging Nurse: Jami JIMENEZ  Discharging Hospital Unit/Room#: 5524/5524-01  Discharging Unit Phone Number: [phone]    Emergency Contact:   Extended Emergency Contact Information  Primary Emergency Contact: Levi Lovett  Home Phone: [phone]  Mobile Phone: [phone]  Relation: Spouse    Past Surgical History:  Past Surgical History:   Procedure Laterality Date    BACK SURGERY      CARPAL TUNNEL RELEASE Left 14    HAND SURGERY Right 14    SHOULDER SURGERY Right 2021    RIGHT SHOULDER ARTHROTMY, OPEN BICEPS TENODESIS, REMOVAL OF LOOSE BODIES, REVERSE TOTAL SHOULDER REPLACEMENT performed by Serge Kerns MD at [facility] OR    SKIN GRAFT Right        Immunization History:   Immunization History   Administered Date(s) Administered    COVID-19, MODERNA BLUE border, Primary or Immunocompromised, (age 12y+), IM, 100 mcg/0.5mL 2021, 2021, 2022    Influenza, FLUARIX, FLULAVAL, FLUZONE (age 6 mo+) and AFLURIA, (age 3 y+), Quadv PF, 0.5mL 2020    Influenza, FLUBLOK, (age 18 y+), Quadv PF, 0.5mL 2019    Pneumococcal, PCV20, PREVNAR 20, (age 6w+), IM, 0.5mL 2023    Pneumococcal, PPSV23, PNEUMOVAX 23, (age 2y+), SC/IM, 0.5mL 2014, 2018    Td, unspecified formulation 08/15/2012       Active Problems:  Patient Active Problem List   Diagnosis Code    Pulmonary nodules R91.8    Tobacco abuse Z72.0    Abnormal chest CT R93.89    Decreased diffusion capacity R94.2    Restrictive lung disease J98.4    Aortic regurgitation I35.1    HTN (hypertension) I10    COPD with acute exacerbation (HCC) J44.1    Lumbosacral spondylosis without myelopathy M47.817

## 2025-01-15 NOTE — PROGRESS NOTES
prior dose  AUC = 653 / trough = 20.3   Adjust to 1000mg IV q12h          Note: Serum levels collected for AUC-based dosing may be high if collected in close proximity to the dose administered. This is not necessarily indicative of toxicity.    Cultures & Sensitivities:  Results       Procedure Component Value Units Date/Time    Meningitis/Encephalitis Panel, CSF [[id number]] Collected: 01/14/25 1458    Order Status: Completed Specimen: CSF Updated: 01/14/25 1905     Meningitis Encephalitis Panel --     Meningitis Encephalitis Panel PCR Result: Not Detected  Patients with a suspicion of cryptococcal meningitis should be tested  for cryptococcal antigen.\"  See additional report for complete Meningitis Encephalitis Panel.      Narrative:      ORDER#: [id number]                          ORDERED BY: NAGI JOHNSON  SOURCE: CSF (Spinal Fluid)                 COLLECTED:  01/14/25 14:58  ANTIBIOTICS AT SVETA.:                      RECEIVED :  01/14/25 17:12    Meningitis Encephalitis Panel Report [[id number]] Collected: 01/14/25 1458    Order Status: No result Updated: 01/14/25 1906    HSV PCR [[id number]]     Order Status: Canceled Specimen: CSF     Culture, CSF (with Gram Stain) [[id number]] Collected: 01/13/25 0000    Order Status: Canceled Specimen: CSF     Culture, Urine [[id number]]     Order Status: Sent Specimen: Urine, clean catch     Culture, Urine [[id number]]     Order Status: No result Specimen: Urine, clean catch     Blood Culture 1 [[id number]] Collected: 01/09/25 1447    Order Status: Completed Specimen: Blood Updated: 01/13/25 1815     Blood Culture, Routine No Growth after 4 days of incubation.    Narrative:      ORDER#: [id number]                          ORDERED BY: YULISA ZHAO  SOURCE: Blood                              COLLECTED:  01/09/25 14:47  ANTIBIOTICS AT SVETA.:                      RECEIVED :  01/09/25 18:15  If child <=2 yrs old please draw pediatric bottle.~Blood Culture 1    Blood Culture 2  Emergency Use  Authorization (EUA) only.    Patient Fact Sheet:  https://www.fda.gov/media/575032/download  Provider Fact Sheet: https://www.fda.gov/media/462211/download  EUA: https://www.fda.gov/media/674725/download  IFU: https://www.fda.gov/media/160558/download    Methodology:  RT-PCR          Influenza A NOT DETECTED     Influenza B NOT DETECTED          Recent Labs     01/12/25  1607 01/13/25  0833 01/14/25  0641   CREATININE 0.8 0.5* 0.5*   BUN 20 13 6*   WBC 8.9  --  10.1       Estimated Creatinine Clearance: 114 mL/min (A) (based on SCr of 0.5 mg/dL (L)).    Additional Lab Values / Findings of Note:    No results for input(s): \"PROCAL\" in the last 72 hours.

## 2025-01-16 ENCOUNTER — CARE COORDINATION (OUTPATIENT)
Dept: CARE COORDINATION | Age: 70
End: 2025-01-16

## 2025-01-16 LAB
ANION GAP SERPL CALCULATED.3IONS-SCNC: 13 MMOL/L (ref 3–16)
BASOPHILS # BLD: 0.1 K/UL (ref 0–0.2)
BASOPHILS NFR BLD: 0.5 %
BUN SERPL-MCNC: 6 MG/DL (ref 7–20)
CALCIUM SERPL-MCNC: 8 MG/DL (ref 8.3–10.6)
CHLORIDE SERPL-SCNC: 108 MMOL/L (ref 99–110)
CO2 SERPL-SCNC: 23 MMOL/L (ref 21–32)
CREAT SERPL-MCNC: 0.5 MG/DL (ref 0.6–1.2)
DEPRECATED RDW RBC AUTO: 17.4 % (ref 12.4–15.4)
EOSINOPHIL # BLD: 0.1 K/UL (ref 0–0.6)
EOSINOPHIL NFR BLD: 1.3 %
GFR SERPLBLD CREATININE-BSD FMLA CKD-EPI: >90 ML/MIN/{1.73_M2}
GLUCOSE BLD-MCNC: 112 MG/DL (ref 70–99)
GLUCOSE BLD-MCNC: 66 MG/DL (ref 70–99)
GLUCOSE BLD-MCNC: 77 MG/DL (ref 70–99)
GLUCOSE BLD-MCNC: 80 MG/DL (ref 70–99)
GLUCOSE BLD-MCNC: 91 MG/DL (ref 70–99)
GLUCOSE BLD-MCNC: 97 MG/DL (ref 70–99)
GLUCOSE SERPL-MCNC: 78 MG/DL (ref 70–99)
HCT VFR BLD AUTO: 31.1 % (ref 36–48)
HGB BLD-MCNC: 10.1 G/DL (ref 12–16)
LYMPHOCYTES # BLD: 0.8 K/UL (ref 1–5.1)
LYMPHOCYTES NFR BLD: 8 %
MAGNESIUM SERPL-MCNC: 1.93 MG/DL (ref 1.8–2.4)
MCH RBC QN AUTO: 28.9 PG (ref 26–34)
MCHC RBC AUTO-ENTMCNC: 32.5 G/DL (ref 31–36)
MCV RBC AUTO: 88.9 FL (ref 80–100)
MONOCYTES # BLD: 1.3 K/UL (ref 0–1.3)
MONOCYTES NFR BLD: 12.9 %
NEUTROPHILS # BLD: 8.1 K/UL (ref 1.7–7.7)
NEUTROPHILS NFR BLD: 77.3 %
PERFORMED ON: ABNORMAL
PERFORMED ON: ABNORMAL
PERFORMED ON: NORMAL
PHOSPHATE SERPL-MCNC: 2.9 MG/DL (ref 2.5–4.9)
PLATELET # BLD AUTO: 429 K/UL (ref 135–450)
PMV BLD AUTO: 8.5 FL (ref 5–10.5)
POTASSIUM SERPL-SCNC: 3.2 MMOL/L (ref 3.5–5.1)
POTASSIUM SERPL-SCNC: 3.3 MMOL/L (ref 3.5–5.1)
RBC # BLD AUTO: 3.5 M/UL (ref 4–5.2)
SODIUM SERPL-SCNC: 144 MMOL/L (ref 136–145)
WBC # BLD AUTO: 10.5 K/UL (ref 4–11)

## 2025-01-16 PROCEDURE — 2500000003 HC RX 250 WO HCPCS: Performed by: INTERNAL MEDICINE

## 2025-01-16 PROCEDURE — 2700000000 HC OXYGEN THERAPY PER DAY

## 2025-01-16 PROCEDURE — 85025 COMPLETE CBC W/AUTO DIFF WBC: CPT

## 2025-01-16 PROCEDURE — 80048 BASIC METABOLIC PNL TOTAL CA: CPT

## 2025-01-16 PROCEDURE — 2580000003 HC RX 258: Performed by: INTERNAL MEDICINE

## 2025-01-16 PROCEDURE — 6360000002 HC RX W HCPCS: Performed by: SURGERY

## 2025-01-16 PROCEDURE — 6370000000 HC RX 637 (ALT 250 FOR IP): Performed by: SURGERY

## 2025-01-16 PROCEDURE — 92526 ORAL FUNCTION THERAPY: CPT

## 2025-01-16 PROCEDURE — 94761 N-INVAS EAR/PLS OXIMETRY MLT: CPT

## 2025-01-16 PROCEDURE — 94640 AIRWAY INHALATION TREATMENT: CPT

## 2025-01-16 PROCEDURE — 2500000003 HC RX 250 WO HCPCS: Performed by: NURSE PRACTITIONER

## 2025-01-16 PROCEDURE — 36415 COLL VENOUS BLD VENIPUNCTURE: CPT

## 2025-01-16 PROCEDURE — 6360000002 HC RX W HCPCS: Performed by: INTERNAL MEDICINE

## 2025-01-16 PROCEDURE — 2060000000 HC ICU INTERMEDIATE R&B

## 2025-01-16 PROCEDURE — 84100 ASSAY OF PHOSPHORUS: CPT

## 2025-01-16 PROCEDURE — 84132 ASSAY OF SERUM POTASSIUM: CPT

## 2025-01-16 PROCEDURE — 83735 ASSAY OF MAGNESIUM: CPT

## 2025-01-16 RX ADMIN — IPRATROPIUM BROMIDE AND ALBUTEROL SULFATE 1 DOSE: 2.5; .5 SOLUTION RESPIRATORY (INHALATION) at 07:39

## 2025-01-16 RX ADMIN — HYDROMORPHONE HYDROCHLORIDE 0.5 MG: 1 INJECTION, SOLUTION INTRAMUSCULAR; INTRAVENOUS; SUBCUTANEOUS at 05:24

## 2025-01-16 RX ADMIN — DEXTROSE MONOHYDRATE 125 ML: 100 INJECTION, SOLUTION INTRAVENOUS at 11:41

## 2025-01-16 RX ADMIN — IPRATROPIUM BROMIDE AND ALBUTEROL SULFATE 1 DOSE: 2.5; .5 SOLUTION RESPIRATORY (INHALATION) at 11:54

## 2025-01-16 RX ADMIN — METOPROLOL TARTRATE 2.5 MG: 1 INJECTION, SOLUTION INTRAVENOUS at 21:56

## 2025-01-16 RX ADMIN — IPRATROPIUM BROMIDE AND ALBUTEROL SULFATE 1 DOSE: 2.5; .5 SOLUTION RESPIRATORY (INHALATION) at 17:02

## 2025-01-16 RX ADMIN — SODIUM CHLORIDE, PRESERVATIVE FREE 10 ML: 5 INJECTION INTRAVENOUS at 21:56

## 2025-01-16 RX ADMIN — HYDROMORPHONE HYDROCHLORIDE 0.5 MG: 1 INJECTION, SOLUTION INTRAMUSCULAR; INTRAVENOUS; SUBCUTANEOUS at 01:38

## 2025-01-16 RX ADMIN — METOPROLOL TARTRATE 2.5 MG: 1 INJECTION, SOLUTION INTRAVENOUS at 08:45

## 2025-01-16 RX ADMIN — METOPROLOL TARTRATE 2.5 MG: 1 INJECTION, SOLUTION INTRAVENOUS at 14:53

## 2025-01-16 RX ADMIN — LEVETIRACETAM 1000 MG: 100 INJECTION INTRAVENOUS at 08:45

## 2025-01-16 RX ADMIN — IPRATROPIUM BROMIDE AND ALBUTEROL SULFATE 1 DOSE: 2.5; .5 SOLUTION RESPIRATORY (INHALATION) at 21:47

## 2025-01-16 RX ADMIN — METOPROLOL TARTRATE 2.5 MG: 1 INJECTION, SOLUTION INTRAVENOUS at 03:59

## 2025-01-16 RX ADMIN — SODIUM CHLORIDE, PRESERVATIVE FREE 10 ML: 5 INJECTION INTRAVENOUS at 08:45

## 2025-01-16 RX ADMIN — LEVETIRACETAM 1000 MG: 100 INJECTION INTRAVENOUS at 21:56

## 2025-01-16 ASSESSMENT — PAIN DESCRIPTION - ONSET
ONSET: UNABLE TO COMMUNICATE
ONSET: UNABLE TO COMMUNICATE

## 2025-01-16 ASSESSMENT — PAIN SCALES - GENERAL
PAINLEVEL_OUTOF10: 2
PAINLEVEL_OUTOF10: 8
PAINLEVEL_OUTOF10: 7
PAINLEVEL_OUTOF10: 2

## 2025-01-16 ASSESSMENT — PAIN - FUNCTIONAL ASSESSMENT
PAIN_FUNCTIONAL_ASSESSMENT: ACTIVITIES ARE NOT PREVENTED
PAIN_FUNCTIONAL_ASSESSMENT: ACTIVITIES ARE NOT PREVENTED

## 2025-01-16 ASSESSMENT — PAIN DESCRIPTION - PAIN TYPE
TYPE: ACUTE PAIN
TYPE: ACUTE PAIN

## 2025-01-16 NOTE — PROGRESS NOTES
Speech Language Pathology  Facility/Department:[facility] 5T ORTHO/NEURO  Dysphagia Treatment Note & DC    Name: Renay Lovett  : 1955  MRN: [id number]                                                       Patient Diagnosis(es):   Patient Active Problem List    Diagnosis Date Noted    Chronic systolic (congestive) heart failure 2022    Pulmonary nodules 2013    Tobacco abuse 2013    Moderate malnutrition (HCC) 2025    Encounter for palliative care 2025    Advance care planning 2025    Encephalopathy acute 2025    Hypokalemia 01/10/2025    Hypomagnesemia 01/10/2025    Urinary tract infection without hematuria 01/10/2025    Acute encephalopathy 01/10/2025    Aphasia 01/10/2025    Dementia (HCC) 01/10/2025    Cerebrovascular accident (CVA) due to embolism of left posterior cerebral artery (HCC) 01/10/2025    AMS (altered mental status) 2025    Chronic respiratory failure with hypoxia 08/10/2021    COPD, mild (HCC) 08/10/2021    Major depressive disorder with single episode, in partial remission ([facility]) 2021    Primary osteoarthritis of right shoulder 2021    Displaced fracture of acromial process, right shoulder, initial encounter for closed fracture 2021    Acute pain of right shoulder 2021    Rotator cuff arthropathy of right shoulder 2021    Osteopenia 2020    Morbidly obese 2020    Type 2 diabetes mellitus without complication, without long-term current use of insulin (Former[facility]) 10/26/2018    Lumbosacral spondylosis without myelopathy 2016    Displacement of lumbar intervertebral disc without myelopathy 2016    Degeneration of lumbar or lumbosacral intervertebral disc 2016    Spinal stenosis, lumbar region, without neurogenic claudication 2016    COPD with acute exacerbation (HCC) 2014    Aortic regurgitation 2014    HTN (hypertension) 2014    Abnormal chest CT 2014     Decreased diffusion capacity 02/17/2014    Restrictive lung disease 02/17/2014       Past Medical History:   Diagnosis Date    Abnormal CT scan, chest     COPD (chronic obstructive pulmonary disease) (HCC)     Decreased diffusion capacity of lung     Dementia (HCC)     Depression     Dyspnea     Hypertension     Lumbosacral spondylosis without myelopathy 05/09/2016    Osteopenia 12/31/2020    Oxygen dependent     2 L/PRN (usually uses O2)    Pneumonia     Restrictive lung disease     Tobacco abuse     Well controlled type 2 diabetes mellitus (HCC) 10/26/2018     Past Surgical History:   Procedure Laterality Date    BACK SURGERY      CARPAL TUNNEL RELEASE Left 12/12/14    HAND SURGERY Right 9/26/14    SHOULDER SURGERY Right 7/20/2021    RIGHT SHOULDER ARTHROTMY, OPEN BICEPS TENODESIS, REMOVAL OF LOOSE BODIES, REVERSE TOTAL SHOULDER REPLACEMENT performed by Serge Kerns MD at [facility] OR    SKIN GRAFT Right     UPPER GASTROINTESTINAL ENDOSCOPY N/A 1/15/2025    ESOPHAGOGASTRODUODENOSCOPY PERCUTANEOUS ENDOSCOPIC GASTROSTOMY TUBE INSERTION performed by Bell Oliva MD at [facility] ENDOSCOPY     History of Present Illness  Per MD notes:   Altered Mental Status       Pt arrives via EMS for AMS. EMS reports  called and states pt hasn't ate/ drank in a week and husnad thinks its psych related. EMS states she answered yes/ mo questions but had concerns for sepsis         Imaging  XR CHEST PORTABLE   Final Result   Impression:       No acute cardiopulmonary abnormality.        Electronically signed by Timothy Cantor MD      FL LUMBAR PUNCTURE DIAG   Final Result      XR CHEST 1 VIEW   Final Result      1. Mild basilar airspace disease.      Electronically signed by David Lora MD      CT HEAD WO CONTRAST   Final Result      1. No acute intracranial hemorrhage.   2. Age-related cortical atrophy. White matter microangiopathic change.      Electronically signed by Héctor Mackey      CTA HEAD NECK W CONTRAST   Final

## 2025-01-16 NOTE — PROGRESS NOTES
V2.0    [facility] [place]      Name:  Renay Lovett /Age/Sex: 1955  (69 y.o. female)   MRN & CSN:  [id number] & [id number] Encounter Date/Time: 2025 1:00 PM EST   Location:  5524/5524-01 PCP: Melchor Gaitan MD     Attending:Adonis Nova MD       Hospital Day: 6    Assessment and Recommendations     69 y.o. female with PMHx significant for essential hypertension, diabetes mellitus, COPD who was admitted initially to [facility] with altered mental status and acute on chronic back pain as well as progressive memory loss and inability to take care of herself. Had fever, leukocytosis. Was started on broad spectrum antibiotics.  Infectious workup unremarkable. Was found with severe ischemic microvascular disease per brain imaging and possible acute/subacute stroke. Was transferred to [facility] for cEEG and possible lumbar tap.       Patient must prove tolerance to tube feeds once at goal prior to discharge to hospice.    Altered mental status  Progressive memory loss  Possible seizure-like activity  Acute/subacute stroke  Cognitive decline  -Continue Keppra for seizure prophylaxis.  - Appreciate neurology.  Continuous EEG as ordered. LP as ordered.   - Possible tap if no improvement.  Will continue to monitor clinically.  -Patient to remain n.p.o. as per speech recommendations.  Continue IV fluids as ordered.  - Continue Ceftriaxone with follow-up cultures.  If negative by tomorrow, will discontinue antibiotic treatment.  -Palliative care consulted. Patient's  interested in Hospice. Will consult hospice.   -PEG tube     Hypertension  -Controlled.  Continue Cardizem, losartan, metoprolol as tolerated.    Dyslipidemia  -Lipitor as ordered    Acute hypoxic respiratory failure  -Wean oxygen as able.  Aim for 88 to 92% as per her history of COPD.  Check chest x-ray.    Hypokalemia  -Replete aggressively. Recheck levels after infusion ends.     Type II DM  -Insulin regimen as

## 2025-01-16 NOTE — PLAN OF CARE
Problem: Chronic Conditions and Co-morbidities  Goal: Patient's chronic conditions and co-morbidity symptoms are monitored and maintained or improved  1/16/2025 1509 by Muriel Jordan RN  Outcome: Progressing  Note: Glucose checked before meals and sliding scale Humalog administered per the MAR accordingly. Jevity 1.5 continuous tube feeds initiated this shift. Patient currently NPO. Plan of care continues.      Problem: Discharge Planning  Goal: Discharge to home or other facility with appropriate resources  1/16/2025 1509 by Muriel Jordan RN  Outcome: Progressing  Note: Plan for discharge with hospice. Social work following. Plan of care continues.        Problem: Safety - Adult  Goal: Free from fall injury  1/16/2025 1509 by Muriel Jordan RN  Outcome: Progressing  Note: Fall precautions in place. Non-skid socks on. Bed locked in lowest position with alarm on and side rails up. Bedside table, belongings, and call light within reach. Hourly rounding by RN. Floor clean and free from clutter. Room door open. Plan of care continues.        Problem: Neurosensory - Adult  Goal: Absence of seizures  1/16/2025 1509 by Muriel Jordan RN  Outcome: Progressing  Note: Seizure precautions in place. Side rails padded. Suction available at bedside. AVASYS camera on in room. Plan of care continues.      Problem: Respiratory - Adult  Goal: Achieves optimal ventilation and oxygenation  1/16/2025 1509 by Muriel Jordan RN  Outcome: Progressing  Note: Remains on 5 L O2 high flow nasal cannula. Patient frequently removes nasal cannula. Frequent reorientation provided to keep nasal cannula in place. AVASYS camera on in room. Plan of care continues.      Problem: Nutrition Deficit:  Goal: Optimize nutritional status  1/16/2025 1509 by Muriel Jordan RN  Outcome: Progressing  Note: Jevity 1.5 tube feeds initiated this shift at rate of 10 mL per dietitian order. Patient tolerated so far. Plan of care continues.

## 2025-01-16 NOTE — PROGRESS NOTES
Patient remains intermittently alert and oriented to self only. Frequent reorientation provided. VSS with exception to tachycardia and 5 L O2 high flow nasal cannula. Adult non-verbal pain scale used to assess pain and patient medicated accordingly. No acute neuro changes throughout shift.   Tube feed initiated through PEG tube and patient tolerating so far.   Frequent turning and repositioning for comfort and pressure relief. Incontinent of bowel and bladder. Q2 check and change. Fall precautions in place. Bed locked in lowest position with alarm on. Call light within reach. Hourly rounding by RN. Plan of care continues.

## 2025-01-16 NOTE — RT PROTOCOL NOTE
RT Nebulizer Bronchodilator Protocol Note    There is a bronchodilator order in the chart from a provider indicating to follow the RT Bronchodilator Protocol and there is an “Initiate RT Bronchodilator Protocol” order as well (see protocol at bottom of note).    CXR Findings:  XR CHEST PORTABLE    Result Date: 1/15/2025  Impression: No acute cardiopulmonary abnormality.  Electronically signed by Timothy Cantor MD      The findings from the last RT Protocol Assessment were as follows:  Smoking: Chronic pulmonary disease  Respiratory Pattern: Regular pattern and RR 12-20 bpm  Breath Sounds: Inspiratory and expiratory or bilateral wheezing and/or rhonchi  Cough: Weak, non-productive  Indication for Bronchodilator Therapy: Wheezing associated with pulm disorder  Bronchodilator Assessment Score: 11    Aerosolized bronchodilator medication orders have been revised according to the RT Nebulizer Bronchodilator Protocol below.    Respiratory Therapist to perform RT Therapy Protocol Assessment initially then follow the protocol.  Repeat RT Therapy Protocol Assessment PRN for score 0-3 or on second treatment, BID, and PRN for scores above 3.    No Indications - adjust the frequency to every 6 hours PRN wheezing or bronchospasm, if no treatments needed after 48 hours then discontinue using Per Protocol order mode.     If indication present, adjust the RT bronchodilator orders based on the Bronchodilator Assessment Score as indicated below.  If a patient is on this medication at home then do not decrease Frequency below that used at home.    0-3 - enter or revise RT bronchodilator order(s) to equivalent RT Bronchodilator order with Frequency of every 4 hours PRN for wheezing or increased work of breathing using Per Protocol order mode.       4-6 - enter or revise RT Bronchodilator order(s) to two equivalent RT bronchodilator orders with one order with BID Frequency and one order with Frequency of every 4 hours PRN wheezing or

## 2025-01-16 NOTE — PROGRESS NOTES
Pt awake in bed, lethargic and oriented to self only. VSS on 5 L NC with exception to tachycardia for which MD is aware, pt exhibiting no s/s acute distress at this time. Pt tolerating NPO diet well, pt peg tube assessed and dressed with abd binder, dietary to consult in AM. Pt bedrest. Pt incontinent, check and change to preserve skin integrity and prevent breakdown. Pt pain managed via MAR, pain assessed suing adult nonverbal pain scale. Standard safety precautions in place, bed locked and in lowest position, bed/chair alarm on, gripper socks applied, bedside table/call light within reach, pt oriented to fall prevention measures. Pt gives no indication she has other needs at this time. Plan of care to continue.    Electronically signed by SYLWIA MARS RN on 1/16/2025 at 2:41 AM

## 2025-01-16 NOTE — PROGRESS NOTES
Progress Note    Patient Renay Lovett  MRN: [id number]  YOB: 1955 Age: 69 y.o. Sex: female  Room: [address] [place] [address]       Admitting Physician: Neena Rm MD   Date of Admission: 1/11/2025  9:08 PM   Primary Care Physician: Melchor Gaitan MD     Subjective:  Renay Lovett was seen and examined. We are following for PEG placement.  -- poor historian. Per nurse no issues with PEG noted    ROS:  Not able to obtain due to mental status.    Objective:  Vital Signs:   Vitals:    01/16/25 0800   BP: 114/70   Pulse: (!) 102   Resp: 20   Temp: 98.3 °F (36.8 °C)   SpO2: 94%         Physical Exam:  Constitutional: confused, chronically ill appearing  HEENT: Sclera anicteric, mucosal membranes moist  Cardiovascular: Regular rate and rhythm.  No murmurs.  Respiratory: Respirations nonlabored, no crepitus  GI: Abdomen nondistended, soft, and nontender.  Normal active bowel sounds.  No masses palpable. PEG site noted with abdominal binder over.  Rectal: Deferred  Musculoskeletal: No pitting edema of the lower legs.  Neurological: confused not oriented. Does respond to name    Intake/Output:    Intake/Output Summary (Last 24 hours) at 1/16/2025 1019  Last data filed at 1/16/2025 0845  Gross per 24 hour   Intake 410 ml   Output --   Net 410 ml        Current Medications:  Current Facility-Administered Medications   Medication Dose Route Frequency Provider Last Rate Last Admin    HYDROmorphone HCl PF (DILAUDID) injection 0.25 mg  0.25 mg IntraVENous Q3H PRN Adonis Nova MD        Or    HYDROmorphone HCl PF (DILAUDID) injection 0.5 mg  0.5 mg IntraVENous Q3H PRN Adonis Nova MD   0.5 mg at 01/16/25 0524    polyvinyl alcohol (LIQUIFILM TEARS) 1.4 % ophthalmic solution 2 drop  2 drop Both Eyes Q4H PRN Adonis Nova MD   2 drop at 01/15/25 1353    ipratropium 0.5 mg-albuterol 2.5 mg (DUONEB) nebulizer solution 1 Dose  1 Dose Inhalation 4x Daily RT Adonis Nova MD   1 Dose at 01/16/25 0739     1/12/2025.    Mildly prominent interstitial markings which are not significantly changed since the prior study. No curly B lines. No consolidation. Stable heart size.  Impression: Impression:     No acute cardiopulmonary abnormality.      Electronically signed by Timothy Cantor MD       Labs:   Recent Labs     01/14/25  0641 01/14/25  2030 01/15/25  0318 01/15/25  0819 01/16/25  0546   WBC 10.1  --   --  11.3* 10.5   HGB 9.8*  --   --  10.5* 10.1*     --   --  439 429   ALKPHOS 49  --   --   --   --    ALT <5*  --   --   --   --    AST 16  --   --   --   --    BILITOT 0.3  --   --   --   --    BUN 6*  --   --   --  6*   CREATININE 0.5*  --   --   --  0.5*     --   --   --  144   K 3.3*  3.3* 3.2* 3.7  --  3.2*  3.3*          Assessment:  Hospital Problems             Last Modified POA    * (Principal) Encephalopathy acute 1/12/2025 Yes    Cerebrovascular accident (CVA) due to embolism of left posterior cerebral artery (HCC) 1/12/2025 Yes    Moderate malnutrition (HCC) (Chronic) 1/13/2025 Yes    Encounter for palliative care 1/13/2025 Yes    Advance care planning 1/13/2025 Yes       69-year-old patient admitted with cognitive decline and altered mental status, history of stroke, hypertension, hyperlipidemia, COPD and diabetes.  Currently undergoing seizure evaluation.  Family interested in possible hospice/palliative care but want PEG placed for feeding.  She has evidence of oropharyngeal dysphagia by speech eval  EGD PEG on 1/15/25: French 20, 3 cm from internal bumper to skin     Plan:  Pending dietician consult  The head of the bed should be kept elevated to 30 degrees during tube feeds, and the tube should be flushed with 30 mL of water every 8 hours and after giving medications through the tube   Continue acid suppression  No further GI plans and will be available as needed  Case discussed with Dr. Pj Nova PAJoshuaC    [facility]    [phone]. Also available via Perfect

## 2025-01-16 NOTE — PROGRESS NOTES
Patient is Alert to self. VSS this shift with exception of elevated heart rate. Pain managed per MAR. Patient had PEG tube placed successfully today, MD notified at time for meds and feeds, dietician consult placed.  Tolerating Q2 turns. Voiding via incontinent pads to prevent skin breakdown. Patient and family updated on plan of care. Fall and safety precautions in place, call light within reach.

## 2025-01-16 NOTE — PROGRESS NOTES
PEG tube flushing well. Jevity 1.5 initiated through PEG tube at rate of 10 mL/HR with 30 mL water flushes q4 per dietitian order.  at bedside and teaching provided on care of PEG tube, med admin through PEG tube, and set up of feeding pump.  verbalized understanding of teaching and all questions answered.

## 2025-01-16 NOTE — CONSULTS
Comprehensive Nutrition Assessment    RECOMMENDATIONS:  PO Diet: NPO  Nutrition Support:  **Patient at high risk of refeeding; EN to run at trophic rate x24 hrs. Recommend initiating : 1 mg Folic acid daily, 100 mg thiamine x5 days, MVI daily  Start: Jevity 1.5 (Standard Formula with Fiber) @ 10 mL/hr- hold for 24hrs  **Closely monitor K+ Mg, Phos and replete as needed prior to advancing from trophic rate  Advance: As tolerated, increase by 10 mL/hr q 8 hours  Goal: 55 mL/hr  Water Flushes: 30ml q4 (Maintenance)    NUTRITION ASSESSMENT:   Nutritional summary & status: Follow up and consult for tube feed ordering and management. Patient remains alert to self only per EMR, s/p PEG placement by GI 1/15 and OK to initiate enteral feeds per GI MD. Per PC note,  will provide care at home with hospice services. Patient is at high risk for refeeding as she has had minimal nutrition for at least the past 10 days. Per CM note, pt must be tolerating EN at goal rate prior to d/c to hospice services. RD will provide EN recommendations and continue to monitor advancement and tolerance.   Admission // PMH: Acute encephalopathy // HTN, COPD, T2DM, osteopenia, lumbosacral spondylosis, oxygen dependent 2L PRN, depression, dementia    MALNUTRITION ASSESSMENT  Context of Malnutrition: Acute Illness   Malnutrition Status: Moderate malnutrition  Findings of the 6 clinical characteristics of malnutrition (Minimum of 2 out of 6 clinical characteristics is required to make the diagnosis of moderate or severe Protein Calorie Malnutrition based on AND/ASPEN Guidelines):  Energy Intake:  50% or less of estimated energy requirements for 5 or more days  Weight Loss:  Mild weight loss (7% over 3.5 months)     Body Fat Loss:  Mild body fat loss Buccal region   Muscle Mass Loss:  Mild muscle mass loss Temples (temporalis)    NUTRITION DIAGNOSIS   Moderate malnutrition, in context of acute illness or injury related to inadequate

## 2025-01-16 NOTE — ANESTHESIA POSTPROCEDURE EVALUATION
Department of Anesthesiology  Postprocedure Note    Patient: Renay Lovett  MRN: [id number]  YOB: 1955  Date of evaluation: 1/15/2025    Procedure Summary       Date: 01/15/25 Room / Location: [facility] 01 / [facility]    Anesthesia Start: 1456 Anesthesia Stop: 1524    Procedure: ESOPHAGOGASTRODUODENOSCOPY PERCUTANEOUS ENDOSCOPIC GASTROSTOMY TUBE INSERTION Diagnosis:       Poor nutrition      (Poor nutrition [E63.9])    Surgeons: Bell Oliva MD Responsible Provider: Aydin Esparza DO    Anesthesia Type: MAC ASA Status: 3            Anesthesia Type: No value filed.    Christopher Phase I: Christopher Score: 8    Christopher Phase II: Christopher Score: 8    Vitals:    01/15/25 1915   BP: 106/64   Pulse: (!) 110   Resp: 18   Temp: 98.4 °F (36.9 °C)   SpO2: (!) 88%       Anesthesia Post Evaluation    Patient location during evaluation: bedside  Patient participation: complete - patient participated  Level of consciousness: awake and awake and alert  Pain score: 0  Airway patency: patent  Nausea & Vomiting: no nausea and no vomiting  Cardiovascular status: hemodynamically stable  Respiratory status: acceptable  Hydration status: euvolemic  Pain management: adequate and satisfactory to patient    No notable events documented.

## 2025-01-16 NOTE — PLAN OF CARE
Problem: Chronic Conditions and Co-morbidities  Goal: Patient's chronic conditions and co-morbidity symptoms are monitored and maintained or improved  1/16/2025 0235 by Lucille Mitchell RN  Outcome: Progressing  1/15/2025 1550 by Marsha Olivas RN  Outcome: Progressing  Flowsheets (Taken 1/15/2025 1550)  Care Plan - Patient's Chronic Conditions and Co-Morbidity Symptoms are Monitored and Maintained or Improved:   Monitor and assess patient's chronic conditions and comorbid symptoms for stability, deterioration, or improvement   Collaborate with multidisciplinary team to address chronic and comorbid conditions and prevent exacerbation or deterioration     Problem: Discharge Planning  Goal: Discharge to home or other facility with appropriate resources  1/16/2025 0235 by Lucille Mitchell RN  Outcome: Progressing  1/15/2025 1550 by Marsha Olivas RN  Outcome: Progressing  Flowsheets (Taken 1/15/2025 1550)  Discharge to home or other facility with appropriate resources:   Identify barriers to discharge with patient and caregiver   Identify discharge learning needs (meds, wound care, etc)   Refer to discharge planning if patient needs post-hospital services based on physician order or complex needs related to functional status, cognitive ability or social support system   Arrange for needed discharge resources and transportation as appropriate   Arrange for interpreters to assist at discharge as needed  Note: Patient updated and educated on barriers to discharge and plan of care.      Problem: Safety - Adult  Goal: Free from fall injury  1/16/2025 0235 by Lucille Mitchell RN  Outcome: Progressing  Note: [place] safety precautions in place, bed locked and in lowest position, bed/chair alarm on, bedside table/call light within reach, gripper socks applied, pt oriented to fall prevention measures.     1/15/2025 1550 by Marsha Olivas RN  Outcome: Progressing  Flowsheets (Taken 1/15/2025

## 2025-01-16 NOTE — CARE COORDINATION
CM following: ELBA spoke with Miladis with [facility] who reports that the pt will need to be to goal and demonstrate tolerating tube feeds prior to discharge. Miladis also reports that hospital staff will need to provide education and teaching to pt's spouse on administering tube feeds at home. ELBA discussed with nurse on duty who will conduct teaching with pt's spouse today when he arrives. Miladis reports they will deliver hospital bed to pt's home tomorrow. Miladis will re-evaluate for home vs. IPU with pt and pt's spouse once pt tolerating tube feeds. ELBA will continue to follow for discharge planning.  Electronically signed by MICHELLE Heredia on 1/16/2025 at 10:29 AM  [phone]

## 2025-01-17 VITALS
OXYGEN SATURATION: 98 % | WEIGHT: 178 LBS | DIASTOLIC BLOOD PRESSURE: 77 MMHG | BODY MASS INDEX: 26.36 KG/M2 | RESPIRATION RATE: 17 BRPM | TEMPERATURE: 98.4 F | SYSTOLIC BLOOD PRESSURE: 158 MMHG | HEIGHT: 69 IN | HEART RATE: 100 BPM

## 2025-01-17 LAB
ALBUMIN CSF-MCNC: 30.4 MG/DL (ref 10–30)
ALBUMIN SERPL-MCNC: 1783 MG/DL (ref 3400–5000)
GLUCOSE BLD-MCNC: 107 MG/DL (ref 70–99)
GLUCOSE BLD-MCNC: 112 MG/DL (ref 70–99)
GLUCOSE BLD-MCNC: 117 MG/DL (ref 70–99)
GLUCOSE BLD-MCNC: 122 MG/DL (ref 70–99)
IGG CSF-MCNC: 5.73 MG/DL (ref 0–6)
IGG CSF/SERPL: 0.41 {RATIO} (ref 0.2–0.7)
IGG SERPL-MCNC: 828 MG/DL (ref 700–1600)
IGG SYNTH RATE SER+CSF CALC-MRATE: -5.2 MG/DAY (ref -9.9–3.3)
PERFORMED ON: ABNORMAL
PROT CSF-MCNC: 62 MG/DL (ref 15–45)

## 2025-01-17 PROCEDURE — 6360000002 HC RX W HCPCS: Performed by: INTERNAL MEDICINE

## 2025-01-17 PROCEDURE — 2500000003 HC RX 250 WO HCPCS: Performed by: NURSE PRACTITIONER

## 2025-01-17 PROCEDURE — 94761 N-INVAS EAR/PLS OXIMETRY MLT: CPT

## 2025-01-17 PROCEDURE — 2700000000 HC OXYGEN THERAPY PER DAY

## 2025-01-17 PROCEDURE — 94640 AIRWAY INHALATION TREATMENT: CPT

## 2025-01-17 PROCEDURE — 2500000003 HC RX 250 WO HCPCS: Performed by: INTERNAL MEDICINE

## 2025-01-17 PROCEDURE — 6370000000 HC RX 637 (ALT 250 FOR IP): Performed by: SURGERY

## 2025-01-17 RX ORDER — IPRATROPIUM BROMIDE AND ALBUTEROL SULFATE 2.5; .5 MG/3ML; MG/3ML
1 SOLUTION RESPIRATORY (INHALATION)
Status: DISCONTINUED | OUTPATIENT
Start: 2025-01-17 | End: 2025-01-17 | Stop reason: HOSPADM

## 2025-01-17 RX ADMIN — METOPROLOL TARTRATE 2.5 MG: 1 INJECTION, SOLUTION INTRAVENOUS at 03:07

## 2025-01-17 RX ADMIN — METOPROLOL TARTRATE 2.5 MG: 1 INJECTION, SOLUTION INTRAVENOUS at 14:07

## 2025-01-17 RX ADMIN — LEVETIRACETAM 1000 MG: 100 INJECTION INTRAVENOUS at 08:52

## 2025-01-17 RX ADMIN — IPRATROPIUM BROMIDE AND ALBUTEROL SULFATE 1 DOSE: 2.5; .5 SOLUTION RESPIRATORY (INHALATION) at 08:39

## 2025-01-17 RX ADMIN — SODIUM CHLORIDE, PRESERVATIVE FREE 10 ML: 5 INJECTION INTRAVENOUS at 08:52

## 2025-01-17 RX ADMIN — METOPROLOL TARTRATE 2.5 MG: 1 INJECTION, SOLUTION INTRAVENOUS at 08:52

## 2025-01-17 NOTE — PROGRESS NOTES
Upper airway sound heard. Auscultated pt, and noise expiratory noise seems to be coming from upper airway, not lungs. No change in expiratory noise before or after breathing tx. Pt had also pulled off nasal cannula. SpO2 87 off O2. Placed back on her 5L, and SpO2 quickly returned to 99%.   01/16/25 9672   Treatment   Treatment Type HHN   $Treatment Type $Inhaled Therapy/Meds   Medications Albuterol/Ipratropium   Pre-Tx Pulse 122   Pre-Tx Resps 18   Breath Sounds Pre-Tx MARYJANE Clear   Breath Sounds Pre-Tx LLL Diminished   Breath Sounds Pre-Tx RUL Clear   Breath Sounds Pre-Tx RML Diminished   Breath Sounds Pre-Tx RLL Diminished   Breath Sounds Post-Tx MARYJANE Clear   Breath Sounds Post-Tx LLL Diminished   Breath Sounds Post-Tx RUL Clear   Breath Sounds Post-Tx RML Clear   Breath Sounds Post-Tx RLL Diminished   Post-Tx Pulse 113   Post-Tx Resps 18   Delivery Source Mask;Oxygen   Position Semi-Cardona's   Treatment Tolerance Well   Duration 7   Is patient on O2? Y   Breath Sounds   Respiratory Pattern Regular   Upper Airway Sounds Other (comment)  (upper aiway squeak on exhalatin)   Oxygen Therapy/Pulse Ox   O2 Therapy Oxygen humidified   O2 Device High flow nasal cannula   O2 Flow Rate (L/min) 5 L/min   Pulse (!) 113   Respirations 18   SpO2 100 %   Skin Assessment Clean, dry, & intact   Pulse Oximeter Device Mode Intermittent   Pulse Oximeter Device Location Finger

## 2025-01-17 NOTE — CARE COORDINATION
Case Management Assessment            Discharge Note                    Date / Time of Note: 1/17/2025 1:52 PM                  Discharge Note Completed by: MICHELLE Heredia    Patient Name: Renay Lovett   YOB: 1955  Diagnosis: Encephalopathy [G93.40]   Date / Time: 1/11/2025  9:08 PM    Current PCP: Melchor Gaitan MD  Clinic patient: No    Hospitalization in the last 30 days: Yes  Readmission Assessment  Number of Days since last admission?: 1-7 days (Transfer from [place])  Previous Disposition: Other (comment) (Transfer from [place])  Who is being Interviewed: Unable to Complete (Transfer from [place])  What was the patient's/caregiver's perception as to why they think they needed to return back to the hospital?: Other (Comment) (Transfer from [place])  Did you visit your Primary Care Physician after you left the hospital, before you returned this time?:  (Transfer from [place])  Why weren't you able to visit your PCP?: Other (Comment) (Transfer from [place])  Did you see a specialist, such as Cardiac, Pulmonary, Orthopedic Physician, etc. after you left the hospital?: Other (Comment) (Transfer from [place])  Who advised the patient to return to the hospital?: Other (Comment) (Transfer from [place])  Does the patient report anything that got in the way of taking their medications?: No (Transfer from [place])  In our efforts to provide the best possible care to you and others like you, can you think of anything that we could have done to help you after you left the hospital the first time, so that you might not have needed to return so soon?: Other (Comment) (Transfer from [place])    Advance Directives:  Code Status: Limited  [place] DNR form completed and on chart: Yes    Financial:  Payor: [place] DUC MEDICARE / Plan: [employer] MEDICARE / Product Type: *No Product type* /      Ph[facility]:    [facility] [place] [facility] [address] - P [phone] - F

## 2025-01-17 NOTE — PROGRESS NOTES
Pt alert to self only. No acute neuro change this shift. Pt HR remains tachy this shift. Elevated BP managed via scheduled BP medication. Pt on 5L high flow NC. No s/s of pain thus far. Voiding well via incontinent briefs. Tube feeds remains through PEG tube. Turning and pillow support utilized. Needs met at this time. Fall precautions in place, call light within reach. Plan of care to continue.

## 2025-01-17 NOTE — RT PROTOCOL NOTE
RT Inhaler-Nebulizer Bronchodilator Protocol Note    There is a bronchodilator order in the chart from a provider indicating to follow the RT Bronchodilator Protocol and there is an “Initiate RT Inhaler-Nebulizer Bronchodilator Protocol” order as well (see protocol at bottom of note).    CXR Findings:  No results found.    The findings from the last RT Protocol Assessment were as follows:   History Pulmonary Disease: Chronic pulmonary disease  Respiratory Pattern: Regular pattern and RR 12-20 bpm  Breath Sounds: Slightly diminished and/or crackles  Cough: Strong, spontaneous, non-productive  Indication for Bronchodilator Therapy: Wheezing associated with pulm disorder  Bronchodilator Assessment Score: 4    Aerosolized bronchodilator medication orders have been revised according to the RT Inhaler-Nebulizer Bronchodilator Protocol below.    Respiratory Therapist to perform RT Therapy Protocol Assessment initially then follow the protocol.  Repeat RT Therapy Protocol Assessment PRN for score 0-3 or on second treatment, BID, and PRN for scores above 3.    No Indications - adjust the frequency to every 6 hours PRN wheezing or bronchospasm, if no treatments needed after 48 hours then discontinue using Per Protocol order mode.     If indication present, adjust the RT bronchodilator orders based on the Bronchodilator Assessment Score as indicated below.  Use Inhaler orders unless patient has one or more of the following: on home nebulizer, not able to hold breath for 10 seconds, is not alert and oriented, cannot activate and use MDI correctly, or respiratory rate 25 breaths per minute or more, then use the equivalent nebulizer order(s) with same Frequency and PRN reasons based on the score.  If a patient is on this medication at home then do not decrease Frequency below that used at home.    0-3 - enter or revise RT bronchodilator order(s) to equivalent RT Bronchodilator order with Frequency of every 4 hours PRN for

## 2025-01-17 NOTE — PLAN OF CARE
Problem: Safety - Adult  Goal: Free from fall injury  1/16/2025 2238 by Rhea Gutierrez, RN  Outcome: Progressing  Note: Fall precautions in place. Bed alarm on, bed in lowest position, call light within reach, non slip socks, hourly rounding.      Problem: Skin/Tissue Integrity  Goal: Absence of new skin breakdown  Description: 1.  Monitor for areas of redness and/or skin breakdown  2.  Assess vascular access sites hourly  3.  Every 4-6 hours minimum:  Change oxygen saturation probe site  4.  Every 4-6 hours:  If on nasal continuous positive airway pressure, respiratory therapy assess nares and determine need for appliance change or resting period.  Outcome: Progressing     Problem: Pain  Goal: Verbalizes/displays adequate comfort level or baseline comfort level  Outcome: Progressing  Note: Pain managed via nonpharm measures and medication per MAR.

## 2025-01-17 NOTE — PROGRESS NOTES
This RN has attempted to call report to IPU with [facility] at number [phone]. Each time this RN receives a message saying \"we're sorry your call not be completed at this time. Pleas hang up and try again later.\" Will attempt to continue calling. At this time transport is at bedside to  patient. Peripheral and IV and midline removed per [facility].

## 2025-01-17 NOTE — PLAN OF CARE
Problem: Discharge Planning  Goal: Discharge to home or other facility with appropriate resources  Outcome: Progressing  Pt and family involved in discharge planning. Patient discharging today to [facility]PU at [facility].      Problem: Safety - Adult  Goal: Free from fall injury  Outcome: Progressing  All fall precautions in place. Bed locked and in lowest position with alarm on. Overbed table and personal belonings within reach. Call light within reach and patient instructed to use call light for assistance. Non-skid socks on.      Problem: Skin/Tissue Integrity  Goal: Absence of new skin breakdown  Description: 1.  Monitor for areas of redness and/or skin breakdown  2.  Assess vascular access sites hourly  3.  Every 4-6 hours minimum:  Change oxygen saturation probe site  4.  Every 4-6 hours:  If on nasal continuous positive airway pressure, respiratory therapy assess nares and determine need for appliance change or resting period.  Outcome: Progressing  Assisted with q2 turns to prevent skin breakdown.       Problem: Pain  Goal: Verbalizes/displays adequate comfort level or baseline comfort level  Outcome: Progressing  Patient denying pain throughout shift thus far

## 2025-01-21 ENCOUNTER — CARE COORDINATION (OUTPATIENT)
Dept: CARE COORDINATION | Age: 70
End: 2025-01-21

## 2025-01-21 DIAGNOSIS — E51.9 VITAMIN B1 DEFICIENCY: Primary | ICD-10-CM

## 2025-01-21 LAB — PROT PATTERN CSF ELPH-IMP: NORMAL

## 2025-01-21 RX ORDER — THIAMINE MONONITRATE (VIT B1) 100 MG
100 TABLET ORAL DAILY
Qty: 90 TABLET | Refills: 3 | Status: SHIPPED | OUTPATIENT
Start: 2025-01-21 | End: 2026-01-16

## 2025-01-21 NOTE — CARE COORDINATION
Chart reviewed. Patient was transferred to [facility] IPU. Care coordination will complete at this time as she is not inpatient hospice.

## 2025-01-22 LAB
OLIGOCLONAL BANDS CSF QL: 0
OLIGOCLONAL BANDS SERPL QL: 0

## 2025-01-30 NOTE — PROGRESS NOTES
Labs For all offices you need to make an appointment for lab,  you DO need an appointment for most lab facilities/clinics  Fyi only: if you see your labs entered for next visit it is a date range, please see below for when to have the labs drawn    Please call now to schedule a lab appointment for at least 3 days up to 2 weeks before your future appointment in July     Please obtain fasting lab work , at least 3 days up to 2 weeks before your future appointment in July   The labs can be performed at any [facility]   (Definition of fasting Fasting 12 hours without food or juice, black coffee is acceptable and please drink a lot of water to stay hydrated, it makes it easier for lab to draw your blood)     I recommend the RSV and COVID shot, you can get both at the pharmacy, we are unable to give RSV shots at our clinic    Referrals specialist endocrine for high calcium   please call [phone]      [facility] for ear do both  Right and left daily till I see you back    Medicare Wellness Visit  Plan for Preventive Care    A good way for you to stay healthy is to use preventive care.  Medicare covers many services that can help you stay healthy.* The goal of these services is to find any health problems as quickly as possible. Finding problems early can help make them easier to treat.  Your personal plan below lists the services you may need and when they are due.      Health Maintenance Summary       Respiratory Syncytial Virus (RSV) Vaccine 60+ (1 - 1-dose 75+ series)  Never done    Hepatitis B Vaccine (For Physician/APC Discussion) (3 of 3 - 19+ 3-dose series)  Overdue since 6/6/2023    COVID-19 Vaccine (8 - 2024-25 season)  Overdue since 9/1/2024    Traditional Medicare- Medicare Wellness Visit (Yearly)  Due since 1/1/2025    Depression Screening (Yearly)  Next due on 1/23/2026    DTaP/Tdap/Td Vaccine (2 - Td or Tdap)  Next due on 11/13/2028    Osteoporosis Screening   Completed    Pneumococcal Vaccine 50+    Completed    Shingles Vaccine   Completed    Hepatitis A Vaccine   Aged Out    Influenza Vaccine   Completed    Meningococcal Vaccine   Aged Out    Meningococcal Serogroup B Vaccine   Aged Out    HPV Vaccine   Aged Out             Preventive Care for Women and Men    Heart Screenings (Cardiovascular):  Blood tests are used to check your cholesterol, lipid and triglyceride levels. High levels can increase your risk for heart disease and stroke. High levels can be treated with medications, diet and exercise. Lowering your levels can help keep your heart and blood vessels healthy.  Your provider will order these tests if they are needed.    An ultrasound is done to see if you have an abdominal aortic aneurysm (AAA).  This is an enlargement of one of the main blood vessels that delivers blood to the body.   In the [place], 9,000 deaths are caused by AAA.  You may not even know you have this problem and as many as 1 in 3 people will have a serious problem if it is not treated.  Early diagnosis allows for more effective treatment and cure.  If you have a family history of AAA or are a male age 65-75 who has smoked, you are at higher risk of an AAA.  Your provider can order this test, if needed.    Colorectal Screening:  There are many tests that are used to check for cancer of your colon and rectum. You and your provider should discuss what test is best for you and when to have it done.  Options include:  Screening Colonoscopy: exam of the entire colon, seen through a flexible lighted tube.  Flexible Sigmoidoscopy: exam of the last third (sigmoid portion) of the colon and rectum, seen through a flexible lighted tube.  Cologuard DNA stool test: a sample of your stool is used to screen for cancer and unseen blood in your stool.  Fecal Occult Blood Test: a sample of your stool is studied to find any unseen blood    Flu Shot:  An immunization that helps to prevent influenza (the flu). You should get this every year. The  best time to get the shot is in the fall.    Pneumococcal Shot:  Vaccines help prevent pneumococcal disease, which is any type of illness caused by Streptococcus pneumoniae bacteria. There are two kinds of pneumococcal vaccines available in the [place]:   Pneumococcal conjugate vaccines (PCV20 or Llcevnu49®)  Pneumococcal polysaccharide vaccine (PPSV23 or Gdvbmkhcw76®)  For those who have never received any pneumococcal conjugate vaccine, CDC recommends PVC20 for adults 65 years or older and adults 19 through 64 years old with certain medical conditions or risk factors.   For those who have previously received PCV13, this should be followed by a dose of PPSV23.     Hepatitis B Shot:  An immunization that helps to protect people from getting Hepatitis B. Hepatitis B is a virus that spreads through contact with infected blood or body fluids. Many people with the virus do not have symptoms.  The virus can lead to serious problems, such as liver disease. Some people are at higher risk than others. Your doctor will tell you if you need this shot.     Diabetes Screening:  A test to measure sugar (glucose) in your blood is called a fasting blood sugar. Fasting means you cannot have food or drink for at least 8 hours before the test. This test can detect diabetes long before you may notice symptoms.    Glaucoma Screening:  Glaucoma screening is performed by your eye doctor. The test measures the fluid pressure inside your eyes to determine if you have glaucoma.     Hepatitis C Screening:  A blood test to see if you have the hepatitis C virus.  Hepatitis C attacks the liver and is a major cause of chronic liver disease.  Medicare will cover a single screening for all adults born between 1945 & 1965, or high risk patients (people who have injected illegal drugs or people who have had blood transfusions).  High risk patients who continue to inject illegal drugs can be screened for Hepatitis C every year.    Smoking and  Isovue-370 FINDINGS: Aortic arch: Sequential 3 vessel branching pattern. Mild atherosclerotic calcification of the arch. Subclavian arteries: Mildly limited in evaluation secondary to streak artifact from contrast bolus injection, otherwise normal. Common carotid arteries: Moderate atherosclerotic calcifications of the right carotid bifurcation and moderate atherosclerotic calcification of the left carotid bifurcation. No flow-limiting stenosis on the right. Limited evaluation of luminal patency of  the left and the distal left common carotid artery due to streak artifact but there does not appear to be flow-limiting stenosis. Right cervical internal carotid artery: No splenomegaly stenosis of the proximal right cervical internal carotid by NASCET criteria. Right external carotid artery: Patent Left cervical internal carotid artery: No flow-limiting stenosis of the proximal left cervical internal carotid by NASCET criteria. Left external carotid artery: Patent Intracranial internal carotid arteries: Normal Anterior circulation: M1, A1, and their more distal segments are normal. Cervical vertebral arteries: Normal Intracranial vertebral arteries: Normal Basilar artery: Normal PICA, AICA, superior cerebellar arteries: Normal Posterior circulation: Normal. Dural venous sinuses: Normal Contrast enhanced brain parenchyma: No intracranial mass or mass effect. No acute loss of gray-white differentiation. Ventricles are normal in volume. Mild diffuse cerebral volume. Orbits: Normal Paranasal sinuses and mastoid air cells: Mucosal filling of the left sphenoid sinus. Other sinuses are clear. Neck soft tissues: Normal Osseous structures: No suspicious osseous lesions. Mild multilevel spondylosis of the cervical spine. Lung apices: Clear     CTA Head: No large vessel occlusion, flow-limiting stenosis, or intracranial aneurysm. CTA Neck: There is moderate atherosclerotic calcification at both carotid bifurcations but no definite  Tobacco-Use Cessation Counseling:  Tobacco is the single greatest cause of disease and early death in our country today. Medication and counseling together can increase a person’s chance of quitting for good.   Medicare covers two quitting attempts per year, with four counseling sessions per attempt (eight sessions in a 12 month period)    Preventive Screening tests for Women    Screening Mammograms and Breast Exams:  An x-ray of your breasts to check for breast cancer before you or your doctor may be able to feel it.  If breast cancer is found early it can usually be treated with success.    Pelvic Exams and Pap Tests:  An exam to check for cervical and vaginal cancer. A Pap test is a lab test in which cells are taken from your cervix and sent to the lab to look for signs of cervical cancer. If cancer of the cervix is found early, chances for a cure are good. Testing can generally end at age 65, or if a woman has a hysterectomy for a benign condition. Your provider may recommend more frequent testing if certain abnormal results are found.    Bone Mass Measurements:  A painless x-ray of your bone density to see if you are at risk for a broken bone. Bone density refers to the thickness of bones or how tightly the bone tissue is packed.    Preventive Screening tests for Men    Prostate Screening:  Should you have a prostate cancer test (PSA)?  It is up to you to decide if you want a prostate cancer test. Talk to your clinician to find out if the test is right for you.  Things for you to consider and talk about should include:  Benefits and harms of the test  Your family history  How your race/ethnicity may influence the test  If the test may impact other medical conditions you have  Your values on screenings and treatments    *Medicare pays for many preventive services to keep you healthy. For some of these services, you might have to pay a deductible, coinsurance, and / or copayment.  The amounts vary depending on  the type of services you need and the kind of Medicare health plan you have.    For further details on screenings offered by Medicare please visit: https://www.medicare.gov/coverage/preventive-screening-services      BLOODCULT2 No Growth after 4 days of incubation. 01/09/2025 02:46 PM     Organism: No results found for: \"ORG\"      Electronically signed by Adonis Nova MD on 1/17/2025 at 10:49 AM

## 2025-06-04 ENCOUNTER — CARE COORDINATION (OUTPATIENT)
Dept: CARE COORDINATION | Age: 70
End: 2025-06-04

## (undated) DEVICE — COVER,MAYO STAND,XL,STERILE: Brand: MEDLINE

## (undated) DEVICE — SST BUR, WIRE PASS DRILL, 2 FLUTES, MED., 1.5MM DIA: Brand: MICROAIRE®

## (undated) DEVICE — COVER LT HNDL BLU PLAS

## (undated) DEVICE — COVER,TABLE,HEAVY DUTY,77"X90",STRL: Brand: MEDLINE

## (undated) DEVICE — SYSTEM SKIN CLSR 22CM DERMBND PRINEO

## (undated) DEVICE — INTENDED FOR TISSUE SEPARATION, AND OTHER PROCEDURES THAT REQUIRE A SHARP SURGICAL BLADE TO PUNCTURE OR CUT.: Brand: BARD-PARKER ® STAINLESS STEEL BLADES

## (undated) DEVICE — SUTURE MCRYL SZ 4-0 L27IN ABSRB UD L19MM PS-2 1/2 CIR PRIM Y426H

## (undated) DEVICE — INTENDED FOR TISSUE SEPARATION, AND OTHER PROCEDURES THAT REQUIRE A SHARP SURGICAL BLADE TO PUNCTURE OR CUT.: Brand: BARD-PARKER ® CARBON RIB-BACK BLADES

## (undated) DEVICE — PAD,ABDOMINAL,5"X9",ST,LF,25/BX: Brand: MEDLINE INDUSTRIES, INC.

## (undated) DEVICE — 3M™ IOBAN™ 2 ANTIMICROBIAL INCISE DRAPE 6648EZ: Brand: IOBAN™ 2

## (undated) DEVICE — ELECTROSURGICAL PENCIL ROCKER SWITCH NON COATED BLADE ELECTRODE 10 FT (3 M) CORD HOLSTER: Brand: MEGADYNE

## (undated) DEVICE — SST TWIST DRILL, STANDARD, 2.4MM DIA. X 127MM: Brand: MICROAIRE®

## (undated) DEVICE — APPLICATOR MEDICATED 26 CC SOLUTION HI LT ORNG CHLORAPREP

## (undated) DEVICE — SUTURE FIBERWIRE SZ 2 W/ TAPERED NEEDLE BLUE L38IN NONABSORB BLU L26.5MM 1/2 CIRCLE AR7200

## (undated) DEVICE — UNDERGLOVE SURG SZ 8 BLU LTX FREE SYN POLYISOPRENE POLYMER

## (undated) DEVICE — SPONGE GZ W4XL8IN COT WVN 12 PLY

## (undated) DEVICE — SUTURE VCRL SZ 0 L18IN ABSRB UD L36MM CT-1 1/2 CIR J840D

## (undated) DEVICE — DRAPE 70X60IN SPLIT IMPERV ADHES STRIP

## (undated) DEVICE — TUBE SUCT YANKR ST DISP W/ CTRL BULB TP

## (undated) DEVICE — SOLUTION IV 1000ML 0.9% SOD CHL

## (undated) DEVICE — PEEL-AWAY HOOD: Brand: FLYTE, SURGICOOL

## (undated) DEVICE — STOCKINETTE ORTH W9XL36IN COT 2 PLY HLLW FOR HANDLING LMB

## (undated) DEVICE — GLOVE SURG SZ 8 L12IN FNGR THK75MIL WHT LTX POLYMER BEAD

## (undated) DEVICE — GOWN,REINFRCE,POLY,ECLIPSE,SLV,3XLG: Brand: MEDLINE

## (undated) DEVICE — SUTURE ETHBND EXCEL SZ 2 L30IN NONABSORBABLE GRN L40MM V-37 MX69G

## (undated) DEVICE — PLATE ES AD W 9FT CRD 2

## (undated) DEVICE — 3M™ STERI-DRAPE™ U-DRAPE 1067 1067 5/BX 4BX/CS/CTN&#X20;: Brand: STERI-DRAPE™

## (undated) DEVICE — E-Z CLEAN, NON-STICK, PTFE COATED, ELECTROSURGICAL BLADE ELECTRODE, 2.5 INCH (6.35 CM): Brand: EZ CLEAN

## (undated) DEVICE — Z INACTIVE USE 2660664 SOLUTION IRRIG 3000ML 0.9% SOD CHL USP UROMATIC PLAS CONT

## (undated) DEVICE — HIGH FLOW TIP

## (undated) DEVICE — HANDPIECE SUCTION TUBING INTERPULSE 10FT

## (undated) DEVICE — PROTECTOR ULN NRV PUR FOAM HK LOOP STRP ANATOMICALLY

## (undated) DEVICE — GARMENT,MEDLINE,DVT,INT,CALF,MED, GEN2: Brand: MEDLINE

## (undated) DEVICE — INTENDED TO SUPPORT AND MAINTAIN THE POSITION OF AN ANESTHETIZED PATIENT DURING SURGERY: Brand: ERIN BEACH CHAIR FACE MASK

## (undated) DEVICE — PAD DRY FLOOR ABS 32X58IN GRN

## (undated) DEVICE — SUTURE VCRL SZ 2-0 L18IN ABSRB UD CT-1 L36MM 1/2 CIR J839D

## (undated) DEVICE — 1010 S-DRAPE TOWEL DRAPE 10/BX: Brand: STERI-DRAPE™

## (undated) DEVICE — SOLUTION IV IRRIG WATER 1000ML POUR BRL 2F7114

## (undated) DEVICE — 3M™ COBAN™ NL STERILE NON-LATEX SELF-ADHERENT WRAP, 2086S, 6 IN X 5 YD (15 CM X 4,5 M), 12 ROLLS/CASE: Brand: 3M™ COBAN™

## (undated) DEVICE — BLANKET WRM W40.2XL55.9IN IORT LO BODY + MISTRAL AIR

## (undated) DEVICE — BIT DRL 25X127MM

## (undated) DEVICE — GOWN,SIRUS,POLYRNF,BRTHSLV,XLN/XXL,18/CS: Brand: MEDLINE

## (undated) DEVICE — DUAL CUT SAGITTAL BLADE

## (undated) DEVICE — KIT SHLDR STBL MARCO FOR SPIDER LIMB POS

## (undated) DEVICE — GUARD PROTECTOR EYE ADLT ST

## (undated) DEVICE — BIT DRL DIA4MM CALIB GRAD DEPTH MRK FOR ENCORE SYS

## (undated) DEVICE — PAD,NON-ADHERENT,3X8,STERILE,LF,1/PK: Brand: MEDLINE

## (undated) DEVICE — CARBIDE BUR, OVAL CUTTING, 8 FLUTES, MED., 4MM DIA.: Brand: MICROAIRE®

## (undated) DEVICE — SURGICAL SET UP - SURE SET: Brand: MEDLINE INDUSTRIES, INC.

## (undated) DEVICE — SPONGE,LAP,18"X18",DLX,XR,ST,5/PK,40/PK: Brand: MEDLINE

## (undated) DEVICE — SURE SET-DOUBLE BASIN-LF: Brand: MEDLINE INDUSTRIES, INC.

## (undated) DEVICE — PACK,SHOULDER II,DRAPE: Brand: MEDLINE

## (undated) DEVICE — TUBE PERC ENDO GASTSTMY 20FR

## (undated) DEVICE — GLENOID, HEAD W/RETAINING SCREW, RSP, 36MM, NEUTRAL
Type: IMPLANTABLE DEVICE | Site: SHOULDER | Status: NON-FUNCTIONAL
Brand: DJO SURGICAL
Removed: 2021-07-20

## (undated) DEVICE — TAP SURG DIA65MM SHLDR GUID RMR RSP

## (undated) DEVICE — JEWISH HOSPITAL TURNOVER KIT: Brand: MEDLINE INDUSTRIES, INC.

## (undated) DEVICE — PIN DRVR L35IN BNE QUIK REL SMOOTH

## (undated) DEVICE — 3M™ STERI-DRAPE™ U-DRAPE 1015: Brand: STERI-DRAPE™

## (undated) DEVICE — ENDOVIVE SFT PEG KIT PULL WENFIT 20F BX2

## (undated) DEVICE — GOWN,SIRUS,POLYRNF,BRTHSLV,XL,30/CS: Brand: MEDLINE